# Patient Record
Sex: FEMALE | Race: WHITE | NOT HISPANIC OR LATINO | Employment: UNEMPLOYED | URBAN - METROPOLITAN AREA
[De-identification: names, ages, dates, MRNs, and addresses within clinical notes are randomized per-mention and may not be internally consistent; named-entity substitution may affect disease eponyms.]

---

## 2017-07-19 ENCOUNTER — ALLSCRIPTS OFFICE VISIT (OUTPATIENT)
Dept: OTHER | Facility: OTHER | Age: 57
End: 2017-07-19

## 2017-07-19 DIAGNOSIS — I10 ESSENTIAL (PRIMARY) HYPERTENSION: ICD-10-CM

## 2017-07-22 ENCOUNTER — TRANSCRIBE ORDERS (OUTPATIENT)
Dept: LAB | Facility: CLINIC | Age: 57
End: 2017-07-22

## 2017-07-22 ENCOUNTER — APPOINTMENT (OUTPATIENT)
Dept: LAB | Facility: CLINIC | Age: 57
End: 2017-07-22
Payer: COMMERCIAL

## 2017-07-22 DIAGNOSIS — I10 ESSENTIAL (PRIMARY) HYPERTENSION: ICD-10-CM

## 2017-07-22 LAB
ALBUMIN SERPL BCP-MCNC: 3.5 G/DL (ref 3.5–5)
ALP SERPL-CCNC: 108 U/L (ref 46–116)
ALT SERPL W P-5'-P-CCNC: 23 U/L (ref 12–78)
ANION GAP SERPL CALCULATED.3IONS-SCNC: 8 MMOL/L (ref 4–13)
AST SERPL W P-5'-P-CCNC: 28 U/L (ref 5–45)
BILIRUB SERPL-MCNC: 0.65 MG/DL (ref 0.2–1)
BUN SERPL-MCNC: 16 MG/DL (ref 5–25)
CALCIUM SERPL-MCNC: 8.9 MG/DL (ref 8.3–10.1)
CHLORIDE SERPL-SCNC: 103 MMOL/L (ref 100–108)
CHOLEST SERPL-MCNC: 166 MG/DL (ref 50–200)
CO2 SERPL-SCNC: 27 MMOL/L (ref 21–32)
CREAT SERPL-MCNC: 0.78 MG/DL (ref 0.6–1.3)
GFR SERPL CREATININE-BSD FRML MDRD: >60 ML/MIN/1.73SQ M
GLUCOSE P FAST SERPL-MCNC: 94 MG/DL (ref 65–99)
HDLC SERPL-MCNC: 55 MG/DL (ref 40–60)
LDLC SERPL CALC-MCNC: 73 MG/DL (ref 0–100)
POTASSIUM SERPL-SCNC: 4.5 MMOL/L (ref 3.5–5.3)
PROT SERPL-MCNC: 7.6 G/DL (ref 6.4–8.2)
SODIUM SERPL-SCNC: 138 MMOL/L (ref 136–145)
TRIGL SERPL-MCNC: 191 MG/DL
TSH SERPL DL<=0.05 MIU/L-ACNC: 2.89 UIU/ML (ref 0.36–3.74)

## 2017-07-22 PROCEDURE — 36415 COLL VENOUS BLD VENIPUNCTURE: CPT

## 2017-07-22 PROCEDURE — 84443 ASSAY THYROID STIM HORMONE: CPT

## 2017-07-22 PROCEDURE — 80053 COMPREHEN METABOLIC PANEL: CPT

## 2017-07-22 PROCEDURE — 80061 LIPID PANEL: CPT

## 2017-07-24 ENCOUNTER — GENERIC CONVERSION - ENCOUNTER (OUTPATIENT)
Dept: OTHER | Facility: OTHER | Age: 57
End: 2017-07-24

## 2017-11-16 ENCOUNTER — ALLSCRIPTS OFFICE VISIT (OUTPATIENT)
Dept: OTHER | Facility: OTHER | Age: 57
End: 2017-11-16

## 2017-11-16 ENCOUNTER — LAB REQUISITION (OUTPATIENT)
Dept: LAB | Facility: HOSPITAL | Age: 57
End: 2017-11-16
Payer: COMMERCIAL

## 2017-11-16 DIAGNOSIS — Z12.39 ENCOUNTER FOR OTHER SCREENING FOR MALIGNANT NEOPLASM OF BREAST: ICD-10-CM

## 2017-11-16 DIAGNOSIS — Z01.419 ENCOUNTER FOR GYNECOLOGICAL EXAMINATION WITHOUT ABNORMAL FINDING: ICD-10-CM

## 2017-11-16 PROCEDURE — G0145 SCR C/V CYTO,THINLAYER,RESCR: HCPCS | Performed by: OBSTETRICS & GYNECOLOGY

## 2017-11-16 PROCEDURE — 87624 HPV HI-RISK TYP POOLED RSLT: CPT | Performed by: OBSTETRICS & GYNECOLOGY

## 2017-11-22 LAB — HPV RRNA GENITAL QL NAA+PROBE: NORMAL

## 2017-11-28 ENCOUNTER — GENERIC CONVERSION - ENCOUNTER (OUTPATIENT)
Dept: OTHER | Facility: OTHER | Age: 57
End: 2017-11-28

## 2017-11-28 LAB
LAB AP GYN PRIMARY INTERPRETATION: NORMAL
Lab: NORMAL

## 2017-12-16 ENCOUNTER — HOSPITAL ENCOUNTER (OUTPATIENT)
Dept: MAMMOGRAPHY | Facility: CLINIC | Age: 57
Discharge: HOME | End: 2017-12-16
Payer: COMMERCIAL

## 2017-12-16 DIAGNOSIS — Z12.39 ENCOUNTER FOR OTHER SCREENING FOR MALIGNANT NEOPLASM OF BREAST: ICD-10-CM

## 2017-12-16 PROCEDURE — 77063 BREAST TOMOSYNTHESIS BI: CPT

## 2017-12-16 PROCEDURE — G0202 SCR MAMMO BI INCL CAD: HCPCS

## 2017-12-18 ENCOUNTER — GENERIC CONVERSION - ENCOUNTER (OUTPATIENT)
Dept: OTHER | Facility: OTHER | Age: 57
End: 2017-12-18

## 2018-01-09 NOTE — RESULT NOTES
Verified Results  (1) THIN PREP PAP WITH IMAGING 16VJS8886 02:29PM Ev Nuñez Order Number: [id number]     Test Name Result Flag Reference   LAB AP CASE REPORT (Report)     Gynecologic Cytology Report            Case: [id number]                  Authorizing Provider: Abran Sanchez MD     Collected:      11/16/2017 1429        First Screen:     ADILENE Coon   Received:      11/20/2017 1109        Rescreen:       ADILENE Jaime                              Specimen:  LIQUID-BASED PAP, SCREENING, Cervix   HPV HIGH RISK RESULT (Report)     HPV, High Risk: HPV NEG, HPV16 NEG, HPV18 NEG      Other High Risk HPV Negative, HPV 16 Negative, HPV 18 Negative  HPV types: 16,18,31,33,35,39,45,51,52,56,58,59,66 and 68 DNA are undetectable or below the pre-set threshold  [employer] FDA approved Shiva 4800 is utilized with strict adherence to the manufacturers instruction  manual to test for the presence of High-Risk HPV DNA, as well as HPV 16 and HPV 18  This instrument  has been validated by our laboratory and/or by the   A negative result does not preclude the presence of HPV infection because results depend on adequate  specimen collection, absence of inhibitors and sufficient DNA to be detected  Additionally, HPV negative  results are not intended to prevent women from proceeding to colposcopy if clinically warranted  Positive HPV test results indicate the presence of any one or more of the high risk types, but since patients  are often co-infected with low-risk types it does not rule out the presence of low-risk types in patients  with mixed infections  LAB AP GYN PRIMARY INTERPRETATION      Negative for intraepithelial lesion or malignancy  Electronically signed by ADILENE Jaime on 11/28/2017 at 2:18 PM   LAB AP GYN SPECIMEN ADEQUACY      Satisfactory for evaluation  Endocervical/transformation zone component present     LAB AP GYN ADDITIONAL INFORMATION (Report) [employer]'s FDA approved ,  and ThinPrep Imaging System are   utilized with strict adherence to the 's instruction manual to   prepare gynecologic and non-gynecologic cytology specimens for the   production of ThinPrep slides as well as for gynecologic ThinPrep imaging  These processes have been validated by our laboratory and/or by the     The Pap test is not a diagnostic procedure and should not be used as the   sole means to detect cervical cancer  It is only a screening procedure to   aid in the detection of cervical cancer and its precursors  Both   false-negative and false-positive results have been experienced  Your   patient's test result should be interpreted in this context together with   the history and clinical findings

## 2018-01-09 NOTE — PROGRESS NOTES
Assessment    1  Encounter for preventive health examination (V70 0) (Z00 00)   2  Arthralgia of left hip (889 45) (N68 885)    Discussion/Summary    Well adult  Patient appears to be in stable health  She was again encouraged to obtain colonoscopy for screening test  She will obtain blood work as ordered  Hip pain  Patient will obtain x-ray of left hip for further evaluation  If x-rays within normal limits she will consider starting physical therapy  Chief Complaint  Pt is here for a well exam  Pt offers no complaints  Pt would like BW ordered  All meds/allergies reviewed with pt  History of Present Illness  HPI: Patient does exercise 3 days a week with a dance/aerobics class  She denies any recent illness  Her gynecology exam is up to date  Her mammogram is up to date however patient has not obtain colonoscopy  Review of Systems    Constitutional: No fever, no chills, feels well, no tiredness, no recent weight gain or weight loss  Eyes: No complaints of eye pain, no red eyes, no eyesight problems, no discharge, no dry eyes, no itching of eyes  ENT: no complaints of earache, no loss of hearing, no nose bleeds, no nasal discharge, no sore throat, no hoarseness  Cardiovascular: No complaints of slow heart rate, no fast heart rate, no chest pain, no palpitations, no leg claudication, no lower extremity edema  Respiratory: No complaints of shortness of breath, no wheezing, no cough, no SOB on exertion, no orthopnea, no PND  Gastrointestinal: No complaints of abdominal pain, no constipation, no nausea or vomiting, no diarrhea, no bloody stools  Genitourinary: No complaints of dysuria, no incontinence, no pelvic pain, no dysmenorrhea, no vaginal discharge or bleeding  Musculoskeletal: Patient complains of intermittent left hip discomfort which can last from a few seconds to several hours  She denies any acute trauma   Patient describes days where she can exercise without any discomfort of hip    Integumentary: No complaints of skin rash or lesions, no itching, no skin wounds, no breast pain or lump  Active Problems     1  Encounter for cervical Pap smear with pelvic exam (V76 2,V72 31) (Z01 419)   2  Encounter for screening colonoscopy (V76 51) (Z12 11)   3  Insect bite (919 4,E906 4) (W57 XXXA)   4  Lyme disease (088 81) (A69 20)   5  Skin rash (782 1) (R21)    Hyperlipidemia (272 4) (E78 5)       Benign essential hypertension (401 1) (I10)       Vitamin D deficiency (268 9) (E55 9)          Past Medical History    · History of Encounter for screening mammogram for malignant neoplasm of breast  (V76 12) (Z12 31)   · History of rosacea (V13 3) (Z87 2)   · History of Reported Pap Smear   · History of Skin Cancer (V10 83)    Social History    · Never a smoker    Current Meds   1  Artificial Tears 0 4 % Ophthalmic Solution; Therapy: (Recorded:09Zgi7503) to Recorded   2  Atorvastatin Calcium 20 MG Oral Tablet; Take 1 tablet daily; Therapy: 03Bpc8861 to (Last Tenny Stage)  Requested for: 89Yen4868 Ordered   3  Bystolic 10 MG Oral Tablet; TAKE ONE (1) TABLET(S) DAILY; Therapy: 06IRR6159 to (0487 72 23 66)  Requested for: 60Kew1696; Last   Rx:91Wmx7984 Ordered   4  Restasis 0 05 % Ophthalmic Emulsion; Therapy: (Recorded:58Iyu3137) to Recorded   5  Vitamin D (Ergocalciferol) 49244 UNIT Oral Capsule; TAKE 1 CAPSULE WEEKLY; Therapy: 83QBX9394 to (Last KZ:46VLN7534)  Requested for: 55ZXN6163 Ordered    Allergies    1  No Known Drug Allergies    Vitals   Recorded: 22Rko8608 09:09AM   Temperature 97 5 F   Heart Rate 82   Systolic 823   Diastolic 80   Height 5 ft 7 in   Weight 227 lb 4 00 oz   BMI Calculated 35 59   BSA Calculated 2 13     Physical Exam    Constitutional   General appearance: No acute distress, well appearing and well nourished      Ears, Nose, Mouth, and Throat   External inspection of ears and nose: Normal     Otoscopic examination: Tympanic membranes translucent with normal light reflex  Canals patent without erythema  Oropharynx: Normal with no erythema, edema, exudate or lesions  Pulmonary   Respiratory effort: No increased work of breathing or signs of respiratory distress  Auscultation of lungs: Clear to auscultation  Cardiovascular   Auscultation of heart: Normal rate and rhythm, normal S1 and S2, no murmurs  Carotid pulses: 2+ bilaterally  Examination of extremities for edema and/or varicosities: Normal     Abdomen   Abdomen: Non-tender, no masses  Liver and spleen: No hepatomegaly or splenomegaly  Lymphatic   Palpation of lymph nodes in neck: No lymphadenopathy  Musculoskeletal   Gait and station: Normal     Joints, bones, and muscles: Normal     Range of motion: Abnormal   Patient has mild to moderate decreased range of motion with left hip in internal and external rotation  Negative tenderness to palpation of the greater trochanter  Stability: Normal     Muscle strength/tone: Normal        Health Management  Encounter for cervical Pap smear with pelvic exam   (every) THINPREP PAP; every 1 year; Next Due: 47NNR8297; Overdue  Encounter for screening colonoscopy   COLONOSCOPY; every 10 years; Next Due: 06NCS2956; Overdue  History of Encounter for screening mammogram for malignant neoplasm of breast   Digital Bilateral Screening Mammogram With CAD; every 1 year; Next Due: 00BXR3665;   Overdue    Signatures   Electronically signed by : JABARI Posada ; Feb 29 3135 11:03AM EST                       (Author)

## 2018-01-10 NOTE — RESULT NOTES
Verified Results  * XR HIP 2+ VIEW LEFT 67XWT1941 08:87UF Elsa Broderick     Test Name Result Flag Reference   * XR HIP/PELV 2-3 VWS LEFT (Report)     LEFT HIP     INDICATION: Left hip pain  COMPARISON: None     VIEWS: AP pelvis and 2 coned down views; 3 images     FINDINGS:     There is no fracture or dislocation  Moderate degenerative osteoarthritis of the hip joint with joint space narrowing and osteophyte formation of the femoral head and acetabulum  Degenerative disc disease within the lower lumbar spine  Mild degenerative osteoarthritis of the right hip  No lytic or blastic lesions are seen  Soft tissues are unremarkable  IMPRESSION:     Degenerative osteoarthritis  No acute osseous abnormality  Workstation performed: [id number]     Signed by:   Sabra Lee DO   3/22/16       Discussion/Summary   hip xray shows moderate arthritis   I would refer to [facility] for further eval and treat

## 2018-01-10 NOTE — RESULT NOTES
Verified Results  (1) COMPREHENSIVE METABOLIC PANEL 83QKO8308 95:13SU Dearl Pointer    Order Number: [id number]  TW Order Number: [id number] Order Number: [id number] Order Number: [id number]     Test Name Result Flag Reference   GLUCOSE,RANDM 91 mg/dL     If the patient is fasting, the ADA then defines impaired fasting glucose as > 100 mg/dL and diabetes as > or equal to 123 mg/dL  SODIUM 140 mmol/L  136-145   POTASSIUM 4 5 mmol/L  3 5-5 3   CHLORIDE 106 mmol/L  100-108   CARBON DIOXIDE 29 mmol/L  21-32   ANION GAP (CALC) 5 mmol/L  4-13   BLOOD UREA NITROGEN 16 mg/dL  5-25   CREATININE 0 72 mg/dL  0 60-1 30   Standardized to IDMS reference method   CALCIUM 8 7 mg/dL  8 3-10 1   BILI, TOTAL 0 67 mg/dL  0 20-1 00   ALK PHOSPHATAS 107 U/L     ALT (SGPT) 23 U/L  12-78   AST(SGOT) 22 U/L  5-45   ALBUMIN 3 6 g/dL  3 5-5 0   TOTAL PROTEIN 6 9 g/dL  6 4-8 2   eGFR Non-African American      >60 0 ml/min/1 73sq [facility] Disease Education Program recommendations are as follows:  GFR calculation is accurate only with a steady state creatinine  Chronic Kidney disease less than 60 ml/min/1 73 sq  meters  Kidney failure less than 15 ml/min/1 73 sq  meters  (1) LIPID PANEL, FASTING 83FMS1837 10:60SL Dearl Pointer    Order Number: [id number]  TW Order Number: [id number] Order Number: [id number] Order Number: [id number]     Test Name Result Flag Reference   CHOLESTEROL 153 mg/dL     HDL,DIRECT 53 mg/dL  40-60   Specimen collection should occur prior to Metamizole administration due to the potential for falsely depressed results     LDL CHOLESTEROL CALCULATED 66 mg/dL  0-100   Triglyceride:         Normal              <150 mg/dl       Borderline High    150-199 mg/dl       High               200-499 mg/dl       Very High          >499 mg/dl  Cholesterol:         Desirable        <200 mg/dl      Borderline High  200-239 mg/dl      High             >239 mg/dl  HDL Cholesterol: High    >59 mg/dL      Low     <41 mg/dL  LDL CALCULATED:    This screening LDL is a calculated result  It does not have the accuracy of the Direct Measured LDL in the monitoring of patients with hyperlipidemia and/or statin therapy  Direct Measure LDL (WTY972) must be ordered separately in these patients  TRIGLYCERIDES 171 mg/dL H <=150   Specimen collection should occur prior to N-Acetylcysteine or Metamizole administration due to the potential for falsely depressed results       (1) TSH 11JMF2667 89:16FB [facility] Order Number: [id number]   Order Number: [id number] Order Number: [id number] Order Number: [id number]     Test Name Result Flag Reference   TSH 2 680 uIU/mL  0 358-3 740   The recommended reference ranges for TSH during pregnancy are as follows:  First trimester 0 1 to 2 5 uIU/mL  Second trimester  0 2 to 3 0 uIU/mL  Third trimester 0 3 to 3 0 uIU/m     (1) VITAMIN D 25-HYDROXY 35NLE6756 21:21TP Soren Morgan Order Number: [id number]   Order Number: [id number]     Test Name Result Flag Reference   VIT D 25-HYDROX 33 5 ng/mL  30 0-100 0       Discussion/Summary   All recent blood test was within normal limits

## 2018-01-11 NOTE — RESULT NOTES
Discussion/Summary   all bw was normal     Verified Results  (1) COMPREHENSIVE METABOLIC PANEL 10OPS9810 46:69ZE Romina Wilsons Order Number: [id number]     Test Name Result Flag Reference   SODIUM 138 mmol/L  136-145   POTASSIUM 4 5 mmol/L  3 5-5 3   CHLORIDE 103 mmol/L  100-108   CARBON DIOXIDE 27 mmol/L  21-32   ANION GAP (CALC) 8 mmol/L  4-13   BLOOD UREA NITROGEN 16 mg/dL  5-25   CREATININE 0 78 mg/dL  0 60-1 30   Standardized to IDMS reference method   CALCIUM 8 9 mg/dL  8 3-10 1   BILI, TOTAL 0 65 mg/dL  0 20-1 00   ALK PHOSPHATAS 108 U/L     ALT (SGPT) 23 U/L  12-78   AST(SGOT) 28 U/L  5-45   ALBUMIN 3 5 g/dL  3 5-5 0   TOTAL PROTEIN 7 6 g/dL  6 4-8 2   eGFR Non-African American      >60 0 ml/min/1 73sq [facility] Disease Education Program recommendations are as follows:  GFR calculation is accurate only with a steady state creatinine  Chronic Kidney disease less than 60 ml/min/1 73 sq  meters  Kidney failure less than 15 ml/min/1 73 sq  meters  GLUCOSE FASTING 94 mg/dL  65-99     (1) LIPID PANEL, FASTING 57HVG0072 79:25UK Romina Miguel Order Number: [id number]     Test Name Result Flag Reference   CHOLESTEROL 166 mg/dL     HDL,DIRECT 55 mg/dL  40-60   Specimen collection should occur prior to Metamizole administration due to the potential for falsely depressed results  LDL CHOLESTEROL CALCULATED 73 mg/dL  0-100   Triglyceride:         Normal              <150 mg/dl       Borderline High    150-199 mg/dl       High               200-499 mg/dl       Very High          >499 mg/dl  Cholesterol:         Desirable        <200 mg/dl      Borderline High  200-239 mg/dl      High             >239 mg/dl  HDL Cholesterol:        High    >59 mg/dL      Low     <41 mg/dL  LDL CALCULATED:    This screening LDL is a calculated result  It does not have the accuracy of the Direct Measured LDL in the monitoring of patients with hyperlipidemia and/or statin therapy  Direct Measure LDL (FRI561) must be ordered separately in these patients  TRIGLYCERIDES 191 mg/dL H <=150   Specimen collection should occur prior to N-Acetylcysteine or Metamizole administration due to the potential for falsely depressed results  (1) TSH 82BOT3441 22:13YP Jumana Cardenas    Order Number: [id number]     Test Name Result Flag Reference   TSH 2 890 uIU/mL  0 358-3 740   Patients undergoing fluorescein dye angiography may retain small amounts of fluorescein in the body for 48-72 hours post procedure  Samples containing fluorescein can produce falsely depressed TSH values  If the patient had this procedure,a specimen should be resubmitted post fluorescein clearance            The recommended reference ranges for TSH during pregnancy are as follows:  First trimester 0 1 to 2 5 uIU/mL  Second trimester  0 2 to 3 0 uIU/mL  Third trimester 0 3 to 3 0 uIU/m

## 2018-01-12 NOTE — PROGRESS NOTES
Chief Complaint  pt here for bp check temp was 97 5 bp first time was 124/80,,, 15 min later bp was 120/80 , pt feels fine , pulse was 82 , resp was 14 told pt if u don't hear back from dr , no concern don't worry      Active Problems    1  Arthralgia of left hip (719 45) (M25 552)   2  Benign essential hypertension (401 1) (I10)   3  Contact dermatitis due to poison ivy (692 6) (L23 7)   4  Encounter for cervical Pap smear with pelvic exam (V76 2,V72 31) (Z01 419)   5  Encounter for screening colonoscopy (V76 51) (Z12 11)   6  Hyperlipidemia (272 4) (E78 5)   7  Insect bite (919 4,E906 4) (W57 XXXA)   8  Lyme disease (088 81) (A69 20)   9  Primary localized osteoarthritis of left hip (715 15) (M16 12)   10  Skin rash (782 1) (R21)   11  Vitamin D deficiency (268 9) (E55 9)    Current Meds   1  Artificial Tears 0 4 % Ophthalmic Solution; Therapy: (Recorded:96Wgb5511) to Recorded   2  Atorvastatin Calcium 20 MG Oral Tablet; TAKE ONE (1) TABLET(S) DAILY; Therapy: 45Aem8521 to (Evaluate:88Pig6504)  Requested for: 25Wrq5502; Last   Rx:72Hlk1085 Ordered   3  Bystolic 20 MG Oral Tablet; TAKE 1 TABLET DAILY; Therapy: 66HPF3521 to (Evaluate:52Mez3175)  Requested for: 82Nyo7182; Last   Rx:87Zjt8211 Ordered   4  Restasis 0 05 % Ophthalmic Emulsion; Therapy: (Recorded:99Iqd4411) to Recorded   5  Vitamin D (Ergocalciferol) 03423 UNIT Oral Capsule; TAKE 1 CAPSULE WEEKLY; Therapy: 02GAW8141 to (Last JM:17SKM2841)  Requested for: 56XKO8425 Ordered    Allergies    1  No Known Drug Allergies    Future Appointments    Date/Time Provider Specialty Site   11/62/4287 92:48 AM JABARI Robin   Family Medicine [address] [place] [address]     Signatures   Electronically signed by : Marthe Krabbe, M D ; Aug 19 7493 12:49PM EST                       (Author)

## 2018-01-14 VITALS
BODY MASS INDEX: 37.06 KG/M2 | WEIGHT: 236.13 LBS | HEIGHT: 67 IN | SYSTOLIC BLOOD PRESSURE: 130 MMHG | DIASTOLIC BLOOD PRESSURE: 86 MMHG | HEART RATE: 76 BPM | TEMPERATURE: 97.2 F

## 2018-01-15 NOTE — RESULT NOTES
Verified Results  MAMMO SCREENING BILATERAL W 3D & CAD 28IVW8262 03:49PM Joan Serrato Order Number: [id number]    - Patient Instructions: To schedule this appointment, please contact Central Scheduling at [phone]  Do not wear any perfume, powder, lotion or deodorant on breast or underarm area  Please bring your doctors order, referral (if needed) and insurance information with you on the day of the test  Failure to bring this information may result in this test being rescheduled  Arrive 15 minutes prior to your appointment time to register  On the day of your test, please bring any prior mammogram or breast studies with you that were not performed at a [facility]  Failure to bring prior exams may result in your test needing to be rescheduled   Order Number: [id number]    - Patient Instructions: To schedule this appointment, please contact Central Scheduling at [phone]  Do not wear any perfume, powder, lotion or deodorant on breast or underarm area  Please bring your doctors order, referral (if needed) and insurance information with you on the day of the test  Failure to bring this information may result in this test being rescheduled  Arrive 15 minutes prior to your appointment time to register  On the day of your test, please bring any prior mammogram or breast studies with you that were not performed at a [facility]  Failure to bring prior exams may result in your test needing to be rescheduled  Test Name Result Flag Reference   MAMMO SCREENING BILATERAL W 3D & CAD (Report)     Patient History:   Patient is postmenopausal    Family history of pancreatic cancer in maternal uncle at age 76  Benign stereotactic core biopsy of the right breast, August 5, 2005  Patient has never smoked  Patient's BMI is 33 9  Reason for exam: screening (asymptomatic)       Mammo Screening Bilateral W DBT and CAD: November 16, 2016 -    Check In #: [id number]   2D/3D Procedure   3D views: Bilateral MLO view(s) were taken  2D views: Bilateral MLO and CC view(s) were taken  Technologist: ARIEL Davis (ARIEL)(M)   Prior study comparison: October 20, 2015, left breast DIGITAL    UNILATERAL SCREENING MAMMOGRAM WITH CAD, performed at [address] [facility]  October 20, 2015, right breast unilateral    diagnostic mammogram, performed at [address] [place] [address]  April 17, 2015, right breast unilateral diagnostic    mammogram, performed at [address] [place] [address]  October 15, 2014, bilateral WB digitl bilat seth, performed at [address] [place] [address]  April 15, 2014, right breast    unilateral diagnostic mammogram, performed at [address] [place] [address]  October 14, 2013, right breast unilateral    diagnostic mammogram, performed at [address] [place] [address]  October 8, 2013, digital bilateral screening mammogram,    performed at [address] [facility]  October 2, 2012,    digital bilateral screening mammogram, performed at [address] [facility]  September 29, 2011, digital bilateral    screening mammogram, performed at [facility]  There are scattered fibroglandular densities  A combination of    mediolateral oblique 3-D tomographic slices as well as standard    two-dimensional orthogonal images were obtained  No dominant soft tissue mass, architectural distortion or    suspicious calcifications are noted  The skin and nipple    contours are within normal limits  No evidence of malignancy  No significant changes   when compared with prior studies  ASSESSMENT: BiRad:1 - Negative     Recommendation:   Routine screening mammogram of both breasts in 1 year  A    reminder letter will be sent  8-10% of cancers will be missed on mammography  Management of a    palpable abnormality must be based on clinical grounds   Patients    will be notified of their results via letter from our facility  Accredited by [employer] of Radiology and FDA  Transcription Location: ARIEL Lundy 98: [id number]     Risk Value(s):   Tyrer-Cuzick 10 Year: 8 672%, Tyrer-Cuzick Lifetime: 25 212%,    Myriad Table: 1 5%, COOKIE 5 Year: 1 8%, NCI Lifetime: 11 7%   Signed by:    Tika Crooks MD   11/17/16

## 2018-01-17 ENCOUNTER — ALLSCRIPTS OFFICE VISIT (OUTPATIENT)
Dept: OTHER | Facility: OTHER | Age: 58
End: 2018-01-17

## 2018-01-18 NOTE — PROGRESS NOTES
Assessment   1  Benign essential hypertension (401 1) (I10)    Plan   Health Maintenance    · COLONOSCOPY; Status:Active; Requested GLENDY:[id number]; Discussion/Summary      Hypertension  Patient's blood pressure is stable at this time and she will continue current regimen of medications  She he was given a prescription to obtain screening colonoscopy with Dr Susan Wesley  Patient's last blood test results were within normal limits  Chief Complaint   Pt is here for 6 month follow up  All meds/allergies reviewed and updated w/ pt  History of Present Illness   Patient denies any recent illness  She is up-to-date with seeing her gynecologist and mammogram  She has yet to have a screening colonoscopy  Review of Systems        Constitutional: No fever, no chills, feels well, no tiredness, no recent weight gain or weight loss  Eyes: No complaints of eye pain, no red eyes, no eyesight problems, no discharge, no dry eyes, no itching of eyes  ENT: no complaints of earache, no loss of hearing, no nose bleeds, no nasal discharge, no sore throat, no hoarseness  Cardiovascular: No complaints of slow heart rate, no fast heart rate, no chest pain, no palpitations, no leg claudication, no lower extremity edema  Respiratory: No complaints of shortness of breath, no wheezing, no cough, no SOB on exertion, no orthopnea, no PND  Gastrointestinal: No complaints of abdominal pain, no constipation, no nausea or vomiting, no diarrhea, no bloody stools  Genitourinary: No complaints of dysuria, no incontinence, no pelvic pain, no dysmenorrhea, no vaginal discharge or bleeding  Musculoskeletal: No complaints of arthralgias, no myalgias, no joint swelling or stiffness, no limb pain or swelling  Integumentary: No complaints of skin rash or lesions, no itching, no skin wounds, no breast pain or lump        Neurological: No complaints of headache, no confusion, no convulsions, no numbness, no dizziness or fainting, no tingling, no limb weakness, no difficulty walking  Active Problems   1  Arthralgia of left hip (719 45) (M25 552)   2  Benign essential hypertension (401 1) (I10)   3  Contact dermatitis due to poison ivy (692 6) (L23 7)   4  Encounter for cervical Pap smear with pelvic exam (V76 2,V72 31) (Z01 419)   5  Encounter for other screening for malignant neoplasm of breast (V76 19) (Z12 39)   6  Encounter for screening colonoscopy (V76 51) (Z12 11)   7  Hyperlipidemia (272 4) (E78 5)   8  Insect bite (919 4,E906 4) (W57 XXXA)   9  Lyme disease (088 81) (A69 20)   10  Primary localized osteoarthritis of left hip (715 15) (M16 12)   11  Skin rash (782 1) (R21)   12  Vitamin D deficiency (268 9) (E55 9)    Past Medical History   1  History of Encounter for screening mammogram for malignant neoplasm of breast     (V76 12) (Z12 31)   2  History of rosacea (V13 3) (Z87 2)   3  History of Reported Pap Smear   4  History of Skin Cancer (V10 83)    Surgical History   1  Denied: History Of Prior Surgery    Family History   Mother    1  Family history of Living and Healthy  Father    2  Family history of cardiac disorder (V17 49) (Z82 49)   3  Family history of dementia (V17 2) (Z81 8)   4  Family history of hypercholesterolemia (V18 19) (Z83 42)  Maternal Uncle    5  Family history of pancreatic cancer (V16 0) (Z80 0)    Social History    · Acute alcohol use (Z72 89)   · Denied: History of Drug use   · Exercises 5 to 6 times per week (V49 89) (Z78 9)   · Never a smoker   · Sexually active  The social history was reviewed and updated today  Current Meds    1  Artificial Tears 0 4 % SOLN;     Therapy: (Recorded:16Lzo7936) to Recorded   2  Atorvastatin Calcium 20 MG Oral Tablet; take one tablet by mouth every day; Therapy: 58Uib5700 to (Evaluate:24Mar2018)  Requested for: 78Bnn2615; Last     Rx:70Uhv6046 Ordered   3  Bystolic 20 MG Oral Tablet; take one tablet by mouth every day;      Therapy: 42IBF8478 to (Evaluate:24Mar2018)  Requested for: 99Mts4578; Last     Rx:22Qhz0491 Ordered   4  Calcium CAPS; Therapy: (Recorded:01Nov2016) to Recorded   5  Multivitamins TABS; Therapy: (Recorded:01Nov2016) to Recorded   6  Restasis 0 05 % Ophthalmic Emulsion; Therapy: (Recorded:84Pke5693) to Recorded     The medication list was reviewed and updated today  Allergies   1  No Known Drug Allergies    Vitals   Vital Signs    Recorded: 79QEE6496 10:47AM Recorded: 26EIJ6127 10:34AM   Temperature  96 F   Heart Rate  76   Respiration  16   Systolic 347 992   Diastolic 80 90   Height  5 ft 7 in   Weight  236 lb 2 oz   BMI Calculated  36 98   BSA Calculated  2 17     Physical Exam        Constitutional      General appearance: No acute distress, well appearing and well nourished  Ears, Nose, Mouth, and Throat      External inspection of ears and nose: Normal        Otoscopic examination: Tympanic membranes translucent with normal light reflex  Canals patent without erythema  Oropharynx: Normal with no erythema, edema, exudate or lesions  Pulmonary      Respiratory effort: No increased work of breathing or signs of respiratory distress  Auscultation of lungs: Clear to auscultation  Cardiovascular      Auscultation of heart: Normal rate and rhythm, normal S1 and S2, without murmurs  Examination of extremities for edema and/or varicosities: Normal        Lymphatic      Palpation of lymph nodes in neck: No lymphadenopathy  Health Management   Encounter for cervical Pap smear with pelvic exam   (every) THINPREP PAP; every 1 year; Next Due: 57NNL2613; Overdue  Encounter for screening colonoscopy   COLONOSCOPY; every 10 years; Next Due: 92KPJ0647; Overdue  History of Encounter for screening mammogram for malignant neoplasm of breast   Digital Bilateral Screening Mammogram With CAD; every 1 year; Next Due: 41DHA1054;  Overdue    Future Appointments      Date/Time Provider Specialty Site   11/19/2018 01:40 PM JABARI Cardona   Obstetrics/Gynecology [facility]  [facility]    Electronically signed by : JABARI Sanchez ; Jan 17 0660 10:54AM EST                       (Author)

## 2018-01-22 VITALS
SYSTOLIC BLOOD PRESSURE: 120 MMHG | DIASTOLIC BLOOD PRESSURE: 80 MMHG | RESPIRATION RATE: 16 BRPM | HEART RATE: 76 BPM | TEMPERATURE: 96 F | WEIGHT: 236.13 LBS | BODY MASS INDEX: 37.06 KG/M2 | HEIGHT: 67 IN

## 2018-01-22 VITALS
WEIGHT: 233 LBS | HEIGHT: 67 IN | BODY MASS INDEX: 36.57 KG/M2 | DIASTOLIC BLOOD PRESSURE: 82 MMHG | SYSTOLIC BLOOD PRESSURE: 128 MMHG

## 2018-01-23 NOTE — RESULT NOTES
Verified Results  MAMMO SCREENING BILATERAL W 3D & CAD 06Bww8486 10:45AM Gamal Hung Order Number: [id number]    - Patient Instructions: To schedule this appointment, please contact Central Scheduling at [phone]  Do not wear any perfume, powder, lotion or deodorant on breast or underarm area  Please bring your doctors order, referral (if needed) and insurance information with you on the day of the test  Failure to bring this information may result in this test being rescheduled  Arrive 15 minutes prior to your appointment time to register  On the day of your test, please bring any prior mammogram or breast studies with you that were not performed at a [facility]  Failure to bring prior exams may result in your test needing to be rescheduled  Test Name Result Flag Reference   MAMMO SCREENING BILATERAL W 3D & CAD (Report)     Patient History:   Patient is postmenopausal    Family history of pancreatic cancer at age 76 in maternal uncle  Benign stereotactic core biopsy of the right breast, August 5, 2005  No Hormone Replacement Therapy   Patient has never smoked  Patient's BMI is 33 9  Reason for exam: screening, asymptomatic  Mammo Screening Bilateral W DBT and CAD: December 16, 2017 -    Check In #: [de-identified]   2D/3D Procedure   3D views: Bilateral MLO view(s) were taken  2D views: Bilateral CC view(s) were taken  Technologist: ARIEL Cobb (R)(M)   Prior study comparison: November 16, 2016, mammo screening    bilateral W DBT and CAD performed at [facility]  October 20, 2015, left breast DIGITAL    UNILATERAL SCREENING MAMMOGRAM WITH CAD, performed at [facility]  October 20, 2015, right breast unilateral    diagnostic mammogram, performed at [address] [place] [address]  There are scattered fibroglandular densities   A combination of    mediolateral oblique 3D tomographic slices as well as standard    two-dimensional orthogonal images were obtained  No new dominant    soft tissue mass, architectural distortion or suspicious    calcifications are noted  The skin and nipple structures are    within normal limits  Benign appearing calcifications are noted  Prior biopsy clip noted  No mammographic evidence of malignancy  No    significant changes when compared with prior studies  ACR BI-RADSï¾® Assessments: BiRad:2 - Benign     Recommendation:   Routine screening mammogram of both breasts in 1 year  The patient is scheduled in a reminder system for screening    mammography  8-10% of cancers will be missed on mammography  Management of a    palpable abnormality must be based on clinical grounds  Patients    will be notified of their results via letter from our facility  Accredited by [employer] of [employer] and FDA       Transcription Location:  [address]: [id number]     Risk Value(s):   Tyrer-Cuzick 10 Year: 4 500%, Tyrer-Cuzick Lifetime: 12 900%,    Myriad Table: 1 5%, COOKIE 5 Year: 1 9%, NCI Lifetime: 11 5%   Signed by:   Nata Khan MD   12/18/17

## 2018-04-04 DIAGNOSIS — E78.5 HYPERLIPIDEMIA, UNSPECIFIED HYPERLIPIDEMIA TYPE: Primary | ICD-10-CM

## 2018-04-04 RX ORDER — ATORVASTATIN CALCIUM 20 MG/1
TABLET, FILM COATED ORAL
Qty: 30 TABLET | Refills: 5 | Status: SHIPPED | OUTPATIENT
Start: 2018-04-04 | End: 2018-10-06 | Stop reason: SDUPTHER

## 2018-04-12 DIAGNOSIS — I10 ESSENTIAL HYPERTENSION: Primary | ICD-10-CM

## 2018-04-12 RX ORDER — NEBIVOLOL HYDROCHLORIDE 20 MG/1
TABLET ORAL
Qty: 30 TABLET | Refills: 5 | Status: SHIPPED | OUTPATIENT
Start: 2018-04-12 | End: 2018-10-06 | Stop reason: SDUPTHER

## 2018-07-19 ENCOUNTER — OFFICE VISIT (OUTPATIENT)
Dept: FAMILY MEDICINE CLINIC | Facility: CLINIC | Age: 58
End: 2018-07-19
Payer: COMMERCIAL

## 2018-07-19 VITALS
WEIGHT: 238.8 LBS | DIASTOLIC BLOOD PRESSURE: 98 MMHG | TEMPERATURE: 98.4 F | SYSTOLIC BLOOD PRESSURE: 160 MMHG | HEIGHT: 68 IN | RESPIRATION RATE: 16 BRPM | BODY MASS INDEX: 36.19 KG/M2 | HEART RATE: 66 BPM

## 2018-07-19 DIAGNOSIS — I10 BENIGN ESSENTIAL HYPERTENSION: ICD-10-CM

## 2018-07-19 DIAGNOSIS — E78.5 HYPERLIPIDEMIA, UNSPECIFIED HYPERLIPIDEMIA TYPE: ICD-10-CM

## 2018-07-19 DIAGNOSIS — I10 ESSENTIAL HYPERTENSION: Primary | ICD-10-CM

## 2018-07-19 PROCEDURE — 99213 OFFICE O/P EST LOW 20 MIN: CPT | Performed by: FAMILY MEDICINE

## 2018-07-19 PROCEDURE — 3008F BODY MASS INDEX DOCD: CPT | Performed by: FAMILY MEDICINE

## 2018-07-19 RX ORDER — CYCLOSPORINE 0.5 MG/ML
EMULSION OPHTHALMIC
COMMUNITY
End: 2022-06-16

## 2018-07-19 RX ORDER — HYDROCHLOROTHIAZIDE 25 MG/1
25 TABLET ORAL DAILY
Qty: 30 TABLET | Refills: 5 | Status: SHIPPED | OUTPATIENT
Start: 2018-07-19 | End: 2019-01-02 | Stop reason: SDUPTHER

## 2018-07-19 NOTE — PROGRESS NOTES
FAMILY PRACTICE OFFICE VISIT       NAME: Gerry Hartmann  AGE: 62 y o  SEX: female       : 1960        MRN: [id number]    DATE: 2018  TIME: 12:44 PM    Assessment and Plan     Problem List Items Addressed This Visit     Benign essential hypertension     Hypertension  Blood pressures are above goal   She was given prescription to add hydrochlorothiazide 25 mg and continue with her current dose of of Bystolic  She will check blood pressures on her home device and call if they maintain above 140/90  She will obtain blood work as ordered         Relevant Medications    hydrochlorothiazide (HYDRODIURIL) 25 mg tablet    Hyperlipidemia     Hyperlipidemia  Patient will check lipid panel blood work and continue with current dose of statin therapy           Other Visit Diagnoses     Essential hypertension    -  Primary    Relevant Medications    hydrochlorothiazide (HYDRODIURIL) 25 mg tablet    Other Relevant Orders    CBC    Comprehensive metabolic panel    Lipid panel    TSH, 3rd generation            There are no Patient Instructions on file for this visit  Chief Complaint     Chief Complaint   Patient presents with    Follow-up     Patient is here for a follow up visit  History of Present Illness     Patient denies any symptoms of chest pain, shortness of breath, or headaches  She does walk 2 miles a day for exercise  She has not been checking home blood pressures on her device  She is a nonsmoker  Patient does see gynecologist for routine exams and mammograms  She has yet to obtain screening colonoscopy        Review of Systems   Review of Systems   Constitutional: Negative  HENT: Negative  Eyes: Negative  Respiratory: Negative  Cardiovascular: Negative  Gastrointestinal: Negative  Genitourinary: Negative  Musculoskeletal: Negative  Skin: Negative          Active Problem List     Patient Active Problem List   Diagnosis    Benign essential hypertension    Hyperlipidemia    Lyme disease       Past Medical History:  Past Medical History:   Diagnosis Date    Papanicolaou smear 12/01/2010    Pap and pelvic smears 12/2010, 10/2011, 10/2012    Rosacea     Skin cancer     H/O Basal Cell Cancer       Past Surgical History:  No past surgical history on file  Family History:  Family History   Problem Relation Age of Onset    No Known Problems Mother     Heart disease Father         Cardiac Disorder    Dementia Father     Hyperlipidemia Father     Pancreatic cancer Maternal Uncle        Social History:  Social History     Social History    Marital status: /Civil Union     Spouse name: N/A    Number of children: N/A    Years of education: N/A     Occupational History    Not on file  Social History Main Topics    Smoking status: Never Smoker    Smokeless tobacco: Never Used    Alcohol use Yes      Comment: Acute alcohol use    Drug use: No    Sexual activity: Yes     Other Topics Concern    Not on file     Social History Narrative    Exercises 5 50 6 times per week       Objective     Vitals:    07/19/18 1109   BP: 160/98   Pulse:    Resp:    Temp:      Wt Readings from Last 3 Encounters:   07/19/18 108 kg (238 lb 12 8 oz)   01/17/18 107 kg (236 lb 2 oz)   11/16/17 106 kg (233 lb)       Physical Exam   Constitutional: No distress  HENT:   Mouth/Throat: Oropharynx is clear and moist  No oropharyngeal exudate  Neck:   No carotid bruits   Cardiovascular:   Regular rate and rhythm with no murmurs   Pulmonary/Chest:   Lungs are clear to auscultation without wheezes,rales, or rhonchi   Musculoskeletal: She exhibits no edema         Pertinent Laboratory/Diagnostic Studies:  Lab Results   Component Value Date    GLUCOSE 91 06/08/2016    BUN 16 07/22/2017    CREATININE 0 78 07/22/2017    CALCIUM 8 9 07/22/2017     07/22/2017    K 4 5 07/22/2017    CO2 27 07/22/2017     07/22/2017     Lab Results   Component Value Date    ALT 23 07/22/2017 AST 28 07/22/2017    ALKPHOS 108 07/22/2017    BILITOT 0 65 07/22/2017       Lab Results   Component Value Date    WBC 5 89 02/12/2015    HGB 13 5 02/12/2015    HCT 41 1 02/12/2015    MCV 95 02/12/2015     02/12/2015       No results found for: TSH    Lab Results   Component Value Date    CHOL 166 07/22/2017     Lab Results   Component Value Date    TRIG 191 (H) 07/22/2017     Lab Results   Component Value Date    HDL 55 07/22/2017     Lab Results   Component Value Date    LDLCALC 73 07/22/2017     No results found for: HGBA1C    Results for orders placed or performed in visit on 11/16/17   HPV High Risk   Result Value Ref Range    HPV, High Risk HPV NEG, HPV16 NEG, HPV18 NEG HPV NEG, HPV16 NEG, HPV18 NEG   Liquid-based pap, screening   Result Value Ref Range    Case Report       Gynecologic Cytology Report                       Case: [id number]                                  Authorizing Provider:  Ashley Rowe MD          Collected:           11/16/2017 1429              First Screen:          ADILENE Rubio     Received:            11/20/2017 1109              Rescreen:              ADILENE Young                                                         Specimen:    LIQUID-BASED PAP, SCREENING, Cervix                                                        Primary Interpretation Negative for intraepithelial lesion or malignancy     Specimen Adequacy       Satisfactory for evaluation  Endocervical/transformation zone component present  High Risk HPV Result       HPV, High Risk: HPV NEG, HPV16 NEG, HPV18 NEG      Other High Risk HPV Negative, HPV 16 Negative, HPV 18 Negative  HPV types: 16,18,31,33,35,39,45,51,52,56,58,59,66 and 68 DNA are undetectable or below the pre-set threshold  Roches FDA approved Shiva 4800 is utilized with strict adherence to the s instruction  manual to test for the presence of High-Risk HPV DNA, as well as HPV 16 and HPV 18   This instrument  has been validated by our laboratory and/or by the   A negative result does not preclude the presence of HPV infection because results depend on adequate  specimen collection, absence of inhibitors and sufficient DNA to be detected  Additionally, HPV negative  results are not intended to prevent women from proceeding to colposcopy if clinically warranted  Positive HPV test results indicate the presence of any one or more of the high risk types, but since patients  are often co-infected with low-risk types it does not rule out the presence of low-risk  types in patients  with mixed infections  Additional Information       Guide's FDA approved ,  and ThinPrep Imaging System are utilized with strict adherence to the 's instruction manual to prepare gynecologic and non-gynecologic cytology specimens for the production of ThinPrep slides as well as for gynecologic ThinPrep imaging  These processes have been validated by our laboratory and/or by the   The Pap test is not a diagnostic procedure and should not be used as the sole means to detect cervical cancer  It is only a screening procedure to aid in the detection of cervical cancer and its precursors  Both false-negative and false-positive results have been experienced  Your patient's test result should be interpreted in this context together with the history and clinical findings           Orders Placed This Encounter   Procedures    CBC    Comprehensive metabolic panel    Lipid panel    TSH, 3rd generation       ALLERGIES:  No Known Allergies    Current Medications     Current Outpatient Prescriptions   Medication Sig Dispense Refill    atorvastatin (LIPITOR) 20 mg tablet TAKE ONE TABLET BY MOUTH EVERY DAY 30 tablet 5    BYSTOLIC 20 MG tablet TAKE ONE TABLET BY MOUTH EVERY DAY 30 tablet 5    cholecalciferol (VITAMIN D3) 1,000 units tablet Take 1 capsule by mouth once a week      cycloSPORINE (RESTASIS) 0 05 % ophthalmic emulsion Apply to eye      hypromellose (NATURES TEARS) 0 4 % SOLN Apply to eye      Multiple Vitamin (MULTIVITAMINS PO) Take by mouth      hydrochlorothiazide (HYDRODIURIL) 25 mg tablet Take 1 tablet (25 mg total) by mouth daily 30 tablet 5     No current facility-administered medications for this visit            Health Maintenance     Health Maintenance   Topic Date Due    HIV SCREENING  1960    Hepatitis C Screening  1960    Depression Screening PHQ-9  1960    CRC Screening: Colonoscopy  1960    PNEUMOCOCCAL POLYSACCHARIDE VACCINE X 2 AGE 11-64 YEARS IMMUNOCOMPROMISED (1) 01/12/1971    DTaP,Tdap,and Td Vaccines (1 - Tdap) 01/12/1981    INFLUENZA VACCINE  09/01/2018    PAP SMEAR  11/16/2018    MAMMOGRAM  12/16/2018     Immunization History   Administered Date(s) Administered    Influenza 07/29/2016    Influenza Quadrivalent Preservative Free 3 years and older IM 07/29/2016    Zoster 46/53/1881       Ariane Nuñez MD

## 2018-07-19 NOTE — ASSESSMENT & PLAN NOTE
Hyperlipidemia    Patient will check lipid panel blood work and continue with current dose of statin therapy

## 2018-07-19 NOTE — ASSESSMENT & PLAN NOTE
Hypertension  Blood pressures are above goal   She was given prescription to add hydrochlorothiazide 25 mg and continue with her current dose of of Bystolic  She will check blood pressures on her home device and call if they maintain above 140/90    She will obtain blood work as ordered

## 2018-07-24 ENCOUNTER — APPOINTMENT (OUTPATIENT)
Dept: LAB | Facility: CLINIC | Age: 58
End: 2018-07-24
Payer: COMMERCIAL

## 2018-07-24 DIAGNOSIS — I10 ESSENTIAL HYPERTENSION: ICD-10-CM

## 2018-07-24 LAB
ALBUMIN SERPL BCP-MCNC: 3.5 G/DL (ref 3.5–5)
ALP SERPL-CCNC: 105 U/L (ref 46–116)
ALT SERPL W P-5'-P-CCNC: 29 U/L (ref 12–78)
ANION GAP SERPL CALCULATED.3IONS-SCNC: 10 MMOL/L (ref 4–13)
AST SERPL W P-5'-P-CCNC: 39 U/L (ref 5–45)
BILIRUB SERPL-MCNC: 0.7 MG/DL (ref 0.2–1)
BUN SERPL-MCNC: 17 MG/DL (ref 5–25)
CALCIUM SERPL-MCNC: 9.3 MG/DL (ref 8.3–10.1)
CHLORIDE SERPL-SCNC: 100 MMOL/L (ref 100–108)
CHOLEST SERPL-MCNC: 167 MG/DL (ref 50–200)
CO2 SERPL-SCNC: 26 MMOL/L (ref 21–32)
CREAT SERPL-MCNC: 0.81 MG/DL (ref 0.6–1.3)
ERYTHROCYTE [DISTWIDTH] IN BLOOD BY AUTOMATED COUNT: 13.2 % (ref 11.6–15.1)
GFR SERPL CREATININE-BSD FRML MDRD: 80 ML/MIN/1.73SQ M
GLUCOSE P FAST SERPL-MCNC: 94 MG/DL (ref 65–99)
HCT VFR BLD AUTO: 43.3 % (ref 34.8–46.1)
HDLC SERPL-MCNC: 47 MG/DL (ref 40–60)
HGB BLD-MCNC: 13.9 G/DL (ref 11.5–15.4)
LDLC SERPL CALC-MCNC: 73 MG/DL (ref 0–100)
MCH RBC QN AUTO: 32.6 PG (ref 26.8–34.3)
MCHC RBC AUTO-ENTMCNC: 32.1 G/DL (ref 31.4–37.4)
MCV RBC AUTO: 102 FL (ref 82–98)
NONHDLC SERPL-MCNC: 120 MG/DL
PLATELET # BLD AUTO: 304 THOUSANDS/UL (ref 149–390)
PMV BLD AUTO: 10.1 FL (ref 8.9–12.7)
POTASSIUM SERPL-SCNC: 4.6 MMOL/L (ref 3.5–5.3)
PROT SERPL-MCNC: 8 G/DL (ref 6.4–8.2)
RBC # BLD AUTO: 4.26 MILLION/UL (ref 3.81–5.12)
SODIUM SERPL-SCNC: 136 MMOL/L (ref 136–145)
TRIGL SERPL-MCNC: 236 MG/DL
TSH SERPL DL<=0.05 MIU/L-ACNC: 4.14 UIU/ML (ref 0.36–3.74)
WBC # BLD AUTO: 8.65 THOUSAND/UL (ref 4.31–10.16)

## 2018-07-24 PROCEDURE — 80053 COMPREHEN METABOLIC PANEL: CPT

## 2018-07-24 PROCEDURE — 36415 COLL VENOUS BLD VENIPUNCTURE: CPT

## 2018-07-24 PROCEDURE — 84443 ASSAY THYROID STIM HORMONE: CPT

## 2018-07-24 PROCEDURE — 80061 LIPID PANEL: CPT

## 2018-07-24 PROCEDURE — 85027 COMPLETE CBC AUTOMATED: CPT

## 2018-07-25 ENCOUNTER — TELEPHONE (OUTPATIENT)
Dept: FAMILY MEDICINE CLINIC | Facility: CLINIC | Age: 58
End: 2018-07-25

## 2018-07-25 NOTE — TELEPHONE ENCOUNTER
----- Message from Thony Graham MD sent at 7/33/5227 12:26 PM EDT -----  Blood work shows borderline low thyroid but not enough to cause symptoms  I would recheck at next ov in 6 mos

## 2018-10-06 DIAGNOSIS — I10 ESSENTIAL HYPERTENSION: ICD-10-CM

## 2018-10-06 DIAGNOSIS — E78.5 HYPERLIPIDEMIA, UNSPECIFIED HYPERLIPIDEMIA TYPE: ICD-10-CM

## 2018-10-06 RX ORDER — NEBIVOLOL HYDROCHLORIDE 20 MG/1
TABLET ORAL
Qty: 30 TABLET | Refills: 5 | Status: SHIPPED | OUTPATIENT
Start: 2018-10-06 | End: 2019-03-31 | Stop reason: SDUPTHER

## 2018-10-06 RX ORDER — ATORVASTATIN CALCIUM 20 MG/1
TABLET, FILM COATED ORAL
Qty: 30 TABLET | Refills: 5 | Status: SHIPPED | OUTPATIENT
Start: 2018-10-06 | End: 2019-03-31 | Stop reason: SDUPTHER

## 2018-10-11 DIAGNOSIS — I10 ESSENTIAL HYPERTENSION: ICD-10-CM

## 2018-10-11 DIAGNOSIS — E78.5 HYPERLIPIDEMIA, UNSPECIFIED HYPERLIPIDEMIA TYPE: ICD-10-CM

## 2018-10-11 RX ORDER — NEBIVOLOL HYDROCHLORIDE 20 MG/1
TABLET ORAL
Qty: 30 TABLET | Refills: 5 | Status: SHIPPED | OUTPATIENT
Start: 2018-10-11 | End: 2018-10-23 | Stop reason: ALTCHOICE

## 2018-10-11 RX ORDER — ATORVASTATIN CALCIUM 20 MG/1
TABLET, FILM COATED ORAL
Qty: 30 TABLET | Refills: 5 | Status: SHIPPED | OUTPATIENT
Start: 2018-10-11 | End: 2018-10-23 | Stop reason: ALTCHOICE

## 2018-10-23 ENCOUNTER — OFFICE VISIT (OUTPATIENT)
Dept: FAMILY MEDICINE CLINIC | Facility: CLINIC | Age: 58
End: 2018-10-23
Payer: COMMERCIAL

## 2018-10-23 VITALS
SYSTOLIC BLOOD PRESSURE: 138 MMHG | HEIGHT: 68 IN | DIASTOLIC BLOOD PRESSURE: 88 MMHG | RESPIRATION RATE: 16 BRPM | BODY MASS INDEX: 35.98 KG/M2 | TEMPERATURE: 98.3 F | WEIGHT: 237.4 LBS | HEART RATE: 64 BPM

## 2018-10-23 DIAGNOSIS — E03.9 HYPOTHYROIDISM, UNSPECIFIED TYPE: Primary | ICD-10-CM

## 2018-10-23 DIAGNOSIS — I10 BENIGN ESSENTIAL HYPERTENSION: ICD-10-CM

## 2018-10-23 DIAGNOSIS — E03.8 OTHER SPECIFIED HYPOTHYROIDISM: ICD-10-CM

## 2018-10-23 PROCEDURE — 3008F BODY MASS INDEX DOCD: CPT | Performed by: FAMILY MEDICINE

## 2018-10-23 PROCEDURE — 99213 OFFICE O/P EST LOW 20 MIN: CPT | Performed by: FAMILY MEDICINE

## 2018-10-23 PROCEDURE — 3075F SYST BP GE 130 - 139MM HG: CPT | Performed by: FAMILY MEDICINE

## 2018-10-23 PROCEDURE — 3079F DIAST BP 80-89 MM HG: CPT | Performed by: FAMILY MEDICINE

## 2018-10-23 NOTE — ASSESSMENT & PLAN NOTE
Hypertension  Patient blood pressure is stable at this time    She will continue with current medications

## 2018-10-23 NOTE — PROGRESS NOTES
FAMILY PRACTICE OFFICE VISIT       NAME: Ceci Guerin  AGE: 62 y o  SEX: female       : 1960        MRN: [id number]    DATE: 10/23/2018  TIME: 11:36 AM    Assessment and Plan     Problem List Items Addressed This Visit     Benign essential hypertension     Hypertension  Patient blood pressure is stable at this time  She will continue with current medications         Other specified hypothyroidism     Hypothyroidism  Patient will check TSH level at this time  We will make further recommendations pending results of test           Other Visit Diagnoses     Hypothyroidism, unspecified type    -  Primary    Relevant Orders    TSH, 3rd generation            There are no Patient Instructions on file for this visit  Chief Complaint     Chief Complaint   Patient presents with    Follow-up     Patient is here for a follow up visit  History of Present Illness     Patient denies any recent illness  She has not been checking blood pressures at home on her home device  She denies any chest pain, shortness breath, or headaches  She did have a slightly abnormal TSH in blood work checked in 2018  She denies any changes in weight in the last year        Review of Systems   Review of Systems   Constitutional: Negative  HENT: Negative  Respiratory: Negative  Cardiovascular: Negative  Gastrointestinal: Negative  Active Problem List     Patient Active Problem List   Diagnosis    Benign essential hypertension    Hyperlipidemia    Lyme disease    Other specified hypothyroidism       Past Medical History:  Past Medical History:   Diagnosis Date    Papanicolaou smear 2010    Pap and pelvic smears 2010, 10/2011, 10/2012    Rosacea     Skin cancer     H/O Basal Cell Cancer       Past Surgical History:  No past surgical history on file      Family History:  Family History   Problem Relation Age of Onset    No Known Problems Mother     Heart disease Father Cardiac Disorder    Dementia Father     Hyperlipidemia Father     Pancreatic cancer Maternal Uncle        Social History:  Social History     Social History    Marital status: /Civil Union     Spouse name: N/A    Number of children: N/A    Years of education: N/A     Occupational History    Not on file  Social History Main Topics    Smoking status: Never Smoker    Smokeless tobacco: Never Used    Alcohol use Yes      Comment: Acute alcohol use    Drug use: No    Sexual activity: Yes     Other Topics Concern    Not on file     Social History Narrative    Exercises 5 50 6 times per week       Objective     Vitals:    10/23/18 1111   BP: 138/88   Pulse:    Resp:    Temp:      Wt Readings from Last 3 Encounters:   10/23/18 108 kg (237 lb 6 4 oz)   07/19/18 108 kg (238 lb 12 8 oz)   01/17/18 107 kg (236 lb 2 oz)       Physical Exam   Constitutional: No distress  Neck:   No carotid bruit   Cardiovascular:   Regular rate and rhythm with no murmurs   Pulmonary/Chest:   Lungs are clear to auscultation without wheezes,rales, or rhonchi   Musculoskeletal: She exhibits no edema  Lymphadenopathy:     She has no cervical adenopathy         Pertinent Laboratory/Diagnostic Studies:  Lab Results   Component Value Date    GLUCOSE 98 11/23/2015    BUN 17 07/24/2018    CREATININE 0 81 07/24/2018    CALCIUM 9 3 07/24/2018     07/24/2018    K 4 6 07/24/2018    CO2 26 07/24/2018     07/24/2018     Lab Results   Component Value Date    ALT 29 07/24/2018    AST 39 07/24/2018    ALKPHOS 105 07/24/2018    BILITOT 0 57 11/23/2015       Lab Results   Component Value Date    WBC 8 65 07/24/2018    HGB 13 9 07/24/2018    HCT 43 3 07/24/2018     (H) 07/24/2018     07/24/2018       No results found for: TSH    Lab Results   Component Value Date    CHOL 158 11/23/2015     Lab Results   Component Value Date    TRIG 236 (H) 07/24/2018     Lab Results   Component Value Date    HDL 47 07/24/2018 Lab Results   Component Value Date    LDLCALC 73 07/24/2018     No results found for: HGBA1C    Results for orders placed or performed in visit on 07/24/18   CBC   Result Value Ref Range    WBC 8 65 4 31 - 10 16 Thousand/uL    RBC 4 26 3 81 - 5 12 Million/uL    Hemoglobin 13 9 11 5 - 15 4 g/dL    Hematocrit 43 3 34 8 - 46 1 %     (H) 82 - 98 fL    MCH 32 6 26 8 - 34 3 pg    MCHC 32 1 31 4 - 37 4 g/dL    RDW 13 2 11 6 - 15 1 %    Platelets 406 518 - 165 Thousands/uL    MPV 10 1 8 9 - 12 7 fL   Comprehensive metabolic panel   Result Value Ref Range    Sodium 136 136 - 145 mmol/L    Potassium 4 6 3 5 - 5 3 mmol/L    Chloride 100 100 - 108 mmol/L    CO2 26 21 - 32 mmol/L    ANION GAP 10 4 - 13 mmol/L    BUN 17 5 - 25 mg/dL    Creatinine 0 81 0 60 - 1 30 mg/dL    Glucose, Fasting 94 65 - 99 mg/dL    Calcium 9 3 8 3 - 10 1 mg/dL    AST 39 5 - 45 U/L    ALT 29 12 - 78 U/L    Alkaline Phosphatase 105 46 - 116 U/L    Total Protein 8 0 6 4 - 8 2 g/dL    Albumin 3 5 3 5 - 5 0 g/dL    Total Bilirubin 0 70 0 20 - 1 00 mg/dL    eGFR 80 ml/min/1 73sq m   Lipid panel   Result Value Ref Range    Cholesterol 167 50 - 200 mg/dL    Triglycerides 236 (H) <=150 mg/dL    HDL, Direct 47 40 - 60 mg/dL    LDL Calculated 73 0 - 100 mg/dL    Non-HDL-Chol (CHOL-HDL) 120 mg/dl   TSH, 3rd generation   Result Value Ref Range    TSH 3RD GENERATON 4 140 (H) 0 358 - 3 740 uIU/mL       Orders Placed This Encounter   Procedures    TSH, 3rd generation       ALLERGIES:  No Known Allergies    Current Medications     Current Outpatient Prescriptions   Medication Sig Dispense Refill    atorvastatin (LIPITOR) 20 mg tablet TAKE ONE TABLET BY MOUTH EVERY DAY 30 tablet 5    BYSTOLIC 20 MG tablet TAKE ONE TABLET BY MOUTH EVERY DAY 30 tablet 5    cholecalciferol (VITAMIN D3) 1,000 units tablet Take 1 capsule by mouth once a week      cycloSPORINE (RESTASIS) 0 05 % ophthalmic emulsion Apply to eye      hydrochlorothiazide (HYDRODIURIL) 25 mg tablet Take 1 tablet (25 mg total) by mouth daily 30 tablet 5    hypromellose (NATURES TEARS) 0 4 % SOLN Apply to eye      Multiple Vitamin (MULTIVITAMINS PO) Take by mouth       No current facility-administered medications for this visit            Health Maintenance     Health Maintenance   Topic Date Due    CRC Screening: Colonoscopy  1960    Pneumococcal PPSV23 Highest Risk Adult (1 of 3 - PCV13) 01/12/1979    DTaP,Tdap,and Td Vaccines (1 - Tdap) 01/12/1981    INFLUENZA VACCINE  07/01/2018    PAP SMEAR  11/16/2018    MAMMOGRAM  12/16/2018    Depression Screening PHQ  10/23/2019     Immunization History   Administered Date(s) Administered    Influenza 07/29/2016    Influenza Quadrivalent Preservative Free 3 years and older IM 07/29/2016    Zoster 02/11/0993       Scott Barnett MD

## 2018-10-23 NOTE — ASSESSMENT & PLAN NOTE
Hypothyroidism  Patient will check TSH level at this time    We will make further recommendations pending results of test

## 2018-10-24 ENCOUNTER — APPOINTMENT (OUTPATIENT)
Dept: LAB | Facility: CLINIC | Age: 58
End: 2018-10-24
Payer: COMMERCIAL

## 2018-10-24 ENCOUNTER — TRANSCRIBE ORDERS (OUTPATIENT)
Dept: LAB | Facility: CLINIC | Age: 58
End: 2018-10-24

## 2018-10-24 DIAGNOSIS — E03.9 HYPOTHYROIDISM, UNSPECIFIED TYPE: ICD-10-CM

## 2018-10-24 LAB — TSH SERPL DL<=0.05 MIU/L-ACNC: 2.27 UIU/ML (ref 0.36–3.74)

## 2018-10-24 PROCEDURE — 36415 COLL VENOUS BLD VENIPUNCTURE: CPT

## 2018-10-24 PROCEDURE — 84443 ASSAY THYROID STIM HORMONE: CPT

## 2018-10-25 ENCOUNTER — TELEPHONE (OUTPATIENT)
Dept: FAMILY MEDICINE CLINIC | Facility: CLINIC | Age: 58
End: 2018-10-25

## 2018-10-25 NOTE — TELEPHONE ENCOUNTER
----- Message from Crys Hamilton MD sent at [id number]/[id number]  7:14 AM EDT -----  Blood work shows thyroid function back to normal

## 2018-11-25 NOTE — PROGRESS NOTES
Assessment/Plan:  Normal GYN exam  CoTesting '22  RTO 1 yr  Obesity- demonstrated weight loss in the past, gained 8 '17, 3 '18  SBE  3 D Mammo  Exrecise 3/wk- does 6 /wk, 3 with a   Ca 1,000 mg/d -does         Diagnoses and all orders for this visit:    Encounter for annual routine gynecological examination    Encounter for screening mammogram for breast cancer  -     Mammo screening bilateral w 3d & cad; Future    Other orders  -     calcium-vitamin D 250-100 MG-UNIT per tablet; Take 1 tablet by mouth 2 (two) times a day              Subjective:        Patient ID: Vasile Oquendo is a 62 y o  female  Jessie King is here for her annual visit  She is without complaints  She gained another 3 lb but is active going to the gym 3 times a week with a   The following portions of the patient's history were reviewed and updated as appropriate: She  has a past medical history of High blood pressure; Papanicolaou smear (12/01/2010); Rosacea; and Skin cancer  Patient Active Problem List    Diagnosis Date Noted    Encounter for annual routine gynecological examination 11/26/2018    Encounter for screening mammogram for breast cancer 11/26/2018    Other specified hypothyroidism 10/23/2018    Lyme disease 07/21/2015    Benign essential hypertension 04/16/2013    Hyperlipidemia 09/18/2012   PMH:  G2, P2; SAVD x 2, '90, '93  Right stereotactic biopsy '05  Basal cell cancer- R Eye, Chest 5/10  Panic attacks  HTN  Obesity- BMI 40 '11, lost 40 pounds '15  Lyme Disease 7/15  Hypercholesterolemia 9/15  Menopause 54 '14     She  has a past surgical history that includes [place] tooth extraction  Her family history includes Alcohol abuse in her maternal grandfather; Dementia in her father; Heart disease in her father; Hyperlipidemia in her father; No Known Problems in her brother, daughter, daughter, mother, and paternal grandmother;  Other in her paternal grandfather; Pancreatic cancer in her maternal uncle; Stroke in her maternal grandmother  FH:   MGM- CVA  F- Parkinson's disease 80     She  reports that she has never smoked  She has never used smokeless tobacco  She reports that she drinks alcohol  She reports that she does not use drugs  SH:   Just moved back to area '17   Jhony Perry still at [facility]  Jobie Friend 25 in [address] and dogs   28  in [place] and back her watching [employer] 2 dogs  Current Outpatient Prescriptions   Medication Sig Dispense Refill    atorvastatin (LIPITOR) 20 mg tablet TAKE ONE TABLET BY MOUTH EVERY DAY 30 tablet 5    BYSTOLIC 20 MG tablet TAKE ONE TABLET BY MOUTH EVERY DAY 30 tablet 5    calcium-vitamin D 250-100 MG-UNIT per tablet Take 1 tablet by mouth 2 (two) times a day      cycloSPORINE (RESTASIS) 0 05 % ophthalmic emulsion Apply to eye      hydrochlorothiazide (HYDRODIURIL) 25 mg tablet Take 1 tablet (25 mg total) by mouth daily 30 tablet 5    hypromellose (NATURES TEARS) 0 4 % SOLN Apply to eye      Multiple Vitamin (MULTIVITAMINS PO) Take by mouth       No current facility-administered medications for this visit  Current Outpatient Prescriptions on File Prior to Visit   Medication Sig    atorvastatin (LIPITOR) 20 mg tablet TAKE ONE TABLET BY MOUTH EVERY DAY    BYSTOLIC 20 MG tablet TAKE ONE TABLET BY MOUTH EVERY DAY    cycloSPORINE (RESTASIS) 0 05 % ophthalmic emulsion Apply to eye    hydrochlorothiazide (HYDRODIURIL) 25 mg tablet Take 1 tablet (25 mg total) by mouth daily    hypromellose (NATURES TEARS) 0 4 % SOLN Apply to eye    Multiple Vitamin (MULTIVITAMINS PO) Take by mouth    [DISCONTINUED] cholecalciferol (VITAMIN D3) 1,000 units tablet Take 1 capsule by mouth once a week     No current facility-administered medications on file prior to visit  She has No Known Allergies       Review of Systems   Constitutional: Negative for activity change, appetite change, fatigue and unexpected weight change  Eyes: Negative for visual disturbance  Respiratory: Negative for cough, chest tightness, shortness of breath and wheezing  Cardiovascular: Negative for chest pain, palpitations and leg swelling  Breast: Patient denies tenderness, nipple discharge, masses, or erythema  Gastrointestinal: Negative for abdominal distention, abdominal pain, blood in stool, constipation, diarrhea, nausea and vomiting  Endocrine: Negative for cold intolerance and heat intolerance  Genitourinary: Negative for decreased urine volume, difficulty urinating, dyspareunia, dysuria, frequency, hematuria, menstrual problem, pelvic pain, urgency, vaginal bleeding, vaginal discharge and vaginal pain  Rare stress incontinence  Moore without problems  Musculoskeletal: Positive for arthralgias (Left hip- chronic)  Skin: Negative for rash  Neurological: Negative for weakness, light-headedness, numbness and headaches  Hematological: Does not bruise/bleed easily  Psychiatric/Behavioral: Negative for agitation, behavioral problems, self-injury and sleep disturbance  The patient is not nervous/anxious  Objective:    Vitals:    11/26/18 1139   BP: 142/82   BP Location: Right arm   Patient Position: Sitting   Pulse: 80   Weight: 107 kg (236 lb)   Height: 5' 8" (1 727 m)            Physical Exam   Constitutional: She is oriented to person, place, and time  She appears well-developed and well-nourished  HENT:   Head: Normocephalic and atraumatic  Eyes: Pupils are equal, round, and reactive to light  Conjunctivae and EOM are normal    Neck: Normal range of motion  Neck supple  No tracheal deviation present  No thyromegaly present  Cardiovascular: Normal rate, regular rhythm and normal heart sounds  No murmur heard  Pulmonary/Chest: Effort normal and breath sounds normal  No respiratory distress  She has no wheezes   Right breast exhibits no inverted nipple, no mass, no nipple discharge, no skin change and no tenderness  Left breast exhibits no inverted nipple, no mass, no nipple discharge, no skin change and no tenderness  Breasts are symmetrical    Abdominal: Soft  Bowel sounds are normal  She exhibits no distension and no mass  There is no tenderness  Genitourinary: Vagina normal and uterus normal  Rectal exam shows no external hemorrhoid  No breast swelling, tenderness, discharge or bleeding  There is no rash, tenderness or lesion on the right labia  There is no rash, tenderness or lesion on the left labia  Uterus is not deviated, not enlarged and not tender  Cervix exhibits no motion tenderness and no discharge  Right adnexum displays no mass, no tenderness and no fullness  Left adnexum displays no mass, no tenderness and no fullness  Genitourinary Comments: Mild vaginal relaxation  Musculoskeletal: Normal range of motion  Neurological: She is alert and oriented to person, place, and time  Skin: Skin is warm and dry  Psychiatric: She has a normal mood and affect  Her behavior is normal  Judgment and thought content normal    Nursing note and vitals reviewed

## 2018-11-26 ENCOUNTER — ANNUAL EXAM (OUTPATIENT)
Dept: GYNECOLOGY | Facility: CLINIC | Age: 58
End: 2018-11-26
Payer: COMMERCIAL

## 2018-11-26 VITALS
BODY MASS INDEX: 35.77 KG/M2 | WEIGHT: 236 LBS | HEART RATE: 80 BPM | SYSTOLIC BLOOD PRESSURE: 142 MMHG | HEIGHT: 68 IN | DIASTOLIC BLOOD PRESSURE: 82 MMHG

## 2018-11-26 DIAGNOSIS — Z12.31 ENCOUNTER FOR SCREENING MAMMOGRAM FOR BREAST CANCER: ICD-10-CM

## 2018-11-26 DIAGNOSIS — Z01.419 ENCOUNTER FOR ANNUAL ROUTINE GYNECOLOGICAL EXAMINATION: Primary | ICD-10-CM

## 2018-11-26 PROCEDURE — S0612 ANNUAL GYNECOLOGICAL EXAMINA: HCPCS | Performed by: OBSTETRICS & GYNECOLOGY

## 2018-12-17 ENCOUNTER — HOSPITAL ENCOUNTER (OUTPATIENT)
Dept: MAMMOGRAPHY | Facility: CLINIC | Age: 58
Discharge: HOME | End: 2018-12-17
Payer: COMMERCIAL

## 2018-12-17 VITALS — BODY MASS INDEX: 35.77 KG/M2 | WEIGHT: 236 LBS | HEIGHT: 68 IN

## 2018-12-17 DIAGNOSIS — Z12.31 ENCOUNTER FOR SCREENING MAMMOGRAM FOR BREAST CANCER: ICD-10-CM

## 2018-12-17 PROCEDURE — 77063 BREAST TOMOSYNTHESIS BI: CPT

## 2018-12-17 PROCEDURE — 77067 SCR MAMMO BI INCL CAD: CPT

## 2019-01-02 DIAGNOSIS — I10 ESSENTIAL HYPERTENSION: ICD-10-CM

## 2019-01-02 RX ORDER — HYDROCHLOROTHIAZIDE 25 MG/1
TABLET ORAL
Qty: 30 TABLET | Refills: 5 | Status: SHIPPED | OUTPATIENT
Start: 2019-01-02 | End: 2019-07-27 | Stop reason: SDUPTHER

## 2019-02-14 ENCOUNTER — OFFICE VISIT (OUTPATIENT)
Dept: FAMILY MEDICINE CLINIC | Facility: CLINIC | Age: 59
End: 2019-02-14
Payer: COMMERCIAL

## 2019-02-14 VITALS
BODY MASS INDEX: 35.89 KG/M2 | HEART RATE: 68 BPM | DIASTOLIC BLOOD PRESSURE: 70 MMHG | TEMPERATURE: 98.9 F | WEIGHT: 236.8 LBS | HEIGHT: 68 IN | RESPIRATION RATE: 16 BRPM | SYSTOLIC BLOOD PRESSURE: 118 MMHG

## 2019-02-14 DIAGNOSIS — I10 BENIGN ESSENTIAL HYPERTENSION: ICD-10-CM

## 2019-02-14 DIAGNOSIS — E78.5 HYPERLIPIDEMIA, UNSPECIFIED HYPERLIPIDEMIA TYPE: ICD-10-CM

## 2019-02-14 DIAGNOSIS — Z28.21 VACCINATION REFUSED BY PATIENT: Primary | ICD-10-CM

## 2019-02-14 PROCEDURE — 1036F TOBACCO NON-USER: CPT | Performed by: FAMILY MEDICINE

## 2019-02-14 PROCEDURE — 99213 OFFICE O/P EST LOW 20 MIN: CPT | Performed by: FAMILY MEDICINE

## 2019-02-14 PROCEDURE — 3074F SYST BP LT 130 MM HG: CPT | Performed by: FAMILY MEDICINE

## 2019-02-14 PROCEDURE — 3078F DIAST BP <80 MM HG: CPT | Performed by: FAMILY MEDICINE

## 2019-02-14 PROCEDURE — 3008F BODY MASS INDEX DOCD: CPT | Performed by: FAMILY MEDICINE

## 2019-02-14 RX ORDER — FLUOROURACIL 50 MG/G
CREAM TOPICAL
COMMUNITY
Start: 2019-01-22 | End: 2019-12-03

## 2019-02-14 RX ORDER — DOXYCYCLINE HYCLATE 100 MG/1
CAPSULE ORAL AS NEEDED
COMMUNITY
Start: 2019-01-22 | End: 2021-03-02

## 2019-02-14 NOTE — ASSESSMENT & PLAN NOTE
Hypertension    Patient blood pressure is stable at this time and she will continue with current regimen of medications

## 2019-02-14 NOTE — PROGRESS NOTES
FAMILY PRACTICE OFFICE VISIT       NAME: Lynn Samayoa  AGE: 61 y o  SEX: female       : 1960        MRN: [id number]    DATE: 2019  TIME: 10:38 AM    Assessment and Plan     Problem List Items Addressed This Visit        Cardiovascular and Mediastinum    Benign essential hypertension     Hypertension  Patient blood pressure is stable at this time and she will continue with current regimen of medications            Other    Hyperlipidemia     Hyperlipidemia  Lipid panel stable on current dose of statin therapy           Other Visit Diagnoses     Vaccination refused by patient    -  Primary        Patient was given referral to have screening colonoscopy performed this year    There are no Patient Instructions on file for this visit  Chief Complaint     Chief Complaint   Patient presents with    Follow-up     Pt is here for 4 month follow up        History of Present Illness     Patient denies any recent illness  She does see her gynecologist on a regular basis for Pap smears and mammograms  She has yet to have a colonoscopy but is willing to have 1 this year  Review of Systems   Review of Systems   Constitutional: Negative  HENT: Negative  Eyes: Negative  Respiratory: Negative  Cardiovascular: Negative  Gastrointestinal: Negative  Genitourinary: Negative  Musculoskeletal: Negative  Skin: Negative  Neurological: Negative  Psychiatric/Behavioral: Negative          Active Problem List     Patient Active Problem List   Diagnosis    Benign essential hypertension    Hyperlipidemia    Lyme disease    Other specified hypothyroidism    Encounter for annual routine gynecological examination    Encounter for screening mammogram for breast cancer       Past Medical History:  Past Medical History:   Diagnosis Date    High blood pressure     Papanicolaou smear 2010    Pap and pelvic smears 2010, 10/2011, 10/2012    Rosacea     Skin cancer     H/O Basal Cell Cancer       Past Surgical History:  Past Surgical History:   Procedure Laterality Date    BREAST BIOPSY Right 08/05/2005    benign aprocrine metaplasia    WISDOM TOOTH EXTRACTION         Family History:  Family History   Problem Relation Age of Onset    No Known Problems Mother     Heart disease Father         Cardiac Disorder    Dementia Father     Hyperlipidemia Father     Pancreatic cancer Maternal Uncle     No Known Problems Brother     No Known Problems Daughter     Stroke Maternal Grandmother     Alcohol abuse Maternal Grandfather     No Known Problems Paternal Grandmother     Other Paternal Grandfather         heart problems    No Known Problems Daughter        Social History:  Social History     Socioeconomic History    Marital status: /Civil Union     Spouse name: Not on file    Number of children: Not on file    Years of education: Not on file    Highest education level: Not on file   Occupational History    Not on file   Social Needs    Financial resource strain: Not on file    Food insecurity:     Worry: Not on file     Inability: Not on file    Transportation needs:     Medical: Not on file     Non-medical: Not on file   Tobacco Use    Smoking status: Never Smoker    Smokeless tobacco: Never Used   Substance and Sexual Activity    Alcohol use: Yes     Comment: Acute alcohol use    Drug use: No    Sexual activity: Yes     Partners: Male     Birth control/protection: None   Lifestyle    Physical activity:     Days per week: Not on file     Minutes per session: Not on file    Stress: Not on file   Relationships    Social connections:     Talks on phone: Not on file     Gets together: Not on file     Attends Zoroastrianism service: Not on file     Active member of club or organization: Not on file     Attends meetings of clubs or organizations: Not on file     Relationship status: Not on file    Intimate partner violence:     Fear of current or ex partner: Not on file     Emotionally abused: Not on file     Physically abused: Not on file     Forced sexual activity: Not on file   Other Topics Concern    Not on file   Social History Narrative    Exercises 5 50 6 times per week       Objective     Vitals:    02/14/19 1007   BP: 118/70   Pulse: 68   Resp: 16   Temp: 98 9 °F (37 2 °C)     Wt Readings from Last 3 Encounters:   02/14/19 107 kg (236 lb 12 8 oz)   12/17/18 107 kg (236 lb)   11/26/18 107 kg (236 lb)       Physical Exam   Constitutional: No distress  HENT:   Mouth/Throat: No oropharyngeal exudate  Tympanic membranes within normal limits bilaterally   Neck:   No carotid bruit   Cardiovascular:   Regular rate and rhythm with no murmurs   Pulmonary/Chest:   Lungs are clear to auscultation without wheezes,rales, or rhonchi   Musculoskeletal: She exhibits no edema  Pertinent Laboratory/Diagnostic Studies:  Lab Results   Component Value Date    GLUCOSE 98 11/23/2015    BUN 17 07/24/2018    CREATININE 0 81 07/24/2018    CALCIUM 9 3 07/24/2018     11/23/2015    K 4 6 07/24/2018    CO2 26 07/24/2018     07/24/2018     Lab Results   Component Value Date    ALT 29 07/24/2018    AST 39 07/24/2018    ALKPHOS 105 07/24/2018    BILITOT 0 57 11/23/2015       Lab Results   Component Value Date    WBC 8 65 07/24/2018    HGB 13 9 07/24/2018    HCT 43 3 07/24/2018     (H) 07/24/2018     07/24/2018       No results found for: TSH    Lab Results   Component Value Date    CHOL 158 11/23/2015     Lab Results   Component Value Date    TRIG 236 (H) 07/24/2018     Lab Results   Component Value Date    HDL 47 07/24/2018     Lab Results   Component Value Date    LDLCALC 73 07/24/2018     No results found for: HGBA1C    Results for orders placed or performed in visit on 10/24/18   TSH, 3rd generation   Result Value Ref Range    TSH 3RD GENERATON 2 270 0 358 - 3 740 uIU/mL       No orders of the defined types were placed in this encounter        ALLERGIES:  No Known Allergies    Current Medications     Current Outpatient Medications   Medication Sig Dispense Refill    atorvastatin (LIPITOR) 20 mg tablet TAKE ONE TABLET BY MOUTH EVERY DAY 30 tablet 5    BYSTOLIC 20 MG tablet TAKE ONE TABLET BY MOUTH EVERY DAY 30 tablet 5    calcium-vitamin D 250-100 MG-UNIT per tablet Take 1 tablet by mouth 2 (two) times a day      cycloSPORINE (RESTASIS) 0 05 % ophthalmic emulsion Apply to eye      doxycycline hyclate (VIBRAMYCIN) 100 mg capsule as needed      fluorouracil (EFUDEX) 5 % cream       hydrochlorothiazide (HYDRODIURIL) 25 mg tablet TAKE ONE TABLET BY MOUTH EVERY DAY 30 tablet 5    hypromellose (NATURES TEARS) 0 4 % SOLN Apply to eye      Multiple Vitamin (MULTIVITAMINS PO) Take by mouth       No current facility-administered medications for this visit            Health Maintenance     Health Maintenance   Topic Date Due    Hepatitis C Screening  1960    CRC Screening: Colonoscopy  1960    BMI: Followup Plan  01/12/1978    Pneumococcal PPSV23 Highest Risk Adult (1 of 3 - PCV13) 01/12/1979    DTaP,Tdap,and Td Vaccines (1 - Tdap) 01/12/1981    PAP SMEAR  11/16/2018    INFLUENZA VACCINE  03/14/2019 (Originally 7/1/2018)    Depression Screening PHQ  10/23/2019    BMI: Adult  12/17/2019    MAMMOGRAM  12/17/2019    HEPATITIS B VACCINES  Aged Out     Immunization History   Administered Date(s) Administered    INFLUENZA 07/29/2016    Influenza Quadrivalent Preservative Free 3 years and older IM 07/29/2016    Zoster 56/41/3334       Denise Lopez MD

## 2019-03-31 DIAGNOSIS — E78.5 HYPERLIPIDEMIA, UNSPECIFIED HYPERLIPIDEMIA TYPE: ICD-10-CM

## 2019-03-31 DIAGNOSIS — I10 ESSENTIAL HYPERTENSION: ICD-10-CM

## 2019-04-01 RX ORDER — NEBIVOLOL HYDROCHLORIDE 20 MG/1
TABLET ORAL
Qty: 30 TABLET | Refills: 5 | Status: SHIPPED | OUTPATIENT
Start: 2019-04-01 | End: 2020-04-22

## 2019-04-01 RX ORDER — ATORVASTATIN CALCIUM 20 MG/1
TABLET, FILM COATED ORAL
Qty: 30 TABLET | Refills: 5 | Status: SHIPPED | OUTPATIENT
Start: 2019-04-01 | End: 2020-04-22

## 2019-05-01 ENCOUNTER — ANESTHESIA (OUTPATIENT)
Dept: PERIOP | Facility: HOSPITAL | Age: 59
End: 2019-05-01
Payer: COMMERCIAL

## 2019-05-01 ENCOUNTER — APPOINTMENT (EMERGENCY)
Dept: RADIOLOGY | Facility: HOSPITAL | Age: 59
End: 2019-05-01
Payer: COMMERCIAL

## 2019-05-01 ENCOUNTER — ANESTHESIA EVENT (OUTPATIENT)
Dept: PERIOP | Facility: HOSPITAL | Age: 59
End: 2019-05-01
Payer: COMMERCIAL

## 2019-05-01 ENCOUNTER — HOSPITAL ENCOUNTER (OUTPATIENT)
Facility: HOSPITAL | Age: 59
Setting detail: OBSERVATION
Discharge: HOME | End: 2019-05-02
Attending: EMERGENCY MEDICINE | Admitting: SURGERY
Payer: COMMERCIAL

## 2019-05-01 DIAGNOSIS — S09.90XA INJURY OF HEAD, INITIAL ENCOUNTER: ICD-10-CM

## 2019-05-01 DIAGNOSIS — S01.81XA FACIAL LACERATION, INITIAL ENCOUNTER: ICD-10-CM

## 2019-05-01 DIAGNOSIS — S02.85XA CLOSED FRACTURE OF ORBIT, INITIAL ENCOUNTER (HCC): ICD-10-CM
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
## 2019-05-01 DIAGNOSIS — S02.32XA CLOSED FRACTURE OF LEFT ORBITAL FLOOR, INITIAL ENCOUNTER (HCC): ICD-10-CM

## 2019-05-01 LAB
ANION GAP SERPL CALCULATED.3IONS-SCNC: 8 MMOL/L (ref 4–13)
BUN SERPL-MCNC: 17 MG/DL (ref 5–25)
CALCIUM SERPL-MCNC: 8.9 MG/DL (ref 8.3–10.1)
CHLORIDE SERPL-SCNC: 105 MMOL/L (ref 100–108)
CO2 SERPL-SCNC: 27 MMOL/L (ref 21–32)
CREAT SERPL-MCNC: 0.79 MG/DL (ref 0.6–1.3)
ERYTHROCYTE [DISTWIDTH] IN BLOOD BY AUTOMATED COUNT: 13.4 % (ref 11.6–15.1)
GFR SERPL CREATININE-BSD FRML MDRD: 82 ML/MIN/1.73SQ M
GLUCOSE P FAST SERPL-MCNC: 137 MG/DL (ref 65–99)
GLUCOSE SERPL-MCNC: 137 MG/DL (ref 65–140)
HCT VFR BLD AUTO: 42.9 % (ref 34.8–46.1)
HGB BLD-MCNC: 14.3 G/DL (ref 11.5–15.4)
MCH RBC QN AUTO: 33.3 PG (ref 26.8–34.3)
MCHC RBC AUTO-ENTMCNC: 33.3 G/DL (ref 31.4–37.4)
MCV RBC AUTO: 100 FL (ref 82–98)
PLATELET # BLD AUTO: 327 THOUSANDS/UL (ref 149–390)
PMV BLD AUTO: 9.3 FL (ref 8.9–12.7)
POTASSIUM SERPL-SCNC: 3.4 MMOL/L (ref 3.5–5.3)
RBC # BLD AUTO: 4.29 MILLION/UL (ref 3.81–5.12)
SODIUM SERPL-SCNC: 140 MMOL/L (ref 136–145)
WBC # BLD AUTO: 10.9 THOUSAND/UL (ref 4.31–10.16)

## 2019-05-01 PROCEDURE — C1713 ANCHOR/SCREW BN/BN,TIS/BN: HCPCS | Performed by: PLASTIC SURGERY

## 2019-05-01 PROCEDURE — 72125 CT NECK SPINE W/O DYE: CPT

## 2019-05-01 PROCEDURE — 99285 EMERGENCY DEPT VISIT HI MDM: CPT

## 2019-05-01 PROCEDURE — 80048 BASIC METABOLIC PNL TOTAL CA: CPT | Performed by: SURGERY

## 2019-05-01 PROCEDURE — 70450 CT HEAD/BRAIN W/O DYE: CPT

## 2019-05-01 PROCEDURE — 70486 CT MAXILLOFACIAL W/O DYE: CPT

## 2019-05-01 PROCEDURE — 90471 IMMUNIZATION ADMIN: CPT

## 2019-05-01 PROCEDURE — 36415 COLL VENOUS BLD VENIPUNCTURE: CPT | Performed by: SURGERY

## 2019-05-01 PROCEDURE — NC001 PR NO CHARGE: Performed by: SURGERY

## 2019-05-01 PROCEDURE — 90715 TDAP VACCINE 7 YRS/> IM: CPT | Performed by: EMERGENCY MEDICINE

## 2019-05-01 PROCEDURE — 85027 COMPLETE CBC AUTOMATED: CPT | Performed by: SURGERY

## 2019-05-01 PROCEDURE — 99285 EMERGENCY DEPT VISIT HI MDM: CPT | Performed by: EMERGENCY MEDICINE

## 2019-05-01 PROCEDURE — 99219 PR INITIAL OBSERVATION CARE/DAY 50 MINUTES: CPT | Performed by: SURGERY

## 2019-05-01 DEVICE — TI MATRIXMIDFACE SCREW SELF-DRILLING 5MM
Type: IMPLANTABLE DEVICE | Site: ORBIT | Status: FUNCTIONAL
Brand: MATRIXMIDFACE

## 2019-05-01 DEVICE — TI MATRIXMIDFACE ORBITAL FLOOR PLATE-ANATOMIC MED/0.3MM THIC
Type: IMPLANTABLE DEVICE | Site: ORBIT | Status: FUNCTIONAL
Brand: MATRIXMIDFACE

## 2019-05-01 RX ORDER — HYDROMORPHONE HCL/PF 1 MG/ML
0.5 SYRINGE (ML) INJECTION
Status: DISCONTINUED | OUTPATIENT
Start: 2019-05-01 | End: 2019-05-01 | Stop reason: HOSPADM

## 2019-05-01 RX ORDER — ROCURONIUM BROMIDE 10 MG/ML
INJECTION, SOLUTION INTRAVENOUS AS NEEDED
Status: DISCONTINUED | OUTPATIENT
Start: 2019-05-01 | End: 2019-05-01 | Stop reason: SURG

## 2019-05-01 RX ORDER — LIDOCAINE HYDROCHLORIDE 10 MG/ML
INJECTION, SOLUTION INFILTRATION; PERINEURAL AS NEEDED
Status: DISCONTINUED | OUTPATIENT
Start: 2019-05-01 | End: 2019-05-01 | Stop reason: SURG

## 2019-05-01 RX ORDER — SUCCINYLCHOLINE/SOD CL,ISO/PF 100 MG/5ML
SYRINGE (ML) INTRAVENOUS AS NEEDED
Status: DISCONTINUED | OUTPATIENT
Start: 2019-05-01 | End: 2019-05-01 | Stop reason: SURG

## 2019-05-01 RX ORDER — FENTANYL CITRATE/PF 50 MCG/ML
25 SYRINGE (ML) INJECTION
Status: DISCONTINUED | OUTPATIENT
Start: 2019-05-01 | End: 2019-05-01 | Stop reason: HOSPADM

## 2019-05-01 RX ORDER — ALBUTEROL SULFATE 2.5 MG/3ML
2.5 SOLUTION RESPIRATORY (INHALATION) EVERY 4 HOURS PRN
Status: DISCONTINUED | OUTPATIENT
Start: 2019-05-01 | End: 2019-05-01 | Stop reason: HOSPADM

## 2019-05-01 RX ORDER — SODIUM CHLORIDE, SODIUM LACTATE, POTASSIUM CHLORIDE, CALCIUM CHLORIDE 600; 310; 30; 20 MG/100ML; MG/100ML; MG/100ML; MG/100ML
100 INJECTION, SOLUTION INTRAVENOUS CONTINUOUS
Status: DISCONTINUED | OUTPATIENT
Start: 2019-05-01 | End: 2019-05-02 | Stop reason: HOSPADM

## 2019-05-01 RX ORDER — LABETALOL 20 MG/4 ML (5 MG/ML) INTRAVENOUS SYRINGE
AS NEEDED
Status: DISCONTINUED | OUTPATIENT
Start: 2019-05-01 | End: 2019-05-01 | Stop reason: SURG

## 2019-05-01 RX ORDER — MIDAZOLAM HYDROCHLORIDE 1 MG/ML
INJECTION INTRAMUSCULAR; INTRAVENOUS AS NEEDED
Status: DISCONTINUED | OUTPATIENT
Start: 2019-05-01 | End: 2019-05-01 | Stop reason: SURG

## 2019-05-01 RX ORDER — GLYCOPYRROLATE 0.2 MG/ML
INJECTION INTRAMUSCULAR; INTRAVENOUS AS NEEDED
Status: DISCONTINUED | OUTPATIENT
Start: 2019-05-01 | End: 2019-05-01 | Stop reason: SURG

## 2019-05-01 RX ORDER — LIDOCAINE HYDROCHLORIDE AND EPINEPHRINE 10; 10 MG/ML; UG/ML
INJECTION, SOLUTION INFILTRATION; PERINEURAL AS NEEDED
Status: DISCONTINUED | OUTPATIENT
Start: 2019-05-01 | End: 2019-05-01 | Stop reason: HOSPADM

## 2019-05-01 RX ORDER — HYDROCHLOROTHIAZIDE 25 MG/1
25 TABLET ORAL DAILY
Status: DISCONTINUED | OUTPATIENT
Start: 2019-05-01 | End: 2019-05-02 | Stop reason: HOSPADM

## 2019-05-01 RX ORDER — NEBIVOLOL 10 MG/1
20 TABLET ORAL DAILY
Status: DISCONTINUED | OUTPATIENT
Start: 2019-05-01 | End: 2019-05-02 | Stop reason: HOSPADM

## 2019-05-01 RX ORDER — PROPOFOL 10 MG/ML
INJECTION, EMULSION INTRAVENOUS AS NEEDED
Status: DISCONTINUED | OUTPATIENT
Start: 2019-05-01 | End: 2019-05-01 | Stop reason: SURG

## 2019-05-01 RX ORDER — SODIUM CHLORIDE, SODIUM GLUCONATE, SODIUM ACETATE, POTASSIUM CHLORIDE, MAGNESIUM CHLORIDE, SODIUM PHOSPHATE, DIBASIC, AND POTASSIUM PHOSPHATE .53; .5; .37; .037; .03; .012; .00082 G/100ML; G/100ML; G/100ML; G/100ML; G/100ML; G/100ML; G/100ML
125 INJECTION, SOLUTION INTRAVENOUS CONTINUOUS
Status: DISCONTINUED | OUTPATIENT
Start: 2019-05-01 | End: 2019-05-02 | Stop reason: HOSPADM

## 2019-05-01 RX ORDER — SODIUM CHLORIDE, SODIUM LACTATE, POTASSIUM CHLORIDE, CALCIUM CHLORIDE 600; 310; 30; 20 MG/100ML; MG/100ML; MG/100ML; MG/100ML
INJECTION, SOLUTION INTRAVENOUS CONTINUOUS PRN
Status: DISCONTINUED | OUTPATIENT
Start: 2019-05-01 | End: 2019-05-01

## 2019-05-01 RX ORDER — ONDANSETRON 2 MG/ML
INJECTION INTRAMUSCULAR; INTRAVENOUS AS NEEDED
Status: DISCONTINUED | OUTPATIENT
Start: 2019-05-01 | End: 2019-05-01 | Stop reason: SURG

## 2019-05-01 RX ORDER — MAGNESIUM HYDROXIDE 1200 MG/15ML
LIQUID ORAL AS NEEDED
Status: DISCONTINUED | OUTPATIENT
Start: 2019-05-01 | End: 2019-05-01 | Stop reason: HOSPADM

## 2019-05-01 RX ORDER — CEFAZOLIN SODIUM 2 G/50ML
SOLUTION INTRAVENOUS AS NEEDED
Status: DISCONTINUED | OUTPATIENT
Start: 2019-05-01 | End: 2019-05-01 | Stop reason: SURG

## 2019-05-01 RX ORDER — FENTANYL CITRATE 50 UG/ML
INJECTION, SOLUTION INTRAMUSCULAR; INTRAVENOUS AS NEEDED
Status: DISCONTINUED | OUTPATIENT
Start: 2019-05-01 | End: 2019-05-01 | Stop reason: SURG

## 2019-05-01 RX ORDER — HYDROMORPHONE HCL/PF 1 MG/ML
0.5 SYRINGE (ML) INJECTION
Status: DISCONTINUED | OUTPATIENT
Start: 2019-05-01 | End: 2019-05-02 | Stop reason: HOSPADM

## 2019-05-01 RX ORDER — EPHEDRINE SULFATE 50 MG/ML
INJECTION INTRAVENOUS AS NEEDED
Status: DISCONTINUED | OUTPATIENT
Start: 2019-05-01 | End: 2019-05-01 | Stop reason: SURG

## 2019-05-01 RX ORDER — OXYCODONE HYDROCHLORIDE 5 MG/1
5 TABLET ORAL EVERY 4 HOURS PRN
Status: DISCONTINUED | OUTPATIENT
Start: 2019-05-01 | End: 2019-05-02 | Stop reason: HOSPADM

## 2019-05-01 RX ORDER — LIDOCAINE HYDROCHLORIDE AND EPINEPHRINE 10; 10 MG/ML; UG/ML
1 INJECTION, SOLUTION INFILTRATION; PERINEURAL ONCE
Status: COMPLETED | OUTPATIENT
Start: 2019-05-01 | End: 2019-05-01

## 2019-05-01 RX ORDER — ATORVASTATIN CALCIUM 20 MG/1
20 TABLET, FILM COATED ORAL
Status: DISCONTINUED | OUTPATIENT
Start: 2019-05-01 | End: 2019-05-02 | Stop reason: HOSPADM

## 2019-05-01 RX ORDER — NEOSTIGMINE METHYLSULFATE 1 MG/ML
INJECTION INTRAVENOUS AS NEEDED
Status: DISCONTINUED | OUTPATIENT
Start: 2019-05-01 | End: 2019-05-01 | Stop reason: SURG

## 2019-05-01 RX ORDER — OXYCODONE HYDROCHLORIDE 10 MG/1
10 TABLET ORAL EVERY 4 HOURS PRN
Status: DISCONTINUED | OUTPATIENT
Start: 2019-05-01 | End: 2019-05-02 | Stop reason: HOSPADM

## 2019-05-01 RX ORDER — ONDANSETRON 2 MG/ML
4 INJECTION INTRAMUSCULAR; INTRAVENOUS ONCE AS NEEDED
Status: DISCONTINUED | OUTPATIENT
Start: 2019-05-01 | End: 2019-05-01 | Stop reason: HOSPADM

## 2019-05-01 RX ORDER — DEXAMETHASONE SODIUM PHOSPHATE 10 MG/ML
INJECTION, SOLUTION INTRAMUSCULAR; INTRAVENOUS AS NEEDED
Status: DISCONTINUED | OUTPATIENT
Start: 2019-05-01 | End: 2019-05-01 | Stop reason: SURG

## 2019-05-01 RX ORDER — ACETAMINOPHEN 325 MG/1
650 TABLET ORAL EVERY 6 HOURS PRN
Status: DISCONTINUED | OUTPATIENT
Start: 2019-05-01 | End: 2019-05-02 | Stop reason: HOSPADM

## 2019-05-01 RX ADMIN — TETANUS TOXOID, REDUCED DIPHTHERIA TOXOID AND ACELLULAR PERTUSSIS VACCINE, ADSORBED 0.5 ML: 5; 2.5; 8; 8; 2.5 SUSPENSION INTRAMUSCULAR at 01:37

## 2019-05-01 RX ADMIN — GLYCOPYRROLATE 0.2 MG: 0.2 INJECTION, SOLUTION INTRAMUSCULAR; INTRAVENOUS at 19:46

## 2019-05-01 RX ADMIN — ONDANSETRON 4 MG: 2 INJECTION INTRAMUSCULAR; INTRAVENOUS at 19:05

## 2019-05-01 RX ADMIN — ROCURONIUM BROMIDE 2.5 MG: 10 INJECTION, SOLUTION INTRAVENOUS at 18:56

## 2019-05-01 RX ADMIN — SODIUM CHLORIDE, SODIUM GLUCONATE, SODIUM ACETATE, POTASSIUM CHLORIDE, MAGNESIUM CHLORIDE, SODIUM PHOSPHATE, DIBASIC, AND POTASSIUM PHOSPHATE 125 ML/HR: .53; .5; .37; .037; .03; .012; .00082 INJECTION, SOLUTION INTRAVENOUS at 04:22

## 2019-05-01 RX ADMIN — HYDROCHLOROTHIAZIDE 25 MG: 25 TABLET ORAL at 10:43

## 2019-05-01 RX ADMIN — SODIUM CHLORIDE, SODIUM LACTATE, POTASSIUM CHLORIDE, AND CALCIUM CHLORIDE: .6; .31; .03; .02 INJECTION, SOLUTION INTRAVENOUS at 18:16

## 2019-05-01 RX ADMIN — LIDOCAINE HYDROCHLORIDE,EPINEPHRINE BITARTRATE 1 ML: 10; .01 INJECTION, SOLUTION INFILTRATION; PERINEURAL at 00:44

## 2019-05-01 RX ADMIN — NEBIVOLOL HYDROCHLORIDE 20 MG: 10 TABLET ORAL at 10:45

## 2019-05-01 RX ADMIN — SODIUM CHLORIDE, SODIUM GLUCONATE, SODIUM ACETATE, POTASSIUM CHLORIDE, MAGNESIUM CHLORIDE, SODIUM PHOSPHATE, DIBASIC, AND POTASSIUM PHOSPHATE 125 ML/HR: .53; .5; .37; .037; .03; .012; .00082 INJECTION, SOLUTION INTRAVENOUS at 17:29

## 2019-05-01 RX ADMIN — LABETALOL HYDROCHLORIDE 2.5 MG: 5 INJECTION, SOLUTION INTRAVENOUS at 19:38

## 2019-05-01 RX ADMIN — SODIUM CHLORIDE 3 G: 9 INJECTION, SOLUTION INTRAVENOUS at 07:10

## 2019-05-01 RX ADMIN — EPHEDRINE SULFATE 10 MG: 50 INJECTION, SOLUTION INTRAVENOUS at 19:14

## 2019-05-01 RX ADMIN — NEOSTIGMINE METHYLSULFATE 2.5 MG: 1 INJECTION, SOLUTION INTRAVENOUS at 19:46

## 2019-05-01 RX ADMIN — PROPOFOL 200 MG: 10 INJECTION, EMULSION INTRAVENOUS at 18:56

## 2019-05-01 RX ADMIN — GLYCOPYRROLATE 0.4 MG: 0.2 INJECTION, SOLUTION INTRAMUSCULAR; INTRAVENOUS at 19:21

## 2019-05-01 RX ADMIN — CEFAZOLIN SODIUM 2000 MG: 2 SOLUTION INTRAVENOUS at 19:04

## 2019-05-01 RX ADMIN — LIDOCAINE HYDROCHLORIDE 50 MG: 10 INJECTION, SOLUTION INFILTRATION; PERINEURAL at 19:28

## 2019-05-01 RX ADMIN — Medication 100 MG: at 18:56

## 2019-05-01 RX ADMIN — ROCURONIUM BROMIDE 10 MG: 10 INJECTION, SOLUTION INTRAVENOUS at 19:02

## 2019-05-01 RX ADMIN — SODIUM CHLORIDE, SODIUM GLUCONATE, SODIUM ACETATE, POTASSIUM CHLORIDE, MAGNESIUM CHLORIDE, SODIUM PHOSPHATE, DIBASIC, AND POTASSIUM PHOSPHATE 125 ML/HR: .53; .5; .37; .037; .03; .012; .00082 INJECTION, SOLUTION INTRAVENOUS at 08:08

## 2019-05-01 RX ADMIN — FENTANYL CITRATE 100 MCG: 50 INJECTION, SOLUTION INTRAMUSCULAR; INTRAVENOUS at 18:53

## 2019-05-01 RX ADMIN — SODIUM CHLORIDE 3 G: 9 INJECTION, SOLUTION INTRAVENOUS at 13:12

## 2019-05-01 RX ADMIN — MIDAZOLAM 2 MG: 1 INJECTION INTRAMUSCULAR; INTRAVENOUS at 18:50

## 2019-05-01 RX ADMIN — SODIUM CHLORIDE, SODIUM GLUCONATE, SODIUM ACETATE, POTASSIUM CHLORIDE, MAGNESIUM CHLORIDE, SODIUM PHOSPHATE, DIBASIC, AND POTASSIUM PHOSPHATE 125 ML/HR: .53; .5; .37; .037; .03; .012; .00082 INJECTION, SOLUTION INTRAVENOUS at 20:33

## 2019-05-01 RX ADMIN — DEXAMETHASONE SODIUM PHOSPHATE 10 MG: 10 INJECTION, SOLUTION INTRAMUSCULAR; INTRAVENOUS at 19:05

## 2019-05-02 VITALS
TEMPERATURE: 98.1 F | BODY MASS INDEX: 34.86 KG/M2 | OXYGEN SATURATION: 94 % | DIASTOLIC BLOOD PRESSURE: 74 MMHG | SYSTOLIC BLOOD PRESSURE: 133 MMHG | RESPIRATION RATE: 18 BRPM | HEART RATE: 89 BPM | HEIGHT: 68 IN | WEIGHT: 230 LBS

## 2019-05-02 PROCEDURE — 97166 OT EVAL MOD COMPLEX 45 MIN: CPT

## 2019-05-02 PROCEDURE — G8988 SELF CARE GOAL STATUS: HCPCS

## 2019-05-02 PROCEDURE — G8989 SELF CARE D/C STATUS: HCPCS

## 2019-05-02 PROCEDURE — NC001 PR NO CHARGE: Performed by: SURGERY

## 2019-05-02 PROCEDURE — G8987 SELF CARE CURRENT STATUS: HCPCS

## 2019-05-02 PROCEDURE — 99217 PR OBSERVATION CARE DISCHARGE MANAGEMENT: CPT | Performed by: SURGERY

## 2019-05-02 RX ADMIN — SODIUM CHLORIDE, SODIUM GLUCONATE, SODIUM ACETATE, POTASSIUM CHLORIDE, MAGNESIUM CHLORIDE, SODIUM PHOSPHATE, DIBASIC, AND POTASSIUM PHOSPHATE 125 ML/HR: .53; .5; .37; .037; .03; .012; .00082 INJECTION, SOLUTION INTRAVENOUS at 05:32

## 2019-05-02 RX ADMIN — HYDROCHLOROTHIAZIDE 25 MG: 25 TABLET ORAL at 08:28

## 2019-05-02 RX ADMIN — NEBIVOLOL HYDROCHLORIDE 20 MG: 10 TABLET ORAL at 08:28

## 2019-05-03 ENCOUNTER — TRANSITIONAL CARE MANAGEMENT (OUTPATIENT)
Dept: FAMILY MEDICINE CLINIC | Facility: CLINIC | Age: 59
End: 2019-05-03

## 2019-07-27 DIAGNOSIS — I10 ESSENTIAL HYPERTENSION: ICD-10-CM

## 2019-07-27 RX ORDER — HYDROCHLOROTHIAZIDE 25 MG/1
TABLET ORAL
Qty: 30 TABLET | Refills: 5 | Status: SHIPPED | OUTPATIENT
Start: 2019-07-27 | End: 2020-01-22

## 2019-07-29 DIAGNOSIS — I10 ESSENTIAL HYPERTENSION: ICD-10-CM

## 2019-07-29 RX ORDER — HYDROCHLOROTHIAZIDE 25 MG/1
TABLET ORAL
Qty: 30 TABLET | Refills: 5 | OUTPATIENT
Start: 2019-07-29

## 2019-08-14 ENCOUNTER — OFFICE VISIT (OUTPATIENT)
Dept: FAMILY MEDICINE CLINIC | Facility: CLINIC | Age: 59
End: 2019-08-14
Payer: COMMERCIAL

## 2019-08-14 VITALS
WEIGHT: 233.6 LBS | BODY MASS INDEX: 35.4 KG/M2 | HEART RATE: 64 BPM | OXYGEN SATURATION: 95 % | DIASTOLIC BLOOD PRESSURE: 70 MMHG | HEIGHT: 68 IN | SYSTOLIC BLOOD PRESSURE: 110 MMHG | RESPIRATION RATE: 18 BRPM | TEMPERATURE: 98.6 F

## 2019-08-14 DIAGNOSIS — I10 BENIGN ESSENTIAL HYPERTENSION: Primary | ICD-10-CM
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
## 2019-08-14 DIAGNOSIS — E78.5 HYPERLIPIDEMIA, UNSPECIFIED HYPERLIPIDEMIA TYPE: ICD-10-CM

## 2019-08-14 PROCEDURE — 3074F SYST BP LT 130 MM HG: CPT | Performed by: FAMILY MEDICINE

## 2019-08-14 PROCEDURE — 1036F TOBACCO NON-USER: CPT | Performed by: FAMILY MEDICINE

## 2019-08-14 PROCEDURE — 3008F BODY MASS INDEX DOCD: CPT | Performed by: FAMILY MEDICINE

## 2019-08-14 PROCEDURE — 99213 OFFICE O/P EST LOW 20 MIN: CPT | Performed by: FAMILY MEDICINE

## 2019-08-14 NOTE — ASSESSMENT & PLAN NOTE
Hypertension  Patient blood pressure is stable at this time she will continue with current regimen of medications    She will obtain blood work as ordered for further evaluation

## 2019-08-14 NOTE — PROGRESS NOTES
FAMILY PRACTICE OFFICE VISIT       NAME: Marcie Guevara  AGE: 61 y o  SEX: female       : 1960        MRN: [id number]    DATE: 2019  TIME: 11:00 AM    Assessment and Plan     Problem List Items Addressed This Visit        Cardiovascular and Mediastinum    Benign essential hypertension - Primary     Hypertension  Patient blood pressure is stable at this time she will continue with current regimen of medications  She will obtain blood work as ordered for further evaluation         Relevant Orders    CBC    Comprehensive metabolic panel    Lipid panel    TSH, 3rd generation       Musculoskeletal and Integument    Tick bite     Tick bite  Patient will observe for any unusual symptoms or rashes over the next 3 weeks  She will call and we will recommend further evaluation as needed            Other    Hyperlipidemia    Relevant Orders    CBC    Comprehensive metabolic panel    Lipid panel    TSH, 3rd generation              Chief Complaint     Chief Complaint   Patient presents with    Follow-up     6 months    Tick Removal     Behind right ear about 1 week ago       History of Present Illness     Patient denies any recent illness  She has been stain active with exercise and walking on a daily basis  She has yet to go for her initial screening colonoscopy  Patient also reports a removal of a take behind her right ear 1 week ago  She denies any symptoms at this time      Review of Systems   Review of Systems   Constitutional: Negative  HENT: Negative  Eyes: Negative  Respiratory: Negative  Cardiovascular: Negative  Gastrointestinal: Negative  Genitourinary: Negative  Musculoskeletal: Negative  Skin:        Patient does complain of intermittent pruritic skin but denies any a obvious dryness on her skin  It is variable where itching occurs but could be anywhere on her body which lasted few minutes after scratching  She denies any known allergens at this time  Neurological: Negative  Hematological: Negative  Psychiatric/Behavioral: Negative          Active Problem List     Patient Active Problem List   Diagnosis    Benign essential hypertension    Hyperlipidemia    Lyme disease    Other specified hypothyroidism    Encounter for annual routine gynecological examination    Encounter for screening mammogram for breast cancer    Closed fracture of left orbital floor ([address] )    Fall    Tick bite       Past Medical History:  Past Medical History:   Diagnosis Date    High blood pressure     Papanicolaou smear 12/01/2010    Pap and pelvic smears 12/2010, 10/2011, 10/2012    Rosacea     Skin cancer     H/O Basal Cell Cancer       Past Surgical History:  Past Surgical History:   Procedure Laterality Date    BREAST BIOPSY Right 08/05/2005    benign aprocrine metaplasia    ORBITAL FRACTURE SURGERY Left 5/1/2019    Procedure: OPEN REDUCTION W/ INTERNAL FIXATION (ORIF) ORBITAL;  Surgeon: Devon Flores MD;  Location: [facility];  Service: Plastics    WISDOM TOOTH EXTRACTION         Family History:  Family History   Problem Relation Age of Onset    No Known Problems Mother     Heart disease Father         Cardiac Disorder    Dementia Father     Hyperlipidemia Father     Pancreatic cancer Maternal Uncle     No Known Problems Brother     No Known Problems Daughter     Stroke Maternal Grandmother     Alcohol abuse Maternal Grandfather     No Known Problems Paternal Grandmother     Other Paternal Grandfather         heart problems    No Known Problems Daughter        Social History:  Social History     Socioeconomic History    Marital status: /Civil Union     Spouse name: Not on file    Number of children: Not on file    Years of education: Not on file    Highest education level: Not on file   Occupational History    Not on file   Social Needs    Financial resource strain: Not on file    Food insecurity:     Worry: Not on file     Inability: Not on file    Transportation needs:     Medical: Not on file     Non-medical: Not on file   Tobacco Use    Smoking status: Never Smoker    Smokeless tobacco: Never Used   Substance and Sexual Activity    Alcohol use: Yes     Comment: Acute alcohol use    Drug use: No    Sexual activity: Yes     Partners: Male     Birth control/protection: None   Lifestyle    Physical activity:     Days per week: Not on file     Minutes per session: Not on file    Stress: Not on file   Relationships    Social connections:     Talks on phone: Not on file     Gets together: Not on file     Attends [employer] service: Not on file     Active member of club or organization: Not on file     Attends meetings of clubs or organizations: Not on file     Relationship status: Not on file    Intimate partner violence:     Fear of current or ex partner: Not on file     Emotionally abused: Not on file     Physically abused: Not on file     Forced sexual activity: Not on file   Other Topics Concern    Not on file   Social History Narrative    Exercises 5 50 6 times per week       Objective     Vitals:    08/14/19 1029   BP: 110/70   Pulse:    Resp:    Temp:    SpO2:      Wt Readings from Last 3 Encounters:   08/14/19 106 kg (233 lb 9 6 oz)   05/01/19 104 kg (230 lb)   02/14/19 107 kg (236 lb 12 8 oz)       Physical Exam   Constitutional: She is oriented to person, place, and time  No distress  Cardiovascular: Normal heart sounds  Regular rate and rhythm with no murmurs   Pulmonary/Chest: Breath sounds normal    Lungs are clear to auscultation without wheezes,rales, or rhonchi   Abdominal:   Abdomen is soft, nontender with positive bowel sounds  There is no rebound or guarding  No masses palpated   Neurological: She is alert and oriented to person, place, and time  No cranial nerve deficit  Skin: No rash noted  Area were tick bite was attached appears to be minimally pink with no discharge or signs of infection    Negative ECM rash Psychiatric: She has a normal mood and affect   Her behavior is normal  Judgment and thought content normal        Pertinent Laboratory/Diagnostic Studies:  Lab Results   Component Value Date    GLUCOSE 98 11/23/2015    BUN 17 05/01/2019    CREATININE 0 79 05/01/2019    CALCIUM 8 9 05/01/2019     11/23/2015    K 3 4 (L) 05/01/2019    CO2 27 05/01/2019     05/01/2019     Lab Results   Component Value Date    ALT 29 07/24/2018    AST 39 07/24/2018    ALKPHOS 105 07/24/2018    BILITOT 0 57 11/23/2015       Lab Results   Component Value Date    WBC 10 90 (H) 05/01/2019    HGB 14 3 05/01/2019    HCT 42 9 05/01/2019     (H) 05/01/2019     05/01/2019       No results found for: TSH    Lab Results   Component Value Date    CHOL 158 11/23/2015     Lab Results   Component Value Date    TRIG 236 (H) 07/24/2018     Lab Results   Component Value Date    HDL 47 07/24/2018     Lab Results   Component Value Date    LDLCALC 73 07/24/2018     No results found for: HGBA1C    Results for orders placed or performed during the hospital encounter of 05/01/19   CBC and Platelet   Result Value Ref Range    WBC 10 90 (H) 4 31 - 10 16 Thousand/uL    RBC 4 29 3 81 - 5 12 Million/uL    Hemoglobin 14 3 11 5 - 15 4 g/dL    Hematocrit 42 9 34 8 - 46 1 %     (H) 82 - 98 fL    MCH 33 3 26 8 - 34 3 pg    MCHC 33 3 31 4 - 37 4 g/dL    RDW 13 4 11 6 - 15 1 %    Platelets 090 054 - 619 Thousands/uL    MPV 9 3 8 9 - 12 7 fL   Basic metabolic panel   Result Value Ref Range    Sodium 140 136 - 145 mmol/L    Potassium 3 4 (L) 3 5 - 5 3 mmol/L    Chloride 105 100 - 108 mmol/L    CO2 27 21 - 32 mmol/L    ANION GAP 8 4 - 13 mmol/L    BUN 17 5 - 25 mg/dL    Creatinine 0 79 0 60 - 1 30 mg/dL    Glucose 137 65 - 140 mg/dL    Glucose, Fasting 137 (H) 65 - 99 mg/dL    Calcium 8 9 8 3 - 10 1 mg/dL    eGFR 82 ml/min/1 73sq m       Orders Placed This Encounter   Procedures    CBC    Comprehensive metabolic panel    Lipid panel    TSH, 3rd generation       ALLERGIES:  No Known Allergies    Current Medications     Current Outpatient Medications   Medication Sig Dispense Refill    atorvastatin (LIPITOR) 20 mg tablet TAKE ONE TABLET BY MOUTH EVERY DAY 30 tablet 5    BYSTOLIC 20 MG tablet TAKE ONE TABLET BY MOUTH EVERY DAY 30 tablet 5    calcium-vitamin D 250-100 MG-UNIT per tablet Take 1 tablet by mouth daily       cycloSPORINE (RESTASIS) 0 05 % ophthalmic emulsion Apply to eye      doxycycline hyclate (VIBRAMYCIN) 100 mg capsule as needed      fluorouracil (EFUDEX) 5 % cream       hydrochlorothiazide (HYDRODIURIL) 25 mg tablet TAKE ONE TABLET BY MOUTH EVERY DAY 30 tablet 5    hypromellose (NATURES TEARS) 0 4 % SOLN Apply to eye      Multiple Vitamin (MULTIVITAMINS PO) Take by mouth       No current facility-administered medications for this visit            Health Maintenance     Health Maintenance   Topic Date Due    Hepatitis C Screening  1960    CRC Screening: Colonoscopy  1960    BMI: Followup Plan  01/12/1978    INFLUENZA VACCINE  07/01/2019    Pneumococcal Vaccine: Pediatrics (0 to 5 Years) and At-Risk Patients (6 to 59 Years) (1 of 3 - PCV13) 05/07/2020 (Originally 1/12/1966)    Depression Screening PHQ  10/23/2019    MAMMOGRAM  12/17/2019    BMI: Adult  08/14/2020    PAP SMEAR  11/16/2020    Pneumococcal Vaccine: 65+ Years (1 of 2 - PCV13) 01/12/2025    DTaP,Tdap,and Td Vaccines (2 - Td) 05/01/2029    HEPATITIS B VACCINES  Aged Out     Immunization History   Administered Date(s) Administered    INFLUENZA 07/29/2016    Influenza Quadrivalent Preservative Free 3 years and older IM 07/29/2016    Tdap 05/01/2019    Zoster 77/96/1799       Thony Graham MD

## 2019-08-14 NOTE — ASSESSMENT & PLAN NOTE
Tick bite  Patient will observe for any unusual symptoms or rashes over the next 3 weeks    She will call and we will recommend further evaluation as needed

## 2019-08-15 ENCOUNTER — APPOINTMENT (OUTPATIENT)
Dept: LAB | Facility: CLINIC | Age: 59
End: 2019-08-15
Payer: COMMERCIAL

## 2019-08-15 ENCOUNTER — TELEPHONE (OUTPATIENT)
Dept: FAMILY MEDICINE CLINIC | Facility: CLINIC | Age: 59
End: 2019-08-15

## 2019-08-15 DIAGNOSIS — E78.5 HYPERLIPIDEMIA, UNSPECIFIED HYPERLIPIDEMIA TYPE: ICD-10-CM

## 2019-08-15 DIAGNOSIS — I10 BENIGN ESSENTIAL HYPERTENSION: ICD-10-CM

## 2019-08-15 LAB
ALBUMIN SERPL BCP-MCNC: 3.4 G/DL (ref 3.5–5)
ALP SERPL-CCNC: 114 U/L (ref 46–116)
ALT SERPL W P-5'-P-CCNC: 26 U/L (ref 12–78)
ANION GAP SERPL CALCULATED.3IONS-SCNC: 6 MMOL/L (ref 4–13)
AST SERPL W P-5'-P-CCNC: 23 U/L (ref 5–45)
BILIRUB SERPL-MCNC: 0.27 MG/DL (ref 0.2–1)
BUN SERPL-MCNC: 15 MG/DL (ref 5–25)
CALCIUM SERPL-MCNC: 9 MG/DL (ref 8.3–10.1)
CHLORIDE SERPL-SCNC: 105 MMOL/L (ref 100–108)
CHOLEST SERPL-MCNC: 164 MG/DL (ref 50–200)
CO2 SERPL-SCNC: 29 MMOL/L (ref 21–32)
CREAT SERPL-MCNC: 0.72 MG/DL (ref 0.6–1.3)
ERYTHROCYTE [DISTWIDTH] IN BLOOD BY AUTOMATED COUNT: 13.1 % (ref 11.6–15.1)
GFR SERPL CREATININE-BSD FRML MDRD: 92 ML/MIN/1.73SQ M
GLUCOSE P FAST SERPL-MCNC: 98 MG/DL (ref 65–99)
HCT VFR BLD AUTO: 40.9 % (ref 34.8–46.1)
HDLC SERPL-MCNC: 47 MG/DL (ref 40–60)
HGB BLD-MCNC: 12.9 G/DL (ref 11.5–15.4)
LDLC SERPL CALC-MCNC: 65 MG/DL (ref 0–100)
MCH RBC QN AUTO: 32.1 PG (ref 26.8–34.3)
MCHC RBC AUTO-ENTMCNC: 31.5 G/DL (ref 31.4–37.4)
MCV RBC AUTO: 102 FL (ref 82–98)
NONHDLC SERPL-MCNC: 117 MG/DL
PLATELET # BLD AUTO: 319 THOUSANDS/UL (ref 149–390)
PMV BLD AUTO: 10.1 FL (ref 8.9–12.7)
POTASSIUM SERPL-SCNC: 4 MMOL/L (ref 3.5–5.3)
PROT SERPL-MCNC: 7.6 G/DL (ref 6.4–8.2)
RBC # BLD AUTO: 4.02 MILLION/UL (ref 3.81–5.12)
SODIUM SERPL-SCNC: 140 MMOL/L (ref 136–145)
TRIGL SERPL-MCNC: 262 MG/DL
TSH SERPL DL<=0.05 MIU/L-ACNC: 1.43 UIU/ML (ref 0.36–3.74)
WBC # BLD AUTO: 6.82 THOUSAND/UL (ref 4.31–10.16)

## 2019-08-15 PROCEDURE — 36415 COLL VENOUS BLD VENIPUNCTURE: CPT

## 2019-08-15 PROCEDURE — 84443 ASSAY THYROID STIM HORMONE: CPT

## 2019-08-15 PROCEDURE — 85027 COMPLETE CBC AUTOMATED: CPT

## 2019-08-15 PROCEDURE — 80053 COMPREHEN METABOLIC PANEL: CPT

## 2019-08-15 PROCEDURE — 80061 LIPID PANEL: CPT

## 2019-08-15 NOTE — TELEPHONE ENCOUNTER
----- Message from Abiodun Tadeo MD sent at 1/74/0390  5:12 PM EDT -----  All recent blood work stable    No significant change from previous blood test

## 2019-10-26 DIAGNOSIS — E78.5 HYPERLIPIDEMIA, UNSPECIFIED HYPERLIPIDEMIA TYPE: ICD-10-CM

## 2019-10-26 DIAGNOSIS — I10 ESSENTIAL HYPERTENSION: ICD-10-CM

## 2019-10-28 RX ORDER — ATORVASTATIN CALCIUM 20 MG/1
TABLET, FILM COATED ORAL
Qty: 30 TABLET | Refills: 5 | Status: SHIPPED | OUTPATIENT
Start: 2019-10-28 | End: 2019-12-03

## 2019-10-28 RX ORDER — NEBIVOLOL HYDROCHLORIDE 20 MG/1
TABLET ORAL
Qty: 30 TABLET | Refills: 5 | Status: SHIPPED | OUTPATIENT
Start: 2019-10-28 | End: 2019-12-03

## 2019-12-03 ENCOUNTER — ANNUAL EXAM (OUTPATIENT)
Dept: GYNECOLOGY | Facility: CLINIC | Age: 59
End: 2019-12-03
Payer: COMMERCIAL

## 2019-12-03 VITALS
DIASTOLIC BLOOD PRESSURE: 82 MMHG | HEIGHT: 67 IN | WEIGHT: 229.4 LBS | BODY MASS INDEX: 36 KG/M2 | SYSTOLIC BLOOD PRESSURE: 120 MMHG

## 2019-12-03 DIAGNOSIS — Z12.31 ENCOUNTER FOR SCREENING MAMMOGRAM FOR BREAST CANCER: ICD-10-CM

## 2019-12-03 DIAGNOSIS — Z01.419 ENCOUNTER FOR ANNUAL ROUTINE GYNECOLOGICAL EXAMINATION: Primary | ICD-10-CM

## 2019-12-03 PROCEDURE — S0612 ANNUAL GYNECOLOGICAL EXAMINA: HCPCS | Performed by: OBSTETRICS & GYNECOLOGY

## 2019-12-03 NOTE — PROGRESS NOTES
Assessment/Plan:  Normal GYN exam  CoTesting '22  RTO 1 yr  Obesity- demonstrated weight loss in the past, gained 6 '17, 3 '18- lost 8 '19  SBE  3 D Mammo  Exrecise 3/wk- does 5 /wk  Ca 1,000 mg/d -does       Diagnoses and all orders for this visit:    Encounter for annual routine gynecological examination    Encounter for screening mammogram for breast cancer  -     Mammo screening bilateral w 3d & cad; Future              Subjective:        Patient ID: Missy Roca is a 61 y o  female  [facility] is here for her annual visit  She is without any gynecologic complaints  She fell and in May and fractured her left orbit requiring surgery  The following portions of the patient's history were reviewed and updated as appropriate: She  has a past medical history of High blood pressure, Papanicolaou smear (12/01/2010), Rosacea, and Skin cancer  Patient Active Problem List    Diagnosis Date Noted    Tick bite 08/14/2019    Closed fracture of left orbital floor ([facility] [address] ) 05/01/2019    Fall 05/01/2019    Encounter for annual routine gynecological examination 11/26/2018    Encounter for screening mammogram for breast cancer 11/26/2018    Other specified hypothyroidism 10/23/2018    Lyme disease 07/21/2015    Benign essential hypertension 04/16/2013    Hyperlipidemia 09/18/2012   PMH:  G2, P2; SAVD x 2, '90, '93  Right stereotactic biopsy '05  Basal cell cancer- R Eye, Chest 5/10  Panic attacks  HTN  Obesity- BMI 40 '11, lost 40 pounds '15  Lyme Disease 7/15  Hypercholesterolemia 9/15      Menopause 47 '14   She  has a past surgical history that includes [place] tooth extraction; Breast biopsy (Right, 08/05/2005); and Orbital fracture repair (Left, 5/1/2019)  Her family history includes Alcohol abuse in her maternal grandfather; Dementia in her father; Heart disease in her father; Hyperlipidemia in her father; No Known Problems in her brother, daughter, daughter, mother, and paternal grandmother;  Other in her paternal grandfather; Pancreatic cancer in her maternal uncle; Stroke in her maternal grandmother  FH:   M- 80 R Breast Ca- lumpectomy without adjuvant therapy  MGM- CVA  F- Parkinson's disease 80      She  reports that she has never smoked  She has never used smokeless tobacco  She reports that she drinks alcohol  She reports that she does not use drugs  SH:   Just moved back to [address] still at [place]  Zulman [address] 26 in [address] and dogs   29 back here watching [employer] dogs and others  Current Outpatient Medications   Medication Sig Dispense Refill    atorvastatin (LIPITOR) 20 mg tablet TAKE ONE TABLET BY MOUTH EVERY DAY 30 tablet 5    atorvastatin (LIPITOR) 20 mg tablet TAKE ONE TABLET BY MOUTH EVERY DAY 30 tablet 5    BYSTOLIC 20 MG tablet TAKE ONE TABLET BY MOUTH EVERY DAY 30 tablet 5    BYSTOLIC 20 MG tablet TAKE ONE TABLET BY MOUTH EVERY DAY 30 tablet 5    calcium-vitamin D 250-100 MG-UNIT per tablet Take 1 tablet by mouth daily       cycloSPORINE (RESTASIS) 0 05 % ophthalmic emulsion Apply to eye      doxycycline hyclate (VIBRAMYCIN) 100 mg capsule as needed      fluorouracil (EFUDEX) 5 % cream       hydrochlorothiazide (HYDRODIURIL) 25 mg tablet TAKE ONE TABLET BY MOUTH EVERY DAY 30 tablet 5    hypromellose (NATURES TEARS) 0 4 % SOLN Apply to eye      Multiple Vitamin (MULTIVITAMINS PO) Take by mouth       No current facility-administered medications for this visit        Current Outpatient Medications on File Prior to Visit   Medication Sig    atorvastatin (LIPITOR) 20 mg tablet TAKE ONE TABLET BY MOUTH EVERY DAY    atorvastatin (LIPITOR) 20 mg tablet TAKE ONE TABLET BY MOUTH EVERY DAY    BYSTOLIC 20 MG tablet TAKE ONE TABLET BY MOUTH EVERY DAY    BYSTOLIC 20 MG tablet TAKE ONE TABLET BY MOUTH EVERY DAY    calcium-vitamin D 250-100 MG-UNIT per tablet Take 1 tablet by mouth daily     cycloSPORINE (RESTASIS) 0 05 % ophthalmic emulsion Apply to eye    doxycycline hyclate (VIBRAMYCIN) 100 mg capsule as needed    fluorouracil (EFUDEX) 5 % cream     hydrochlorothiazide (HYDRODIURIL) 25 mg tablet TAKE ONE TABLET BY MOUTH EVERY DAY    hypromellose (NATURES TEARS) 0 4 % SOLN Apply to eye    Multiple Vitamin (MULTIVITAMINS PO) Take by mouth     No current facility-administered medications on file prior to visit  She has No Known Allergies       Review of Systems   Constitutional: Negative for activity change, appetite change, fatigue and unexpected weight change  Eyes: Negative for visual disturbance  Respiratory: Negative for cough, chest tightness, shortness of breath and wheezing  Cardiovascular: Negative for chest pain, palpitations and leg swelling  Breast: Patient denies tenderness, nipple discharge, masses, or erythema  Gastrointestinal: Negative for abdominal distention, abdominal pain, blood in stool, constipation, diarrhea, nausea and vomiting  Endocrine: Negative for cold intolerance and heat intolerance  Genitourinary: Negative for decreased urine volume, difficulty urinating, dyspareunia, dysuria, frequency, hematuria, menstrual problem, pelvic pain, urgency, vaginal bleeding, vaginal discharge and vaginal pain  Rare stress incontinence  [place] once a week using a lubricant  Musculoskeletal: Positive for arthralgias (Left hip)  Skin: Negative for rash  Neurological: Negative for weakness, light-headedness, numbness and headaches  Hematological: Does not bruise/bleed easily  Psychiatric/Behavioral: Negative for agitation, behavioral problems and sleep disturbance  The patient is not nervous/anxious  Objective: There were no vitals filed for this visit  Physical Exam   Constitutional: She is oriented to person, place, and time  She appears well-developed and well-nourished  HENT:   Head: Normocephalic and atraumatic     Eyes: Pupils are equal, round, and reactive to light  Conjunctivae and EOM are normal    Neck: Normal range of motion  Neck supple  No tracheal deviation present  No thyromegaly present  Cardiovascular: Normal rate, regular rhythm and normal heart sounds  No murmur heard  Pulmonary/Chest: Effort normal and breath sounds normal  No respiratory distress  She has no wheezes  Right breast exhibits no inverted nipple, no mass, no nipple discharge, no skin change and no tenderness  Left breast exhibits no inverted nipple, no mass, no nipple discharge, no skin change and no tenderness  No breast tenderness, discharge or bleeding  Breasts are symmetrical    Abdominal: Soft  Bowel sounds are normal  She exhibits no distension and no mass  There is no tenderness  Genitourinary: Vagina normal and uterus normal  Rectal exam shows no external hemorrhoid  No breast tenderness, discharge or bleeding  There is no rash, tenderness or lesion on the right labia  There is no rash, tenderness or lesion on the left labia  Uterus is not deviated, not enlarged and not tender  Cervix exhibits no motion tenderness and no discharge  Right adnexum displays no mass, no tenderness and no fullness  Left adnexum displays no mass, no tenderness and no fullness  Genitourinary Comments: Urethral meatus within normal limits  Perineum within normal limits  Bladder well supported  Musculoskeletal: Normal range of motion  Neurological: She is alert and oriented to person, place, and time  Skin: Skin is warm and dry  Psychiatric: She has a normal mood and affect  Her behavior is normal  Judgment and thought content normal    Nursing note and vitals reviewed

## 2019-12-31 ENCOUNTER — HOSPITAL ENCOUNTER (OUTPATIENT)
Dept: BONE DENSITY | Facility: CLINIC | Age: 59
Discharge: HOME | End: 2019-12-31
Payer: COMMERCIAL

## 2019-12-31 VITALS — WEIGHT: 229 LBS | HEIGHT: 67 IN | BODY MASS INDEX: 35.94 KG/M2

## 2019-12-31 DIAGNOSIS — Z12.31 ENCOUNTER FOR SCREENING MAMMOGRAM FOR BREAST CANCER: ICD-10-CM

## 2019-12-31 PROCEDURE — 77063 BREAST TOMOSYNTHESIS BI: CPT

## 2019-12-31 PROCEDURE — 77067 SCR MAMMO BI INCL CAD: CPT

## 2020-01-22 DIAGNOSIS — I10 ESSENTIAL HYPERTENSION: ICD-10-CM

## 2020-01-22 RX ORDER — HYDROCHLOROTHIAZIDE 25 MG/1
TABLET ORAL
Qty: 30 TABLET | Refills: 5 | Status: SHIPPED | OUTPATIENT
Start: 2020-01-22 | End: 2020-08-19

## 2020-01-25 DIAGNOSIS — I10 ESSENTIAL HYPERTENSION: ICD-10-CM

## 2020-01-25 RX ORDER — HYDROCHLOROTHIAZIDE 25 MG/1
TABLET ORAL
Qty: 30 TABLET | Refills: 0 | OUTPATIENT
Start: 2020-01-25

## 2020-02-24 DIAGNOSIS — I10 ESSENTIAL HYPERTENSION: ICD-10-CM

## 2020-02-24 RX ORDER — HYDROCHLOROTHIAZIDE 25 MG/1
TABLET ORAL
Qty: 30 TABLET | Refills: 5 | OUTPATIENT
Start: 2020-02-24

## 2020-04-22 DIAGNOSIS — E78.5 HYPERLIPIDEMIA, UNSPECIFIED HYPERLIPIDEMIA TYPE: ICD-10-CM

## 2020-04-22 DIAGNOSIS — I10 ESSENTIAL HYPERTENSION: ICD-10-CM

## 2020-04-22 RX ORDER — ATORVASTATIN CALCIUM 20 MG/1
TABLET, FILM COATED ORAL
Qty: 30 TABLET | Refills: 5 | Status: SHIPPED | OUTPATIENT
Start: 2020-04-22 | End: 2020-04-24

## 2020-04-22 RX ORDER — NEBIVOLOL HYDROCHLORIDE 20 MG/1
TABLET ORAL
Qty: 30 TABLET | Refills: 5 | Status: SHIPPED | OUTPATIENT
Start: 2020-04-22 | End: 2020-04-24

## 2020-04-24 DIAGNOSIS — E78.5 HYPERLIPIDEMIA, UNSPECIFIED HYPERLIPIDEMIA TYPE: ICD-10-CM

## 2020-04-24 DIAGNOSIS — I10 ESSENTIAL HYPERTENSION: ICD-10-CM

## 2020-04-24 RX ORDER — NEBIVOLOL HYDROCHLORIDE 20 MG/1
TABLET ORAL
Qty: 30 TABLET | Refills: 5 | Status: SHIPPED | OUTPATIENT
Start: 2020-04-24 | End: 2020-10-21

## 2020-04-24 RX ORDER — ATORVASTATIN CALCIUM 20 MG/1
TABLET, FILM COATED ORAL
Qty: 30 TABLET | Refills: 5 | Status: SHIPPED | OUTPATIENT
Start: 2020-04-24 | End: 2020-10-21

## 2020-05-13 ENCOUNTER — OFFICE VISIT (OUTPATIENT)
Dept: FAMILY MEDICINE CLINIC | Facility: CLINIC | Age: 60
End: 2020-05-13
Payer: COMMERCIAL

## 2020-05-13 VITALS
BODY MASS INDEX: 36.98 KG/M2 | DIASTOLIC BLOOD PRESSURE: 84 MMHG | HEIGHT: 67 IN | WEIGHT: 235.6 LBS | SYSTOLIC BLOOD PRESSURE: 122 MMHG | HEART RATE: 57 BPM | TEMPERATURE: 97.9 F

## 2020-05-13 DIAGNOSIS — E78.5 HYPERLIPIDEMIA, UNSPECIFIED HYPERLIPIDEMIA TYPE: ICD-10-CM

## 2020-05-13 DIAGNOSIS — Z00.00 WELL ADULT EXAM: ICD-10-CM

## 2020-05-13 DIAGNOSIS — Z12.11 COLON CANCER SCREENING: ICD-10-CM

## 2020-05-13 DIAGNOSIS — I10 BENIGN ESSENTIAL HYPERTENSION: Primary | ICD-10-CM

## 2020-05-13 PROCEDURE — 99396 PREV VISIT EST AGE 40-64: CPT | Performed by: FAMILY MEDICINE

## 2020-06-25 ENCOUNTER — OFFICE VISIT (OUTPATIENT)
Dept: FAMILY MEDICINE CLINIC | Facility: CLINIC | Age: 60
End: 2020-06-25
Payer: COMMERCIAL

## 2020-06-25 VITALS
TEMPERATURE: 99.3 F | WEIGHT: 238 LBS | RESPIRATION RATE: 15 BRPM | HEIGHT: 67 IN | SYSTOLIC BLOOD PRESSURE: 118 MMHG | HEART RATE: 75 BPM | BODY MASS INDEX: 37.35 KG/M2 | DIASTOLIC BLOOD PRESSURE: 68 MMHG | OXYGEN SATURATION: 94 %

## 2020-06-25 DIAGNOSIS — H61.23 BILATERAL IMPACTED CERUMEN: Primary | ICD-10-CM

## 2020-06-25 PROCEDURE — 1036F TOBACCO NON-USER: CPT | Performed by: FAMILY MEDICINE

## 2020-06-25 PROCEDURE — 3074F SYST BP LT 130 MM HG: CPT | Performed by: FAMILY MEDICINE

## 2020-06-25 PROCEDURE — 3078F DIAST BP <80 MM HG: CPT | Performed by: FAMILY MEDICINE

## 2020-06-25 PROCEDURE — 69210 REMOVE IMPACTED EAR WAX UNI: CPT | Performed by: FAMILY MEDICINE

## 2020-06-25 PROCEDURE — 3008F BODY MASS INDEX DOCD: CPT | Performed by: FAMILY MEDICINE

## 2020-06-25 PROCEDURE — 99212 OFFICE O/P EST SF 10 MIN: CPT | Performed by: FAMILY MEDICINE

## 2020-08-19 DIAGNOSIS — I10 ESSENTIAL HYPERTENSION: ICD-10-CM

## 2020-08-19 RX ORDER — HYDROCHLOROTHIAZIDE 25 MG/1
TABLET ORAL
Qty: 30 TABLET | Refills: 5 | Status: SHIPPED | OUTPATIENT
Start: 2020-08-19 | End: 2021-02-15

## 2020-09-01 ENCOUNTER — APPOINTMENT (OUTPATIENT)
Dept: LAB | Facility: CLINIC | Age: 60
End: 2020-09-01
Payer: COMMERCIAL

## 2020-09-01 DIAGNOSIS — E03.9 HYPOTHYROIDISM, UNSPECIFIED TYPE: Primary | ICD-10-CM

## 2020-09-01 DIAGNOSIS — E78.5 HYPERLIPIDEMIA, UNSPECIFIED HYPERLIPIDEMIA TYPE: ICD-10-CM

## 2020-09-01 DIAGNOSIS — I10 BENIGN ESSENTIAL HYPERTENSION: ICD-10-CM

## 2020-09-01 LAB
ALBUMIN SERPL BCP-MCNC: 3.4 G/DL (ref 3.5–5)
ALP SERPL-CCNC: 112 U/L (ref 46–116)
ALT SERPL W P-5'-P-CCNC: 24 U/L (ref 12–78)
ANION GAP SERPL CALCULATED.3IONS-SCNC: 6 MMOL/L (ref 4–13)
AST SERPL W P-5'-P-CCNC: 21 U/L (ref 5–45)
BILIRUB SERPL-MCNC: 0.66 MG/DL (ref 0.2–1)
BUN SERPL-MCNC: 16 MG/DL (ref 5–25)
CALCIUM SERPL-MCNC: 9.4 MG/DL (ref 8.3–10.1)
CHLORIDE SERPL-SCNC: 105 MMOL/L (ref 100–108)
CHOLEST SERPL-MCNC: 173 MG/DL (ref 50–200)
CO2 SERPL-SCNC: 29 MMOL/L (ref 21–32)
CREAT SERPL-MCNC: 0.8 MG/DL (ref 0.6–1.3)
ERYTHROCYTE [DISTWIDTH] IN BLOOD BY AUTOMATED COUNT: 12.5 % (ref 11.6–15.1)
GFR SERPL CREATININE-BSD FRML MDRD: 80 ML/MIN/1.73SQ M
GLUCOSE P FAST SERPL-MCNC: 95 MG/DL (ref 65–99)
HCT VFR BLD AUTO: 42 % (ref 34.8–46.1)
HDLC SERPL-MCNC: 53 MG/DL
HGB BLD-MCNC: 13.4 G/DL (ref 11.5–15.4)
LDLC SERPL CALC-MCNC: 66 MG/DL (ref 0–100)
MCH RBC QN AUTO: 32.5 PG (ref 26.8–34.3)
MCHC RBC AUTO-ENTMCNC: 31.9 G/DL (ref 31.4–37.4)
MCV RBC AUTO: 102 FL (ref 82–98)
NONHDLC SERPL-MCNC: 120 MG/DL
PLATELET # BLD AUTO: 298 THOUSANDS/UL (ref 149–390)
PMV BLD AUTO: 9.8 FL (ref 8.9–12.7)
POTASSIUM SERPL-SCNC: 3.8 MMOL/L (ref 3.5–5.3)
PROT SERPL-MCNC: 7.6 G/DL (ref 6.4–8.2)
RBC # BLD AUTO: 4.12 MILLION/UL (ref 3.81–5.12)
SODIUM SERPL-SCNC: 140 MMOL/L (ref 136–145)
TRIGL SERPL-MCNC: 272 MG/DL
TSH SERPL DL<=0.05 MIU/L-ACNC: 4.23 UIU/ML (ref 0.36–3.74)
WBC # BLD AUTO: 9.03 THOUSAND/UL (ref 4.31–10.16)

## 2020-09-01 PROCEDURE — 80061 LIPID PANEL: CPT

## 2020-09-01 PROCEDURE — 84443 ASSAY THYROID STIM HORMONE: CPT

## 2020-09-01 PROCEDURE — 80053 COMPREHEN METABOLIC PANEL: CPT

## 2020-09-01 PROCEDURE — 85027 COMPLETE CBC AUTOMATED: CPT

## 2020-09-01 PROCEDURE — 36415 COLL VENOUS BLD VENIPUNCTURE: CPT

## 2020-10-21 DIAGNOSIS — E78.5 HYPERLIPIDEMIA, UNSPECIFIED HYPERLIPIDEMIA TYPE: ICD-10-CM

## 2020-10-21 DIAGNOSIS — I10 ESSENTIAL HYPERTENSION: ICD-10-CM

## 2020-10-21 RX ORDER — NEBIVOLOL HYDROCHLORIDE 20 MG/1
TABLET ORAL
Qty: 30 TABLET | Refills: 5 | Status: SHIPPED | OUTPATIENT
Start: 2020-10-21 | End: 2021-05-13

## 2020-10-21 RX ORDER — ATORVASTATIN CALCIUM 20 MG/1
TABLET, FILM COATED ORAL
Qty: 30 TABLET | Refills: 5 | Status: SHIPPED | OUTPATIENT
Start: 2020-10-21 | End: 2021-05-13

## 2020-10-27 ENCOUNTER — PREP FOR PROCEDURE (OUTPATIENT)
Dept: GASTROENTEROLOGY | Facility: MEDICAL CENTER | Age: 60
End: 2020-10-27

## 2020-10-27 DIAGNOSIS — Z12.11 COLON CANCER SCREENING: Primary | ICD-10-CM

## 2020-12-22 ENCOUNTER — TRANSCRIBE ORDERS (OUTPATIENT)
Dept: ADMINISTRATIVE | Facility: HOSPITAL | Age: 60
End: 2020-12-22

## 2020-12-22 DIAGNOSIS — Z12.31 ENCOUNTER FOR SCREENING MAMMOGRAM FOR MALIGNANT NEOPLASM OF BREAST: Primary | ICD-10-CM

## 2021-01-05 NOTE — PROGRESS NOTES
Assessment/Plan:  Normal GYN exam  CoTesting '22  RTO 1 yr  Obesity- weight loss in the past, gained 9 '15, 3 '18- lost 8 '19, gained 10 '20  SBE  3 D Mammo  Exrecise 3/wk- does 5 /wk  Ca 1,000 mg/d -does       Diagnoses and all orders for this visit:    Encounter for annual routine gynecological examination    BMI 37 0-37 9, adult    Encounter for screening mammogram for breast cancer    - has active order for 3 D Mammogram    Depression screen negative  Subjective:        Patient ID: Ruel Dunn is a 61 y o  female  [place] is here for a yearly evaluation  She is without complaints  She denies any vaginal bleeding  She is sexually active without any problems  Her ability to exercise has been affected by the pandemic  The following portions of the patient's history were reviewed and updated as appropriate: She  has a past medical history of High blood pressure, Papanicolaou smear (12/01/2010), Rosacea, and Skin cancer  Patient Active Problem List    Diagnosis Date Noted    Bilateral impacted cerumen 06/25/2020    Tick bite 08/14/2019    Closed fracture of left orbital floor ([facility] [address] ) 05/01/2019    Fall 05/01/2019    Encounter for annual routine gynecological examination 11/26/2018    Well adult exam 11/26/2018    Other specified hypothyroidism 10/23/2018    Lyme disease 07/21/2015    Benign essential hypertension 04/16/2013    Hyperlipidemia 09/18/2012   PMH:  G2, P2; SAVD x 2, '90, '93  Right stereotactic biopsy '05  Basal cell cancer- R Eye, Chest 5/10  Panic attacks  HTN  Obesity- BMI 40 '11, lost 40 pounds '15  Lyme Disease 7/15  Hypercholesterolemia 9/15      Menopause 47 '14   She  has a past surgical history that includes [place] tooth extraction; Breast biopsy (Right, 08/05/2005); and Orbital fracture repair (Left, 5/1/2019)    Her family history includes Alcohol abuse in her maternal grandfather; Breast cancer (age of onset: 80) in her mother; Dementia in her father; Heart disease in her father; Hyperlipidemia in her father; No Known Problems in her brother, daughter, daughter, maternal aunt, paternal aunt, and paternal grandmother; Other in her paternal grandfather; Pancreatic cancer in her maternal uncle; Stroke in her maternal grandmother  FH:   M- 80 R Breast Ca- lumpectomy without adjuvant therapy  MGM- CVA  F- Parkinson's disease 80, Valvular heart surgery   She  reports that she has never smoked  She has never used smokeless tobacco  She reports current alcohol use  She reports that she does not use drugs  SH:   Just moved back to area    [facility] in [address] and dogs    31 back here watching [employer] dogs and others    Oli's father , had been bed ridden for 4 years  Current Outpatient Medications   Medication Sig Dispense Refill    atorvastatin (LIPITOR) 20 mg tablet TAKE ONE TABLET BY MOUTH EVERY DAY 30 tablet 5    Bystolic 20 MG tablet TAKE ONE TABLET BY MOUTH EVERY DAY 30 tablet 5    cycloSPORINE (RESTASIS) 0 05 % ophthalmic emulsion Apply to eye      doxycycline hyclate (VIBRAMYCIN) 100 mg capsule as needed      hydrochlorothiazide (HYDRODIURIL) 25 mg tablet TAKE ONE TABLET BY MOUTH EVERY DAY 30 tablet 5    Multiple Vitamin (MULTIVITAMINS PO) Take by mouth       No current facility-administered medications for this visit  Current Outpatient Medications on File Prior to Visit   Medication Sig    atorvastatin (LIPITOR) 20 mg tablet TAKE ONE TABLET BY MOUTH EVERY DAY    Bystolic 20 MG tablet TAKE ONE TABLET BY MOUTH EVERY DAY    cycloSPORINE (RESTASIS) 0 05 % ophthalmic emulsion Apply to eye    doxycycline hyclate (VIBRAMYCIN) 100 mg capsule as needed    hydrochlorothiazide (HYDRODIURIL) 25 mg tablet TAKE ONE TABLET BY MOUTH EVERY DAY    Multiple Vitamin (MULTIVITAMINS PO) Take by mouth     No current facility-administered medications on file prior to visit        She has No Known Allergies       Review of Systems   Constitutional: Negative for activity change, appetite change, chills, fatigue, fever and unexpected weight change  HENT: Positive for sneezing  Negative for congestion, rhinorrhea, sinus pressure, sore throat and trouble swallowing  Eyes: Negative for discharge, redness, itching and visual disturbance  Respiratory: Negative for cough, chest tightness, shortness of breath and wheezing  Cardiovascular: Negative for chest pain, palpitations and leg swelling  Gastrointestinal: Negative for abdominal distention, abdominal pain, blood in stool, constipation, diarrhea, nausea and vomiting  Genitourinary: Negative for decreased urine volume, difficulty urinating, dyspareunia, dysuria, frequency, hematuria, menstrual problem, pelvic pain, urgency, vaginal bleeding, vaginal discharge and vaginal pain  Sexually active   Musculoskeletal: Positive for arthralgias (Left hip and right knee)  Skin: Negative for rash  Neurological: Negative for weakness, light-headedness, numbness and headaches  Hematological: Does not bruise/bleed easily  Psychiatric/Behavioral: Negative for agitation, behavioral problems and sleep disturbance  The patient is not nervous/anxious  Objective: There were no vitals filed for this visit  Physical Exam  Vitals signs and nursing note reviewed  Constitutional:       General: She is not in acute distress  Appearance: She is well-developed  HENT:      Head: Normocephalic and atraumatic  Eyes:      General: No scleral icterus  Right eye: No discharge  Left eye: No discharge  Extraocular Movements: Extraocular movements intact  Conjunctiva/sclera: Conjunctivae normal    Neck:      Musculoskeletal: Normal range of motion and neck supple  Thyroid: No thyromegaly  Trachea: No tracheal deviation  Cardiovascular:      Rate and Rhythm: Normal rate and regular rhythm  Heart sounds: Normal heart sounds  No murmur  Pulmonary:      Effort: Pulmonary effort is normal  No respiratory distress  Breath sounds: Normal breath sounds  No wheezing  Chest:      Breasts: Breasts are symmetrical          Right: No inverted nipple, mass, nipple discharge, skin change or tenderness  Left: No inverted nipple, mass, nipple discharge, skin change or tenderness  Abdominal:      General: Bowel sounds are normal  There is no distension  Palpations: Abdomen is soft  There is no mass  Tenderness: There is no abdominal tenderness  Genitourinary:     General: Normal vulva  Labia:         Right: No rash, tenderness or lesion  Left: No rash, tenderness or lesion  Vagina: Normal       Cervix: No cervical motion tenderness or discharge  Uterus: Not deviated, not enlarged and not tender  Adnexa:         Right: No mass, tenderness or fullness  Left: No mass, tenderness or fullness  Rectum: No external hemorrhoid  Comments: Urethral meatus within normal limits  Perineum within normal limits  Bladder well supported  Physiologic vaginal atrophy  Musculoskeletal: Normal range of motion  Lymphadenopathy:      Cervical: No cervical adenopathy  Skin:     General: Skin is warm and dry  Neurological:      Mental Status: She is alert and oriented to person, place, and time  Psychiatric:         Behavior: Behavior normal          Thought Content:  Thought content normal          Judgment: Judgment normal

## 2021-01-06 ENCOUNTER — ANNUAL EXAM (OUTPATIENT)
Dept: GYNECOLOGY | Facility: CLINIC | Age: 61
End: 2021-01-06
Payer: COMMERCIAL

## 2021-01-06 VITALS
BODY MASS INDEX: 37.57 KG/M2 | SYSTOLIC BLOOD PRESSURE: 126 MMHG | HEIGHT: 67 IN | DIASTOLIC BLOOD PRESSURE: 78 MMHG | WEIGHT: 239.4 LBS

## 2021-01-06 DIAGNOSIS — Z12.31 ENCOUNTER FOR SCREENING MAMMOGRAM FOR BREAST CANCER: ICD-10-CM

## 2021-01-06 DIAGNOSIS — Z01.419 ENCOUNTER FOR ANNUAL ROUTINE GYNECOLOGICAL EXAMINATION: Primary | ICD-10-CM

## 2021-01-06 PROCEDURE — S0612 ANNUAL GYNECOLOGICAL EXAMINA: HCPCS | Performed by: OBSTETRICS & GYNECOLOGY

## 2021-01-13 ENCOUNTER — HOSPITAL ENCOUNTER (OUTPATIENT)
Dept: MAMMOGRAPHY | Facility: CLINIC | Age: 61
Discharge: HOME | End: 2021-01-13
Payer: COMMERCIAL

## 2021-01-13 VITALS — WEIGHT: 235 LBS | BODY MASS INDEX: 36.88 KG/M2 | HEIGHT: 67 IN

## 2021-01-13 DIAGNOSIS — Z12.31 ENCOUNTER FOR SCREENING MAMMOGRAM FOR MALIGNANT NEOPLASM OF BREAST: ICD-10-CM

## 2021-01-13 PROCEDURE — 77067 SCR MAMMO BI INCL CAD: CPT

## 2021-01-13 PROCEDURE — 77063 BREAST TOMOSYNTHESIS BI: CPT

## 2021-01-28 ENCOUNTER — LAB (OUTPATIENT)
Dept: LAB | Facility: CLINIC | Age: 61
End: 2021-01-28
Payer: COMMERCIAL

## 2021-01-28 DIAGNOSIS — E03.9 HYPOTHYROIDISM, UNSPECIFIED TYPE: ICD-10-CM

## 2021-01-28 LAB — TSH SERPL DL<=0.05 MIU/L-ACNC: 5.03 UIU/ML (ref 0.36–3.74)

## 2021-01-28 PROCEDURE — 36415 COLL VENOUS BLD VENIPUNCTURE: CPT

## 2021-01-28 PROCEDURE — 84443 ASSAY THYROID STIM HORMONE: CPT

## 2021-02-15 DIAGNOSIS — I10 ESSENTIAL HYPERTENSION: ICD-10-CM

## 2021-02-15 RX ORDER — HYDROCHLOROTHIAZIDE 25 MG/1
TABLET ORAL
Qty: 30 TABLET | Refills: 5 | Status: SHIPPED | OUTPATIENT
Start: 2021-02-15 | End: 2021-05-21 | Stop reason: SDUPTHER

## 2021-03-01 ENCOUNTER — ANESTHESIA EVENT (OUTPATIENT)
Dept: GASTROENTEROLOGY | Facility: AMBULARY SURGERY CENTER | Age: 61
End: 2021-03-01

## 2021-03-02 ENCOUNTER — HOSPITAL ENCOUNTER (OUTPATIENT)
Dept: GASTROENTEROLOGY | Facility: AMBULARY SURGERY CENTER | Age: 61
Setting detail: OUTPATIENT SURGERY
Discharge: HOME | End: 2021-03-02
Attending: INTERNAL MEDICINE | Admitting: INTERNAL MEDICINE
Payer: COMMERCIAL

## 2021-03-02 ENCOUNTER — ANESTHESIA (OUTPATIENT)
Dept: GASTROENTEROLOGY | Facility: AMBULARY SURGERY CENTER | Age: 61
End: 2021-03-02

## 2021-03-02 VITALS
WEIGHT: 233 LBS | DIASTOLIC BLOOD PRESSURE: 57 MMHG | HEART RATE: 65 BPM | SYSTOLIC BLOOD PRESSURE: 98 MMHG | OXYGEN SATURATION: 95 % | BODY MASS INDEX: 36.57 KG/M2 | RESPIRATION RATE: 18 BRPM | HEIGHT: 67 IN | TEMPERATURE: 97 F

## 2021-03-02 DIAGNOSIS — Z12.11 COLON CANCER SCREENING: ICD-10-CM

## 2021-03-02 PROCEDURE — G0121 COLON CA SCRN NOT HI RSK IND: HCPCS | Performed by: INTERNAL MEDICINE

## 2021-03-02 RX ORDER — LIDOCAINE HYDROCHLORIDE 10 MG/ML
INJECTION, SOLUTION EPIDURAL; INFILTRATION; INTRACAUDAL; PERINEURAL AS NEEDED
Status: DISCONTINUED | OUTPATIENT
Start: 2021-03-02 | End: 2021-03-02

## 2021-03-02 RX ORDER — SODIUM CHLORIDE, SODIUM LACTATE, POTASSIUM CHLORIDE, CALCIUM CHLORIDE 600; 310; 30; 20 MG/100ML; MG/100ML; MG/100ML; MG/100ML
100 INJECTION, SOLUTION INTRAVENOUS CONTINUOUS
Status: DISCONTINUED | OUTPATIENT
Start: 2021-03-02 | End: 2021-03-06 | Stop reason: HOSPADM

## 2021-03-02 RX ORDER — PROPOFOL 10 MG/ML
INJECTION, EMULSION INTRAVENOUS AS NEEDED
Status: DISCONTINUED | OUTPATIENT
Start: 2021-03-02 | End: 2021-03-02

## 2021-03-02 RX ADMIN — PROPOFOL 50 MG: 10 INJECTION, EMULSION INTRAVENOUS at 09:45

## 2021-03-02 RX ADMIN — PROPOFOL 100 MG: 10 INJECTION, EMULSION INTRAVENOUS at 09:41

## 2021-03-02 RX ADMIN — LIDOCAINE HYDROCHLORIDE 50 MG: 10 INJECTION, SOLUTION EPIDURAL; INFILTRATION; INTRACAUDAL at 09:41

## 2021-03-02 RX ADMIN — PROPOFOL 50 MG: 10 INJECTION, EMULSION INTRAVENOUS at 09:48

## 2021-03-02 RX ADMIN — PROPOFOL 50 MG: 10 INJECTION, EMULSION INTRAVENOUS at 09:53

## 2021-03-02 RX ADMIN — SODIUM CHLORIDE, SODIUM LACTATE, POTASSIUM CHLORIDE, AND CALCIUM CHLORIDE 100 ML/HR: .6; .31; .03; .02 INJECTION, SOLUTION INTRAVENOUS at 09:10

## 2021-03-02 RX ADMIN — PROPOFOL 50 MG: 10 INJECTION, EMULSION INTRAVENOUS at 09:55

## 2021-03-02 RX ADMIN — PROPOFOL 50 MG: 10 INJECTION, EMULSION INTRAVENOUS at 09:50

## 2021-03-02 RX ADMIN — PROPOFOL 50 MG: 10 INJECTION, EMULSION INTRAVENOUS at 09:42

## 2021-03-02 NOTE — DISCHARGE INSTRUCTIONS
Diverticulosis   WHAT YOU NEED TO KNOW:   Diverticulosis is a condition that causes small pockets called diverticula to form in your intestine  These pockets make it difficult for bowel movements to pass through your digestive system  DISCHARGE INSTRUCTIONS:   Seek care immediately if:   · You have severe pain on the left side of your lower abdomen  · Your bowel movements are bright or dark red  Contact your healthcare provider if:   · You have a fever and chills  · You feel dizzy or lightheaded  · You have nausea, or you are vomiting  · You have a change in your bowel movements  · You have questions or concerns about your condition or care  Medicines:   · Medicines  to soften your bowel movements may be given  You may also need medicines to treat symptoms such as bloating and pain  · Take your medicine as directed  Contact your healthcare provider if you think your medicine is not helping or if you have side effects  Tell him or her if you are allergic to any medicine  Keep a list of the medicines, vitamins, and herbs you take  Include the amounts, and when and why you take them  Bring the list or the pill bottles to follow-up visits  Carry your medicine list with you in case of an emergency  Self-care: The goal of treatment is to manage any symptoms you have and prevent other problems such as diverticulitis  Diverticulitis is swelling or infection of the diverticula  Your healthcare provider may recommend any of the following:  · Eat a variety of high-fiber foods  High-fiber foods help you have regular bowel movements  High-fiber foods include cooked beans, fruits, vegetables, and some cereals  Most adults need 25 to 35 grams of fiber each day  Your healthcare provider may recommend that you have more  Ask your healthcare provider how much fiber you need  Increase fiber slowly  You may have abdominal discomfort, bloating, and gas if you add fiber to your diet too quickly  You may need to take a fiber supplement if you are not getting enough fiber from food  · Drink liquids as directed  You may need to drink 2 to 3 liters (8 to 12 cups) of liquids every day  Ask your healthcare provider how much liquid to drink each day and which liquids are best for you  · Apply heat  on your abdomen for 20 to 30 minutes every 2 hours for as many days as directed  Heat helps decrease pain and muscle spasms  Help prevent diverticulitis or other symptoms: The following may help decrease your risk for diverticulitis or symptoms, such as bleeding  Talk to your provider about these or other things you can do to prevent problems that may occur with diverticulosis  · Exercise regularly  Ask your healthcare provider about the best exercise plan for you  Exercise can help you have regular bowel movements  Get 30 minutes of exercise on most days of the week  · Maintain a healthy weight  Ask your healthcare provider how much you should weigh  Ask him or her to help you create a weight loss plan if you are overweight  · Do not smoke  Nicotine and other chemicals in cigarettes increase your risk for diverticulitis  Ask your healthcare provider for information if you currently smoke and need help to quit  E-cigarettes or smokeless tobacco still contain nicotine  Talk to your healthcare provider before you use these products  · Ask your healthcare provider if it is safe to take NSAIDs  NSAIDs may increase your risk of diverticulitis  Follow up with your healthcare provider as directed:  Write down your questions so you remember to ask them during your visits  © Copyright [address] Information is for End User's use only and may not be sold, redistributed or otherwise used for commercial purposes  All illustrations and images included in CareNotes® are the copyrighted property of [employer]  or [facility] Veronica Donaldson   The above information is an  only   It is not intended as medical advice for individual conditions or treatments  Talk to your doctor, nurse or pharmacist before following any medical regimen to see if it is safe and effective for you  Diverticulitis Diet   WHAT YOU NEED TO KNOW:   A diverticulitis diet includes foods that allow your intestines to rest while you have diverticulitis  Diverticulitis is a condition that causes small pockets along your intestine called diverticula to become inflamed or infected  This is caused by hard bowel movement, food, or bacteria that get stuck in the pockets  DISCHARGE INSTRUCTIONS:   Foods that may be recommended while you have diverticulitis:   · A clear liquid diet may be recommended for 2 to 3 days  A clear liquid diet includes clear liquids, and foods that are liquid at room temperature  Examples include the following:     ? Water and clear juices (such as apple, cranberry, or grape), strained citrus juices or fruit punch    ? Coffee or tea (without cream or milk)    ? Clear sports drinks or soft drinks, such as ginger ale, lemon-lime soda, or club soda (no cola or root beer)    ? Clear broth, bouillon, or consommé    ? Plain popsicles (no popsicles with pureed fruit or fiber)    ? Flavored gelatin without fruit    · Low-fiber foods may be recommended until your symptoms improve  Examples include the following:     ? Cream of wheat and finely ground grits    ? White bread, white pasta, and white rice    ? Canned and well-cooked fruit without skins or seeds, and juice without pulp    ? Canned and well-cooked vegetables without skins or seeds, and vegetable juice    ? Cow's milk, lactose-free milk, soy milk, and rice milk    ? Yogurt, cottage cheese, and sherbet    ? Eggs, poultry (such as chicken and turkey), fish, and tender, ground, well-cooked beef     ? Tofu and smooth nut butters, such as peanut butter    ?  Broth and strained soups made of low-fiber foods    High-fiber foods  can help prevent diverticulosis and diverticulitis  Your healthcare provider will tell you when you can add high-fiber foods back into your diet  Examples include the following:  · Whole grains and breads, and cereals made with whole grains    · Dried fruit, fresh fruit with skin, and fruit pulp    · Raw vegetables    · Cooked greens, such as spinach    · Tough meat and meat with gristle    · Legumes, such as arciniega beans and lentils    Contact your healthcare provider if:   · Your symptoms do not get better, or they get worse  · You have questions about the foods you should eat  · You have questions or concerns about your condition or care  © Copyright [address] Information is for End User's use only and may not be sold, redistributed or otherwise used for commercial purposes  All illustrations and images included in CareNotes® are the copyrighted property of [employer]  or [employer]  The above information is an  only  It is not intended as medical advice for individual conditions or treatments  Talk to your doctor, nurse or pharmacist before following any medical regimen to see if it is safe and effective for you

## 2021-03-02 NOTE — H&P
History and Physical - [facility]  Moncho Pires 64 y o  female MRN: [id number]    HPI: Moncho Pires is a 64y o  year old female who presents for screening colonoscopy  She never had colonoscopy before        Review of Systems    Historical Information   Past Medical History:   Diagnosis Date    Cancer ([place])     High blood pressure     Hypertension     Papanicolaou smear 12/01/2010    Pap and pelvic smears 12/2010, 10/2011, 10/2012    Rosacea     Skin cancer     H/O Basal Cell Cancer     Past Surgical History:   Procedure Laterality Date    BREAST BIOPSY Right 08/05/2005    benign aprocrine metaplasia    ORBITAL FRACTURE SURGERY Left 5/1/2019    Procedure: OPEN REDUCTION W/ INTERNAL FIXATION (ORIF) ORBITAL;  Surgeon: Dallin Urena MD;  Location: [facility];  Service: Plastics    WISDOM TOOTH EXTRACTION       Social History   Social History     Substance and Sexual Activity   Alcohol Use Yes    Frequency: 4 or more times a week    Drinks per session: 1 or 2    Binge frequency: Never     Social History     Substance and Sexual Activity   Drug Use No     Social History     Tobacco Use   Smoking Status Never Smoker   Smokeless Tobacco Never Used     Family History   Problem Relation Age of Onset    Breast cancer Mother 80    Heart disease Father         Cardiac Disorder    Dementia Father     Hyperlipidemia Father     Pancreatic cancer Maternal Uncle     No Known Problems Brother     No Known Problems Daughter     Stroke Maternal Grandmother     Alcohol abuse Maternal Grandfather     No Known Problems Paternal Grandmother     Other Paternal Grandfather         heart problems    No Known Problems Daughter     No Known Problems Maternal Aunt     No Known Problems Paternal Aunt        Meds/Allergies     (Not in a hospital admission)      No Known Allergies    Objective     /85   Pulse 75   Temp (!) 96 9 °F (36 1 °C) (Temporal)   Resp 18   Ht 5' 7" (1 702 m) Wt 106 kg (233 lb)   LMP  (LMP Unknown)   SpO2 97%   BMI 36 49 kg/m²       PHYSICAL EXAM    Gen: NAD  CV: RRR  CHEST: Clear  ABD: soft, NT/ND  EXT: no edema  Neuro: AAO      ASSESSMENT/PLAN:  This is a 64y o  year old female here for screening colonoscopy      PLAN:   Procedure:  Colonoscopy with biopsy and possible polypectomy

## 2021-03-02 NOTE — ANESTHESIA POSTPROCEDURE EVALUATION
Post-Op Assessment Note    CV Status:  Stable    Pain management: adequate     Mental Status:  Alert and awake   Hydration Status:  Euvolemic   PONV Controlled:  Controlled   Airway Patency:  Patent      Post Op Vitals Reviewed: Yes      Staff: CRNA         No complications documented      /66 (03/02/21 0957)    Temp (!) 97 °F (36 1 °C) (03/02/21 0957)    Pulse 79 (03/02/21 0957)   Resp 20 (03/02/21 0957)    SpO2 96 % (03/02/21 0957)

## 2021-03-02 NOTE — ANESTHESIA PREPROCEDURE EVALUATION
Procedure:  COLONOSCOPY    Relevant Problems   ANESTHESIA (within normal limits)      CARDIO   (+) Benign essential hypertension   (+) Hyperlipidemia      ENDO   (+) Other specified hypothyroidism (no meds currently)      PULMONARY (within normal limits)   (-) Sleep apnea   (-) Smoking   (-) URI (upper respiratory infection)        Physical Exam    Airway    Mallampati score: III  TM Distance: <3 FB  Neck ROM: full     Dental   No notable dental hx     Cardiovascular      Pulmonary      Other Findings        Anesthesia Plan  ASA Score- 2     Anesthesia Type- IV sedation with anesthesia with ASA Monitors  Additional Monitors:   Airway Plan:           Plan Factors-Exercise tolerance (METS): >4 METS  Chart reviewed  Existing labs reviewed  Patient summary reviewed  Patient is not a current smoker  Induction- intravenous  Postoperative Plan-     Informed Consent- Anesthetic plan and risks discussed with patient  I personally reviewed this patient with the CRNA  Discussed and agreed on the Anesthesia Plan with the CRNA  Gonzales Kohli

## 2021-03-10 ENCOUNTER — VBI (OUTPATIENT)
Dept: ADMINISTRATIVE | Facility: OTHER | Age: 61
End: 2021-03-10

## 2021-04-06 DIAGNOSIS — Z23 ENCOUNTER FOR IMMUNIZATION: ICD-10-CM

## 2021-04-12 ENCOUNTER — IMMUNIZATIONS (OUTPATIENT)
Dept: FAMILY MEDICINE CLINIC | Facility: HOSPITAL | Age: 61
End: 2021-04-12

## 2021-04-12 DIAGNOSIS — Z23 ENCOUNTER FOR IMMUNIZATION: Primary | ICD-10-CM

## 2021-04-12 PROCEDURE — 91300 SARS-COV-2 / COVID-19 MRNA VACCINE (PFIZER-BIONTECH) 30 MCG: CPT

## 2021-04-12 PROCEDURE — 0001A SARS-COV-2 / COVID-19 MRNA VACCINE (PFIZER-BIONTECH) 30 MCG: CPT

## 2021-05-03 ENCOUNTER — IMMUNIZATIONS (OUTPATIENT)
Dept: FAMILY MEDICINE CLINIC | Facility: HOSPITAL | Age: 61
End: 2021-05-03

## 2021-05-03 DIAGNOSIS — Z23 ENCOUNTER FOR IMMUNIZATION: Primary | ICD-10-CM

## 2021-05-03 PROCEDURE — 91300 SARS-COV-2 / COVID-19 MRNA VACCINE (PFIZER-BIONTECH) 30 MCG: CPT

## 2021-05-03 PROCEDURE — 0002A SARS-COV-2 / COVID-19 MRNA VACCINE (PFIZER-BIONTECH) 30 MCG: CPT

## 2021-05-13 DIAGNOSIS — I10 ESSENTIAL HYPERTENSION: ICD-10-CM

## 2021-05-13 DIAGNOSIS — E78.5 HYPERLIPIDEMIA, UNSPECIFIED HYPERLIPIDEMIA TYPE: ICD-10-CM

## 2021-05-13 RX ORDER — ATORVASTATIN CALCIUM 20 MG/1
TABLET, FILM COATED ORAL
Qty: 30 TABLET | Refills: 1 | Status: SHIPPED | OUTPATIENT
Start: 2021-05-13 | End: 2021-05-16

## 2021-05-13 RX ORDER — NEBIVOLOL HYDROCHLORIDE 20 MG/1
TABLET ORAL
Qty: 30 TABLET | Refills: 1 | Status: SHIPPED | OUTPATIENT
Start: 2021-05-13 | End: 2021-05-16

## 2021-05-13 NOTE — TELEPHONE ENCOUNTER
Please let patient know prescriptions for Bystolic and atorvastatin were renewed  She is due for a checkup in the office with Dr No Lin  Her last checkup was May 13, 2020  Please ask her to schedule

## 2021-05-16 DIAGNOSIS — E78.5 HYPERLIPIDEMIA, UNSPECIFIED HYPERLIPIDEMIA TYPE: ICD-10-CM

## 2021-05-16 DIAGNOSIS — I10 ESSENTIAL HYPERTENSION: ICD-10-CM

## 2021-05-16 RX ORDER — NEBIVOLOL HYDROCHLORIDE 20 MG/1
TABLET ORAL
Qty: 30 TABLET | Refills: 5 | Status: SHIPPED | OUTPATIENT
Start: 2021-05-16 | End: 2021-05-21 | Stop reason: SDUPTHER

## 2021-05-16 RX ORDER — ATORVASTATIN CALCIUM 20 MG/1
TABLET, FILM COATED ORAL
Qty: 30 TABLET | Refills: 5 | Status: SHIPPED | OUTPATIENT
Start: 2021-05-16 | End: 2021-11-12

## 2021-05-17 ENCOUNTER — RA CDI HCC (OUTPATIENT)
Dept: OTHER | Facility: HOSPITAL | Age: 61
End: 2021-05-17

## 2021-05-17 NOTE — PROGRESS NOTES
[facility] [[email or web address]  coding opportunities             Chart reviewed, (number of) suggestions sent to provider: 1           Patients insurance company: [employer] (Medicare Advantage and Commercial)     Visit status: Patient arrived for their scheduled appointment     Provider never responded to [place] [id number]  coding request     [place] [address]  coding opportunities             Chart reviewed, (number of) suggestions sent to provider: 1           Patients insurance company: [employer] ([employer] and [employer])           Found on active problem list - please assess using MEAT for 2021  S02  32XA Closed fracture of left orbital floor ([facility] [[email or web address] 75 )

## 2021-05-21 ENCOUNTER — OFFICE VISIT (OUTPATIENT)
Dept: FAMILY MEDICINE CLINIC | Facility: CLINIC | Age: 61
End: 2021-05-21
Payer: COMMERCIAL

## 2021-05-21 VITALS
OXYGEN SATURATION: 98 % | BODY MASS INDEX: 37.2 KG/M2 | HEIGHT: 67 IN | SYSTOLIC BLOOD PRESSURE: 110 MMHG | RESPIRATION RATE: 18 BRPM | DIASTOLIC BLOOD PRESSURE: 60 MMHG | WEIGHT: 237 LBS | HEART RATE: 67 BPM | TEMPERATURE: 97.8 F

## 2021-05-21 DIAGNOSIS — E03.9 HYPOTHYROIDISM, UNSPECIFIED TYPE: ICD-10-CM

## 2021-05-21 DIAGNOSIS — Z00.00 WELL ADULT EXAM: ICD-10-CM

## 2021-05-21 DIAGNOSIS — I10 ESSENTIAL HYPERTENSION: Primary | ICD-10-CM

## 2021-05-21 DIAGNOSIS — E78.5 HYPERLIPIDEMIA, UNSPECIFIED HYPERLIPIDEMIA TYPE: ICD-10-CM

## 2021-05-21 PROCEDURE — 99396 PREV VISIT EST AGE 40-64: CPT | Performed by: FAMILY MEDICINE

## 2021-05-21 PROCEDURE — 3725F SCREEN DEPRESSION PERFORMED: CPT | Performed by: FAMILY MEDICINE

## 2021-05-21 RX ORDER — HYDROCHLOROTHIAZIDE 25 MG/1
25 TABLET ORAL DAILY
Qty: 30 TABLET | Refills: 5 | Status: SHIPPED | OUTPATIENT
Start: 2021-05-21 | End: 2021-07-02

## 2021-05-21 RX ORDER — NEBIVOLOL 20 MG/1
20 TABLET ORAL DAILY
Qty: 30 TABLET | Refills: 5 | Status: SHIPPED | OUTPATIENT
Start: 2021-05-21 | End: 2021-11-12

## 2021-05-21 NOTE — ASSESSMENT & PLAN NOTE
Hypothyroidism  Patient will check TSH blood work    We will make further recommendations pending results of test

## 2021-05-21 NOTE — ASSESSMENT & PLAN NOTE
Well adult  Overall the patient appears to be in stable health  Her colonoscopy and mammogram are up-to-date  She is up-to-date with COVID vaccination  She will obtain blood work as ordered for further evaluation    We will make further recommendations pending results of test

## 2021-05-21 NOTE — PROGRESS NOTES
FAMILY PRACTICE OFFICE VISIT       NAME: Gretchen Lechuga  AGE: 64 y o  SEX: female       : 1960        MRN: [id number]    DATE: 2021  TIME: 10:42 AM    Assessment and Plan     Problem List Items Addressed This Visit        Endocrine    Hypothyroidism      Hypothyroidism  Patient will check TSH blood work  We will make further recommendations pending results of test          Relevant Medications    nebivolol (Bystolic) 20 MG tablet    Other Relevant Orders    CBC    Comprehensive metabolic panel    Lipid panel    TSH, 3rd generation       Cardiovascular and Mediastinum    Essential hypertension - Primary       Hypertension  Patient blood pressure is stable at this time she will continue with current regimen of medications         Relevant Medications    nebivolol (Bystolic) 20 MG tablet    hydrochlorothiazide (HYDRODIURIL) 25 mg tablet    Other Relevant Orders    CBC    Comprehensive metabolic panel    Lipid panel    TSH, 3rd generation       Other    Hyperlipidemia      Hyperlipidemia  Patient will check lipid panel blood work and continue with current dose of statin therapy         Well adult exam       Well adult  Overall the patient appears to be in stable health  Her colonoscopy and mammogram are up-to-date  She is up-to-date with COVID vaccination  She will obtain blood work as ordered for further evaluation  We will make further recommendations pending results of test              Depression Screening Follow-up Plan: Patient's depression screening was positive with a PHQ-2 score of 0  Their PHQ-9 score was   Clinically patient does not have depression  No treatment is required  BMI Counseling: Body mass index is 37 12 kg/m²  The BMI is above normal  Nutrition recommendations include decreasing portion sizes and moderation in carbohydrate intake  Exercise recommendations include exercising 3-5 times per week               Chief Complaint     Chief Complaint   Patient presents with   Miesha العراقي Physical Exam       History of Present Illness      Patient the office for annual wellness exam   She denies any recent illness  She states she has gained a few lb since last year due to [employer] inactivity  She is trying to play some tennis at this time as well as going for walks with her dog for 30 minutes on a daily basis  She does see her gynecologist on a yearly basis for routine exams and mammograms  Her colonoscopy was performed in March 2021 and was cleared for 10 years  She is up-to-date with COVID vaccination  Review of Systems   Review of Systems   Constitutional: Negative  HENT: Negative  Eyes: Negative  Respiratory: Negative  Cardiovascular: Negative  Gastrointestinal: Negative  Genitourinary: Negative  Musculoskeletal: Negative  Skin: Negative  Neurological: Negative  Psychiatric/Behavioral: Negative          Active Problem List     Patient Active Problem List   Diagnosis    Essential hypertension    Hyperlipidemia    Lyme disease    Hypothyroidism    Encounter for annual routine gynecological examination    Well adult exam    Closed fracture of left orbital floor ([address] )    Fall    Tick bite    Bilateral impacted cerumen       Past Medical History:  Past Medical History:   Diagnosis Date    [facility] [address]     High blood pressure     Hypertension     Papanicolaou smear 12/01/2010    Pap and pelvic smears 12/2010, 10/2011, 10/2012    Rosacea     Skin cancer     H/O Basal Cell Cancer       Past Surgical History:  Past Surgical History:   Procedure Laterality Date    BREAST BIOPSY Right 08/05/2005    benign aprocrine metaplasia    ORBITAL FRACTURE SURGERY Left 5/1/2019    Procedure: OPEN REDUCTION W/ INTERNAL FIXATION (ORIF) ORBITAL;  Surgeon: Iliana Hollins MD;  [facility];  Service: Plastics    WISDOM TOOTH EXTRACTION         Family History:  Family History   Problem Relation Age of Onset    Breast cancer Mother 80    Heart disease Father Cardiac Disorder    Dementia Father     Hyperlipidemia Father     Pancreatic cancer Maternal Uncle     No Known Problems Brother     No Known Problems Daughter     Stroke Maternal Grandmother     Alcohol abuse Maternal Grandfather     No Known Problems Paternal Grandmother     Other Paternal Grandfather         heart problems    No Known Problems Daughter     No Known Problems Maternal Aunt     No Known Problems Paternal Aunt        Social History:  Social History     Socioeconomic History    Marital status: /Civil Union     Spouse name: Not on file    Number of children: Not on file    Years of education: Not on file    Highest education level: Not on file   Occupational History    Not on file   Social Needs    Financial resource strain: Not on file    Food insecurity     Worry: Not on file     Inability: Not on file    Transportation needs     Medical: Not on file     Non-medical: Not on file   Tobacco Use    Smoking status: Never Smoker    Smokeless tobacco: Never Used   Substance and Sexual Activity    Alcohol use: Yes     Frequency: 4 or more times a week     Drinks per session: 1 or 2     Binge frequency: Never    Drug use: No    Sexual activity: Yes     Partners: Male     Birth control/protection: Post-menopausal   Lifestyle    Physical activity     Days per week: Not on file     Minutes per session: Not on file    Stress: Not on file   Relationships    Social connections     Talks on phone: Not on file     Gets together: Not on file     Attends Latter-day service: Not on file     Active member of club or organization: Not on file     Attends meetings of clubs or organizations: Not on file     Relationship status: Not on file    Intimate partner violence     Fear of current or ex partner: Not on file     Emotionally abused: Not on file     Physically abused: Not on file     Forced sexual activity: Not on file   Other Topics Concern    Not on file   Social History Narrative    Exercises 5 50 6 times per week       Objective     Vitals:    05/21/21 1001   BP: 110/60   Pulse: 67   Resp: 18   Temp: 97 8 °F (36 6 °C)   SpO2: 98%     Wt Readings from Last 3 Encounters:   05/21/21 108 kg (237 lb)   03/02/21 106 kg (233 lb)   01/13/21 107 kg (235 lb)       Physical Exam  Vitals signs and nursing note reviewed  Constitutional:       General: She is not in acute distress  Appearance: Normal appearance  She is well-developed  She is not ill-appearing  HENT:      Head: Normocephalic and atraumatic  Right Ear: External ear normal       Left Ear: External ear normal    Eyes:      General:         Right eye: No discharge  Left eye: No discharge  Extraocular Movements: Extraocular movements intact  Conjunctiva/sclera: Conjunctivae normal       Pupils: Pupils are equal, round, and reactive to light  Neck:      Musculoskeletal: Normal range of motion and neck supple  Thyroid: No thyromegaly  Vascular: No carotid bruit  Cardiovascular:      Rate and Rhythm: Normal rate and regular rhythm  Heart sounds: Normal heart sounds  No murmur  Pulmonary:      Effort: Pulmonary effort is normal       Breath sounds: Normal breath sounds  No wheezing, rhonchi or rales  Abdominal:      General: Abdomen is flat  Bowel sounds are normal  There is no distension  Palpations: Abdomen is soft  Tenderness: There is no abdominal tenderness  There is no guarding or rebound  Comments: NO hepatospenomegaly   Musculoskeletal: Normal range of motion  Right lower leg: No edema  Left lower leg: No edema  Lymphadenopathy:      Cervical: No cervical adenopathy  Skin:     Findings: No rash  Comments: NO RASHES   Neurological:      General: No focal deficit present  Mental Status: She is alert and oriented to person, place, and time  Cranial Nerves: No cranial nerve deficit     Psychiatric:         Mood and Affect: Mood normal  Behavior: Behavior normal          Thought Content: Thought content normal          Judgment: Judgment normal          Pertinent Laboratory/Diagnostic Studies:  Lab Results   Component Value Date    GLUCOSE 98 11/23/2015    BUN 16 09/01/2020    CREATININE 0 80 09/01/2020    CALCIUM 9 4 09/01/2020     11/23/2015    K 3 8 09/01/2020    CO2 29 09/01/2020     09/01/2020     Lab Results   Component Value Date    ALT 24 09/01/2020    AST 21 09/01/2020    ALKPHOS 112 09/01/2020    BILITOT 0 57 11/23/2015       Lab Results   Component Value Date    WBC 9 03 09/01/2020    HGB 13 4 09/01/2020    HCT 42 0 09/01/2020     (H) 09/01/2020     09/01/2020       No results found for: TSH    Lab Results   Component Value Date    CHOL 158 11/23/2015     Lab Results   Component Value Date    TRIG 272 (H) 09/01/2020     Lab Results   Component Value Date    HDL 53 09/01/2020     Lab Results   Component Value Date    LDLCALC 66 09/01/2020     No results found for: HGBA1C    Results for orders placed or performed in visit on 01/28/21   TSH, 3rd generation   Result Value Ref Range    TSH 3RD GENERATON 5 030 (H) 0 358 - 3 740 uIU/mL       Orders Placed This Encounter   Procedures    CBC    Comprehensive metabolic panel    Lipid panel    TSH, 3rd generation       ALLERGIES:  No Known Allergies    Current Medications     Current Outpatient Medications   Medication Sig Dispense Refill    atorvastatin (LIPITOR) 20 mg tablet TAKE ONE TABLET BY MOUTH EVERY DAY 30 tablet 5    cycloSPORINE (RESTASIS) 0 05 % ophthalmic emulsion Apply to eye      hydrochlorothiazide (HYDRODIURIL) 25 mg tablet Take 1 tablet (25 mg total) by mouth daily 30 tablet 5    Multiple Vitamin (MULTIVITAMINS PO) Take by mouth      nebivolol (Bystolic) 20 MG tablet Take 1 tablet (20 mg total) by mouth daily 30 tablet 5     No current facility-administered medications for this visit            Health Maintenance     Health Maintenance   Topic Date Due    Hepatitis C Screening  Never done    HIV Screening  Never done    BMI: Followup Plan  Never done    Annual Physical  05/13/2021    Depression Screening PHQ  05/21/2022    BMI: Adult  05/21/2022    MAMMOGRAM  01/13/2023    DTaP,Tdap,and Td Vaccines (2 - Td) 05/01/2029    Colorectal Cancer Screening  03/02/2031    Influenza Vaccine  Completed    COVID-19 Vaccine  Completed    Pneumococcal Vaccine: Pediatrics (0 to 5 Years) and At-Risk Patients (6 to 59 Years)  Aged Out    HIB Vaccine  Aged Out    Hepatitis B Vaccine  Aged Out    IPV Vaccine  Aged Out    Hepatitis A Vaccine  Aged Out    Meningococcal ACWY Vaccine  Aged Out    HPV Vaccine  Aged [employer] History   Administered Date(s) Administered    INFLUENZA 07/29/2016, 11/23/2020    Influenza Injectable, MDCK, Preservative Free, Quadrivalent, 0 5 mL 11/23/2020    Influenza Quadrivalent Preservative Free 3 years and older IM 07/29/2016    SARS-CoV-2 / COVID-19 mRNA IM ([employer]) 04/12/2021, 05/03/2021    Tdap 05/01/2019    Zoster 86/44/1117       Mitch Drake MD

## 2021-05-24 ENCOUNTER — OFFICE VISIT (OUTPATIENT)
Dept: FAMILY MEDICINE CLINIC | Facility: CLINIC | Age: 61
End: 2021-05-24
Payer: COMMERCIAL

## 2021-05-24 VITALS
DIASTOLIC BLOOD PRESSURE: 68 MMHG | SYSTOLIC BLOOD PRESSURE: 118 MMHG | BODY MASS INDEX: 37.42 KG/M2 | WEIGHT: 238.4 LBS | HEIGHT: 67 IN | OXYGEN SATURATION: 97 % | HEART RATE: 68 BPM | RESPIRATION RATE: 16 BRPM | TEMPERATURE: 97.6 F

## 2021-05-24 DIAGNOSIS — H10.9 CONJUNCTIVITIS OF LEFT EYE, UNSPECIFIED CONJUNCTIVITIS TYPE: Primary | ICD-10-CM

## 2021-05-24 DIAGNOSIS — H00.015 HORDEOLUM EXTERNUM OF LEFT LOWER EYELID: ICD-10-CM

## 2021-05-24 PROCEDURE — 3008F BODY MASS INDEX DOCD: CPT | Performed by: FAMILY MEDICINE

## 2021-05-24 PROCEDURE — 1036F TOBACCO NON-USER: CPT | Performed by: FAMILY MEDICINE

## 2021-05-24 PROCEDURE — 99212 OFFICE O/P EST SF 10 MIN: CPT | Performed by: FAMILY MEDICINE

## 2021-05-24 NOTE — ASSESSMENT & PLAN NOTE
Hordeolum  Patient given prescription for Garamycin abdominal ointment to apply 4 times daily to the affected area  She will continue with warm compresses 4 times daily for 5 minutes  She will also start some of her doxycycline 100 mg b i d  that she normally takes for rosacea    Patient will call if there is no changes in condition over the next 2-3 days

## 2021-05-24 NOTE — PROGRESS NOTES
FAMILY PRACTICE OFFICE VISIT       NAME: Artemio Espinosa  AGE: 64 y o  SEX: female       : 1960        MRN: [id number]    DATE: 2021  TIME: 10:49 AM    Assessment and Plan     Problem List Items Addressed This Visit        Other    Hordeolum externum of left lower eyelid     Hordeolum  Patient given prescription for Garamycin abdominal ointment to apply 4 times daily to the affected area  She will continue with warm compresses 4 times daily for 5 minutes  She will also start some of her doxycycline 100 mg b i d  that she normally takes for rosacea  Patient will call if there is no changes in condition over the next 2-3 days           Other Visit Diagnoses     Conjunctivitis of left eye, unspecified conjunctivitis type    -  Primary    Relevant Medications    gentamicin (GENTAK) 0 3 % ophthalmic ointment              Chief Complaint     Chief Complaint   Patient presents with   Elizabeth Franco     left eye        History of Present Illness       Patient in the office with 2 day history of nodule on left lower eyelid that has become red and uncomfortable with blinking her eyes  She has no changes in visual acuity and can move her eyes in all directions without discomfort  She denies any other recent illness  She does not wear contact lenses      Review of Systems   Review of Systems   Constitutional: Negative  Eyes: Positive for discharge and redness  Negative for visual disturbance         Active Problem List     Patient Active Problem List   Diagnosis    Essential hypertension    Hyperlipidemia    Lyme disease    Hypothyroidism    Encounter for annual routine gynecological examination    Well adult exam    Closed fracture of left orbital floor ([address] )    Fall    Tick bite    Bilateral impacted cerumen    Hordeolum externum of left lower eyelid       Past Medical History:  Past Medical History:   Diagnosis Date    Cancer ([address]     High blood pressure     Hypertension     Papanicolaou smear 12/01/2010    Pap and pelvic smears 12/2010, 10/2011, 10/2012    Rosacea     Skin cancer     H/O Basal Cell Cancer       Past Surgical History:  Past Surgical History:   Procedure Laterality Date    BREAST BIOPSY Right 08/05/2005    benign aprocrine metaplasia    ORBITAL FRACTURE SURGERY Left 5/1/2019    Procedure: OPEN REDUCTION W/ INTERNAL FIXATION (ORIF) ORBITAL;  Surgeon: Shanae Roca MD;  [facility]: [facility];  Service: Plastics    WISDOM TOOTH EXTRACTION         Family History:  Family History   Problem Relation Age of Onset    Breast cancer Mother 80    Heart disease Father         Cardiac Disorder    Dementia Father     Hyperlipidemia Father     Pancreatic cancer Maternal Uncle     No Known Problems Brother     No Known Problems Daughter     Stroke Maternal Grandmother     Alcohol abuse Maternal Grandfather     No Known Problems Paternal Grandmother     Other Paternal Grandfather         heart problems    No Known Problems Daughter     No Known Problems Maternal Aunt     No Known Problems Paternal Aunt        Social History:  Social History     Socioeconomic History    Marital status: /Civil Union     Spouse name: Not on file    Number of children: Not on file    Years of education: Not on file    Highest education level: Not on file   Occupational History    Not on file   Social Needs    Financial resource strain: Not on file    Food insecurity     Worry: Not on file     Inability: Not on file    Transportation needs     Medical: Not on file     Non-medical: Not on file   Tobacco Use    Smoking status: Never Smoker    Smokeless tobacco: Never Used   Substance and Sexual Activity    Alcohol use: Yes     Frequency: 4 or more times a week     Drinks per session: 1 or 2     Binge frequency: Never    Drug use: No    Sexual activity: Yes     Partners: Male     Birth control/protection: Post-menopausal   Lifestyle    Physical activity     Days per week: Not on file Minutes per session: Not on file    Stress: Not on file   Relationships    Social connections     Talks on phone: Not on file     Gets together: Not on file     Attends Jain service: Not on file     Active member of club or organization: Not on file     Attends meetings of clubs or organizations: Not on file     Relationship status: Not on file    Intimate partner violence     Fear of current or ex partner: Not on file     Emotionally abused: Not on file     Physically abused: Not on file     Forced sexual activity: Not on file   Other Topics Concern    Not on file   Social History Narrative    Exercises 5 50 6 times per week       Objective     Vitals:    05/24/21 1027   BP: 118/68   Pulse: 68   Resp: 16   Temp: 97 6 °F (36 4 °C)   SpO2: 97%     Wt Readings from Last 3 Encounters:   05/24/21 108 kg (238 lb 6 4 oz)   05/21/21 108 kg (237 lb)   03/02/21 106 kg (233 lb)       Physical Exam  Constitutional:       General: She is not in acute distress  Appearance: Normal appearance  She is not ill-appearing  Eyes:      Extraocular Movements: Extraocular movements intact  Pupils: Pupils are equal, round, and reactive to light  Comments: Patient very slight injection of  Conjunctiva of left eye  There is a large 2 mm nodule on left lower eyelid  There is some mild redness and swelling of skin  Below left eyelid  Neurological:      Mental Status: She is alert           Pertinent Laboratory/Diagnostic Studies:  Lab Results   Component Value Date    GLUCOSE 98 11/23/2015    BUN 16 09/01/2020    CREATININE 0 80 09/01/2020    CALCIUM 9 4 09/01/2020     11/23/2015    K 3 8 09/01/2020    CO2 29 09/01/2020     09/01/2020     Lab Results   Component Value Date    ALT 24 09/01/2020    AST 21 09/01/2020    ALKPHOS 112 09/01/2020    BILITOT 0 57 11/23/2015       Lab Results   Component Value Date    WBC 9 03 09/01/2020    HGB 13 4 09/01/2020    HCT 42 0 09/01/2020     (H) 09/01/2020  09/01/2020       No results found for: TSH    Lab Results   Component Value Date    CHOL 158 11/23/2015     Lab Results   Component Value Date    TRIG 272 (H) 09/01/2020     Lab Results   Component Value Date    HDL 53 09/01/2020     Lab Results   Component Value Date    LDLCALC 66 09/01/2020     No results found for: HGBA1C    Results for orders placed or performed in visit on 01/28/21   TSH, 3rd generation   Result Value Ref Range    TSH 3RD GENERATON 5 030 (H) 0 358 - 3 740 uIU/mL       No orders of the defined types were placed in this encounter  ALLERGIES:  No Known Allergies    Current Medications     Current Outpatient Medications   Medication Sig Dispense Refill    atorvastatin (LIPITOR) 20 mg tablet TAKE ONE TABLET BY MOUTH EVERY DAY 30 tablet 5    hydrochlorothiazide (HYDRODIURIL) 25 mg tablet Take 1 tablet (25 mg total) by mouth daily 30 tablet 5    Multiple Vitamin (MULTIVITAMINS PO) Take by mouth      nebivolol (Bystolic) 20 MG tablet Take 1 tablet (20 mg total) by mouth daily 30 tablet 5    cycloSPORINE (RESTASIS) 0 05 % ophthalmic emulsion Apply to eye      gentamicin (GENTAK) 0 3 % ophthalmic ointment Administer 0 5 inches into the left eye 4 (four) times a day 3 5 g 1     No current facility-administered medications for this visit            Health Maintenance     Health Maintenance   Topic Date Due    Hepatitis C Screening  Never done    HIV Screening  Never done    Depression Screening PHQ  05/21/2022    BMI: Followup Plan  05/21/2022    BMI: Adult  05/21/2022    Annual Physical  05/21/2022    MAMMOGRAM  01/13/2023    DTaP,Tdap,and Td Vaccines (2 - Td) 05/01/2029    Colorectal Cancer Screening  03/02/2031    Influenza Vaccine  Completed    COVID-19 Vaccine  Completed    Pneumococcal Vaccine: Pediatrics (0 to 5 Years) and At-Risk Patients (6 to 59 Years)  Aged Out    HIB Vaccine  Aged Out    Hepatitis B Vaccine  Aged Out    IPV Vaccine  Aged Out    Hepatitis A Vaccine  Aged Out    Meningococcal ACWY Vaccine  Aged Out    HPV Vaccine  Aged Out     Immunization History   Administered Date(s) Administered    INFLUENZA 07/29/2016, 11/23/2020    Influenza Injectable, MDCK, Preservative Free, Quadrivalent, 0 5 mL 11/23/2020    Influenza Quadrivalent Preservative Free 3 years and older IM 07/29/2016    SARS-CoV-2 / COVID-19 mRNA IM ([employer]) 04/12/2021, 05/03/2021    Tdap 05/01/2019    Zoster 39/23/1997       Marco A Virk MD

## 2021-07-01 ENCOUNTER — APPOINTMENT (OUTPATIENT)
Dept: LAB | Facility: CLINIC | Age: 61
End: 2021-07-01
Payer: COMMERCIAL

## 2021-07-01 DIAGNOSIS — E03.9 HYPOTHYROIDISM, UNSPECIFIED TYPE: ICD-10-CM

## 2021-07-01 DIAGNOSIS — I10 ESSENTIAL HYPERTENSION: ICD-10-CM

## 2021-07-01 LAB
ALBUMIN SERPL BCP-MCNC: 3.5 G/DL (ref 3.5–5)
ALP SERPL-CCNC: 101 U/L (ref 46–116)
ALT SERPL W P-5'-P-CCNC: 20 U/L (ref 12–78)
ANION GAP SERPL CALCULATED.3IONS-SCNC: 8 MMOL/L (ref 4–13)
AST SERPL W P-5'-P-CCNC: 16 U/L (ref 5–45)
BILIRUB SERPL-MCNC: 0.74 MG/DL (ref 0.2–1)
BUN SERPL-MCNC: 17 MG/DL (ref 5–25)
CALCIUM SERPL-MCNC: 9.4 MG/DL (ref 8.3–10.1)
CHLORIDE SERPL-SCNC: 105 MMOL/L (ref 100–108)
CHOLEST SERPL-MCNC: 161 MG/DL (ref 50–200)
CO2 SERPL-SCNC: 26 MMOL/L (ref 21–32)
CREAT SERPL-MCNC: 0.71 MG/DL (ref 0.6–1.3)
ERYTHROCYTE [DISTWIDTH] IN BLOOD BY AUTOMATED COUNT: 12.7 % (ref 11.6–15.1)
GFR SERPL CREATININE-BSD FRML MDRD: 92 ML/MIN/1.73SQ M
GLUCOSE P FAST SERPL-MCNC: 111 MG/DL (ref 65–99)
HCT VFR BLD AUTO: 40.6 % (ref 34.8–46.1)
HDLC SERPL-MCNC: 42 MG/DL
HGB BLD-MCNC: 13.3 G/DL (ref 11.5–15.4)
LDLC SERPL CALC-MCNC: 74 MG/DL (ref 0–100)
MCH RBC QN AUTO: 32.7 PG (ref 26.8–34.3)
MCHC RBC AUTO-ENTMCNC: 32.8 G/DL (ref 31.4–37.4)
MCV RBC AUTO: 100 FL (ref 82–98)
NONHDLC SERPL-MCNC: 119 MG/DL
PLATELET # BLD AUTO: 319 THOUSANDS/UL (ref 149–390)
PMV BLD AUTO: 10.7 FL (ref 8.9–12.7)
POTASSIUM SERPL-SCNC: 3.1 MMOL/L (ref 3.5–5.3)
PROT SERPL-MCNC: 7.4 G/DL (ref 6.4–8.2)
RBC # BLD AUTO: 4.07 MILLION/UL (ref 3.81–5.12)
SODIUM SERPL-SCNC: 139 MMOL/L (ref 136–145)
TRIGL SERPL-MCNC: 224 MG/DL
TSH SERPL DL<=0.05 MIU/L-ACNC: 2.38 UIU/ML (ref 0.36–3.74)
WBC # BLD AUTO: 8.83 THOUSAND/UL (ref 4.31–10.16)

## 2021-07-01 PROCEDURE — 85027 COMPLETE CBC AUTOMATED: CPT

## 2021-07-01 PROCEDURE — 80061 LIPID PANEL: CPT

## 2021-07-01 PROCEDURE — 80053 COMPREHEN METABOLIC PANEL: CPT

## 2021-07-01 PROCEDURE — 36415 COLL VENOUS BLD VENIPUNCTURE: CPT

## 2021-07-01 PROCEDURE — 84443 ASSAY THYROID STIM HORMONE: CPT

## 2021-07-06 ENCOUNTER — TELEPHONE (OUTPATIENT)
Dept: FAMILY MEDICINE CLINIC | Facility: CLINIC | Age: 61
End: 2021-07-06

## 2021-07-06 NOTE — TELEPHONE ENCOUNTER
----- Message from Rebecca Sandoval MD sent at 7/9/4680  6:38 AM EDT -----  Recent blood work was stable with the exception of low potassium at 3 1  This is probably from her  hydrochlorothiazide diuretic  I would Like to decrease this medication to 12 5 mg daily  I will also send in for a few days of potassium supplement    Recheck blood work in 4 weeks

## 2021-08-11 RX ORDER — HYDROCHLOROTHIAZIDE 12.5 MG/1
12.5 TABLET ORAL DAILY
Qty: 30 TABLET | Refills: 0 | OUTPATIENT
Start: 2021-08-11

## 2021-08-14 DIAGNOSIS — I10 ESSENTIAL HYPERTENSION: Primary | ICD-10-CM

## 2021-08-16 RX ORDER — HYDROCHLOROTHIAZIDE 25 MG/1
TABLET ORAL
Qty: 30 TABLET | Refills: 5 | Status: SHIPPED | OUTPATIENT
Start: 2021-08-16 | End: 2022-02-10

## 2021-09-01 ENCOUNTER — TELEPHONE (OUTPATIENT)
Dept: FAMILY MEDICINE CLINIC | Facility: CLINIC | Age: 61
End: 2021-09-01

## 2021-09-01 ENCOUNTER — APPOINTMENT (OUTPATIENT)
Dept: LAB | Facility: CLINIC | Age: 61
End: 2021-09-01
Payer: COMMERCIAL

## 2021-09-01 DIAGNOSIS — I10 ESSENTIAL HYPERTENSION: ICD-10-CM

## 2021-09-01 LAB
ANION GAP SERPL CALCULATED.3IONS-SCNC: 4 MMOL/L (ref 4–13)
BUN SERPL-MCNC: 13 MG/DL (ref 5–25)
CALCIUM SERPL-MCNC: 9 MG/DL (ref 8.3–10.1)
CHLORIDE SERPL-SCNC: 105 MMOL/L (ref 100–108)
CO2 SERPL-SCNC: 28 MMOL/L (ref 21–32)
CREAT SERPL-MCNC: 0.89 MG/DL (ref 0.6–1.3)
GFR SERPL CREATININE-BSD FRML MDRD: 70 ML/MIN/1.73SQ M
GLUCOSE SERPL-MCNC: 120 MG/DL (ref 65–140)
POTASSIUM SERPL-SCNC: 4 MMOL/L (ref 3.5–5.3)
SODIUM SERPL-SCNC: 137 MMOL/L (ref 136–145)

## 2021-09-01 PROCEDURE — 80048 BASIC METABOLIC PNL TOTAL CA: CPT

## 2021-09-01 PROCEDURE — 36415 COLL VENOUS BLD VENIPUNCTURE: CPT

## 2021-09-01 NOTE — TELEPHONE ENCOUNTER
----- Message from Sola Youssef MD sent at 5/0/6383 11:57 AM EDT -----   Recent blood work stable with potassium level coming back to normal at 4 0

## 2021-11-03 ENCOUNTER — VBI (OUTPATIENT)
Dept: ADMINISTRATIVE | Facility: OTHER | Age: 61
End: 2021-11-03

## 2021-11-12 DIAGNOSIS — E78.5 HYPERLIPIDEMIA, UNSPECIFIED HYPERLIPIDEMIA TYPE: ICD-10-CM

## 2021-11-12 DIAGNOSIS — I10 ESSENTIAL HYPERTENSION: ICD-10-CM

## 2021-11-12 RX ORDER — NEBIVOLOL 20 MG/1
TABLET ORAL
Qty: 30 TABLET | Refills: 5 | Status: SHIPPED | OUTPATIENT
Start: 2021-11-12 | End: 2022-06-13 | Stop reason: SDUPTHER

## 2021-11-12 RX ORDER — ATORVASTATIN CALCIUM 20 MG/1
TABLET, FILM COATED ORAL
Qty: 30 TABLET | Refills: 5 | Status: SHIPPED | OUTPATIENT
Start: 2021-11-12 | End: 2022-06-30

## 2021-12-18 ENCOUNTER — IMMUNIZATIONS (OUTPATIENT)
Dept: FAMILY MEDICINE CLINIC | Facility: HOSPITAL | Age: 61
End: 2021-12-18

## 2021-12-18 DIAGNOSIS — Z23 ENCOUNTER FOR IMMUNIZATION: Primary | ICD-10-CM

## 2021-12-18 PROCEDURE — 0001A COVID-19 PFIZER VACC 0.3 ML: CPT

## 2021-12-18 PROCEDURE — 91300 COVID-19 PFIZER VACC 0.3 ML: CPT

## 2022-01-05 ENCOUNTER — TELEPHONE (OUTPATIENT)
Dept: OBGYN CLINIC | Facility: CLINIC | Age: 62
End: 2022-01-05

## 2022-01-05 NOTE — TELEPHONE ENCOUNTER
Pt has her yearly set up for 1/12 and would like to make her appt for her mammo  Script needed    thank you

## 2022-01-11 PROBLEM — Z12.31 ENCOUNTER FOR SCREENING MAMMOGRAM FOR BREAST CANCER: Status: ACTIVE | Noted: 2022-01-11

## 2022-01-11 PROBLEM — Z12.4 SCREENING FOR CERVICAL CANCER: Status: ACTIVE | Noted: 2022-01-11

## 2022-01-11 NOTE — PROGRESS NOTES
Assessment/Plan:  Normal GYN exam  CoTesting '22  RTO 1 yr  Obesity- a struggle with vacillating weight; lost 5 '21  SBE  3 D Mammo  - active, due now  Colonoscopy 3/21-  nl  Exrecise 3/wk- does 5 /wk  Ca 1,000 mg/d -does      Depression Screen:  Neg    Diagnoses and all orders for this visit:    Encounter for annual routine gynecological examination  -     Liquid-based pap, screening    Screening for cervical cancer  -     Liquid-based pap, screening    Encounter for screening mammogram for breast cancer    BMI 37 0-37 9, adult              Subjective:        Patient ID: Vasile Oquendo is a 58 y o  female  Jessie King presents today for a yearly evaluation  She is without any complaints  She denies any vaginal bleeding  She was able to lose 5 lb last year  She walks her dog twice daily and consumes an adequate amount of calcium  Her mammogram is scheduled for the end of the month  She had a normal colonoscopy this past March  The following portions of the patient's history were reviewed and updated as appropriate: She  has a past medical history of Cancer ([facility] [place] [address] ), High blood pressure, Hypertension, Papanicolaou smear (12/01/2010), Rosacea, and Skin cancer    Patient Active Problem List    Diagnosis Date Noted    Encounter for screening mammogram for breast cancer 01/11/2022    Screening for cervical cancer 01/11/2022    Hordeolum externum of left lower eyelid 05/24/2021    Bilateral impacted cerumen 06/25/2020    Tick bite 08/14/2019    Closed fracture of left orbital floor ([facility] [place] [address] ) 05/01/2019    Fall 05/01/2019    Encounter for annual routine gynecological examination 11/26/2018    Well adult exam 11/26/2018    Hypothyroidism 10/23/2018    Lyme disease 07/21/2015    Essential hypertension 04/16/2013    Hyperlipidemia 09/18/2012   PMH:  G2, P2; SAVD x 2, '90, '93  Right stereotactic biopsy '05  Basal cell cancer- R Eye, Chest 5/10  Panic attacks  HTN  Obesity- BMI 40 '11, lost 40 pounds '15  Lyme Disease 7/15  Hypercholesterolemia 9/15      Menopause 47 '14   She  has a past surgical history that includes [place] tooth extraction; Breast biopsy (Right, 2005); and Orbital fracture repair (Left, 2019)  Her family history includes Alcohol abuse in her maternal grandfather; Breast cancer (age of onset: 80) in her mother; Dementia in her father; Heart disease in her father; Hyperlipidemia in her father; No Known Problems in her brother, daughter, daughter, maternal aunt, paternal aunt, and paternal grandmother; Other in her paternal grandfather; Pancreatic cancer in her maternal uncle; Stroke in her maternal grandmother  FH:   M- 80 R Breast Ca- lumpectomy without adjuvant therapy  MGM- CVA  F- Parkinson's disease 80, Valvular heart surgery   She  reports that she has never smoked  She has never used smokeless tobacco  She reports current alcohol use  She reports that she does not use drugs  SH:     Just moved back to area    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  [address] [facility] and dogs    31 back here watching Lucinda Roy 2 dogs and others    Oli's father , had been bed ridden for 4 years    Current Outpatient Medications   Medication Sig Dispense Refill    atorvastatin (LIPITOR) 20 mg tablet TAKE ONE TABLET BY MOUTH EVERY DAY 30 tablet 5    cycloSPORINE (RESTASIS) 0 05 % ophthalmic emulsion Apply to eye      gentamicin (GENTAK) 0 3 % ophthalmic ointment Administer 0 5 inches into the left eye 4 (four) times a day 3 5 g 1    hydrochlorothiazide (HYDRODIURIL) 12 5 mg tablet Take 1 tablet (12 5 mg total) by mouth daily 90 tablet 1    hydrochlorothiazide (HYDRODIURIL) 25 mg tablet TAKE ONE TABLET BY MOUTH EVERY DAY 30 tablet 5    Multiple Vitamin (MULTIVITAMINS PO) Take by mouth      nebivolol (BYSTOLIC) 20 MG tablet TAKE ONE TABLET BY MOUTH EVERY DAY 30 tablet 5    potassium chloride (K-DUR,KLOR-CON) 10 mEq tablet Take 1 tablet (10 mEq total) by mouth 2 (two) times a day 10 tablet 0     No current facility-administered medications for this visit  Current Outpatient Medications on File Prior to Visit   Medication Sig    atorvastatin (LIPITOR) 20 mg tablet TAKE ONE TABLET BY MOUTH EVERY DAY    cycloSPORINE (RESTASIS) 0 05 % ophthalmic emulsion Apply to eye    gentamicin (GENTAK) 0 3 % ophthalmic ointment Administer 0 5 inches into the left eye 4 (four) times a day    hydrochlorothiazide (HYDRODIURIL) 12 5 mg tablet Take 1 tablet (12 5 mg total) by mouth daily    hydrochlorothiazide (HYDRODIURIL) 25 mg tablet TAKE ONE TABLET BY MOUTH EVERY DAY    Multiple Vitamin (MULTIVITAMINS PO) Take by mouth    nebivolol (BYSTOLIC) 20 MG tablet TAKE ONE TABLET BY MOUTH EVERY DAY    potassium chloride (K-DUR,KLOR-CON) 10 mEq tablet Take 1 tablet (10 mEq total) by mouth 2 (two) times a day     No current facility-administered medications on file prior to visit  She has No Known Allergies       Review of Systems   Constitutional: Negative for activity change, appetite change, fatigue and unexpected weight change  HENT: Positive for sneezing (Chronic during this time of year which she believes is from forced air from the heating system)  Eyes: Negative for visual disturbance  Respiratory: Negative for cough, chest tightness, shortness of breath and wheezing  Cardiovascular: Negative for chest pain, palpitations and leg swelling  Breast: Patient denies tenderness, nipple discharge, masses, or erythema  Gastrointestinal: Negative for abdominal distention, abdominal pain, blood in stool, constipation, diarrhea, nausea and vomiting  Endocrine: Negative for cold intolerance and heat intolerance  Genitourinary: Negative for decreased urine volume, difficulty urinating, dyspareunia, dysuria, frequency, hematuria, menstrual problem, pelvic pain, urgency, vaginal bleeding, vaginal discharge and vaginal pain  Sexually active  Rare stress incontinence which is stable  Musculoskeletal: Positive for arthralgias (Intermittently involving the left hip)  Skin: Negative for rash  Neurological: Negative for weakness, light-headedness, numbness and headaches  Hematological: Does not bruise/bleed easily  Psychiatric/Behavioral: Negative for agitation, behavioral problems and sleep disturbance  The patient is not nervous/anxious  Objective:    Vitals:    01/12/22 1101   BP: 110/60   Weight: 106 kg (234 lb 3 2 oz)   Height: 5' 7" (1 702 m)            Physical Exam  Vitals and nursing note reviewed  Constitutional:       General: She is not in acute distress  Appearance: Normal appearance  She is well-developed  She is obese  HENT:      Head: Normocephalic and atraumatic  Eyes:      General: No scleral icterus  Right eye: No discharge  Left eye: No discharge  Extraocular Movements: Extraocular movements intact  Conjunctiva/sclera: Conjunctivae normal    Neck:      Thyroid: No thyromegaly  Trachea: No tracheal deviation  Cardiovascular:      Rate and Rhythm: Normal rate and regular rhythm  Heart sounds: Normal heart sounds  No murmur heard  Pulmonary:      Effort: Pulmonary effort is normal  No respiratory distress  Breath sounds: Normal breath sounds  No wheezing  Chest:   Breasts: Breasts are symmetrical       Right: No inverted nipple, mass, nipple discharge, skin change or tenderness  Left: No inverted nipple, mass, nipple discharge, skin change or tenderness  Abdominal:      General: Abdomen is flat  Bowel sounds are normal  There is no distension  Palpations: Abdomen is soft  There is no mass  Tenderness: There is no abdominal tenderness  There is no right CVA tenderness, left CVA tenderness or guarding  Genitourinary:     General: Normal vulva  Labia:         Right: No rash, tenderness or lesion           Left: No rash, tenderness or lesion  Vagina: Normal       Cervix: No cervical motion tenderness or discharge  Uterus: Not deviated, not enlarged and not tender  Adnexa:         Right: No mass, tenderness or fullness  Left: No mass, tenderness or fullness  Rectum: No external hemorrhoid  Comments: Urethral meatus within normal limits  Perineum within normal limits  Bladder well supported  Physiologic vaginal atrophy  Musculoskeletal:         General: Normal range of motion  Cervical back: Normal range of motion and neck supple  Lymphadenopathy:      Cervical: No cervical adenopathy  Skin:     General: Skin is warm and dry  Neurological:      Mental Status: She is alert and oriented to person, place, and time  Psychiatric:         Mood and Affect: Mood normal          Behavior: Behavior normal          Thought Content:  Thought content normal          Judgment: Judgment normal

## 2022-01-12 ENCOUNTER — ANNUAL EXAM (OUTPATIENT)
Dept: OBGYN CLINIC | Facility: CLINIC | Age: 62
End: 2022-01-12
Payer: COMMERCIAL

## 2022-01-12 VITALS
WEIGHT: 234.2 LBS | DIASTOLIC BLOOD PRESSURE: 60 MMHG | BODY MASS INDEX: 36.76 KG/M2 | HEIGHT: 67 IN | SYSTOLIC BLOOD PRESSURE: 110 MMHG

## 2022-01-12 DIAGNOSIS — Z12.4 SCREENING FOR CERVICAL CANCER: ICD-10-CM

## 2022-01-12 DIAGNOSIS — Z01.419 ENCOUNTER FOR ANNUAL ROUTINE GYNECOLOGICAL EXAMINATION: Primary | ICD-10-CM

## 2022-01-12 DIAGNOSIS — Z12.31 ENCOUNTER FOR SCREENING MAMMOGRAM FOR BREAST CANCER: ICD-10-CM

## 2022-01-12 PROCEDURE — S0612 ANNUAL GYNECOLOGICAL EXAMINA: HCPCS | Performed by: OBSTETRICS & GYNECOLOGY

## 2022-01-12 PROCEDURE — G0476 HPV COMBO ASSAY CA SCREEN: HCPCS | Performed by: OBSTETRICS & GYNECOLOGY

## 2022-01-12 PROCEDURE — G0145 SCR C/V CYTO,THINLAYER,RESCR: HCPCS | Performed by: OBSTETRICS & GYNECOLOGY

## 2022-01-16 LAB
HPV HR 12 DNA CVX QL NAA+PROBE: NEGATIVE
HPV16 DNA CVX QL NAA+PROBE: NEGATIVE
HPV18 DNA CVX QL NAA+PROBE: NEGATIVE

## 2022-01-19 LAB
LAB AP GYN PRIMARY INTERPRETATION: NORMAL
Lab: NORMAL

## 2022-01-24 ENCOUNTER — HOSPITAL ENCOUNTER (OUTPATIENT)
Dept: MAMMOGRAPHY | Facility: CLINIC | Age: 62
Discharge: HOME | End: 2022-01-24
Payer: COMMERCIAL

## 2022-01-24 VITALS — HEIGHT: 67 IN | BODY MASS INDEX: 36.73 KG/M2 | WEIGHT: 234 LBS

## 2022-01-24 DIAGNOSIS — Z12.31 ENCOUNTER FOR SCREENING MAMMOGRAM FOR MALIGNANT NEOPLASM OF BREAST: ICD-10-CM

## 2022-01-24 PROCEDURE — 77067 SCR MAMMO BI INCL CAD: CPT

## 2022-01-24 PROCEDURE — 77063 BREAST TOMOSYNTHESIS BI: CPT

## 2022-02-10 DIAGNOSIS — I10 ESSENTIAL HYPERTENSION: ICD-10-CM

## 2022-02-10 RX ORDER — HYDROCHLOROTHIAZIDE 25 MG/1
TABLET ORAL
Qty: 30 TABLET | Refills: 5 | Status: SHIPPED | OUTPATIENT
Start: 2022-02-10 | End: 2022-06-16

## 2022-06-13 DIAGNOSIS — I10 ESSENTIAL HYPERTENSION: ICD-10-CM

## 2022-06-13 RX ORDER — DOXYCYCLINE HYCLATE 100 MG/1
100 CAPSULE ORAL DAILY PRN
COMMUNITY
Start: 2022-04-13

## 2022-06-13 RX ORDER — NEBIVOLOL 20 MG/1
20 TABLET ORAL DAILY
Qty: 30 TABLET | Refills: 5 | Status: SHIPPED | OUTPATIENT
Start: 2022-06-13

## 2022-06-16 ENCOUNTER — OFFICE VISIT (OUTPATIENT)
Dept: FAMILY MEDICINE CLINIC | Facility: CLINIC | Age: 62
End: 2022-06-16
Payer: COMMERCIAL

## 2022-06-16 ENCOUNTER — APPOINTMENT (OUTPATIENT)
Dept: LAB | Facility: CLINIC | Age: 62
End: 2022-06-16
Payer: COMMERCIAL

## 2022-06-16 VITALS
TEMPERATURE: 97.5 F | SYSTOLIC BLOOD PRESSURE: 122 MMHG | RESPIRATION RATE: 18 BRPM | BODY MASS INDEX: 36.61 KG/M2 | HEART RATE: 75 BPM | DIASTOLIC BLOOD PRESSURE: 80 MMHG | HEIGHT: 67 IN | WEIGHT: 233.25 LBS | OXYGEN SATURATION: 96 %

## 2022-06-16 DIAGNOSIS — E03.9 HYPOTHYROIDISM, UNSPECIFIED TYPE: ICD-10-CM

## 2022-06-16 DIAGNOSIS — Z00.00 WELL ADULT EXAM: ICD-10-CM

## 2022-06-16 DIAGNOSIS — I10 ESSENTIAL HYPERTENSION: Primary | ICD-10-CM

## 2022-06-16 DIAGNOSIS — I10 ESSENTIAL HYPERTENSION: ICD-10-CM

## 2022-06-16 DIAGNOSIS — E78.5 HYPERLIPIDEMIA, UNSPECIFIED HYPERLIPIDEMIA TYPE: ICD-10-CM

## 2022-06-16 LAB
ALBUMIN SERPL BCP-MCNC: 3.3 G/DL (ref 3.5–5)
ALP SERPL-CCNC: 113 U/L (ref 46–116)
ALT SERPL W P-5'-P-CCNC: 20 U/L (ref 12–78)
ANION GAP SERPL CALCULATED.3IONS-SCNC: 7 MMOL/L (ref 4–13)
AST SERPL W P-5'-P-CCNC: 16 U/L (ref 5–45)
BILIRUB SERPL-MCNC: 0.58 MG/DL (ref 0.2–1)
BUN SERPL-MCNC: 16 MG/DL (ref 5–25)
CALCIUM ALBUM COR SERPL-MCNC: 9.9 MG/DL (ref 8.3–10.1)
CALCIUM SERPL-MCNC: 9.3 MG/DL (ref 8.3–10.1)
CHLORIDE SERPL-SCNC: 104 MMOL/L (ref 100–108)
CHOLEST SERPL-MCNC: 161 MG/DL
CO2 SERPL-SCNC: 26 MMOL/L (ref 21–32)
CREAT SERPL-MCNC: 0.82 MG/DL (ref 0.6–1.3)
ERYTHROCYTE [DISTWIDTH] IN BLOOD BY AUTOMATED COUNT: 13 % (ref 11.6–15.1)
GFR SERPL CREATININE-BSD FRML MDRD: 76 ML/MIN/1.73SQ M
GLUCOSE P FAST SERPL-MCNC: 123 MG/DL (ref 65–99)
HCT VFR BLD AUTO: 41.7 % (ref 34.8–46.1)
HDLC SERPL-MCNC: 46 MG/DL
HGB BLD-MCNC: 13.6 G/DL (ref 11.5–15.4)
LDLC SERPL CALC-MCNC: 66 MG/DL (ref 0–100)
MCH RBC QN AUTO: 32.1 PG (ref 26.8–34.3)
MCHC RBC AUTO-ENTMCNC: 32.6 G/DL (ref 31.4–37.4)
MCV RBC AUTO: 98 FL (ref 82–98)
NONHDLC SERPL-MCNC: 115 MG/DL
PLATELET # BLD AUTO: 337 THOUSANDS/UL (ref 149–390)
PMV BLD AUTO: 9.9 FL (ref 8.9–12.7)
POTASSIUM SERPL-SCNC: 3.5 MMOL/L (ref 3.5–5.3)
PROT SERPL-MCNC: 7.6 G/DL (ref 6.4–8.2)
RBC # BLD AUTO: 4.24 MILLION/UL (ref 3.81–5.12)
SODIUM SERPL-SCNC: 137 MMOL/L (ref 136–145)
TRIGL SERPL-MCNC: 246 MG/DL
TSH SERPL DL<=0.05 MIU/L-ACNC: 5.75 UIU/ML (ref 0.45–4.5)
WBC # BLD AUTO: 10.82 THOUSAND/UL (ref 4.31–10.16)

## 2022-06-16 PROCEDURE — 85027 COMPLETE CBC AUTOMATED: CPT

## 2022-06-16 PROCEDURE — 80053 COMPREHEN METABOLIC PANEL: CPT

## 2022-06-16 PROCEDURE — 3008F BODY MASS INDEX DOCD: CPT | Performed by: FAMILY MEDICINE

## 2022-06-16 PROCEDURE — 84443 ASSAY THYROID STIM HORMONE: CPT

## 2022-06-16 PROCEDURE — 99396 PREV VISIT EST AGE 40-64: CPT | Performed by: FAMILY MEDICINE

## 2022-06-16 PROCEDURE — 1036F TOBACCO NON-USER: CPT | Performed by: FAMILY MEDICINE

## 2022-06-16 PROCEDURE — 36415 COLL VENOUS BLD VENIPUNCTURE: CPT

## 2022-06-16 PROCEDURE — 80061 LIPID PANEL: CPT

## 2022-06-16 PROCEDURE — 3725F SCREEN DEPRESSION PERFORMED: CPT | Performed by: FAMILY MEDICINE

## 2022-06-16 NOTE — ASSESSMENT & PLAN NOTE
Hyperlipidemia    Patient will take lipid panel blood work and continue with current dose of statin therapy

## 2022-06-16 NOTE — ASSESSMENT & PLAN NOTE
Well adult  Overall the patient appears to be stable at this time  She will obtain blood work as ordered for further evaluation  Her colonoscopy and mammogram are up-to-date

## 2022-06-16 NOTE — PROGRESS NOTES
FAMILY PRACTICE OFFICE VISIT       NAME: Con Horner  AGE: 58 y o  SEX: female       : 1960        MRN: [id number]    DATE: 2022  TIME: 8:57 AM    Assessment and Plan     Problem List Items Addressed This Visit        Endocrine    Hypothyroidism     Hypothyroidism  Patient will check TSH blood work  We will make further recommendations pending results of test            Relevant Orders    CBC    Comprehensive metabolic panel    Lipid panel    TSH, 3rd generation       Cardiovascular and Mediastinum    Essential hypertension - Primary     Hypertension  Patient blood pressure is stable at this time she will continue current regimen of medications           Relevant Orders    CBC    Comprehensive metabolic panel    Lipid panel    TSH, 3rd generation       Other    Hyperlipidemia     Hyperlipidemia  Patient will take lipid panel blood work and continue with current dose of statin therapy           Relevant Orders    CBC    Comprehensive metabolic panel    Lipid panel    TSH, 3rd generation    Well adult exam     Well adult  Overall the patient appears to be stable at this time  She will obtain blood work as ordered for further evaluation  Her colonoscopy and mammogram are up-to-date  Chief Complaint     Chief Complaint   Patient presents with    Well Check       History of Present Illness     Patient in the office for annual wellness exam   She denies any recent illness  She is up-to-date with mammograms and colonoscopy  She does walk on a regular basis almost daily for exercise  She denies any significant changes in weight  Review of Systems   Review of Systems   Constitutional: Negative  HENT: Negative  Eyes: Negative  Respiratory: Negative  Cardiovascular: Negative  Gastrointestinal: Negative  Genitourinary: Negative  Musculoskeletal: Negative  Skin: Negative  Neurological: Negative  Psychiatric/Behavioral: Negative          Active Problem List     Patient Active Problem List   Diagnosis    Essential hypertension    Hyperlipidemia    Lyme disease    Hypothyroidism    Encounter for annual routine gynecological examination    Well adult exam    Closed fracture of left orbital floor ([facility] [address] )    Fall    Tick bite    Bilateral impacted cerumen    Hordeolum externum of left lower eyelid    Encounter for screening mammogram for breast cancer    Screening for cervical cancer       Past Medical History:  Past Medical History:   Diagnosis Date    Cancer ([facility] [address] )     High blood pressure     Hypertension     Papanicolaou smear 12/01/2010    Pap and pelvic smears 12/2010, 10/2011, 10/2012    Rosacea     Skin cancer     H/O Basal Cell Cancer       Past Surgical History:  Past Surgical History:   Procedure Laterality Date    BREAST BIOPSY Right 08/05/2005    benign aprocrine metaplasia    ORBITAL FRACTURE SURGERY Left 5/1/2019    Procedure: OPEN REDUCTION W/ INTERNAL FIXATION (ORIF) ORBITAL;  Surgeon: Enrique Petty MD;  [facility];  Service: Plastics    WISDOM TOOTH EXTRACTION         Family History:  Family History   Problem Relation Age of Onset    Breast cancer Mother 80    Heart disease Father         Cardiac Disorder    Dementia Father     Hyperlipidemia Father     Pancreatic cancer Maternal Uncle     No Known Problems Brother     No Known Problems Daughter     Stroke Maternal Grandmother     Alcohol abuse Maternal Grandfather     No Known Problems Paternal Grandmother     Other Paternal Grandfather         heart problems    No Known Problems Daughter     No Known Problems Maternal Aunt     No Known Problems Paternal Aunt        Social History:  Social History     Socioeconomic History    Marital status: /Civil Union     Spouse name: Not on file    Number of children: Not on file    Years of education: Not on file    Highest education level: Not on file   Occupational History    Not on file   Tobacco Use  Smoking status: Never Smoker    Smokeless tobacco: Never Used   Vaping Use    Vaping Use: Never used   Substance and Sexual Activity    Alcohol use: Yes    Drug use: No    Sexual activity: Yes     Partners: Male     Birth control/protection: Post-menopausal   Other Topics Concern    Not on file   Social History Narrative    Exercises 5 50 6 times per week     Social Determinants of Health     Financial Resource Strain: Not on file   Food Insecurity: Not on file   Transportation Needs: Not on file   Physical Activity: Not on file   Stress: Not on file   Social Connections: Not on file   Intimate Partner Violence: Not on file   Housing Stability: Not on file       Objective     Vitals:    06/16/22 0835   BP: 122/80   Pulse: 75   Resp: 18   Temp: 97 5 °F (36 4 °C)   SpO2: 96%     Wt Readings from Last 3 Encounters:   06/16/22 106 kg (233 lb 4 oz)   01/24/22 106 kg (234 lb)   01/12/22 106 kg (234 lb 3 2 oz)       Physical Exam  Constitutional:       General: She is not in acute distress  Appearance: Normal appearance  She is not ill-appearing  HENT:      Head: Normocephalic and atraumatic  Eyes:      General:         Right eye: No discharge  Left eye: No discharge  Extraocular Movements: Extraocular movements intact  Conjunctiva/sclera: Conjunctivae normal       Pupils: Pupils are equal, round, and reactive to light  Neck:      Vascular: No carotid bruit  Cardiovascular:      Rate and Rhythm: Normal rate and regular rhythm  Heart sounds: Normal heart sounds  No murmur heard  Pulmonary:      Effort: Pulmonary effort is normal       Breath sounds: Normal breath sounds  No wheezing, rhonchi or rales  Abdominal:      General: Abdomen is flat  Bowel sounds are normal  There is no distension  Palpations: Abdomen is soft  Tenderness: There is no abdominal tenderness  There is no guarding or rebound  Musculoskeletal:      Right lower leg: No edema        Left lower leg: No edema  Lymphadenopathy:      Cervical: No cervical adenopathy  Skin:     Findings: No rash  Neurological:      General: No focal deficit present  Mental Status: She is alert and oriented to person, place, and time  Cranial Nerves: No cranial nerve deficit  Psychiatric:         Mood and Affect: Mood normal          Behavior: Behavior normal          Thought Content: Thought content normal          Judgment: Judgment normal          Pertinent Laboratory/Diagnostic Studies:  Lab Results   Component Value Date    GLUCOSE 98 11/23/2015    BUN 13 09/01/2021    CREATININE 0 89 09/01/2021    CALCIUM 9 0 09/01/2021     11/23/2015    K 4 0 09/01/2021    CO2 28 09/01/2021     09/01/2021     Lab Results   Component Value Date    ALT 20 07/01/2021    AST 16 07/01/2021    ALKPHOS 101 07/01/2021    BILITOT 0 57 11/23/2015       Lab Results   Component Value Date    WBC 8 83 07/01/2021    HGB 13 3 07/01/2021    HCT 40 6 07/01/2021     (H) 07/01/2021     07/01/2021       No results found for: TSH    Lab Results   Component Value Date    CHOL 158 11/23/2015     Lab Results   Component Value Date    TRIG 224 (H) 07/01/2021     Lab Results   Component Value Date    HDL 42 07/01/2021     Lab Results   Component Value Date    LDLCALC 74 07/01/2021     No results found for: HGBA1C    Results for orders placed or performed in visit on 01/12/22   HPV High Risk    Specimen: Cervix;  Genital   Result Value Ref Range    HPV Other HR Negative Negative    HPV16 Negative Negative    HPV18 Negative Negative   Liquid-based pap, screening   Result Value Ref Range    Case Report       Gynecologic Cytology Report                       Case: [id number]                                  Authorizing Provider:  Robbi Saint, MD          Collected:           01/12/2022 1134              Ordering Location:     [facility]      Received:            01/12/2022 [facility]                                                                               [facility] [place]                                                                    First Screen:          Blaise Chand                                                               Specimen:    LIQUID-BASED PAP, SCREENING, Cervix                                                        Primary Interpretation Negative for intraepithelial lesion or malignancy     Specimen Adequacy       Satisfactory for evaluation  Endocervical/transformation zone component present  Additional Information       [employer]'s FDA approved ,  and ThinPrep Imaging Duo System are utilized with strict adherence to the 's instruction manual to prepare gynecologic and non-gynecologic cytology specimens for the production of ThinPrep slides as well as for gynecologic ThinPrep imaging  These processes have been validated by our laboratory and/or by the   The Pap test is not a diagnostic procedure and should not be used as the sole means to detect cervical cancer  It is only a screening procedure to aid in the detection of cervical cancer and its precursors  Both false-negative and false-positive results have been experienced  Your patient's test result should be interpreted in this context together with the history and clinical findings           Orders Placed This Encounter   Procedures    CBC    Comprehensive metabolic panel    Lipid panel    TSH, 3rd generation       ALLERGIES:  No Known Allergies    Current Medications     Current Outpatient Medications   Medication Sig Dispense Refill    atorvastatin (LIPITOR) 20 mg tablet TAKE ONE TABLET BY MOUTH EVERY DAY 30 tablet 5    doxycycline hyclate (VIBRAMYCIN) 100 mg capsule Take 100 mg by mouth daily as needed      hydrochlorothiazide (HYDRODIURIL) 12 5 mg tablet Take 1 tablet (12 5 mg total) by mouth daily 90 tablet 1    Multiple Vitamin (MULTIVITAMINS PO) Take by mouth      nebivolol (BYSTOLIC) 20 MG tablet Take 1 tablet (20 mg total) by mouth daily 30 tablet 5     No current facility-administered medications for this visit           Health Maintenance     Health Maintenance   Topic Date Due    Hepatitis C Screening  Never done    HIV Screening  Never done    COVID-19 Vaccine (4 - Booster for Pfizer series) 04/18/2022    BMI: Followup Plan  05/21/2022    Annual Physical  05/21/2022    Influenza Vaccine (Season Ended) 09/01/2022    Depression Screening  06/16/2023    BMI: Adult  06/16/2023    Breast Cancer Screening: Mammogram  01/24/2024    DTaP,Tdap,and Td Vaccines (2 - Td or Tdap) 05/01/2029    Colorectal Cancer Screening  03/02/2031    Pneumococcal Vaccine: Pediatrics (0 to 5 Years) and At-Risk Patients (6 to 59 Years)  Aged Out    HIB Vaccine  Aged Out    Hepatitis B Vaccine  Aged Out    IPV Vaccine  Aged Out    Hepatitis A Vaccine  Aged Out    Meningococcal ACWY Vaccine  Aged Out    HPV Vaccine  Aged [employer] History   Administered Date(s) Administered    COVID-19 PFIZER VACCINE 0 3 ML IM 04/12/2021, 05/03/2021, 12/18/2021    INFLUENZA 07/29/2016, 11/23/2020    Influenza Injectable, MDCK, Preservative Free, Quadrivalent, 0 5 mL 11/23/2020    Influenza Quadrivalent Preservative Free 3 years and older IM 07/29/2016    Tdap 05/01/2019    Zoster 40/29/3825       Lauren Andrews MD

## 2022-06-17 ENCOUNTER — TELEPHONE (OUTPATIENT)
Dept: FAMILY MEDICINE CLINIC | Facility: CLINIC | Age: 62
End: 2022-06-17

## 2022-06-17 DIAGNOSIS — E03.9 HYPOTHYROIDISM, UNSPECIFIED TYPE: Primary | ICD-10-CM

## 2022-06-17 RX ORDER — LEVOTHYROXINE SODIUM 0.03 MG/1
25 TABLET ORAL DAILY
Qty: 90 TABLET | Refills: 1 | Status: SHIPPED | OUTPATIENT
Start: 2022-06-17 | End: 2022-12-09

## 2022-06-17 NOTE — TELEPHONE ENCOUNTER
----- Message from Mary Lemos MD sent at 1/64/3494  6:16 AM EDT -----  Recent blood work stable for patient with the exception of low thyroid function    If agreeable patient should consider levothyroxine 25 mcg tablet once daily and repeat blood work in 3 months

## 2022-06-17 NOTE — TELEPHONE ENCOUNTER
Spoke with patient  Made aware of results and provider's instructions  Patient verbalized understanding and was agreeable w/ plan  Please send RX to [facility] in [place]

## 2022-06-30 DIAGNOSIS — E78.5 HYPERLIPIDEMIA, UNSPECIFIED HYPERLIPIDEMIA TYPE: ICD-10-CM

## 2022-06-30 RX ORDER — ATORVASTATIN CALCIUM 20 MG/1
TABLET, FILM COATED ORAL
Qty: 30 TABLET | Refills: 1 | Status: SHIPPED | OUTPATIENT
Start: 2022-06-30

## 2022-07-18 DIAGNOSIS — I10 ESSENTIAL HYPERTENSION: ICD-10-CM

## 2022-07-18 RX ORDER — HYDROCHLOROTHIAZIDE 12.5 MG/1
12.5 TABLET ORAL DAILY
Qty: 90 TABLET | Refills: 1 | Status: SHIPPED | OUTPATIENT
Start: 2022-07-18

## 2022-08-10 ENCOUNTER — OFFICE VISIT (OUTPATIENT)
Dept: FAMILY MEDICINE CLINIC | Facility: CLINIC | Age: 62
End: 2022-08-10
Payer: COMMERCIAL

## 2022-08-10 VITALS
HEART RATE: 69 BPM | HEIGHT: 67 IN | DIASTOLIC BLOOD PRESSURE: 80 MMHG | SYSTOLIC BLOOD PRESSURE: 128 MMHG | BODY MASS INDEX: 37.23 KG/M2 | WEIGHT: 237.2 LBS | OXYGEN SATURATION: 97 % | TEMPERATURE: 97.8 F | RESPIRATION RATE: 20 BRPM

## 2022-08-10 DIAGNOSIS — H61.23 BILATERAL IMPACTED CERUMEN: Primary | ICD-10-CM

## 2022-08-10 PROBLEM — S02.32XA CLOSED FRACTURE OF LEFT ORBITAL FLOOR (HCC): Status: RESOLVED | Noted: 2019-05-01 | Resolved: 2022-08-10

## 2022-08-10 PROBLEM — Z12.31 ENCOUNTER FOR SCREENING MAMMOGRAM FOR BREAST CANCER: Status: RESOLVED | Noted: 2022-01-11 | Resolved: 2022-08-10

## 2022-08-10 PROBLEM — Z01.419 ENCOUNTER FOR ANNUAL ROUTINE GYNECOLOGICAL EXAMINATION: Status: RESOLVED | Noted: 2018-11-26 | Resolved: 2022-08-10

## 2022-08-10 PROBLEM — Z12.4 SCREENING FOR CERVICAL CANCER: Status: RESOLVED | Noted: 2022-01-11 | Resolved: 2022-08-10

## 2022-08-10 PROBLEM — H00.015 HORDEOLUM EXTERNUM OF LEFT LOWER EYELID: Status: RESOLVED | Noted: 2021-05-24 | Resolved: 2022-08-10

## 2022-08-10 PROBLEM — L71.9 ROSACEA: Status: ACTIVE | Noted: 2022-08-10
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
## 2022-08-10 PROBLEM — Z00.00 WELL ADULT EXAM: Status: RESOLVED | Noted: 2018-11-26 | Resolved: 2022-08-10
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
## 2022-08-10 PROCEDURE — 3725F SCREEN DEPRESSION PERFORMED: CPT | Performed by: FAMILY MEDICINE

## 2022-08-10 PROCEDURE — 99213 OFFICE O/P EST LOW 20 MIN: CPT | Performed by: FAMILY MEDICINE

## 2022-08-10 PROCEDURE — 69210 REMOVE IMPACTED EAR WAX UNI: CPT | Performed by: FAMILY MEDICINE

## 2022-08-10 NOTE — PROGRESS NOTES
Chief Complaint   Patient presents with    Left ear ache     Xs' 3 days  HPI   Here because she had discomfort in her left ear yesterday  Along with a gummy feeling  Like sticky  Today she is feeling better  Past Medical History:   Diagnosis Date    Hyperlipidemia 9/18/2012    Hypertension     Hypothyroidism 10/23/2018    Lyme disease 07/21/2015    Rosacea     Skin cancer     H/O Basal Cell Cancer        Past Surgical History:   Procedure Laterality Date    BREAST BIOPSY Right 08/05/2005    benign aprocrine metaplasia    ORBITAL FRACTURE SURGERY Left 5/1/2019    Procedure: OPEN REDUCTION W/ INTERNAL FIXATION (ORIF) ORBITAL;  Surgeon: Jose D Robbins MD;  [facility];  Service: Plastics    WISDOM TOOTH EXTRACTION         Social History     Tobacco Use    Smoking status: Never Smoker    Smokeless tobacco: Never Used   Substance Use Topics    Alcohol use: Yes       Social History     Social History Narrative     since 1986  Two children  No grandchildren  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  Enjoys reading, golfing, tennis  The following portions of the patient's history were reviewed and updated as appropriate: allergies, current medications, past family history, past medical history, past social history, past surgical history and problem list       Review of Systems       /80 (BP Location: Left arm, Patient Position: Sitting, Cuff Size: Large)   Pulse 69   Temp 97 8 °F (36 6 °C) (Temporal)   Resp 20   Ht 5' 7" (1 702 m)   Wt 108 kg (237 lb 3 2 oz)   LMP  (LMP Unknown)   SpO2 97%   BMI 37 15 kg/m²      Physical Exam   Wax present in both ear canals  Left ear canal totally blocked  Ear cerumen removal    Date/Time: 8/10/2022 11:50 AM  Performed by: Chandni Garza MD  Authorized by: Chandni Garza MD   Universal Protocol:  Consent: Verbal consent obtained    Patient understanding: patient states understanding of the procedure being performed      Patient location:  Clinic  Procedure details:     Location:  L ear and R ear    Procedure type: irrigation with instrumentation    Post-procedure details:     Complication:  None    Hearing quality:  Improved    Patient tolerance of procedure: Tolerated well, no immediate complications            Current Outpatient Medications:     atorvastatin (LIPITOR) 20 mg tablet, TAKE ONE TABLET BY MOUTH EVERY DAY, Disp: 30 tablet, Rfl: 1    doxycycline hyclate (VIBRAMYCIN) 100 mg capsule, Take 100 mg by mouth daily as needed, Disp: , Rfl:     hydrochlorothiazide (HYDRODIURIL) 12 5 mg tablet, Take 1 tablet (12 5 mg total) by mouth daily, Disp: 90 tablet, Rfl: 1    levothyroxine (Synthroid) 25 mcg tablet, Take 1 tablet (25 mcg total) by mouth daily, Disp: 90 tablet, Rfl: 1    Multiple Vitamin (MULTIVITAMINS PO), Take by mouth, Disp: , Rfl:     nebivolol (BYSTOLIC) 20 MG tablet, Take 1 tablet (20 mg total) by mouth daily, Disp: 30 tablet, Rfl: 5     No problem-specific Assessment & Plan notes found for this encounter  Diagnoses and all orders for this visit:    Bilateral impacted cerumen    Other orders  -     Ear cerumen removal        Patient Instructions   Wax removed from ear canals  Return as scheduled

## 2022-09-07 DIAGNOSIS — E78.5 HYPERLIPIDEMIA, UNSPECIFIED HYPERLIPIDEMIA TYPE: ICD-10-CM

## 2022-09-07 RX ORDER — ATORVASTATIN CALCIUM 20 MG/1
TABLET, FILM COATED ORAL
Qty: 30 TABLET | Refills: 1 | Status: SHIPPED | OUTPATIENT
Start: 2022-09-07 | End: 2022-10-10

## 2022-10-06 ENCOUNTER — VBI (OUTPATIENT)
Dept: ADMINISTRATIVE | Facility: OTHER | Age: 62
End: 2022-10-06

## 2022-10-10 DIAGNOSIS — E78.5 HYPERLIPIDEMIA, UNSPECIFIED HYPERLIPIDEMIA TYPE: ICD-10-CM

## 2022-10-10 RX ORDER — ATORVASTATIN CALCIUM 20 MG/1
TABLET, FILM COATED ORAL
Qty: 30 TABLET | Refills: 1 | Status: SHIPPED | OUTPATIENT
Start: 2022-10-10

## 2022-12-09 DIAGNOSIS — E03.9 HYPOTHYROIDISM, UNSPECIFIED TYPE: ICD-10-CM

## 2022-12-09 DIAGNOSIS — E78.5 HYPERLIPIDEMIA, UNSPECIFIED HYPERLIPIDEMIA TYPE: ICD-10-CM

## 2022-12-09 DIAGNOSIS — I10 ESSENTIAL HYPERTENSION: ICD-10-CM

## 2022-12-09 RX ORDER — NEBIVOLOL 20 MG/1
TABLET ORAL
Qty: 30 TABLET | Refills: 5 | Status: SHIPPED | OUTPATIENT
Start: 2022-12-09

## 2022-12-09 RX ORDER — ATORVASTATIN CALCIUM 20 MG/1
TABLET, FILM COATED ORAL
Qty: 30 TABLET | Refills: 1 | Status: SHIPPED | OUTPATIENT
Start: 2022-12-09

## 2022-12-09 RX ORDER — LEVOTHYROXINE SODIUM 0.03 MG/1
TABLET ORAL
Qty: 90 TABLET | Refills: 1 | Status: SHIPPED | OUTPATIENT
Start: 2022-12-09

## 2023-01-23 DIAGNOSIS — I10 ESSENTIAL HYPERTENSION: ICD-10-CM

## 2023-01-23 RX ORDER — HYDROCHLOROTHIAZIDE 12.5 MG/1
12.5 TABLET ORAL DAILY
Qty: 90 TABLET | Refills: 1 | Status: SHIPPED | OUTPATIENT
Start: 2023-01-23

## 2023-01-24 PROBLEM — Z01.419 ENCOUNTER FOR ANNUAL ROUTINE GYNECOLOGICAL EXAMINATION: Status: ACTIVE | Noted: 2023-01-24

## 2023-01-24 PROBLEM — Z12.31 ENCOUNTER FOR SCREENING MAMMOGRAM FOR BREAST CANCER: Status: ACTIVE | Noted: 2023-01-24

## 2023-01-25 ENCOUNTER — ANNUAL EXAM (OUTPATIENT)
Dept: OBGYN CLINIC | Facility: CLINIC | Age: 63
End: 2023-01-25

## 2023-01-25 VITALS
WEIGHT: 238 LBS | BODY MASS INDEX: 38.25 KG/M2 | SYSTOLIC BLOOD PRESSURE: 136 MMHG | HEIGHT: 66 IN | DIASTOLIC BLOOD PRESSURE: 80 MMHG

## 2023-01-25 DIAGNOSIS — Z01.419 ENCOUNTER FOR ANNUAL ROUTINE GYNECOLOGICAL EXAMINATION: Primary | ICD-10-CM

## 2023-01-25 DIAGNOSIS — Z12.31 ENCOUNTER FOR SCREENING MAMMOGRAM FOR BREAST CANCER: ICD-10-CM

## 2023-01-25 NOTE — PROGRESS NOTES
Assessment/Plan:  Normal GYN exam  CoTesting '27  RTO 1 yr  Obesity- a struggle with vacillating weight; lost 5 '21  SBE  3 D Mammo  - active, due now  Colonoscopy 3/21-  nl  Exrecise 3/wk- does 5 /wk  Ca 1,000 mg/d -does     Depression Screen:  Neg       Diagnoses and all orders for this visit:    Encounter for annual routine gynecological examination    Encounter for screening mammogram for breast cancer  -     Mammo screening bilateral w 3d & cad; Future    BMI 38 0-38 9,adult              Subjective:        Patient ID: Shiva Gaitan is a 61 y o  female  Laurian Litter returns for a yearly evaluation  She is without any gynecologic complaints  She denies any vaginal bleeding  She remains sexually active and uses Martinez glide as a lubricant  Her health is not changed over the past year  The following portions of the patient's history were reviewed and updated as appropriate: She  has a past medical history of Hyperlipidemia (9/18/2012), Hypertension, Hypothyroidism (10/23/2018), Lyme disease (07/21/2015), Rosacea, and Skin cancer  Patient Active Problem List    Diagnosis Date Noted   • Encounter for annual routine gynecological examination 01/24/2023   • Encounter for screening mammogram for breast cancer 01/24/2023   • Rosacea 08/10/2022   • Hypothyroidism 10/23/2018   • Essential hypertension 04/16/2013   • Hyperlipidemia 09/18/2012   PMH:  G2, P2; SAVD x 2, '90, '93  Right stereotactic biopsy '05  Basal cell cancer- R Eye, Chest 5/10  Panic attacks  HTN  Obesity- BMI 40 '11, lost 40 pounds '15  Lyme Disease 7/15  Hypercholesterolemia 9/15      Menopause 47 '14   She  has a past surgical history that includes [place] tooth extraction; Breast biopsy (Right, 08/05/2005); and Orbital fracture repair (Left, 5/1/2019)    Her family history includes Alcohol abuse in her maternal grandfather; Breast cancer (age of onset: 80) in her mother; Dementia in her father; Heart disease in her father; Hyperlipidemia in her father; No Known Problems in her brother, daughter, daughter, maternal aunt, paternal aunt, and paternal grandmother; Other in her paternal grandfather; Pancreatic cancer in her maternal uncle; Stroke in her maternal grandmother  FH:   M- 80 R Breast Ca- lumpectomy without adjuvant therapy  MGM- CVA  F- Parkinson's disease 80, Valvular heart surgery   She  reports that she has never smoked  She has never used smokeless tobacco  She reports current alcohol use  She reports that she does not use drugs  SH:     Moved back to area    Palma Kussmaul at [employer]  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  [employer]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,Horses and dogs    32 back here watching [place] [employer] Malaika's 2 dogs and others    Oli's father , had been bed ridden for 4 years  Current Outpatient Medications   Medication Sig Dispense Refill   • atorvastatin (LIPITOR) 20 mg tablet TAKE ONE TABLET BY MOUTH EVERY DAY 30 tablet 1   • doxycycline hyclate (VIBRAMYCIN) 100 mg capsule Take 100 mg by mouth daily as needed     • hydrochlorothiazide (HYDRODIURIL) 12 5 mg tablet Take 1 tablet (12 5 mg total) by mouth daily 90 tablet 1   • levothyroxine 25 mcg tablet TAKE ONE TABLET BY MOUTH EVERY DAY 90 tablet 1   • Multiple Vitamin (MULTIVITAMINS PO) Take by mouth     • nebivolol (BYSTOLIC) 20 MG tablet TAKE ONE TABLET BY MOUTH EVERY DAY 30 tablet 5     No current facility-administered medications for this visit       Current Outpatient Medications on File Prior to Visit   Medication Sig   • atorvastatin (LIPITOR) 20 mg tablet TAKE ONE TABLET BY MOUTH EVERY DAY   • doxycycline hyclate (VIBRAMYCIN) 100 mg capsule Take 100 mg by mouth daily as needed   • hydrochlorothiazide (HYDRODIURIL) 12 5 mg tablet Take 1 tablet (12 5 mg total) by mouth daily   • levothyroxine 25 mcg tablet TAKE ONE TABLET BY MOUTH EVERY DAY   • Multiple Vitamin (MULTIVITAMINS PO) Take by mouth   • nebivolol (BYSTOLIC) 20 MG tablet TAKE ONE TABLET BY MOUTH EVERY DAY     No current facility-administered medications on file prior to visit  She has No Known Allergies       Review of Systems   Constitutional: Negative for activity change, appetite change, fatigue and unexpected weight change  Eyes: Negative for visual disturbance  Respiratory: Negative for cough, chest tightness, shortness of breath and wheezing  Cardiovascular: Negative for chest pain, palpitations and leg swelling  Breast: Patient denies tenderness, nipple discharge, masses, or erythema  Gastrointestinal: Negative for abdominal distention, abdominal pain, blood in stool, constipation, diarrhea, nausea and vomiting  Endocrine: Negative for cold intolerance and heat intolerance  Genitourinary: Negative for decreased urine volume, difficulty urinating, dyspareunia, dysuria, frequency, hematuria, menstrual problem, pelvic pain, urgency, vaginal bleeding, vaginal discharge and vaginal pain  Sexually active  Stable chronic mild stress incontinence  Musculoskeletal: Negative for arthralgias  Skin: Negative for rash  Neurological: Negative for weakness, light-headedness, numbness and headaches  Hematological: Does not bruise/bleed easily  Psychiatric/Behavioral: Negative for agitation, behavioral problems and sleep disturbance  The patient is not nervous/anxious  Objective:    Vitals:    01/25/23 1052   BP: 136/80   BP Location: Right arm   Patient Position: Sitting   Cuff Size: Standard   Weight: 108 kg (238 lb)   Height: 5' 6" (1 676 m)            Physical Exam  Vitals and nursing note reviewed  Constitutional:       General: She is not in acute distress  Appearance: Normal appearance  She is well-developed  HENT:      Head: Normocephalic and atraumatic  Eyes:      General: No scleral icterus  Right eye: No discharge  Left eye: No discharge  Extraocular Movements: Extraocular movements intact        Conjunctiva/sclera: Conjunctivae normal    Neck:      Thyroid: No thyromegaly  Trachea: No tracheal deviation  Cardiovascular:      Rate and Rhythm: Normal rate and regular rhythm  Heart sounds: Normal heart sounds  No murmur heard  Pulmonary:      Effort: Pulmonary effort is normal  No respiratory distress  Breath sounds: Normal breath sounds  No wheezing  Chest:   Breasts:     Breasts are symmetrical       Right: No inverted nipple, mass, nipple discharge, skin change or tenderness  Left: No inverted nipple, mass, nipple discharge, skin change or tenderness  Abdominal:      General: Bowel sounds are normal  There is no distension  Palpations: Abdomen is soft  There is no mass  Tenderness: There is no abdominal tenderness  There is no guarding or rebound  Genitourinary:     General: Normal vulva  Labia:         Right: No rash, tenderness or lesion  Left: No rash, tenderness or lesion  Vagina: Normal       Cervix: No cervical motion tenderness or discharge  Uterus: Not deviated, not enlarged and not tender  Adnexa:         Right: No mass, tenderness or fullness  Left: No mass, tenderness or fullness  Rectum: No external hemorrhoid  Comments: Urethral meatus within normal limits  Perineum within normal limits  Bladder well supported  The pelvis is deep  The exam is limited due to the patient's habitus  Musculoskeletal:         General: No tenderness  Normal range of motion  Cervical back: Normal range of motion and neck supple  Lymphadenopathy:      Cervical: No cervical adenopathy  Skin:     General: Skin is warm and dry  Neurological:      Mental Status: She is alert and oriented to person, place, and time  Psychiatric:         Mood and Affect: Mood normal          Behavior: Behavior normal          Thought Content:  Thought content normal          Judgment: Judgment normal

## 2023-02-17 ENCOUNTER — HOSPITAL ENCOUNTER (OUTPATIENT)
Dept: MAMMOGRAPHY | Facility: CLINIC | Age: 63
Discharge: HOME | End: 2023-02-17

## 2023-02-17 VITALS — WEIGHT: 238.1 LBS | HEIGHT: 66 IN | BODY MASS INDEX: 38.27 KG/M2

## 2023-02-17 DIAGNOSIS — Z12.31 ENCOUNTER FOR SCREENING MAMMOGRAM FOR MALIGNANT NEOPLASM OF BREAST: ICD-10-CM

## 2023-05-05 DIAGNOSIS — E78.5 HYPERLIPIDEMIA, UNSPECIFIED HYPERLIPIDEMIA TYPE: ICD-10-CM

## 2023-05-05 RX ORDER — ATORVASTATIN CALCIUM 20 MG/1
TABLET, FILM COATED ORAL
Qty: 30 TABLET | Refills: 1 | Status: SHIPPED | OUTPATIENT
Start: 2023-05-05

## 2023-05-28 DIAGNOSIS — E03.9 HYPOTHYROIDISM, UNSPECIFIED TYPE: ICD-10-CM

## 2023-05-28 RX ORDER — LEVOTHYROXINE SODIUM 0.03 MG/1
TABLET ORAL
Qty: 90 TABLET | Refills: 1 | Status: SHIPPED | OUTPATIENT
Start: 2023-05-28

## 2023-06-23 ENCOUNTER — RA CDI HCC (OUTPATIENT)
Dept: OTHER | Facility: HOSPITAL | Age: 63
End: 2023-06-23

## 2023-06-23 NOTE — PROGRESS NOTES
[facility]  coding opportunities       Chart reviewed, no opportunity found: CHART REVIEWED, NO OPPORTUNITY FOUND        Patients Insurance        Commercial Insurance: Lj Gimenez

## 2023-06-30 ENCOUNTER — OFFICE VISIT (OUTPATIENT)
Dept: FAMILY MEDICINE CLINIC | Facility: CLINIC | Age: 63
End: 2023-06-30
Payer: COMMERCIAL

## 2023-06-30 ENCOUNTER — APPOINTMENT (OUTPATIENT)
Dept: LAB | Facility: CLINIC | Age: 63
End: 2023-06-30
Payer: COMMERCIAL

## 2023-06-30 VITALS
DIASTOLIC BLOOD PRESSURE: 80 MMHG | RESPIRATION RATE: 18 BRPM | SYSTOLIC BLOOD PRESSURE: 130 MMHG | WEIGHT: 240.6 LBS | TEMPERATURE: 97.3 F | BODY MASS INDEX: 38.83 KG/M2

## 2023-06-30 DIAGNOSIS — E78.5 HYPERLIPIDEMIA, UNSPECIFIED HYPERLIPIDEMIA TYPE: ICD-10-CM

## 2023-06-30 DIAGNOSIS — E03.9 HYPOTHYROIDISM, UNSPECIFIED TYPE: ICD-10-CM

## 2023-06-30 DIAGNOSIS — I10 ESSENTIAL HYPERTENSION: ICD-10-CM

## 2023-06-30 DIAGNOSIS — E03.9 HYPOTHYROIDISM, UNSPECIFIED TYPE: Primary | ICD-10-CM

## 2023-06-30 DIAGNOSIS — Z00.00 WELL ADULT EXAM: ICD-10-CM

## 2023-06-30 LAB
ALBUMIN SERPL BCP-MCNC: 3.5 G/DL (ref 3.5–5)
ALP SERPL-CCNC: 104 U/L (ref 46–116)
ALT SERPL W P-5'-P-CCNC: 19 U/L (ref 12–78)
ANION GAP SERPL CALCULATED.3IONS-SCNC: 7 MMOL/L
AST SERPL W P-5'-P-CCNC: 14 U/L (ref 5–45)
BILIRUB SERPL-MCNC: 0.52 MG/DL (ref 0.2–1)
BUN SERPL-MCNC: 14 MG/DL (ref 5–25)
CALCIUM SERPL-MCNC: 9.1 MG/DL (ref 8.3–10.1)
CHLORIDE SERPL-SCNC: 108 MMOL/L (ref 96–108)
CHOLEST SERPL-MCNC: 174 MG/DL
CO2 SERPL-SCNC: 25 MMOL/L (ref 21–32)
CREAT SERPL-MCNC: 0.76 MG/DL (ref 0.6–1.3)
ERYTHROCYTE [DISTWIDTH] IN BLOOD BY AUTOMATED COUNT: 12.7 % (ref 11.6–15.1)
GFR SERPL CREATININE-BSD FRML MDRD: 83 ML/MIN/1.73SQ M
GLUCOSE P FAST SERPL-MCNC: 108 MG/DL (ref 65–99)
HCT VFR BLD AUTO: 39.8 % (ref 34.8–46.1)
HDLC SERPL-MCNC: 38 MG/DL
HGB BLD-MCNC: 12.8 G/DL (ref 11.5–15.4)
LDLC SERPL CALC-MCNC: 83 MG/DL (ref 0–100)
MCH RBC QN AUTO: 32 PG (ref 26.8–34.3)
MCHC RBC AUTO-ENTMCNC: 32.2 G/DL (ref 31.4–37.4)
MCV RBC AUTO: 100 FL (ref 82–98)
NONHDLC SERPL-MCNC: 136 MG/DL
PLATELET # BLD AUTO: 352 THOUSANDS/UL (ref 149–390)
PMV BLD AUTO: 9.7 FL (ref 8.9–12.7)
POTASSIUM SERPL-SCNC: 4.1 MMOL/L (ref 3.5–5.3)
PROT SERPL-MCNC: 7.3 G/DL (ref 6.4–8.4)
RBC # BLD AUTO: 4 MILLION/UL (ref 3.81–5.12)
SODIUM SERPL-SCNC: 140 MMOL/L (ref 135–147)
TRIGL SERPL-MCNC: 265 MG/DL
TSH SERPL DL<=0.05 MIU/L-ACNC: 3.88 UIU/ML (ref 0.45–4.5)
WBC # BLD AUTO: 7.88 THOUSAND/UL (ref 4.31–10.16)

## 2023-06-30 PROCEDURE — 85027 COMPLETE CBC AUTOMATED: CPT

## 2023-06-30 PROCEDURE — 36415 COLL VENOUS BLD VENIPUNCTURE: CPT

## 2023-06-30 PROCEDURE — 99396 PREV VISIT EST AGE 40-64: CPT | Performed by: FAMILY MEDICINE

## 2023-06-30 PROCEDURE — 80061 LIPID PANEL: CPT

## 2023-06-30 PROCEDURE — 84443 ASSAY THYROID STIM HORMONE: CPT

## 2023-06-30 PROCEDURE — 80053 COMPREHEN METABOLIC PANEL: CPT

## 2023-06-30 RX ORDER — ATORVASTATIN CALCIUM 20 MG/1
20 TABLET, FILM COATED ORAL DAILY
Qty: 90 TABLET | Refills: 3 | Status: SHIPPED | OUTPATIENT
Start: 2023-06-30

## 2023-06-30 RX ORDER — LEVOTHYROXINE SODIUM 0.03 MG/1
25 TABLET ORAL DAILY
Qty: 90 TABLET | Refills: 3 | Status: SHIPPED | OUTPATIENT
Start: 2023-06-30

## 2023-06-30 RX ORDER — NEBIVOLOL 20 MG/1
20 TABLET ORAL DAILY
Qty: 90 TABLET | Refills: 3 | Status: SHIPPED | OUTPATIENT
Start: 2023-06-30

## 2023-06-30 RX ORDER — HYDROCHLOROTHIAZIDE 12.5 MG/1
12.5 TABLET ORAL DAILY
Qty: 90 TABLET | Refills: 3 | Status: SHIPPED | OUTPATIENT
Start: 2023-06-30

## 2023-06-30 NOTE — ASSESSMENT & PLAN NOTE
Hyperlipidemia    Patient will check lipid panel blood work and continue her current dose of statin therapy

## 2023-06-30 NOTE — PROGRESS NOTES
FAMILY PRACTICE OFFICE VISIT       NAME: Juan Wynne  AGE: 61 y o  SEX: female       : 1960        MRN: [id number]    DATE: 2023  TIME: 8:48 AM    Assessment and Plan     Problem List Items Addressed This Visit        Endocrine    Hypothyroidism - Primary     Hypothyroidism  TSH is stable on current dose of levothyroxine         Relevant Medications    nebivolol (BYSTOLIC) 20 MG tablet    levothyroxine 25 mcg tablet    Other Relevant Orders    CBC    Comprehensive metabolic panel    Lipid panel    TSH, 3rd generation       Cardiovascular and Mediastinum    Essential hypertension     Hypertension  The patient's blood pressure is stable at this time and he will continue current regimen of medications         Relevant Medications    nebivolol (BYSTOLIC) 20 MG tablet    hydrochlorothiazide (HYDRODIURIL) 12 5 mg tablet    Other Relevant Orders    CBC    Comprehensive metabolic panel    Lipid panel    TSH, 3rd generation       Other    Hyperlipidemia     Hyperlipidemia  Patient will check lipid panel blood work and continue her current dose of statin therapy         Relevant Medications    atorvastatin (LIPITOR) 20 mg tablet    Other Relevant Orders    CBC    Comprehensive metabolic panel    Lipid panel    TSH, 3rd generation    Well adult exam     Well adult  Overall the patient appears to be in stable health  Her colonoscopy and mammograms are up-to-date  She was recommended to try and get a more regular form of walking program of 30 minutes 3 to 4 days a week  Chief Complaint     Chief Complaint   Patient presents with   • Physical Exam       History of Present Illness     Patient in the office for annual wellness exam   She denies any recent illness  She has gained a little weight due to not walking as often with her elderly dog  She does see her gynecologist for routine exams  Her last colonoscopy is up-to-date from         Review of Systems   Review of Systems Constitutional: Negative  HENT: Negative  Eyes: Negative  Respiratory: Negative  Cardiovascular: Negative  Gastrointestinal: Negative  Genitourinary: Negative  Musculoskeletal: Negative  Skin: Negative  Neurological: Negative  Psychiatric/Behavioral: Negative          Active Problem List     Patient Active Problem List   Diagnosis   • Essential hypertension   • Hyperlipidemia   • Hypothyroidism   • Rosacea   • Encounter for annual routine gynecological examination   • Encounter for screening mammogram for breast cancer   • Well adult exam       Past Medical History:  Past Medical History:   Diagnosis Date   • Hyperlipidemia 9/18/2012   • Hypertension    • Hypothyroidism 10/23/2018   • Lyme disease 07/21/2015   • Rosacea    • Skin cancer     H/O Basal Cell Cancer       Past Surgical History:  Past Surgical History:   Procedure Laterality Date   • BREAST BIOPSY Right 08/05/2005    benign aprocrine metaplasia   • ORBITAL FRACTURE SURGERY Left 5/1/2019    Procedure: OPEN REDUCTION W/ INTERNAL FIXATION (ORIF) ORBITAL;  Surgeon: Fermin Milan MD;  Location: [facility];  Service: Plastics   • WISDOM TOOTH EXTRACTION         Family History:  Family History   Problem Relation Age of Onset   • Breast cancer Mother 80   • Heart disease Father         Cardiac Disorder   • Dementia Father    • Hyperlipidemia Father    • Pancreatic cancer Maternal Uncle    • No Known Problems Brother    • No Known Problems Daughter    • Stroke Maternal Grandmother    • Alcohol abuse Maternal Grandfather    • No Known Problems Paternal Grandmother    • Other Paternal Grandfather         heart problems   • No Known Problems Daughter    • No Known Problems Maternal Aunt    • No Known Problems Paternal Aunt        Social History:  Social History     Socioeconomic History   • Marital status: /Civil Union     Spouse name: Not on file   • Number of children: Not on file   • Years of education: Not on file   • Highest education level: Not on file   Occupational History   • Not on file   Tobacco Use   • Smoking status: Never   • Smokeless tobacco: Never   Vaping Use   • Vaping Use: Never used   Substance and Sexual Activity   • Alcohol use: Yes   • Drug use: No   • Sexual activity: Yes     Partners: Male     Birth control/protection: Post-menopausal   Other Topics Concern   • Not on file   Social History Narrative     since 1986  Two children  No grandchildren  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  Enjoys reading, golfing, tennis  Social Determinants of Health     Financial Resource Strain: Not on file   Food Insecurity: Not on file   Transportation Needs: Not on file   Physical Activity: Not on file   Stress: Not on file   Social Connections: Not on file   Intimate Partner Violence: Not on file   Housing Stability: Not on file       Objective     Vitals:    06/30/23 0818   BP: 130/80   Resp: 18   Temp: (!) 97 3 °F (36 3 °C)     Wt Readings from Last 3 Encounters:   06/30/23 109 kg (240 lb 9 6 oz)   02/17/23 108 kg (238 lb 1 6 oz)   01/25/23 108 kg (238 lb)       Physical Exam  Constitutional:       General: She is not in acute distress  Appearance: Normal appearance  She is not ill-appearing  HENT:      Head: Normocephalic and atraumatic  Right Ear: Tympanic membrane, ear canal and external ear normal  There is no impacted cerumen  Left Ear: Tympanic membrane, ear canal and external ear normal  There is no impacted cerumen  Eyes:      General:         Right eye: No discharge  Left eye: No discharge  Extraocular Movements: Extraocular movements intact  Conjunctiva/sclera: Conjunctivae normal       Pupils: Pupils are equal, round, and reactive to light  Neck:      Vascular: No carotid bruit  Cardiovascular:      Rate and Rhythm: Normal rate and regular rhythm  Heart sounds: Normal heart sounds  No murmur heard    Pulmonary: "Effort: Pulmonary effort is normal       Breath sounds: Normal breath sounds  No wheezing, rhonchi or rales  Abdominal:      General: Abdomen is flat  Bowel sounds are normal  There is no distension  Palpations: Abdomen is soft  Tenderness: There is no abdominal tenderness  There is no guarding or rebound  Musculoskeletal:      Right lower leg: No edema  Left lower leg: No edema  Lymphadenopathy:      Cervical: No cervical adenopathy  Skin:     Findings: No rash  Neurological:      General: No focal deficit present  Mental Status: She is alert and oriented to person, place, and time  Cranial Nerves: No cranial nerve deficit  Psychiatric:         Mood and Affect: Mood normal          Behavior: Behavior normal          Thought Content:  Thought content normal          Judgment: Judgment normal          Pertinent Laboratory/Diagnostic Studies:  Lab Results   Component Value Date    GLUCOSE 98 11/23/2015    BUN 16 06/16/2022    CREATININE 0 82 06/16/2022    CALCIUM 9 3 06/16/2022     11/23/2015    K 3 5 06/16/2022    CO2 26 06/16/2022     06/16/2022     Lab Results   Component Value Date    ALT 20 06/16/2022    AST 16 06/16/2022    ALKPHOS 113 06/16/2022    BILITOT 0 57 11/23/2015       Lab Results   Component Value Date    WBC 10 82 (H) 06/16/2022    HGB 13 6 06/16/2022    HCT 41 7 06/16/2022    MCV 98 06/16/2022     06/16/2022       No results found for: \"TSH\"    Lab Results   Component Value Date    CHOL 158 11/23/2015     Lab Results   Component Value Date    TRIG 246 (H) 06/16/2022     Lab Results   Component Value Date    HDL 46 (L) 06/16/2022     Lab Results   Component Value Date    LDLCALC 66 06/16/2022     No results found for: \"HGBA1C\"    Results for orders placed or performed in visit on 06/16/22   CBC   Result Value Ref Range    WBC 10 82 (H) 4 31 - 10 16 Thousand/uL    RBC 4 24 3 81 - 5 12 Million/uL    Hemoglobin 13 6 11 5 - 15 4 g/dL    Hematocrit " 41 7 34 8 - 46 1 %    MCV 98 82 - 98 fL    MCH 32 1 26 8 - 34 3 pg    MCHC 32 6 31 4 - 37 4 g/dL    RDW 13 0 11 6 - 15 1 %    Platelets 918 927 - 749 Thousands/uL    MPV 9 9 8 9 - 12 7 fL   Comprehensive metabolic panel   Result Value Ref Range    Sodium 137 136 - 145 mmol/L    Potassium 3 5 3 5 - 5 3 mmol/L    Chloride 104 100 - 108 mmol/L    CO2 26 21 - 32 mmol/L    ANION GAP 7 4 - 13 mmol/L    BUN 16 5 - 25 mg/dL    Creatinine 0 82 0 60 - 1 30 mg/dL    Glucose, Fasting 123 (H) 65 - 99 mg/dL    Calcium 9 3 8 3 - 10 1 mg/dL    Corrected Calcium 9 9 8 3 - 10 1 mg/dL    AST 16 5 - 45 U/L    ALT 20 12 - 78 U/L    Alkaline Phosphatase 113 46 - 116 U/L    Total Protein 7 6 6 4 - 8 2 g/dL    Albumin 3 3 (L) 3 5 - 5 0 g/dL    Total Bilirubin 0 58 0 20 - 1 00 mg/dL    eGFR 76 ml/min/1 73sq m   Lipid panel   Result Value Ref Range    Cholesterol 161 See Comment mg/dL    Triglycerides 246 (H) See Comment mg/dL    HDL, Direct 46 (L) >=50 mg/dL    LDL Calculated 66 0 - 100 mg/dL    Non-HDL-Chol (CHOL-HDL) 115 mg/dl   TSH, 3rd generation   Result Value Ref Range    TSH 3RD GENERATON 5 750 (H) 0 450 - 4 500 uIU/mL       Orders Placed This Encounter   Procedures   • CBC   • Comprehensive metabolic panel   • Lipid panel   • TSH, 3rd generation       ALLERGIES:  No Known Allergies    Current Medications     Current Outpatient Medications   Medication Sig Dispense Refill   • atorvastatin (LIPITOR) 20 mg tablet Take 1 tablet (20 mg total) by mouth daily 90 tablet 3   • doxycycline hyclate (VIBRAMYCIN) 100 mg capsule Take 100 mg by mouth daily as needed     • hydrochlorothiazide (HYDRODIURIL) 12 5 mg tablet Take 1 tablet (12 5 mg total) by mouth daily 90 tablet 3   • levothyroxine 25 mcg tablet Take 1 tablet (25 mcg total) by mouth daily 90 tablet 3   • Multiple Vitamin (MULTIVITAMINS PO) Take by mouth     • nebivolol (BYSTOLIC) 20 MG tablet Take 1 tablet (20 mg total) by mouth daily 90 tablet 3     No current facility-administered medications for this visit           Health Maintenance     Health Maintenance   Topic Date Due   • Hepatitis C Screening  Never done   • HIV Screening  Never done   • BMI: Followup Plan  05/21/2022   • Annual Physical  06/16/2023   • Depression Screening  08/10/2023   • BMI: Adult  06/30/2024   • Breast Cancer Screening: Mammogram  02/17/2025   • DTaP,Tdap,and Td Vaccines (2 - Td or Tdap) 05/01/2029   • Colorectal Cancer Screening  03/02/2031   • Influenza Vaccine  Completed   • COVID-19 Vaccine  Completed   • Pneumococcal Vaccine: Pediatrics (0 to 5 Years) and At-Risk Patients (6 to 59 Years)  Aged Out   • HIB Vaccine  Aged Out   • IPV Vaccine  Aged Out   • Hepatitis A Vaccine  Aged Out   • Meningococcal ACWY Vaccine  Aged Out   • HPV Vaccine  Aged [employer] History   Administered Date(s) Administered   • COVID-19 PFIZER VACCINE 0 3 ML IM 04/12/2021, 05/03/2021, 12/18/2021   • COVID-19 Pfizer Vac BIVALENT Gary-sucrose 12 Yr+ IM (BOOSTER ONLY) 12/12/2022   • INFLUENZA 07/29/2016, 11/23/2020, 12/12/2022   • Influenza Injectable, MDCK, Preservative Free, Quadrivalent, 0 5 mL 11/23/2020   • Influenza Quadrivalent Preservative Free 3 years and older IM 07/29/2016   • Tdap 05/01/2019   • Zoster 81/65/3053       Edith Cadet MD

## 2023-06-30 NOTE — ASSESSMENT & PLAN NOTE
Well adult  Overall the patient appears to be in stable health  Her colonoscopy and mammograms are up-to-date  She was recommended to try and get a more regular form of walking program of 30 minutes 3 to 4 days a week

## 2023-08-29 PROBLEM — Z00.00 WELL ADULT EXAM: Status: RESOLVED | Noted: 2023-06-30 | Resolved: 2023-08-29

## 2023-11-27 ENCOUNTER — OFFICE VISIT (OUTPATIENT)
Dept: FAMILY MEDICINE CLINIC | Facility: CLINIC | Age: 63
End: 2023-11-27
Payer: COMMERCIAL

## 2023-11-27 VITALS
OXYGEN SATURATION: 94 % | DIASTOLIC BLOOD PRESSURE: 82 MMHG | RESPIRATION RATE: 18 BRPM | WEIGHT: 233.2 LBS | SYSTOLIC BLOOD PRESSURE: 130 MMHG | HEART RATE: 71 BPM | BODY MASS INDEX: 37.64 KG/M2

## 2023-11-27 DIAGNOSIS — H61.21 IMPACTED CERUMEN OF RIGHT EAR: Primary | ICD-10-CM

## 2023-11-27 PROCEDURE — 69210 REMOVE IMPACTED EAR WAX UNI: CPT | Performed by: FAMILY MEDICINE

## 2023-11-27 PROCEDURE — 99213 OFFICE O/P EST LOW 20 MIN: CPT | Performed by: FAMILY MEDICINE

## 2023-11-27 NOTE — ASSESSMENT & PLAN NOTE
Tolerated lavage well today. TM normal appearing. Recommend Debrox at home as needed. Follow up if any new concerns or recurrence.

## 2023-11-27 NOTE — PROGRESS NOTES
Name: Aron Saenz      : 1960      MRN: [id number]  Encounter Provider: Marilee Cervantes DO  Encounter Date: 2023   Encounter department: [address] [place] [address]. Impacted cerumen of right ear  Assessment & Plan: Tolerated lavage well today. TM normal appearing. Recommend Debrox at home as needed. Follow up if any new concerns or recurrence. Orders:  -     Ear cerumen removal        Depression Screening and Follow-up Plan: Patient was screened for depression during today's encounter. They screened negative with a PHQ-2 score of 0.         Subjective      HPI  Has has trouble with impacted cerumen in the past. Feels like right ear is clogged today, would like ear wax removal.    Review of Systems    Current Outpatient Medications on File Prior to Visit   Medication Sig   • atorvastatin (LIPITOR) 20 mg tablet Take 1 tablet (20 mg total) by mouth daily   • doxycycline hyclate (VIBRAMYCIN) 100 mg capsule Take 100 mg by mouth daily as needed   • hydrochlorothiazide (HYDRODIURIL) 12.5 mg tablet Take 1 tablet (12.5 mg total) by mouth daily   • levothyroxine 25 mcg tablet Take 1 tablet (25 mcg total) by mouth daily   • Multiple Vitamin (MULTIVITAMINS PO) Take by mouth   • nebivolol (BYSTOLIC) 20 MG tablet Take 1 tablet (20 mg total) by mouth daily       Objective     /82   Pulse 71   Resp 18   Wt 106 kg (233 lb 3.2 oz)   LMP  (LMP Unknown)   SpO2 94%   BMI 37.64 kg/m²     Physical Exam  Ear cerumen removal    Date/Time: 2023 9:10 AM    Performed by: Marilee Cervantes DO  Authorized by: Marilee Cervantes DO  [place] Protocol:  Consent given by: patient  Patient understanding: patient states understanding of the procedure being performed    Patient location:  Clinic  Procedure details:     Local anesthetic:  None    Location:  R ear    Procedure type: irrigation with instrumentation      Instrumentation: curette    Post-procedure details:     Complication:  None Hearing quality:  Improved    Patient tolerance of procedure:   Tolerated well, no immediate complications      Shanti Sultana, DO

## 2024-01-26 PROBLEM — H61.21 IMPACTED CERUMEN OF RIGHT EAR: Status: RESOLVED | Noted: 2020-06-25 | Resolved: 2024-01-26

## 2024-01-28 NOTE — PROGRESS NOTES
Assessment/Plan:  Normal GYN exam  CoTesting '27  RTO 1 yr  Obesity- a struggle with vacillating weight; lost 5 '21 and '23  SBE  3 D Mammo  - active, scheduled for next month  Colonoscopy 3/21-  nl  Exrecise 3/wk- does 5 /wk  Ca 1,000 mg/d -does      Depression Screen:  Neg       Diagnoses and all orders for this visit:    Encounter for annual routine gynecological examination    Encounter for screening mammogram for breast cancer              Subjective:        Patient ID: Fátima Reyes is a 64 y.o. female.    Fátima returns for an annual visit.  She has no concerns.  She denies any vaginal bleeding.  She remains sexually active.  Her health has not changed at all in the past year.  Oli was doing well.        The following portions of the patient's history were reviewed and updated as appropriate: She  has a past medical history of Hyperlipidemia (9/18/2012), Hypertension, Hypothyroidism (10/23/2018), Lyme disease (07/21/2015), Rosacea, and Skin cancer.  Patient Active Problem List    Diagnosis Date Noted    Encounter for annual routine gynecological examination 01/24/2023    Encounter for screening mammogram for breast cancer 01/24/2023    Rosacea 08/10/2022    Hypothyroidism 10/23/2018    Essential hypertension 04/16/2013    Hyperlipidemia 09/18/2012   PMH:  G2, P2; SAVD x 2, '90, '93  Right stereotactic biopsy '05  Basal cell cancer- R Eye, Chest 5/10  Panic attacks  HTN  Obesity- BMI 40 '11, lost 40 pounds '15  Lyme Disease 7/15  Hypercholesterolemia 9/15      Menopause 54 '14   She  has a past surgical history that includes [place] tooth extraction; Breast biopsy (Right, 08/05/2005); and Orbital fracture repair (Left, 5/1/2019).  Her family history includes Alcohol abuse in her maternal grandfather; Breast cancer (age of onset: 85) in her mother; Dementia in her father; Heart disease in her father; Hyperlipidemia in her father; No Known Problems in her brother, daughter, daughter, maternal aunt, paternal  aunt, and paternal grandmother; Other in her paternal grandfather; Pancreatic cancer in her maternal uncle; Stroke in her maternal grandmother.  FH:   M- 85 R Breast Ca- lumpectomy without adjuvant therapy  MGM- CVA  F- Parkinson's disease 81, Valvular heart surgery '20 86  She  reports that she has never smoked. She has never used smokeless tobacco. She reports current alcohol use. She reports that she does not use drugs.  SH:  . Moved back to area '17 . Oli still at [place].  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.  Mercedes 30 in [place]- ,Horses and dogs.   33 back here, was watching [employer] 2 dogs and others.  Currently watching homes and dogs.  Current Outpatient Medications   Medication Sig Dispense Refill    atorvastatin (LIPITOR) 20 mg tablet Take 1 tablet (20 mg total) by mouth daily 90 tablet 3    doxycycline hyclate (VIBRAMYCIN) 100 mg capsule Take 100 mg by mouth daily as needed      hydrochlorothiazide (HYDRODIURIL) 12.5 mg tablet Take 1 tablet (12.5 mg total) by mouth daily 90 tablet 3    Multiple Vitamin (MULTIVITAMINS PO) Take by mouth      nebivolol (BYSTOLIC) 20 MG tablet Take 1 tablet (20 mg total) by mouth daily 90 tablet 3     No current facility-administered medications for this visit.     Current Outpatient Medications on File Prior to Visit   Medication Sig    atorvastatin (LIPITOR) 20 mg tablet Take 1 tablet (20 mg total) by mouth daily    doxycycline hyclate (VIBRAMYCIN) 100 mg capsule Take 100 mg by mouth daily as needed    hydrochlorothiazide (HYDRODIURIL) 12.5 mg tablet Take 1 tablet (12.5 mg total) by mouth daily    Multiple Vitamin (MULTIVITAMINS PO) Take by mouth    nebivolol (BYSTOLIC) 20 MG tablet Take 1 tablet (20 mg total) by mouth daily     No current facility-administered medications on file prior to visit.     She has No Known Allergies..    Review of Systems   Constitutional:  Negative for activity change, appetite change, fatigue and unexpected weight  "change.   Eyes:  Negative for visual disturbance.   Respiratory:  Negative for cough, chest tightness, shortness of breath and wheezing.    Cardiovascular:  Negative for chest pain, palpitations and leg swelling.        Breast: Patient denies tenderness, nipple discharge, masses, or erythema.   Gastrointestinal:  Negative for abdominal distention, abdominal pain, blood in stool, constipation, diarrhea, nausea and vomiting.   Endocrine: Negative for cold intolerance and heat intolerance.   Genitourinary:  Negative for decreased urine volume, difficulty urinating, dyspareunia, dysuria, frequency, hematuria, menstrual problem, pelvic pain, urgency, vaginal bleeding, vaginal discharge and vaginal pain.        Sexually active.  Stable chronic mild stress incontinence.   Musculoskeletal:  Negative for arthralgias.   Skin:  Negative for rash.   Neurological:  Negative for weakness, light-headedness, numbness and headaches.   Hematological:  Does not bruise/bleed easily.   Psychiatric/Behavioral:  Negative for agitation, behavioral problems and sleep disturbance. The patient is not nervous/anxious.          Objective:    Vitals:    01/29/24 1009   BP: 138/80   BP Location: Right arm   Patient Position: Sitting   Cuff Size: Large   Weight: 106 kg (233 lb 9.6 oz)   Height: 5' 6\" (1.676 m)            Physical Exam  Vitals and nursing note reviewed. Exam conducted with a chaperone present.   Constitutional:       General: She is not in acute distress.     Appearance: Normal appearance. She is well-developed.   HENT:      Head: Normocephalic and atraumatic.   Eyes:      General: No scleral icterus.        Right eye: No discharge.         Left eye: No discharge.      Extraocular Movements: Extraocular movements intact.      Conjunctiva/sclera: Conjunctivae normal.   Neck:      Thyroid: No thyromegaly.      Trachea: No tracheal deviation.   Cardiovascular:      Rate and Rhythm: Normal rate and regular rhythm.      Heart sounds: " Normal heart sounds. No murmur heard.  Pulmonary:      Effort: Pulmonary effort is normal. No respiratory distress.      Breath sounds: Normal breath sounds. No wheezing.   Chest:   Breasts:     Breasts are symmetrical.      Right: No inverted nipple, mass, nipple discharge, skin change or tenderness.      Left: No inverted nipple, mass, nipple discharge, skin change or tenderness.   Abdominal:      General: Bowel sounds are normal. There is no distension.      Palpations: Abdomen is soft. There is no mass.      Tenderness: There is no abdominal tenderness. There is no guarding or rebound.   Genitourinary:     General: Normal vulva.      Labia:         Right: No rash, tenderness or lesion.         Left: No rash, tenderness or lesion.       Vagina: Normal.      Cervix: No cervical motion tenderness or discharge.      Uterus: Not deviated, not enlarged and not tender.       Adnexa:         Right: No mass, tenderness or fullness.          Left: No mass, tenderness or fullness.        Rectum: No external hemorrhoid.      Comments: Urethral meatus within normal limits.  Perineum within normal limits.  Bladder well supported.  The pelvis is deep.  There is physiologic vaginal atrophy.  Musculoskeletal:         General: No tenderness. Normal range of motion.      Cervical back: Normal range of motion and neck supple.   Lymphadenopathy:      Cervical: No cervical adenopathy.   Skin:     General: Skin is warm and dry.   Neurological:      Mental Status: She is alert and oriented to person, place, and time.   Psychiatric:         Mood and Affect: Mood normal.         Behavior: Behavior normal.         Thought Content: Thought content normal.         Judgment: Judgment normal.

## 2024-01-29 ENCOUNTER — ANNUAL EXAM (OUTPATIENT)
Dept: OBGYN CLINIC | Facility: CLINIC | Age: 64
End: 2024-01-29
Payer: COMMERCIAL

## 2024-01-29 VITALS
BODY MASS INDEX: 37.54 KG/M2 | WEIGHT: 233.6 LBS | HEIGHT: 66 IN | DIASTOLIC BLOOD PRESSURE: 80 MMHG | SYSTOLIC BLOOD PRESSURE: 138 MMHG

## 2024-01-29 DIAGNOSIS — Z12.31 ENCOUNTER FOR SCREENING MAMMOGRAM FOR BREAST CANCER: ICD-10-CM

## 2024-01-29 DIAGNOSIS — Z01.419 ENCOUNTER FOR ANNUAL ROUTINE GYNECOLOGICAL EXAMINATION: Primary | ICD-10-CM

## 2024-01-29 PROCEDURE — S0612 ANNUAL GYNECOLOGICAL EXAMINA: HCPCS | Performed by: OBSTETRICS & GYNECOLOGY

## 2024-02-19 ENCOUNTER — HOSPITAL ENCOUNTER (OUTPATIENT)
Dept: MAMMOGRAPHY | Facility: CLINIC | Age: 64
Discharge: HOME | End: 2024-02-19
Payer: COMMERCIAL

## 2024-02-19 VITALS — BODY MASS INDEX: 37.28 KG/M2 | HEIGHT: 66 IN | WEIGHT: 232 LBS

## 2024-02-19 DIAGNOSIS — Z12.31 ENCOUNTER FOR SCREENING MAMMOGRAM FOR BREAST CANCER: ICD-10-CM

## 2024-02-19 PROCEDURE — 77063 BREAST TOMOSYNTHESIS BI: CPT

## 2024-02-19 PROCEDURE — 77067 SCR MAMMO BI INCL CAD: CPT

## 2024-02-21 PROBLEM — Z01.419 ENCOUNTER FOR ANNUAL ROUTINE GYNECOLOGICAL EXAMINATION: Status: RESOLVED | Noted: 2023-01-24 | Resolved: 2024-02-21

## 2024-04-05 ENCOUNTER — OFFICE VISIT (OUTPATIENT)
Dept: OBGYN CLINIC | Facility: CLINIC | Age: 64
End: 2024-04-05
Payer: COMMERCIAL

## 2024-04-05 ENCOUNTER — APPOINTMENT (OUTPATIENT)
Dept: RADIOLOGY | Facility: AMBULARY SURGERY CENTER | Age: 64
End: 2024-04-05
Attending: ORTHOPAEDIC SURGERY
Payer: COMMERCIAL

## 2024-04-05 VITALS
HEIGHT: 66 IN | BODY MASS INDEX: 37.28 KG/M2 | SYSTOLIC BLOOD PRESSURE: 142 MMHG | WEIGHT: 232 LBS | DIASTOLIC BLOOD PRESSURE: 87 MMHG | HEART RATE: 61 BPM

## 2024-04-05 DIAGNOSIS — M17.11 PRIMARY OSTEOARTHRITIS OF RIGHT KNEE: ICD-10-CM

## 2024-04-05 DIAGNOSIS — M25.561 RIGHT KNEE PAIN, UNSPECIFIED CHRONICITY: Primary | ICD-10-CM

## 2024-04-05 DIAGNOSIS — M23.203 DEGENERATIVE TEAR OF MEDIAL MENISCUS OF RIGHT KNEE: ICD-10-CM

## 2024-04-05 DIAGNOSIS — M25.561 RIGHT KNEE PAIN, UNSPECIFIED CHRONICITY: ICD-10-CM

## 2024-04-05 PROCEDURE — 73562 X-RAY EXAM OF KNEE 3: CPT

## 2024-04-05 PROCEDURE — 99204 OFFICE O/P NEW MOD 45 MIN: CPT | Performed by: ORTHOPAEDIC SURGERY
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
## 2024-04-15 ENCOUNTER — OFFICE VISIT (OUTPATIENT)
Dept: OBGYN CLINIC | Facility: CLINIC | Age: 64
End: 2024-04-15
Payer: COMMERCIAL

## 2024-04-15 VITALS
HEART RATE: 67 BPM | DIASTOLIC BLOOD PRESSURE: 81 MMHG | WEIGHT: 232 LBS | HEIGHT: 66 IN | BODY MASS INDEX: 37.28 KG/M2 | SYSTOLIC BLOOD PRESSURE: 147 MMHG

## 2024-04-15 DIAGNOSIS — M17.11 PRIMARY OSTEOARTHRITIS OF RIGHT KNEE: Primary | ICD-10-CM

## 2024-04-15 PROCEDURE — 99213 OFFICE O/P EST LOW 20 MIN: CPT | Performed by: ORTHOPAEDIC SURGERY

## 2024-04-15 PROCEDURE — 20610 DRAIN/INJ JOINT/BURSA W/O US: CPT

## 2024-04-15 RX ORDER — BETAMETHASONE SODIUM PHOSPHATE AND BETAMETHASONE ACETATE 3; 3 MG/ML; MG/ML
6 INJECTION, SUSPENSION INTRA-ARTICULAR; INTRALESIONAL; INTRAMUSCULAR; SOFT TISSUE
Status: COMPLETED | OUTPATIENT
Start: 2024-04-15 | End: 2024-04-15

## 2024-04-15 RX ORDER — BUPIVACAINE HYDROCHLORIDE 2.5 MG/ML
2 INJECTION, SOLUTION INFILTRATION; PERINEURAL
Status: COMPLETED | OUTPATIENT
Start: 2024-04-15 | End: 2024-04-15

## 2024-04-15 RX ADMIN — BUPIVACAINE HYDROCHLORIDE 2 ML: 2.5 INJECTION, SOLUTION INFILTRATION; PERINEURAL at 13:00

## 2024-04-15 RX ADMIN — BETAMETHASONE SODIUM PHOSPHATE AND BETAMETHASONE ACETATE 6 MG: 3; 3 INJECTION, SUSPENSION INTRA-ARTICULAR; INTRALESIONAL; INTRAMUSCULAR; SOFT TISSUE at 13:00

## 2024-05-26 DIAGNOSIS — E78.5 HYPERLIPIDEMIA, UNSPECIFIED HYPERLIPIDEMIA TYPE: ICD-10-CM

## 2024-05-26 RX ORDER — ATORVASTATIN CALCIUM 20 MG/1
20 TABLET, FILM COATED ORAL DAILY
Qty: 90 TABLET | Refills: 1 | Status: SHIPPED | OUTPATIENT
Start: 2024-05-26

## 2024-06-07 ENCOUNTER — OFFICE VISIT (OUTPATIENT)
Dept: OBGYN CLINIC | Facility: CLINIC | Age: 64
End: 2024-06-07
Payer: COMMERCIAL

## 2024-06-07 VITALS — WEIGHT: 232 LBS | HEIGHT: 66 IN | BODY MASS INDEX: 37.28 KG/M2

## 2024-06-07 DIAGNOSIS — M17.11 PRIMARY OSTEOARTHRITIS OF RIGHT KNEE: Primary | ICD-10-CM

## 2024-06-07 PROCEDURE — 99213 OFFICE O/P EST LOW 20 MIN: CPT | Performed by: ORTHOPAEDIC SURGERY

## 2024-06-07 RX ORDER — CELECOXIB 100 MG/1
100 CAPSULE ORAL 2 TIMES DAILY
Qty: 30 CAPSULE | Refills: 1 | Status: SHIPPED | OUTPATIENT
Start: 2024-06-07

## 2024-06-07 NOTE — PROGRESS NOTES
Assessment:  1. Primary osteoarthritis of right knee  Medial  Knee Brace    Injection procedure prior authorization    celecoxib (CeleBREX) 100 mg capsule        Patient Active Problem List   Diagnosis    Essential hypertension    Hyperlipidemia    Hypothyroidism    Rosacea    Encounter for screening mammogram for breast cancer    Primary osteoarthritis of right knee           Plan      Viscosupplements have been ordered medial  brace has been ordered.  Celebrex has also been ordered patient has tried Aleve.            Subjective:     Patient ID:    Chief Complaint:Fátima Reyes 64 y.o. female      HPI    Patient comes in today with regards to her right knee.  She did have a really good relief for the first couple weeks but it started to wear off.  She has been walking a lot and took a trip to [facility] where the cortisone seem to have worn off.  She would like to know what her next option can be.  Pain is on the medial side of the knee joint      The following portions of the patient's history were reviewed and updated as appropriate: allergies, current medications, past family history, past social history, past surgical history and problem list.    All organ systems normal    Social History     Socioeconomic History    Marital status: /Civil Union     Spouse name: Not on file    Number of children: Not on file    Years of education: Not on file    Highest education level: Not on file   Occupational History    Not on file   Tobacco Use    Smoking status: Never    Smokeless tobacco: Never   Vaping Use    Vaping status: Never Used   Substance and Sexual Activity    Alcohol use: Yes    Drug use: No    Sexual activity: Yes     Partners: Male     Birth control/protection: Post-menopausal   Other Topics Concern    Not on file   Social History Narrative     since 1986.    Two children.  No grandchildren.    Housewife.   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     "Enjoys reading, golfing, tennis.     Social Determinants of Health     Financial Resource Strain: Not on file   Food Insecurity: Not on file   Transportation Needs: Not on file   Physical Activity: Not on file   Stress: Not on file   Social Connections: Not on file   Intimate Partner Violence: Not on file   Housing Stability: Not on file     Past Medical History:   Diagnosis Date    Hyperlipidemia 9/18/2012    Hypertension     Hypothyroidism 10/23/2018    Lyme disease 07/21/2015    Rosacea     Skin cancer     H/O Basal Cell Cancer     Past Surgical History:   Procedure Laterality Date    BREAST BIOPSY Right 08/05/2005    benign aprocrine metaplasia    ORBITAL FRACTURE SURGERY Left 5/1/2019    Procedure: OPEN REDUCTION W/ INTERNAL FIXATION (ORIF) ORBITAL;  Surgeon: Quirino Silverio MD;  Location: [facility];  Service: Plastics    WISDOM TOOTH EXTRACTION       No Known Allergies  Current Outpatient Medications on File Prior to Visit   Medication Sig Dispense Refill    atorvastatin (LIPITOR) 20 mg tablet TAKE ONE TABLET BY MOUTH EVERY DAY 90 tablet 1    doxycycline hyclate (VIBRAMYCIN) 100 mg capsule Take 100 mg by mouth daily as needed      hydrochlorothiazide (HYDRODIURIL) 12.5 mg tablet Take 1 tablet (12.5 mg total) by mouth daily 90 tablet 3    Multiple Vitamin (MULTIVITAMINS PO) Take by mouth      nebivolol (BYSTOLIC) 20 MG tablet Take 1 tablet (20 mg total) by mouth daily 90 tablet 3     No current facility-administered medications on file prior to visit.              Objective:        Ortho Exam  No effusion no ecchymosis no erythema pinpoint tenderness anterior medial joint line valgus stress negative Gilberto's test negative bounce test negative.  Valgus laxity      I have personally reviewed pertinent films in PACS.    Portions of the record may have been created with voice recognition software.  Occasional wrong word or \"sound a like\" substitutions may have occurred due to the inherent limitations of voice " recognition software.  Read the chart carefully and recognize, using context, where substitutions have occurred.

## 2024-08-07 ENCOUNTER — PROCEDURE VISIT (OUTPATIENT)
Dept: OBGYN CLINIC | Facility: CLINIC | Age: 64
End: 2024-08-07
Payer: COMMERCIAL

## 2024-08-07 VITALS
WEIGHT: 232 LBS | BODY MASS INDEX: 37.28 KG/M2 | HEIGHT: 66 IN | HEART RATE: 69 BPM | SYSTOLIC BLOOD PRESSURE: 118 MMHG | DIASTOLIC BLOOD PRESSURE: 77 MMHG

## 2024-08-07 DIAGNOSIS — M17.11 PRIMARY OSTEOARTHRITIS OF RIGHT KNEE: Primary | ICD-10-CM

## 2024-08-07 PROCEDURE — 20610 DRAIN/INJ JOINT/BURSA W/O US: CPT

## 2024-08-07 RX ORDER — HYALURONATE SODIUM 10 MG/ML
20 SYRINGE (ML) INTRAARTICULAR
Status: COMPLETED | OUTPATIENT
Start: 2024-08-07 | End: 2024-08-07

## 2024-08-07 RX ADMIN — Medication 20 MG: at 10:15

## 2024-08-07 NOTE — PROGRESS NOTES
1. Primary osteoarthritis of right knee          Patient is here for her 1st injection of Euflexxa into the right knee.     Patient rates their pain as 3/10 today    Physical exam of the knee shows no effusion no ecchymosis.      Large joint arthrocentesis: R knee  Universal Protocol:  Consent: Verbal consent obtained.  Risks and benefits: risks, benefits and alternatives were discussed  Consent given by: patient  Patient understanding: patient states understanding of the procedure being performed  Patient identity confirmed: verbally with patient  Supporting Documentation  Indications: pain   Procedure Details  Location: knee - R knee  Needle size: 22 G  Approach: anterolateral  Medications administered: 20 mg Sodium Hyaluronate (Viscosup) 20 MG/2ML    Patient tolerance: patient tolerated the procedure well with no immediate complications  Dressing:  Sterile dressing applied            Patient tolerated procedure follow up 1 week for 2nd injection of Euflexxa into the right knee

## 2024-08-14 ENCOUNTER — PROCEDURE VISIT (OUTPATIENT)
Dept: OBGYN CLINIC | Facility: CLINIC | Age: 64
End: 2024-08-14
Payer: COMMERCIAL

## 2024-08-14 VITALS
HEART RATE: 65 BPM | WEIGHT: 232 LBS | SYSTOLIC BLOOD PRESSURE: 122 MMHG | BODY MASS INDEX: 37.28 KG/M2 | DIASTOLIC BLOOD PRESSURE: 75 MMHG | HEIGHT: 66 IN

## 2024-08-14 DIAGNOSIS — M17.11 PRIMARY OSTEOARTHRITIS OF RIGHT KNEE: Primary | ICD-10-CM

## 2024-08-14 PROCEDURE — 20610 DRAIN/INJ JOINT/BURSA W/O US: CPT | Performed by: ORTHOPAEDIC SURGERY

## 2024-08-14 RX ORDER — HYALURONATE SODIUM 10 MG/ML
20 SYRINGE (ML) INTRAARTICULAR
Status: COMPLETED | OUTPATIENT
Start: 2024-08-14 | End: 2024-08-14

## 2024-08-14 RX ADMIN — Medication 20 MG: at 09:45

## 2024-08-14 NOTE — PROGRESS NOTES
"No diagnosis found.  Patient is here for her 2nd  injection of Euflexxa  into the right knee. Patient reportsimprovements.      All organ systems normal  Physical exam of the knee shows no effusion no ecchymosis.      Large joint arthrocentesis: R knee  Onset Protocol:  procedure performed by consultantConsent: Verbal consent obtained.  Risks and benefits: risks, benefits and alternatives were discussed  Consent given by: patient  Time out: Immediately prior to procedure a \"time out\" was called to verify the correct patient, procedure, equipment, support staff and site/side marked as required.  Timeout called at: 8/14/2024 9:54 AM.  Patient understanding: patient states understanding of the procedure being performed  Site marked: the operative site was marked  Supporting Documentation  Indications: pain   Procedure Details  Location: knee - R knee  Needle size: 22 G  Approach: lateral  Medications administered: 20 mg Sodium Hyaluronate (Viscosup) 20 MG/2ML    Patient tolerance: patient tolerated the procedure well with no immediate complications  Dressing:  Sterile dressing applied          Patient tolerated procedure follow up 1 week for 3rd Euflexxa   "

## 2024-08-21 ENCOUNTER — PROCEDURE VISIT (OUTPATIENT)
Dept: OBGYN CLINIC | Facility: CLINIC | Age: 64
End: 2024-08-21
Payer: COMMERCIAL

## 2024-08-21 VITALS
DIASTOLIC BLOOD PRESSURE: 82 MMHG | HEIGHT: 66 IN | SYSTOLIC BLOOD PRESSURE: 131 MMHG | HEART RATE: 66 BPM | WEIGHT: 232 LBS | RESPIRATION RATE: 16 BRPM | BODY MASS INDEX: 37.28 KG/M2

## 2024-08-21 DIAGNOSIS — M17.11 PRIMARY OSTEOARTHRITIS OF RIGHT KNEE: Primary | ICD-10-CM

## 2024-08-21 DIAGNOSIS — I10 ESSENTIAL HYPERTENSION: ICD-10-CM

## 2024-08-21 PROCEDURE — 20610 DRAIN/INJ JOINT/BURSA W/O US: CPT

## 2024-08-21 RX ORDER — NEBIVOLOL 20 MG/1
20 TABLET ORAL DAILY
Qty: 90 TABLET | Refills: 1 | Status: SHIPPED | OUTPATIENT
Start: 2024-08-21

## 2024-08-21 RX ORDER — HYALURONATE SODIUM 10 MG/ML
20 SYRINGE (ML) INTRAARTICULAR
Status: COMPLETED | OUTPATIENT
Start: 2024-08-21 | End: 2024-08-21

## 2024-08-21 RX ORDER — HYDROCHLOROTHIAZIDE 12.5 MG/1
12.5 TABLET ORAL DAILY
Qty: 30 TABLET | Refills: 0 | Status: SHIPPED | OUTPATIENT
Start: 2024-08-21

## 2024-08-21 RX ADMIN — Medication 20 MG: at 09:15

## 2024-08-21 NOTE — PROGRESS NOTES
1. Primary osteoarthritis of right knee  Ambulatory referral to Physical Therapy        Patient is here for her 3rd injection of Euflexxa into the right knee.     Patient rates their pain as 0/10 today    Physical exam of the knee shows no effusion no ecchymosis.      Large joint arthrocentesis: R knee  Universal Protocol:  Consent: Verbal consent obtained.  Risks and benefits: risks, benefits and alternatives were discussed  Consent given by: patient  Patient understanding: patient states understanding of the procedure being performed  Patient identity confirmed: verbally with patient  Supporting Documentation  Indications: pain   Procedure Details  Location: knee - R knee  Needle size: 22 G  Approach: anterolateral  Medications administered: 20 mg Sodium Hyaluronate (Viscosup) 20 MG/2ML    Patient tolerance: patient tolerated the procedure well with no immediate complications  Dressing:  Sterile dressing applied            Patient tolerated procedure follow up as needed for persistent pain and inflammation of the right knee    states" I am having a warm feeling in my both feet and in my face since few days " on and off . denies any pain.

## 2024-08-29 ENCOUNTER — EVALUATION (OUTPATIENT)
Dept: PHYSICAL THERAPY | Facility: REHABILITATION | Age: 64
End: 2024-08-29
Payer: COMMERCIAL

## 2024-08-29 DIAGNOSIS — M17.11 PRIMARY OSTEOARTHRITIS OF RIGHT KNEE: Primary | ICD-10-CM

## 2024-08-29 PROCEDURE — 97161 PT EVAL LOW COMPLEX 20 MIN: CPT | Performed by: PHYSICAL THERAPIST

## 2024-08-29 PROCEDURE — 97530 THERAPEUTIC ACTIVITIES: CPT | Performed by: PHYSICAL THERAPIST

## 2024-08-29 PROCEDURE — 97110 THERAPEUTIC EXERCISES: CPT | Performed by: PHYSICAL THERAPIST

## 2024-08-29 NOTE — PROGRESS NOTES
PT Evaluation     Today's date: 2024  Patient name: Fátima Reyes  : 1960  MRN: [id number]  Referring provider: Maria De Jesus Palomares PA-C  Dx:   Encounter Diagnosis     ICD-10-CM    1. Primary osteoarthritis of right knee  M17.11 Ambulatory referral to Physical Therapy                     Assessment  Impairments: activity intolerance, impaired balance, impaired physical strength and lacks appropriate home exercise program  Functional limitations: Difficulty navigating stairs, lifting/carrying/pushing/pulling objects    Assessment details: Fátima is a 64 y.o. male presenting to PT w/ history of R knee pain. Pt would benefit from skilled PT services to address the below listed impairments and functional limitations to encourage return to PLOF.       Understanding of Dx/Px/POC: good     Prognosis: good    Goals  STG: R knee MMT improved by at least 25% in all deficient planes in 4 weeks.   STG: Subjectively reports pain at worst decreased by 2 points in 4 weeks.   STG: I w/ HEPs 1 week after prescription.   LTG: Walks 2 miles w/o R knee pain by d/c.   LTG: FOTO >= to goal by d/c.   LTG: I w/ comprehensive HEP by d/c.         Plan    Planned therapy interventions: strengthening, stretching, therapeutic activities, therapeutic exercise, therapeutic training, transfer training, flexibility, functional ROM exercises, graded activity, graded exercise, home exercise program, IADL retraining and graded motor    Frequency: 1-2x month  Duration in weeks: 8  Plan of Care beginning date: 2024  Plan of Care expiration date: 10/24/2024  Treatment plan discussed with: patient        Subjective Evaluation    History of Present Illness  Mechanism of injury: Reports in 2024 walking down hill and slipped. Started seeing ortho, got first cortisone shot end of 2024 which allowed her to function. It was good for a while. Went back to ortho and started gel shots in August. She states since getting injections  her knee has been feeling good. Has been doing her usual walking as well as playing golf. The only thing she notices currently is stiffness going down stairs.  She is fine sitting. No numbness or tingling. Her knee does not buckle, click, catch, or lock.   Patient Goals  Patient goals for therapy: decreased pain, improved balance, increased motion, increased strength, independence with ADLs/IADLs and return to sport/leisure activities    Pain  Current pain ratin  At best pain ratin  At worst pain ratin  Quality: tight, dull ache and discomfort          Objective     Active Range of Motion   Left Knee   Normal active range of motion    Right Knee   Normal active range of motion    Strength/Myotome Testing     Left Knee   Flexion: 5  Extension: 5    Right Knee   Flexion: 5  Extension: 4             Precautions: N/A      Manuals                                                                 Neuro Re-Ed             SLS HEP                                                                                          Ther Ex             Knee ext TB HEP            STS HEP            SLR  HEP                                                                             Ther Activity             Education Regarding HEP, load management 10'                         Gait Training                                       Modalities

## 2024-09-06 ENCOUNTER — RA CDI HCC (OUTPATIENT)
Dept: OTHER | Facility: HOSPITAL | Age: 64
End: 2024-09-06

## 2024-09-13 ENCOUNTER — OFFICE VISIT (OUTPATIENT)
Dept: FAMILY MEDICINE CLINIC | Facility: CLINIC | Age: 64
End: 2024-09-13
Payer: COMMERCIAL

## 2024-09-13 ENCOUNTER — APPOINTMENT (OUTPATIENT)
Dept: LAB | Facility: CLINIC | Age: 64
End: 2024-09-13
Payer: COMMERCIAL

## 2024-09-13 VITALS
BODY MASS INDEX: 38.15 KG/M2 | HEART RATE: 86 BPM | WEIGHT: 237.38 LBS | RESPIRATION RATE: 18 BRPM | OXYGEN SATURATION: 96 % | SYSTOLIC BLOOD PRESSURE: 116 MMHG | TEMPERATURE: 97.1 F | DIASTOLIC BLOOD PRESSURE: 74 MMHG | HEIGHT: 66 IN

## 2024-09-13 DIAGNOSIS — E78.5 HYPERLIPIDEMIA, UNSPECIFIED HYPERLIPIDEMIA TYPE: ICD-10-CM

## 2024-09-13 DIAGNOSIS — Z00.00 WELL ADULT EXAM: ICD-10-CM

## 2024-09-13 DIAGNOSIS — G47.09 OTHER INSOMNIA: ICD-10-CM

## 2024-09-13 DIAGNOSIS — E03.9 HYPOTHYROIDISM, UNSPECIFIED TYPE: ICD-10-CM

## 2024-09-13 DIAGNOSIS — G47.00 INSOMNIA, UNSPECIFIED TYPE: Primary | ICD-10-CM

## 2024-09-13 DIAGNOSIS — I10 ESSENTIAL HYPERTENSION: ICD-10-CM

## 2024-09-13 DIAGNOSIS — I10 ESSENTIAL HYPERTENSION: Primary | ICD-10-CM

## 2024-09-13 LAB
ALBUMIN SERPL BCG-MCNC: 4.3 G/DL (ref 3.5–5)
ALP SERPL-CCNC: 107 U/L (ref 34–104)
ALT SERPL W P-5'-P-CCNC: 13 U/L (ref 7–52)
ANION GAP SERPL CALCULATED.3IONS-SCNC: 11 MMOL/L (ref 4–13)
AST SERPL W P-5'-P-CCNC: 16 U/L (ref 5–45)
BILIRUB SERPL-MCNC: 0.73 MG/DL (ref 0.2–1)
BUN SERPL-MCNC: 17 MG/DL (ref 5–25)
CALCIUM SERPL-MCNC: 9 MG/DL (ref 8.4–10.2)
CHLORIDE SERPL-SCNC: 102 MMOL/L (ref 96–108)
CHOLEST SERPL-MCNC: 168 MG/DL
CO2 SERPL-SCNC: 26 MMOL/L (ref 21–32)
CREAT SERPL-MCNC: 0.76 MG/DL (ref 0.6–1.3)
ERYTHROCYTE [DISTWIDTH] IN BLOOD BY AUTOMATED COUNT: 12.4 % (ref 11.6–15.1)
GLUCOSE P FAST SERPL-MCNC: 115 MG/DL (ref 65–99)
HCT VFR BLD AUTO: 42.8 % (ref 36.5–46.1)
HDLC SERPL-MCNC: 40 MG/DL
HGB BLD-MCNC: 13.8 G/DL (ref 12–15.4)
LDLC SERPL CALC-MCNC: 77 MG/DL (ref 0–100)
MCH RBC QN AUTO: 31.7 PG (ref 26.8–34.3)
MCHC RBC AUTO-ENTMCNC: 32.2 G/DL (ref 31.4–37.4)
MCV RBC AUTO: 98 FL (ref 82–98)
NONHDLC SERPL-MCNC: 128 MG/DL
PLATELET # BLD AUTO: 181 THOUSANDS/UL (ref 149–390)
PMV BLD AUTO: 11.7 FL (ref 8.9–12.7)
POTASSIUM SERPL-SCNC: 4.1 MMOL/L (ref 3.5–5.3)
PROT SERPL-MCNC: 7.2 G/DL (ref 6.4–8.4)
RBC # BLD AUTO: 4.36 MILLION/UL (ref 3.88–5.12)
SODIUM SERPL-SCNC: 139 MMOL/L (ref 135–147)
TRIGL SERPL-MCNC: 255 MG/DL
TSH SERPL DL<=0.05 MIU/L-ACNC: 3.21 UIU/ML (ref 0.45–4.5)
WBC # BLD AUTO: 8.89 THOUSAND/UL (ref 4.31–10.16)

## 2024-09-13 PROCEDURE — 84443 ASSAY THYROID STIM HORMONE: CPT

## 2024-09-13 PROCEDURE — 80061 LIPID PANEL: CPT

## 2024-09-13 PROCEDURE — 85027 COMPLETE CBC AUTOMATED: CPT

## 2024-09-13 PROCEDURE — 99213 OFFICE O/P EST LOW 20 MIN: CPT | Performed by: FAMILY MEDICINE

## 2024-09-13 PROCEDURE — 99396 PREV VISIT EST AGE 40-64: CPT | Performed by: FAMILY MEDICINE

## 2024-09-13 PROCEDURE — 36415 COLL VENOUS BLD VENIPUNCTURE: CPT

## 2024-09-13 PROCEDURE — 80053 COMPREHEN METABOLIC PANEL: CPT

## 2024-09-13 RX ORDER — ZOLPIDEM TARTRATE 10 MG/1
10 TABLET ORAL
Qty: 10 TABLET | Refills: 0 | Status: SHIPPED | OUTPATIENT
Start: 2024-09-13

## 2024-09-13 NOTE — PROGRESS NOTES
FAMILY PRACTICE OFFICE VISIT       NAME: Fátima Reyes  AGE: 64 y.o. SEX: adult       : 1960        MRN: [id number]    DATE: 2024  TIME: 12:33 PM    Assessment and Plan     Problem List Items Addressed This Visit       Essential hypertension - Primary     Hypertension.  The patient's blood pressure is stable at this time and he will continue current regimen of medications         Relevant Orders    CBC    Comprehensive metabolic panel    Lipid panel    TSH, 3rd generation    Hyperlipidemia     Hyperlipidemia.  Patient will check lipid panel blood work and continue with current dose of statin therapy         Relevant Orders    CBC    Comprehensive metabolic panel    Lipid panel    TSH, 3rd generation    Hypothyroidism     Hypothyroidism.  Patient has been off levothyroxine for approximately 1 year.  She will check TSH level.  We will make further recommendations pending results of test.         Relevant Orders    CBC    Comprehensive metabolic panel    Lipid panel    TSH, 3rd generation    Well adult exam     Well adult.  Overall the patient appears to be in stable health.  She will obtain blood work as ordered for further evaluation.  Patient was counseled to obtain COVID-19 vaccine as well as influenza prior to her October trip to [facility].  Upon her return she will consider having Shingrix vaccination.         Relevant Orders    CBC    Comprehensive metabolic panel    Lipid panel    TSH, 3rd generation    Other insomnia     Insomnia.  Patient given prescription for zolpidem 10 mg to use at bedtime as needed to adjust to time zone changes on her upcoming vacation trip to [facility]                Chief Complaint     Chief Complaint   Patient presents with    Well Check     Physical        History of Present Illness     Patient in the office for annual wellness exam.  She denies any recent illness.  She has not been able to exercise regularly due to right knee pain for which she received therapeutic gel  injections recently.  She feels discomfort has improved in her right knee.  Her mammograms, Pap smears, and colonoscopies are up-to-date.  Patient does see gynecologist on a routine basis.    Patient requested prescription for zolpidem to help with her sleep as she and her  are planning a trip to [place] next month.        Review of Systems   Review of Systems   Constitutional: Negative.    HENT: Negative.     Eyes: Negative.    Respiratory: Negative.     Cardiovascular: Negative.    Gastrointestinal: Negative.    Genitourinary: Negative.    Musculoskeletal:  Positive for arthralgias.   Skin: Negative.    Neurological: Negative.    Psychiatric/Behavioral: Negative.         Active Problem List     Patient Active Problem List   Diagnosis    Essential hypertension    Hyperlipidemia    Hypothyroidism    Rosacea    Encounter for screening mammogram for breast cancer    Primary osteoarthritis of right knee    Well adult exam    Other insomnia       Past Medical History:  Past Medical History:   Diagnosis Date    Hyperlipidemia 9/18/2012    Hypertension     Hypothyroidism 10/23/2018    Lyme disease 07/21/2015    Rosacea     Skin cancer     H/O Basal Cell Cancer       Past Surgical History:  Past Surgical History:   Procedure Laterality Date    BREAST BIOPSY Right 08/05/2005    benign aprocrine metaplasia    ORBITAL FRACTURE SURGERY Left 5/1/2019    Procedure: OPEN REDUCTION W/ INTERNAL FIXATION (ORIF) ORBITAL;  Surgeon: Quirino Silverio MD;  Location: [facility];  Service: Plastics    WISDOM TOOTH EXTRACTION         Family History:  Family History   Problem Relation Age of Onset    Breast cancer Mother 85    Heart disease Father         Cardiac Disorder    Dementia Father     Hyperlipidemia Father     Pancreatic cancer Maternal Uncle     No Known Problems Brother     No Known Problems Daughter     Stroke Maternal Grandmother     Alcohol abuse Maternal Grandfather     No Known Problems Paternal Grandmother     Other  Paternal Grandfather         heart problems    No Known Problems Daughter     No Known Problems Maternal Aunt     No Known Problems Paternal Aunt        Social History:  Social History     Socioeconomic History    Marital status: /Civil Union     Spouse name: Not on file    Number of children: Not on file    Years of education: Not on file    Highest education level: Not on file   Occupational History    Not on file   Tobacco Use    Smoking status: Never    Smokeless tobacco: Never   Vaping Use    Vaping status: Never Used   Substance and Sexual Activity    Alcohol use: Yes    Drug use: No    Sexual activity: Yes     Partners: Male     Birth control/protection: Post-menopausal   Other Topics Concern    Not on file   Social History Narrative     since 1986.    Two children.  No grandchildren.    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    Enjoys reading, golfing, tennis.     Social Determinants of Health     Financial Resource Strain: Not on file   Food Insecurity: Not on file   Transportation Needs: Not on file   Physical Activity: Not on file   Stress: Not on file   Social Connections: Not on file   Intimate Partner Violence: Not on file   Housing Stability: Not on file       Objective     Vitals:    09/13/24 1002   BP: 116/74   Pulse: 86   Resp: 18   Temp: (!) 97.1 °F (36.2 °C)   SpO2: 96%     Wt Readings from Last 3 Encounters:   09/13/24 108 kg (237 lb 6 oz)   08/21/24 105 kg (232 lb)   08/14/24 105 kg (232 lb)       Physical Exam  Constitutional:       General: She is not in acute distress.     Appearance: Normal appearance. She is not ill-appearing.   HENT:      Head: Normocephalic and atraumatic.   Eyes:      General:         Right eye: No discharge.         Left eye: No discharge.      Extraocular Movements: Extraocular movements intact.      Conjunctiva/sclera: Conjunctivae normal.      Pupils: Pupils are equal, round, and reactive to light.   Neck:       "Vascular: No carotid bruit.   Cardiovascular:      Rate and Rhythm: Normal rate and regular rhythm.      Heart sounds: Normal heart sounds. No murmur heard.  Pulmonary:      Effort: Pulmonary effort is normal.      Breath sounds: Normal breath sounds. No wheezing, rhonchi or rales.   Abdominal:      General: Abdomen is flat. Bowel sounds are normal. There is no distension.      Palpations: Abdomen is soft.      Tenderness: There is no abdominal tenderness. There is no guarding or rebound.   Musculoskeletal:      Right lower leg: No edema.      Left lower leg: No edema.   Lymphadenopathy:      Cervical: No cervical adenopathy.   Skin:     Findings: No rash.   Neurological:      General: No focal deficit present.      Mental Status: She is alert and oriented to person, place, and time.      Cranial Nerves: No cranial nerve deficit.   Psychiatric:         Mood and Affect: Mood normal.         Behavior: Behavior normal.         Thought Content: Thought content normal.         Judgment: Judgment normal.         Pertinent Laboratory/Diagnostic Studies:  Lab Results   Component Value Date    GLUCOSE 98 11/23/2015    BUN 14 06/30/2023    CREATININE 0.76 06/30/2023    CALCIUM 9.1 06/30/2023     11/23/2015    K 4.1 06/30/2023    CO2 25 06/30/2023     06/30/2023     Lab Results   Component Value Date    ALT 19 06/30/2023    AST 14 06/30/2023    ALKPHOS 104 06/30/2023    BILITOT 0.57 11/23/2015       Lab Results   Component Value Date    WBC 7.88 06/30/2023    HGB 12.8 06/30/2023    HCT 39.8 06/30/2023     (H) 06/30/2023     06/30/2023       No results found for: \"TSH\"    Lab Results   Component Value Date    CHOL 158 11/23/2015     Lab Results   Component Value Date    TRIG 265 (H) 06/30/2023     Lab Results   Component Value Date    HDL 38 (L) 06/30/2023     Lab Results   Component Value Date    LDLCALC 83 06/30/2023     No results found for: \"HGBA1C\"    Results for orders placed or performed in " visit on 06/30/23   CBC   Result Value Ref Range    WBC 7.88 4.31 - 10.16 Thousand/uL    RBC 4.00 3.81 - 5.12 Million/uL    Hemoglobin 12.8 11.5 - 15.4 g/dL    Hematocrit 39.8 34.8 - 46.1 %     (H) 82 - 98 fL    MCH 32.0 26.8 - 34.3 pg    MCHC 32.2 31.4 - 37.4 g/dL    RDW 12.7 11.6 - 15.1 %    Platelets 352 149 - 390 Thousands/uL    MPV 9.7 8.9 - 12.7 fL   Comprehensive metabolic panel   Result Value Ref Range    Sodium 140 135 - 147 mmol/L    Potassium 4.1 3.5 - 5.3 mmol/L    Chloride 108 96 - 108 mmol/L    CO2 25 21 - 32 mmol/L    ANION GAP 7 mmol/L    BUN 14 5 - 25 mg/dL    Creatinine 0.76 0.60 - 1.30 mg/dL    Glucose, Fasting 108 (H) 65 - 99 mg/dL    Calcium 9.1 8.3 - 10.1 mg/dL    AST 14 5 - 45 U/L    ALT 19 12 - 78 U/L    Alkaline Phosphatase 104 46 - 116 U/L    Total Protein 7.3 6.4 - 8.4 g/dL    Albumin 3.5 3.5 - 5.0 g/dL    Total Bilirubin 0.52 0.20 - 1.00 mg/dL    eGFR 83 ml/min/1.73sq m   Lipid panel   Result Value Ref Range    Cholesterol 174 See Comment mg/dL    Triglycerides 265 (H) See Comment mg/dL    HDL, Direct 38 (L) >=50 mg/dL    LDL Calculated 83 0 - 100 mg/dL    Non-HDL-Chol (CHOL-HDL) 136 mg/dl   TSH, 3rd generation   Result Value Ref Range    TSH 3RD GENERATON 3.883 0.450 - 4.500 uIU/mL       Orders Placed This Encounter   Procedures    CBC    Comprehensive metabolic panel    Lipid panel    TSH, 3rd generation       ALLERGIES:  No Known Allergies    Current Medications     Current Outpatient Medications   Medication Sig Dispense Refill    atorvastatin (LIPITOR) 20 mg tablet TAKE ONE TABLET BY MOUTH EVERY DAY 90 tablet 1    celecoxib (CeleBREX) 100 mg capsule Take 1 capsule (100 mg total) by mouth 2 (two) times a day 30 capsule 1    doxycycline hyclate (VIBRAMYCIN) 100 mg capsule Take 100 mg by mouth daily as needed      Multiple Vitamin (MULTIVITAMINS PO) Take by mouth      nebivolol (BYSTOLIC) 20 MG tablet TAKE ONE TABLET BY MOUTH EVERY DAY 90 tablet 1    hydroCHLOROthiazide 12.5 mg  tablet TAKE ONE TABLET BY MOUTH EVERY DAY (Patient not taking: Reported on 9/13/2024) 30 tablet 0    zolpidem (AMBIEN) 10 mg tablet Take 1 tablet (10 mg total) by mouth daily at bedtime as needed for sleep 10 tablet 0     No current facility-administered medications for this visit.         Health Maintenance     Health Maintenance   Topic Date Due    Hepatitis C Screening  Never done    HIV Screening  Never done    Hepatitis A Vaccine (1 of 2 - Risk 2-dose series) Never done    Zoster Vaccine (2 of 3) 04/04/2015    RSV Vaccine Age 60+ Years (1 - 1-dose 60+ series) Never done    Hepatitis B Vaccine (1 of 3 - Risk 3-dose series) Never done    Annual Physical  06/30/2024    COVID-19 Vaccine (5 - 2023-24 season) 09/01/2024    Influenza Vaccine (1) 09/01/2024    Depression Screening  09/13/2025    Breast Cancer Screening: Mammogram  02/19/2026    Cervical Cancer Screening  01/12/2027    DTaP,Tdap,and Td Vaccines (2 - Td or Tdap) 05/01/2029    Colorectal Cancer Screening  03/02/2031    RSV Vaccine age 0-20 Months  Aged Out    Pneumococcal Vaccine: Pediatrics (0 to 5 Years) and At-Risk Patients (6 to 64 Years)  Aged Out    HIB Vaccine  Aged Out    IPV Vaccine  Aged Out    Meningococcal ACWY Vaccine  Aged Out    HPV Vaccine  Aged Out     Immunization History   Administered Date(s) Administered    COVID-19 PFIZER VACCINE 0.3 ML IM 04/12/2021, 05/03/2021, 12/18/2021    COVID-19 Pfizer Vac BIVALENT Gary-sucrose 12 Yr+ IM 12/12/2022    INFLUENZA 07/29/2016, 11/23/2020, 12/12/2022    Influenza Injectable, MDCK, Preservative Free, Quadrivalent, 0.5 mL 11/23/2020    Influenza Quadrivalent Preservative Free 3 years and older IM 07/29/2016    Tdap 05/01/2019    Zoster 02/07/2015       Wagner Gillis MD

## 2024-09-13 NOTE — ASSESSMENT & PLAN NOTE
Well adult.  Overall the patient appears to be in stable health.  She will obtain blood work as ordered for further evaluation.  Patient was counseled to obtain COVID-19 vaccine as well as influenza prior to her October trip to [facility].  Upon her return she will consider having Shingrix vaccination.

## 2024-09-13 NOTE — PROGRESS NOTES
FAMILY PRACTICE OFFICE VISIT       NAME: Fátima Reyes  AGE: 64 y.o. SEX: adult       : 1960        MRN: [id number]    DATE: 2024  TIME: 12:30 PM    Assessment and Plan     Problem List Items Addressed This Visit    None  Visit Diagnoses       Insomnia, unspecified type    -  Primary    Relevant Medications    zolpidem (AMBIEN) 10 mg tablet                Chief Complaint   No chief complaint on file.      History of Present Illness     HPI    Review of Systems   Review of Systems    Active Problem List     Patient Active Problem List   Diagnosis    Essential hypertension    Hyperlipidemia    Hypothyroidism    Rosacea    Encounter for screening mammogram for breast cancer    Primary osteoarthritis of right knee    Well adult exam       Past Medical History:  Past Medical History:   Diagnosis Date    Hyperlipidemia 2012    Hypertension     Hypothyroidism 10/23/2018    Lyme disease 2015    Rosacea     Skin cancer     H/O Basal Cell Cancer       Past Surgical History:  Past Surgical History:   Procedure Laterality Date    BREAST BIOPSY Right 2005    benign aprocrine metaplasia    ORBITAL FRACTURE SURGERY Left 2019    Procedure: OPEN REDUCTION W/ INTERNAL FIXATION (ORIF) ORBITAL;  Surgeon: Quirino Silverio MD;  Location: [facility];  Service: Plastics    WISDOM TOOTH EXTRACTION         Family History:  Family History   Problem Relation Age of Onset    Breast cancer Mother 85    Heart disease Father         Cardiac Disorder    Dementia Father     Hyperlipidemia Father     Pancreatic cancer Maternal Uncle     No Known Problems Brother     No Known Problems Daughter     Stroke Maternal Grandmother     Alcohol abuse Maternal Grandfather     No Known Problems Paternal Grandmother     Other Paternal Grandfather         heart problems    No Known Problems Daughter     No Known Problems Maternal Aunt     No Known Problems Paternal Aunt        Social History:  Social History     Socioeconomic  "History    Marital status: /Civil Union     Spouse name: Not on file    Number of children: Not on file    Years of education: Not on file    Highest education level: Not on file   Occupational History    Not on file   Tobacco Use    Smoking status: Never    Smokeless tobacco: Never   Vaping Use    Vaping status: Never Used   Substance and Sexual Activity    Alcohol use: Yes    Drug use: No    Sexual activity: Yes     Partners: Male     Birth control/protection: Post-menopausal   Other Topics Concern    Not on file   Social History Narrative     since 1986.    Two children.  No grandchildren.    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    Enjoys reading, golfing, tennis.     Social Determinants of Health     Financial Resource Strain: Not on file   Food Insecurity: Not on file   Transportation Needs: Not on file   Physical Activity: Not on file   Stress: Not on file   Social Connections: Not on file   Intimate Partner Violence: Not on file   Housing Stability: Not on file       Objective   There were no vitals filed for this visit.  Wt Readings from Last 3 Encounters:   09/13/24 108 kg (237 lb 6 oz)   08/21/24 105 kg (232 lb)   08/14/24 105 kg (232 lb)       Physical Exam    Pertinent Laboratory/Diagnostic Studies:  Lab Results   Component Value Date    GLUCOSE 98 11/23/2015    BUN 14 06/30/2023    CREATININE 0.76 06/30/2023    CALCIUM 9.1 06/30/2023     11/23/2015    K 4.1 06/30/2023    CO2 25 06/30/2023     06/30/2023     Lab Results   Component Value Date    ALT 19 06/30/2023    AST 14 06/30/2023    ALKPHOS 104 06/30/2023    BILITOT 0.57 11/23/2015       Lab Results   Component Value Date    WBC 7.88 06/30/2023    HGB 12.8 06/30/2023    HCT 39.8 06/30/2023     (H) 06/30/2023     06/30/2023       No results found for: \"TSH\"    Lab Results   Component Value Date    CHOL 158 11/23/2015     Lab Results   Component Value Date    TRIG 265 (H) " "06/30/2023     Lab Results   Component Value Date    HDL 38 (L) 06/30/2023     Lab Results   Component Value Date    LDLCALC 83 06/30/2023     No results found for: \"HGBA1C\"    Results for orders placed or performed in visit on 06/30/23   CBC   Result Value Ref Range    WBC 7.88 4.31 - 10.16 Thousand/uL    RBC 4.00 3.81 - 5.12 Million/uL    Hemoglobin 12.8 11.5 - 15.4 g/dL    Hematocrit 39.8 34.8 - 46.1 %     (H) 82 - 98 fL    MCH 32.0 26.8 - 34.3 pg    MCHC 32.2 31.4 - 37.4 g/dL    RDW 12.7 11.6 - 15.1 %    Platelets 352 149 - 390 Thousands/uL    MPV 9.7 8.9 - 12.7 fL   Comprehensive metabolic panel   Result Value Ref Range    Sodium 140 135 - 147 mmol/L    Potassium 4.1 3.5 - 5.3 mmol/L    Chloride 108 96 - 108 mmol/L    CO2 25 21 - 32 mmol/L    ANION GAP 7 mmol/L    BUN 14 5 - 25 mg/dL    Creatinine 0.76 0.60 - 1.30 mg/dL    Glucose, Fasting 108 (H) 65 - 99 mg/dL    Calcium 9.1 8.3 - 10.1 mg/dL    AST 14 5 - 45 U/L    ALT 19 12 - 78 U/L    Alkaline Phosphatase 104 46 - 116 U/L    Total Protein 7.3 6.4 - 8.4 g/dL    Albumin 3.5 3.5 - 5.0 g/dL    Total Bilirubin 0.52 0.20 - 1.00 mg/dL    eGFR 83 ml/min/1.73sq m   Lipid panel   Result Value Ref Range    Cholesterol 174 See Comment mg/dL    Triglycerides 265 (H) See Comment mg/dL    HDL, Direct 38 (L) >=50 mg/dL    LDL Calculated 83 0 - 100 mg/dL    Non-HDL-Chol (CHOL-HDL) 136 mg/dl   TSH, 3rd generation   Result Value Ref Range    TSH 3RD GENERATON 3.883 0.450 - 4.500 uIU/mL       No orders of the defined types were placed in this encounter.      ALLERGIES:  No Known Allergies    Current Medications     Current Outpatient Medications   Medication Sig Dispense Refill    zolpidem (AMBIEN) 10 mg tablet Take 1 tablet (10 mg total) by mouth daily at bedtime as needed for sleep 10 tablet 0    atorvastatin (LIPITOR) 20 mg tablet TAKE ONE TABLET BY MOUTH EVERY DAY 90 tablet 1    celecoxib (CeleBREX) 100 mg capsule Take 1 capsule (100 mg total) by mouth 2 (two) times " a day 30 capsule 1    doxycycline hyclate (VIBRAMYCIN) 100 mg capsule Take 100 mg by mouth daily as needed      hydroCHLOROthiazide 12.5 mg tablet TAKE ONE TABLET BY MOUTH EVERY DAY (Patient not taking: Reported on 9/13/2024) 30 tablet 0    Multiple Vitamin (MULTIVITAMINS PO) Take by mouth      nebivolol (BYSTOLIC) 20 MG tablet TAKE ONE TABLET BY MOUTH EVERY DAY 90 tablet 1     No current facility-administered medications for this visit.         Health Maintenance     Health Maintenance   Topic Date Due    Hepatitis C Screening  Never done    HIV Screening  Never done    Hepatitis A Vaccine (1 of 2 - Risk 2-dose series) Never done    Zoster Vaccine (2 of 3) 04/04/2015    RSV Vaccine Age 60+ Years (1 - 1-dose 60+ series) Never done    Hepatitis B Vaccine (1 of 3 - Risk 3-dose series) Never done    Annual Physical  06/30/2024    COVID-19 Vaccine (5 - 2023-24 season) 09/01/2024    Influenza Vaccine (1) 09/01/2024    Depression Screening  09/13/2025    Breast Cancer Screening: Mammogram  02/19/2026    Cervical Cancer Screening  01/12/2027    DTaP,Tdap,and Td Vaccines (2 - Td or Tdap) 05/01/2029    Colorectal Cancer Screening  03/02/2031    RSV Vaccine age 0-20 Months  Aged Out    Pneumococcal Vaccine: Pediatrics (0 to 5 Years) and At-Risk Patients (6 to 64 Years)  Aged Out    HIB Vaccine  Aged Out    IPV Vaccine  Aged Out    Meningococcal ACWY Vaccine  Aged Out    HPV Vaccine  Aged Out     Immunization History   Administered Date(s) Administered    COVID-19 PFIZER VACCINE 0.3 ML IM 04/12/2021, 05/03/2021, 12/18/2021    COVID-19 Pfizer Vac BIVALENT Gary-sucrose 12 Yr+ IM 12/12/2022    INFLUENZA 07/29/2016, 11/23/2020, 12/12/2022    Influenza Injectable, MDCK, Preservative Free, Quadrivalent, 0.5 mL 11/23/2020    Influenza Quadrivalent Preservative Free 3 years and older IM 07/29/2016    Tdap 05/01/2019    Zoster 02/07/2015       Wagner Gillis MD

## 2024-09-13 NOTE — ASSESSMENT & PLAN NOTE
Insomnia.  Patient given prescription for zolpidem 10 mg to use at bedtime as needed to adjust to time zone changes on her upcoming vacation trip to [facility]

## 2024-09-13 NOTE — ASSESSMENT & PLAN NOTE
Hypothyroidism.  Patient has been off levothyroxine for approximately 1 year.  She will check TSH level.  We will make further recommendations pending results of test.

## 2024-09-16 ENCOUNTER — TELEPHONE (OUTPATIENT)
Dept: FAMILY MEDICINE CLINIC | Facility: CLINIC | Age: 64
End: 2024-09-16

## 2024-09-16 NOTE — TELEPHONE ENCOUNTER
----- Message from Wagner Gillis MD sent at 9/16/2024  6:03 AM EDT -----  All recent blood tests stable for pt.

## 2024-10-13 PROBLEM — Z00.00 WELL ADULT EXAM: Status: RESOLVED | Noted: 2024-09-13 | Resolved: 2024-10-13

## 2024-11-14 ENCOUNTER — NURSE TRIAGE (OUTPATIENT)
Age: 64
End: 2024-11-14

## 2024-11-14 NOTE — TELEPHONE ENCOUNTER
"Patient reporting noticing a right breast lump today. Nickel sized, and painful/sore. Located between armpit and breast. Denies redness, warmth, swelling, fevers, discoloration, or abnormal nipple drainage.   Scheduled eval for Monday. Discussed with patient tylenol/ibuprofen for pain control and warm compresses.     Reason for Disposition   Breast lump    Answer Assessment - Initial Assessment Questions  1. SYMPTOM: \"What's the main symptom you're concerned about?\"  (e.g., lump, nipple discharge, pain, rash )      Nickel sized lump  2. LOCATION: \"Where is the lump located?\"      Right breast, between armpit and nipple   3. ONSET: \"When did lump  start?\"      Noticed today   4. PRIOR HISTORY: \"Do you have any history of prior problems with your breasts?\" (e.g., breast cancer, breast implant, fibrocystic breast disease)      Denies   5. CAUSE: \"What do you think is causing this symptom?\"      Unsure   6. OTHER SYMPTOMS: \"Do you have any other symptoms?\" (e.g., fever, breast pain, nipple discharge, redness or rash)      Denies  7. PREGNANCY-BREASTFEEDING: \"Is there any chance you are pregnant?\" \"When was your last menstrual period?\" \"Are you breastfeeding?\"      Post menopausal    Protocols used: Breast Symptoms-Adult-[place]    "

## 2024-11-15 DIAGNOSIS — I10 ESSENTIAL HYPERTENSION: ICD-10-CM

## 2024-11-15 RX ORDER — HYDROCHLOROTHIAZIDE 12.5 MG/1
12.5 TABLET ORAL DAILY
Qty: 30 TABLET | Refills: 0 | Status: SHIPPED | OUTPATIENT
Start: 2024-11-15

## 2024-11-15 NOTE — TELEPHONE ENCOUNTER
Patient needs updated blood work. Please place orders. A courtesy refill was provided.   eGFR    eGFR was not calculated on last lab

## 2024-11-18 ENCOUNTER — TELEPHONE (OUTPATIENT)
Age: 64
End: 2024-11-18

## 2024-11-18 ENCOUNTER — OFFICE VISIT (OUTPATIENT)
Dept: OBGYN CLINIC | Facility: CLINIC | Age: 64
End: 2024-11-18
Payer: COMMERCIAL

## 2024-11-18 VITALS — BODY MASS INDEX: 38.38 KG/M2 | WEIGHT: 237.8 LBS | SYSTOLIC BLOOD PRESSURE: 138 MMHG | DIASTOLIC BLOOD PRESSURE: 74 MMHG

## 2024-11-18 DIAGNOSIS — N63.10 MASS OF RIGHT BREAST, UNSPECIFIED QUADRANT: Primary | ICD-10-CM

## 2024-11-18 PROCEDURE — 99213 OFFICE O/P EST LOW 20 MIN: CPT | Performed by: OBSTETRICS & GYNECOLOGY

## 2024-11-18 NOTE — PROGRESS NOTES
Name: Fátima Reyes      : 1960      MRN: [id number]  Encounter Provider: Leah Teresa DO  Encounter Date: 2024   Encounter department: [facility] [place]  :  Assessment & Plan  Mass of right breast, unspecified quadrant  Small approx 3 cm area palpable in right breast approx 8-9 o'clock position  Check diag mammo and diag u/s  Will f/u results    Orders:    US breast right limited (diagnostic); Future    Mammo diagnostic right w 3d and cad; Future        History of Present Illness     HPI  Fátima Reyes is a 64 y.o.   who presents today for right breast lump.    Noticed on Thursday- felt tenderness with her bra rubbing on the right outer side.   Felt the area and noticed a fullness/mass that was palpable in that area  She has never experienced anything like this before  It is no longer tender to the touch but she can still palpate    Denies any skin changes/dimpling, rashes or nipple discharge    Last mammogram 2024- BIR[facility] 1  Denies hx of abnormal mammogram    History obtained from: patient    Review of Systems  Negative unless otherwise noted in HPI    Past Medical History   Past Medical History:   Diagnosis Date    Hyperlipidemia 2012    Hypertension     Hypothyroidism 10/23/2018    Lyme disease 2015    Rosacea     Skin cancer     H/O Basal Cell Cancer     Past Surgical History:   Procedure Laterality Date    BREAST BIOPSY Right 2005    benign aprocrine metaplasia    ORBITAL FRACTURE SURGERY Left 2019    Procedure: OPEN REDUCTION W/ INTERNAL FIXATION (ORIF) ORBITAL;  Surgeon: Quirino Silverio MD;  Location: [facility];  Service: Plastics    WISDOM TOOTH EXTRACTION       Family History   Problem Relation Age of Onset    Breast cancer Mother 85    Heart disease Father         Cardiac Disorder    Dementia Father     Hyperlipidemia Father     Pancreatic cancer Maternal Uncle     No Known Problems Brother     No Known Problems  Daughter     Stroke Maternal Grandmother     Alcohol abuse Maternal Grandfather     No Known Problems Paternal Grandmother     Other Paternal Grandfather         heart problems    No Known Problems Daughter     No Known Problems Maternal Aunt     No Known Problems Paternal Aunt       reports that she has never smoked. She has never used smokeless tobacco. She reports current alcohol use. She reports that she does not use drugs.  Current Outpatient Medications on File Prior to Visit   Medication Sig Dispense Refill    atorvastatin (LIPITOR) 20 mg tablet TAKE ONE TABLET BY MOUTH EVERY DAY 90 tablet 1    doxycycline hyclate (VIBRAMYCIN) 100 mg capsule Take 100 mg by mouth daily as needed      hydroCHLOROthiazide 12.5 mg tablet TAKE ONE TABLET BY MOUTH EVERY DAY 30 tablet 0    Multiple Vitamin (MULTIVITAMINS PO) Take by mouth      nebivolol (BYSTOLIC) 20 MG tablet TAKE ONE TABLET BY MOUTH EVERY DAY 90 tablet 1    celecoxib (CeleBREX) 100 mg capsule Take 1 capsule (100 mg total) by mouth 2 (two) times a day (Patient not taking: Reported on 11/18/2024) 30 capsule 1    zolpidem (AMBIEN) 10 mg tablet Take 1 tablet (10 mg total) by mouth daily at bedtime as needed for sleep (Patient not taking: Reported on 11/18/2024) 10 tablet 0     No current facility-administered medications on file prior to visit.   No Known Allergies      Objective   /74 (BP Location: Left arm, Patient Position: Sitting, Cuff Size: Standard)   Wt 108 kg (237 lb 12.8 oz)   LMP  (LMP Unknown)   BMI 38.38 kg/m²      Physical Exam  Constitutional:       General: She is not in acute distress.  HENT:      Head: Normocephalic and atraumatic.      Mouth/Throat:      Mouth: Mucous membranes are moist.   Cardiovascular:      Rate and Rhythm: Normal rate.   Pulmonary:      Effort: Pulmonary effort is normal. No respiratory distress.   Chest:   Breasts:     Right: Mass present. No bleeding, inverted nipple, nipple discharge, skin change or tenderness.       Left: No bleeding, inverted nipple, mass, nipple discharge, skin change or tenderness.       Abdominal:      General: There is no distension.   Genitourinary:     Comments: deferred  Lymphadenopathy:      Upper Body:      Right upper body: No axillary adenopathy.      Left upper body: No axillary adenopathy.   Skin:     General: Skin is warm and dry.   Neurological:      Mental Status: She is alert.   Psychiatric:         Mood and Affect: Mood normal.         Behavior: Behavior normal.

## 2024-11-18 NOTE — TELEPHONE ENCOUNTER
Patient called in, was seen this morning and had a dx mammo and breast US ordered. Patient called to schedule this and the soonest available, was Jan 29. Patient scheduled this and was placed on the waitlist. Just wanted to call and let us know. Will route to provider as FYI.

## 2024-11-22 DIAGNOSIS — I10 ESSENTIAL HYPERTENSION: ICD-10-CM

## 2024-11-22 DIAGNOSIS — E78.5 HYPERLIPIDEMIA, UNSPECIFIED HYPERLIPIDEMIA TYPE: ICD-10-CM

## 2024-11-22 RX ORDER — HYDROCHLOROTHIAZIDE 12.5 MG/1
12.5 TABLET ORAL DAILY
Qty: 30 TABLET | Refills: 0 | OUTPATIENT
Start: 2024-11-22

## 2024-11-22 RX ORDER — ATORVASTATIN CALCIUM 20 MG/1
20 TABLET, FILM COATED ORAL DAILY
Qty: 90 TABLET | Refills: 1 | Status: SHIPPED | OUTPATIENT
Start: 2024-11-22

## 2024-12-18 ENCOUNTER — HOSPITAL ENCOUNTER (OUTPATIENT)
Dept: MAMMOGRAPHY | Facility: CLINIC | Age: 64
Discharge: HOME | End: 2024-12-18
Payer: COMMERCIAL

## 2024-12-18 ENCOUNTER — HOSPITAL ENCOUNTER (OUTPATIENT)
Dept: ULTRASOUND IMAGING | Facility: CLINIC | Age: 64
Discharge: HOME | End: 2024-12-18
Payer: COMMERCIAL

## 2024-12-18 VITALS — HEIGHT: 66 IN | BODY MASS INDEX: 38.09 KG/M2 | WEIGHT: 237 LBS

## 2024-12-18 DIAGNOSIS — N63.10 MASS OF RIGHT BREAST, UNSPECIFIED QUADRANT: ICD-10-CM

## 2024-12-18 PROCEDURE — 76642 ULTRASOUND BREAST LIMITED: CPT

## 2024-12-18 PROCEDURE — 77066 DX MAMMO INCL CAD BI: CPT

## 2024-12-18 PROCEDURE — G0279 TOMOSYNTHESIS, MAMMO: HCPCS

## 2024-12-18 NOTE — PROGRESS NOTES
Met with patient and Dr. Torres                regarding recommendation for;      ___x__ RIGHT ______LEFT      ___x__Ultrasound guided  ______Stereotactic  Breast biopsy.      ___x__Verbalized understanding.      Blood thinners:  _____yes _____no    Date stopped: ____n/a_______    Biopsy teaching sheet given:  ____x___yes ______no    Pre procedure health information obtained. Pt has HTN, hyperlipidemia, arthritis and Rosacea.

## 2024-12-20 ENCOUNTER — RESULTS FOLLOW-UP (OUTPATIENT)
Dept: LABOR AND DELIVERY | Facility: HOSPITAL | Age: 64
End: 2024-12-20

## 2024-12-23 ENCOUNTER — HOSPITAL ENCOUNTER (OUTPATIENT)
Dept: MAMMOGRAPHY | Facility: CLINIC | Age: 64
Discharge: HOME | End: 2024-12-23
Payer: COMMERCIAL

## 2024-12-23 ENCOUNTER — HOSPITAL ENCOUNTER (OUTPATIENT)
Dept: ULTRASOUND IMAGING | Facility: CLINIC | Age: 64
Discharge: HOME | End: 2024-12-23
Payer: COMMERCIAL

## 2024-12-23 VITALS — DIASTOLIC BLOOD PRESSURE: 94 MMHG | SYSTOLIC BLOOD PRESSURE: 162 MMHG | HEART RATE: 67 BPM

## 2024-12-23 DIAGNOSIS — R92.8 ABNORMAL ULTRASOUND OF BREAST: ICD-10-CM

## 2024-12-23 DIAGNOSIS — R92.8 ABNORMAL MAMMOGRAM: ICD-10-CM

## 2024-12-23 DIAGNOSIS — N63.0 BREAST LUMP IN FEMALE: ICD-10-CM

## 2024-12-23 PROCEDURE — 88342 IMHCHEM/IMCYTCHM 1ST ANTB: CPT | Performed by: PATHOLOGY

## 2024-12-23 PROCEDURE — 88305 TISSUE EXAM BY PATHOLOGIST: CPT | Performed by: PATHOLOGY

## 2024-12-23 PROCEDURE — 88341 IMHCHEM/IMCYTCHM EA ADD ANTB: CPT | Performed by: PATHOLOGY

## 2024-12-23 PROCEDURE — A4648 IMPLANTABLE TISSUE MARKER: HCPCS

## 2024-12-23 PROCEDURE — 88374 M/PHMTRC ALYS ISHQUANT/SEMIQ: CPT | Performed by: PATHOLOGY

## 2024-12-23 PROCEDURE — 88360 TUMOR IMMUNOHISTOCHEM/MANUAL: CPT | Performed by: PATHOLOGY

## 2024-12-23 PROCEDURE — 19083 BX BREAST 1ST LESION US IMAG: CPT

## 2024-12-23 RX ORDER — LIDOCAINE HYDROCHLORIDE 10 MG/ML
5 INJECTION, SOLUTION EPIDURAL; INFILTRATION; INTRACAUDAL; PERINEURAL ONCE
Status: COMPLETED | OUTPATIENT
Start: 2024-12-23 | End: 2024-12-23

## 2024-12-23 RX ADMIN — LIDOCAINE HYDROCHLORIDE 5 ML: 10 INJECTION, SOLUTION EPIDURAL; INFILTRATION; INTRACAUDAL; PERINEURAL at 10:00

## 2024-12-23 NOTE — PROGRESS NOTES
Procedure type:    __x___ultrasound guided _____stereotactic    Breast:    _____Left ___x__Right    Location: 10 o'clock 6 cmfn    Needle: 12g    # of passes: 3    Clip: 10cm Seema    Performed by: Dr. Morales     Pressure held for 5 minutes by: Saad Jean Strips:    ___X__yes _____no    Band aid:    __X___yes_____no    Tolerated procedure:    __X___yes _____no

## 2024-12-24 NOTE — PROGRESS NOTES
Post procedure call completed    Bleeding: _____yes ___x__no    Pain: _____yes ___x___no    Redness/Swelling: ______yes __x____no    Band aid removed: _____yes ___x__no    Steri-Strips intact: ____x__yes _____no

## 2024-12-26 ENCOUNTER — TELEPHONE (OUTPATIENT)
Dept: MAMMOGRAPHY | Facility: CLINIC | Age: 64
End: 2024-12-26

## 2024-12-26 DIAGNOSIS — C50.911 INVASIVE DUCTAL CARCINOMA OF BREAST, FEMALE, RIGHT (HCC): Primary | ICD-10-CM

## 2024-12-26 PROCEDURE — 88305 TISSUE EXAM BY PATHOLOGIST: CPT | Performed by: PATHOLOGY

## 2024-12-26 PROCEDURE — 88360 TUMOR IMMUNOHISTOCHEM/MANUAL: CPT | Performed by: PATHOLOGY

## 2024-12-26 PROCEDURE — 88342 IMHCHEM/IMCYTCHM 1ST ANTB: CPT | Performed by: PATHOLOGY

## 2024-12-26 PROCEDURE — 88341 IMHCHEM/IMCYTCHM EA ADD ANTB: CPT | Performed by: PATHOLOGY

## 2024-12-26 NOTE — TELEPHONE ENCOUNTER
Patient was given positive breast biopsy results by Dr. Morales. The patient was referred to breast surgeon Dr. Esparza and was advised that she will receive a call to schedule an appointment. Patient verbalized understanding.

## 2024-12-26 NOTE — TELEPHONE ENCOUNTER
[facility] - Newly Diagnosed Breast Cancer     ALL Newly Diagnosed Breast Cancer       Notes to Genetics Team (not required):  Please call patient.              Route to 'Oncology Genetics Breast Pool' in Psychiatric

## 2024-12-27 ENCOUNTER — TELEPHONE (OUTPATIENT)
Dept: GENETICS | Facility: CLINIC | Age: 64
End: 2024-12-27

## 2024-12-27 ENCOUNTER — TELEPHONE (OUTPATIENT)
Age: 64
End: 2024-12-27

## 2024-12-27 ENCOUNTER — RESULTS FOLLOW-UP (OUTPATIENT)
Dept: OBGYN CLINIC | Facility: CLINIC | Age: 64
End: 2024-12-27

## 2024-12-27 ENCOUNTER — APPOINTMENT (OUTPATIENT)
Dept: LAB | Facility: CLINIC | Age: 64
End: 2024-12-27
Payer: COMMERCIAL

## 2024-12-27 DIAGNOSIS — Z80.0 FAMILY HISTORY OF PANCREATIC CANCER: ICD-10-CM

## 2024-12-27 DIAGNOSIS — I10 ESSENTIAL HYPERTENSION: ICD-10-CM

## 2024-12-27 DIAGNOSIS — Z80.3 FAMILY HISTORY OF BREAST CANCER: ICD-10-CM

## 2024-12-27 DIAGNOSIS — C50.911 INVASIVE DUCTAL CARCINOMA OF BREAST, FEMALE, RIGHT (HCC): Primary | ICD-10-CM

## 2024-12-27 PROCEDURE — 36415 COLL VENOUS BLD VENIPUNCTURE: CPT

## 2024-12-27 RX ORDER — HYDROCHLOROTHIAZIDE 12.5 MG/1
12.5 TABLET ORAL DAILY
Qty: 30 TABLET | Refills: 5 | Status: SHIPPED | OUTPATIENT
Start: 2024-12-27

## 2024-12-27 NOTE — TELEPHONE ENCOUNTER
Patient has a STAT referral to see Dr. Esparza for Invasive ductal carcinoma of breast, female, right (HCC). Please call the patient at [phone] to schedule accordingly.

## 2024-12-27 NOTE — TELEPHONE ENCOUNTER
I introduced myself to Fátima and let her know that her nurse navigator reached out to the cancer risk and genetics program on her behalf to begin STAT genetic testing process.    I reviewed the following with Fátima:    While the majority of cancer occurs by chance, approximately 5-10% of breast cancer has an underlying genetic cause. Genetic testing is available which can determine if there is an underlying genetic cause to your cancer. Understanding if there is an underlying genetic cause can:  Provide your surgeon with additional information to help with surgical decisions (i.e. lumpectomy vs mastectomy), treatment decisions and eligibility for clinical trials.    It can determine if you have an increased risk for any additional cancers.  Help family members understand their cancer risk.       We work closely with the [facility] breast surgeons and are reaching out to see if you have interest in genetic testing. This test is not a requirement but can take 5-10 days to complete so we would like to start the process as soon as possible so the results are ready for your appointment with your surgeon.       If interested, family history will be collected to initiate STAT genetic testing process.        Patient elected to pursue testing     Diagnosis Details:  Invasive Ductal Carcinoma  Right  Hormone receptors pending    Personal History:  Do you have a personal history of any other cancer? Yes  If yes type/age of diagnosis: BCC age age 44 ([place])    Family history:   Do you have Ashkenazi Moravian ancestry? No  If yes, maternal, paternal, or both?    Do you have any children? Yes  How many sons? 0  How many daughters? 2  Do any of your children have a history of cancer? Yes  If yes type/age of diagnosis:   Sister - BCC age 28 ([place])    Do you have any brothers or sisters? Yes  How many brothers? 1  How many sisters? 0  Are they from the same parents? Yes  If no how maternal/paternal half-siblings:  Do any of your  brothers or sisters have a history of cancer? No  If yes who and the type/age of diagnosis:           Do you have nieces or nephews? Yes  Do any of them have a history of cancer? No  If yes type/age of diagnosis:    Does your mother have a history of cancer? Yes  If yes, cancer type and age of diagnosis: Breast Cancer age 85  Is your mother still living? Yes  Age/Age of death: 90    Thinking about your mother's family (aunts, uncles, cousins, grandparents) is there anyone with a history of cancer? Yes  If yes, list relationship, cancer type and age of diagnosis:  Maternal Uncle - Pancreatic Cancer age 70s ()    Does your father have a history of cancer? No  If yes, cancer type and age of diagnosis:   Is your father still living? Yes  Age/age of death: 91    Thinking about your father's family (aunts, uncles, cousins, grandparents) is there anyone with a history of cancer? No  If yes, list relationship, cancer type and age of diagnosis:      Types of Results  Positive Result - May explain personal diagnosis/family history. Can give surgeon information on treatment plan, inform future screening/management or tell a person about other possible risks. Positive results can initiate testing for other family members who may be at risk (children, siblings, etc)  Negative Result - Does not give an explanation. Surgical/treatment plan will be based on clinical presentation and will be part of discussion with surgeon. Negative result cannot be passed down to children, but they are still at elevated risk.  Uncertain Result - Common, but treated like a negative result clinically. 90% are downgraded over time.     [facility] billing policy   Most individuals pay <$100 for hereditary cancer genetic testing. If insurance covers the cost of the testing, individuals may still pay out of pocket secondary to co-pays, co-insurance, or deductibles. If the cost of the testing exceeds $100, the lab will reach out to the  patient via phone or e-mail. The patient will then have the option to proceed with the testing, cancel the testing, or elect the self-pay option of $250. Fátima verbalized understanding.    We have genetic counselors available, if you have any additional questions or would like to speak with them we can schedule you a 15 minute appointment.      Plan:  An order was placed and the patient was instructed to go to a [facility] lab for a blood collection    Genetic Testing Preformed: [facility] BRCA[facility] STAT Panel (13 genes): EVA, BARD1, BRCA1, BRCA2, CDH1, CHEK2, NF1, PALB2, PTEN, RAD51C, RAD51D, STK11, TP53 with reflex to [facility] CustomNext: Cancer+RNAinsight (59 genes): APC, EVA, AXIN2, BAP1, BARD1, BMPR1A, BRCA1, BRCA2, BRIP1, CDH1, CDK4, CDKN1B, CDKN2A, CHEK2, CTNNA1, DICER1, EGLN1, EPCAM, FH, FLCN, GREM1, HOXB13, KIF1B, KIT, MAX, MEN1, MET, MITF, MLH1, MSH2, MSH3, MSH6, MUTYH, NF1, NTHL1, PALB2, PDGFRA PMS2, POLD1, POLE, POT1, PTEN, RAD51C, RAD51D, RB1, RET, SDHA, SDHAF2, SDHB, SDHC, SDHD, SMAD4, SMARCA4, STK11, KAGR750, TP53, TSC1, TSC2, VHL     Initial results will take approximately 5-12 days to return     Additional results may take up to 2-3 weeks to complete once test is started    Result Call Information:  In the event that we need to reach Fátima via telephone:  I confirmed the patient's mobile number on file as the best number to call with results  I confirmed with the patient that we can leave a voicemail on the provided numbers    When your results are ready, your results will be available in your my chart. If you would like, someone from the genetics team will call you with any type of result- positive, negative or uncertain. Otherwise our team will only call to review significant or complex result. We will make your care team aware of your result.    Patent agreeable to phone call only if results are positive/complex     Patient does not have any further questions, declined meeting with genetic counselor

## 2024-12-27 NOTE — TELEPHONE ENCOUNTER
Can offer her 1/14 at 11:30 am. There is a 30 minute opening, it will just be the last patient of the morning, if you are okay with this Dr. Esparza.    Her [id number] is pending.

## 2025-01-06 ENCOUNTER — RESULTS FOLLOW-UP (OUTPATIENT)
Dept: GENETICS | Facility: CLINIC | Age: 65
End: 2025-01-06

## 2025-01-06 LAB
GENE DIS ANL INTERP-IMP: NORMAL
GENES NOT REPORTED: NORMAL
INTERPRETATION: NORMAL

## 2025-01-08 ENCOUNTER — TELEPHONE (OUTPATIENT)
Age: 65
End: 2025-01-08

## 2025-01-08 DIAGNOSIS — Z17.421 TRIPLE NEGATIVE MALIGNANT NEOPLASM OF BREAST (HCC): Primary | ICD-10-CM

## 2025-01-08 DIAGNOSIS — C50.919 TRIPLE NEGATIVE MALIGNANT NEOPLASM OF BREAST (HCC): Primary | ICD-10-CM

## 2025-01-08 NOTE — TELEPHONE ENCOUNTER
Pt is having a CT scan on 01/20 and needs to have bloodwork done for her BP- per the appt note. Please put the orders on her chart and call her to let her know because she would like to go today or tomorrow if possible. Central scheduling said if blood work done, there is a possibility for sooner appt.

## 2025-01-09 ENCOUNTER — APPOINTMENT (OUTPATIENT)
Dept: LAB | Facility: CLINIC | Age: 65
End: 2025-01-09
Payer: COMMERCIAL

## 2025-01-09 ENCOUNTER — HOSPITAL ENCOUNTER (OUTPATIENT)
Dept: ULTRASOUND IMAGING | Facility: CLINIC | Age: 65
Discharge: HOME | End: 2025-01-09

## 2025-01-09 DIAGNOSIS — C50.919 TRIPLE NEGATIVE MALIGNANT NEOPLASM OF BREAST (HCC): ICD-10-CM

## 2025-01-09 DIAGNOSIS — Z17.421 TRIPLE NEGATIVE MALIGNANT NEOPLASM OF BREAST (HCC): ICD-10-CM

## 2025-01-09 LAB
ALBUMIN SERPL BCG-MCNC: 4.1 G/DL (ref 3.5–5)
ALP SERPL-CCNC: 101 U/L (ref 34–104)
ALT SERPL W P-5'-P-CCNC: 11 U/L (ref 7–52)
ANION GAP SERPL CALCULATED.3IONS-SCNC: 12 MMOL/L (ref 4–13)
AST SERPL W P-5'-P-CCNC: 15 U/L (ref 13–39)
BILIRUB SERPL-MCNC: 0.6 MG/DL (ref 0.2–1)
BUN SERPL-MCNC: 20 MG/DL (ref 5–25)
CALCIUM SERPL-MCNC: 8.8 MG/DL (ref 8.4–10.2)
CHLORIDE SERPL-SCNC: 103 MMOL/L (ref 96–108)
CO2 SERPL-SCNC: 24 MMOL/L (ref 21–32)
CREAT SERPL-MCNC: 0.68 MG/DL (ref 0.6–1.3)
GFR SERPL CREATININE-BSD FRML MDRD: 92 ML/MIN/1.73SQ M
GLUCOSE P FAST SERPL-MCNC: 113 MG/DL (ref 65–99)
POTASSIUM SERPL-SCNC: 4 MMOL/L (ref 3.5–5.3)
PROT SERPL-MCNC: 7.1 G/DL (ref 6.4–8.4)
SODIUM SERPL-SCNC: 139 MMOL/L (ref 135–147)

## 2025-01-09 PROCEDURE — 80053 COMPREHEN METABOLIC PANEL: CPT

## 2025-01-09 PROCEDURE — 36415 COLL VENOUS BLD VENIPUNCTURE: CPT

## 2025-01-14 ENCOUNTER — OFFICE VISIT (OUTPATIENT)
Age: 65
End: 2025-01-14
Payer: COMMERCIAL

## 2025-01-14 VITALS
TEMPERATURE: 98.6 F | OXYGEN SATURATION: 97 % | BODY MASS INDEX: 38.57 KG/M2 | WEIGHT: 240 LBS | HEART RATE: 65 BPM | DIASTOLIC BLOOD PRESSURE: 66 MMHG | SYSTOLIC BLOOD PRESSURE: 118 MMHG | HEIGHT: 66 IN

## 2025-01-14 DIAGNOSIS — C50.911 INVASIVE DUCTAL CARCINOMA OF BREAST, FEMALE, RIGHT (HCC): Primary | ICD-10-CM

## 2025-01-14 PROCEDURE — 99245 OFF/OP CONSLTJ NEW/EST HI 55: CPT | Performed by: SURGERY

## 2025-01-16 ENCOUNTER — DOCUMENTATION (OUTPATIENT)
Dept: HEMATOLOGY ONCOLOGY | Facility: CLINIC | Age: 65
End: 2025-01-16

## 2025-01-16 NOTE — PROGRESS NOTES
In-basket message received from Dr. Esparza to add patient to the breast [facility] on 1/27/2025. Chart reviewed and prep completed.

## 2025-01-16 NOTE — PROGRESS NOTES
Referral to medical oncology received from Dr Esparza.  Chart reviewed by oncology nurse navigator at this time.      Diagnosis: triple negative breast cancer.  Consideration for NAC.    After review of chart, instructions for scheduling added to referral and sent to be scheduled as advised.  Requested patient be scheduled once staging studies have been completed after 1/20/25.

## 2025-01-17 ENCOUNTER — DOCUMENTATION (OUTPATIENT)
Dept: HEMATOLOGY ONCOLOGY | Facility: CLINIC | Age: 65
End: 2025-01-17

## 2025-01-17 ENCOUNTER — HOSPITAL ENCOUNTER (OUTPATIENT)
Dept: NUCLEAR MEDICINE | Facility: HOSPITAL | Age: 65
Discharge: HOME | End: 2025-01-17
Attending: SURGERY
Payer: COMMERCIAL

## 2025-01-17 ENCOUNTER — PATIENT OUTREACH (OUTPATIENT)
Dept: HEMATOLOGY ONCOLOGY | Facility: CLINIC | Age: 65
End: 2025-01-17

## 2025-01-17 ENCOUNTER — TELEPHONE (OUTPATIENT)
Dept: HEMATOLOGY ONCOLOGY | Facility: CLINIC | Age: 65
End: 2025-01-17

## 2025-01-17 DIAGNOSIS — C50.919 TRIPLE NEGATIVE MALIGNANT NEOPLASM OF BREAST (HCC): ICD-10-CM

## 2025-01-17 DIAGNOSIS — Z85.3 PERSONAL HISTORY OF MALIGNANT NEOPLASM OF BREAST: Primary | ICD-10-CM

## 2025-01-17 DIAGNOSIS — Z17.421 TRIPLE NEGATIVE MALIGNANT NEOPLASM OF BREAST (HCC): ICD-10-CM

## 2025-01-17 PROCEDURE — 78306 BONE IMAGING WHOLE BODY: CPT

## 2025-01-17 PROCEDURE — A9503 TC99M MEDRONATE: HCPCS

## 2025-01-17 NOTE — PROGRESS NOTES
I reached out and spoke with Fátima, now that consults have been completed with the oncology teams. I introduced myself and explained my role as their Patient Navigator. I reviewed for any barriers to care and offered supportive services as needed. I reviewed and updated the members assigned to the care team in [facility]. She knows the members of the care team as well as how and when to contact them with any needs.     Distress Thermometer completed at this time. Patient scored 5/10. Referral to  placed.    Patient mentioned that all is going well she has no questions or concerns at this time however patient did rate herself a 5 on the DT.    She is currently able to drive and denies any transportation needs.      She denies any uncontrolled symptoms. Discussed role of Palliative Care in symptom and side effect management. Declined referral at this time.    She states that she is eating and drinking as per usual with no unintentional weight loss.       Patient does not smoke.     She states she is well supported by family and friends.  Community support information provided via Pamphlets, including brochures.    She feels she has adequate insurance coverage and denies any financial concerns at this time.     She verbalizes managing the schedules well.   Future Appointments   Date Time Provider Department Center   1/20/2025  3:30 PM AN CT 1 AN CT [facility]   1/27/2025  9:00 AM Kosta Machado MD [facility]   2/6/2025 10:30 AM DO GIANNA Yanez [facility]        Based on individual needs I will follow up in about 4/5 weeks. I have provided my direct contact information and welcome them to contact me if needs as discussed above change. She was appreciative for the call.      Patient aware should she have questions, concerns or any new barriers to care that she can call me directly.

## 2025-01-17 NOTE — PROGRESS NOTES
All records needed are in patients chart. No records retrieval needed at this time.         Referral received/ Chart reviewed for work up completed     Imaging completed: [facility]   [] PET/CT   [] MRI   [] CT   [x] US   [x] Mammo   [] Bone scan   [] N/A    Pathology completed: [facility]   Date:2024   Location:   []N/A    Additional records needed:    [] Genomic report   [] Genetic testing results   [] Office Note   [] Procedure/ Operative note   [] Lab results   [x] N/A      [] Radiation Oncology records retrieval needed (PN to route to rad/onc clerical pool once scheduled) N/A  Date:  Location:    Breast Cancer Nurse Navigator    Chart reviewed for prepping for initial outreach by nurse navigator.    Diagnosis: R Invasive breast carcinoma, Grade 2 ER-, NC-, Her 2-    Recent Imagin2024-Bilateral Diagnostic mammogram  2024-Right breast ultrasound  2024-Right ultrasound guided breast biopsy      Recent Pathology: 2024 tissue exam-Right ultrasound guided breast biopsy    Previous Breast Cancer Diagnosis:  No    Does patient have a JUAN DAVID ? yes    Is patient diabetic?  YES/NO: no  Controlled? N/A    Is patient on a blood thinner?  no    [place]    Medical Oncology: Dr. Kosta Machado 2024    Radiation Oncology: TBD    Genetic testin2024    Family history of cancer: breast cancer in mother    Any further needs: one scan and chest ct      Information forwarded to Outbound PEP      Nurse Navigator will call patient for initial outreach.      Will have Patient Navigator outreach patient after surgical oncology consultation.  Any clinical questions to be directed to ONN or office nurse.

## 2025-01-18 ENCOUNTER — RESULTS FOLLOW-UP (OUTPATIENT)
Dept: SURGERY | Facility: CLINIC | Age: 65
End: 2025-01-18

## 2025-01-18 DIAGNOSIS — I10 HYPERTENSION, UNSPECIFIED TYPE: ICD-10-CM

## 2025-01-18 DIAGNOSIS — R91.1 LUNG NODULE: Primary | ICD-10-CM

## 2025-01-20 ENCOUNTER — TELEPHONE (OUTPATIENT)
Age: 65
End: 2025-01-20

## 2025-01-20 ENCOUNTER — PATIENT OUTREACH (OUTPATIENT)
Dept: CASE MANAGEMENT | Facility: OTHER | Age: 65
End: 2025-01-20

## 2025-01-20 ENCOUNTER — HOSPITAL ENCOUNTER (OUTPATIENT)
Dept: CT IMAGING | Facility: HOSPITAL | Age: 65
Discharge: HOME | End: 2025-01-20
Attending: SURGERY
Payer: COMMERCIAL

## 2025-01-20 DIAGNOSIS — Z17.421 TRIPLE NEGATIVE MALIGNANT NEOPLASM OF BREAST (HCC): ICD-10-CM

## 2025-01-20 DIAGNOSIS — C50.919 TRIPLE NEGATIVE MALIGNANT NEOPLASM OF BREAST (HCC): ICD-10-CM

## 2025-01-20 PROCEDURE — 71260 CT THORAX DX C+: CPT

## 2025-01-20 PROCEDURE — 74177 CT ABD & PELVIS W/CONTRAST: CPT

## 2025-01-20 RX ORDER — IODIXANOL 320 MG/ML
74 INJECTION, SOLUTION INTRAVASCULAR
Status: COMPLETED | OUTPATIENT
Start: 2025-01-20 | End: 2025-01-20

## 2025-01-20 RX ADMIN — IODIXANOL 74 ML: 320 INJECTION, SOLUTION INTRAVASCULAR at 15:40

## 2025-01-20 NOTE — TELEPHONE ENCOUNTER
Gabriel from the reading room had called in regards to the patients CT scan that was completed today the 20th. He mentioned there  was immediate findings.     Please have provider review asap.

## 2025-01-21 ENCOUNTER — PATIENT OUTREACH (OUTPATIENT)
Dept: HEMATOLOGY ONCOLOGY | Facility: CLINIC | Age: 65
End: 2025-01-21

## 2025-01-21 DIAGNOSIS — R91.1 LUNG NODULE: Primary | ICD-10-CM

## 2025-01-21 DIAGNOSIS — Z17.421 TRIPLE NEGATIVE MALIGNANT NEOPLASM OF BREAST (HCC): ICD-10-CM

## 2025-01-21 DIAGNOSIS — C50.919 TRIPLE NEGATIVE MALIGNANT NEOPLASM OF BREAST (HCC): ICD-10-CM

## 2025-01-21 DIAGNOSIS — C50.911 INVASIVE DUCTAL CARCINOMA OF BREAST, FEMALE, RIGHT (HCC): ICD-10-CM

## 2025-01-21 LAB
GENE DIS ANL INTERP-IMP: NORMAL
INTERPRETATION: NORMAL

## 2025-01-21 NOTE — PROGRESS NOTES
Breast Oncology Nurse Navigator    Called patient for initial outreach from nurse navigator.  Introduced myself and my role as well as members of the navigation team.  Oncology Nurse Navigator will follow patient until they are seen by surgical oncology, after which the patient navigator initiate outreach and follow the patient to survivorship.      Referral received from [facility]  Breast cancer diagnosis was made after pt presented for diagnostic mammogram and right breast ultrasound for a palpable lump. Patient was recommended to have a breast biopsy, pathology was positive for right invasive breast cancer, triple negative, Grade 2.  Patient states some understanding/awareness of diagnosis    Any other issues/diagnoses?  Yes, pt has hypertension, hypothyroidism and hyperlipidemia  Is patient a current smoker: no    Confirmed upcoming appointment with Dr. Machado, including date, time and location.  Patient will be accompanied by self  Provided brief explanation of what to expect at this visit.  Patient has transportation to appointments.      Patient lives with   Support system includes: , children friends and family  Referral placed to oncology social worker: no    Cancer family history includes: breast cancer in mother  Referral to oncology genetics: yes done  Does patient have a prior cancer diagnosis?  no  Any previous radiation treatment to the chest?  no  Is patient pre/post menopausal?  post   Is patient taking hormone replacement therapy? no       Discussed the Cancer Support Community and some of the programs and services offered  Discussed Breast Cancer Support group that meets monthly at [facility].  Information sent via [facility].    Patient has my contact information and knows she can reach out with questions.  Office hours provided.  Patient provided with the phone number for [facility]-[phone].  General assessment completed.  Patient does have [facility] set up  [facility] message sent with  our team's contact information.

## 2025-01-21 NOTE — RESULT ENCOUNTER NOTE
Great news, the scan overall looks good and there is no confirmed distant spread, there is one lung nodule that is very small and will require follow up to make sure that it does not enlarge. Please order a non-contrast chest CT for three months from now and schedule.

## 2025-01-22 ENCOUNTER — TELEPHONE (OUTPATIENT)
Dept: GENETICS | Facility: CLINIC | Age: 65
End: 2025-01-22

## 2025-01-22 NOTE — TELEPHONE ENCOUNTER
Genetic Test Result Note  Today I spoke with Fátima over the phone to review the results of her genetic test for hereditary cancer. She underwent genetic testing through our program on 12/27/2024 due to her recent diagnosis of breast cancer.    A separate report of her STAT genetic test results were disclosed to her on 1/6/25 by [facility] via [employer]. This result includes new genes and does not change her initial genetic test result.     Initial Test: [employer] BRCAplus STAT Panel (13 genes): EVA, BARD1, BRCA1, BRCA2, CDH1, CHEK2, NF1, PALB2, PTEN, RAD51C, RAD51D, STK11, TP53    Result: Negative - No Clinically Significant Variants Detected     Reflexed Test: [employer] CustomNext: Cancer+RNAinsight (46 genes): APC, AXIN2, BAP1, BMPR1A, BRIP1, CDK4, CDKN1B, CDKN2A, CTNNA1, DICER1, EGLN1, EPCAM, FH, FLCN, GREM1, HOXB13, KIF1B, KIT, MAX, MEN1, MET, MITF, MLH1, MSH2, MSH3, MSH6, MUTYH, NTHL1, PDGFRA PMS2, POLD1, POLE, POT1, RB1, RET, SDHA, SDHAF2, SDHB, SDHC, SDHD, SMAD4, SMARCA4, UTQK137,  TSC1, TSC2, VHL     Final Results: Four Variants of Uncertain Significance     Variant 1  MSH3 c.353A>T (p.N118I); heterozygous; uncertain significance      Variant 2  MSH3 c.2581A>C (p.N861H); heterozygous; uncertain significance      Variant 3  POLE c.4168C>T (p.U6621V); heterozygous; uncertain significance      Variant 4  SDHA  c.1438A>G (p.K480E); heterozygous; uncertain significance      Variant of uncertain significance (VUS)  A variant of uncertain significance (VUS) means that a change was identified in a specific gene but it cannot be determined whether the variant is associated with an increased risk of cancer or is a harmless genetic change.     It is possible that the variant was seen in only a handful of individuals, or there may be conflicting or incomplete information in the medical literature about the variant and its association with hereditary cancer.     The significance of the MSH3, POLE, and SDHA variants are  currently not known and therefore this test result cannot be used to help determine Fátima surgical plan, medical management or cancer risks.      The laboratory will continue to accumulate information on these variants and will reclassify it as either a positive or negative genetic test result when they are confident that they have adequate information. We will notify Fátima as updated information is obtained.  It is important to note that the majority of variants of uncertain significance are reclassified as likely benign or benign as additional information about the variant becomes available.     Risks for Family Members:  Fátima was made aware that all of her first-degree relatives are at increased risk to develop breast cancer based on her recent diagnosis and family history of breast cancer. Her family members may also be at increased risk to develop other cancers in her family history, specifically basal cell carcinoma and pancreatic cancer. We recommend that her first-degree relatives make their healthcare providers aware of the family history and discuss their options for screening and risk-reduction.    At this time, these VUS results are not clinically actionable and is not recommended to be tested in other family members.  If Fátima has any affected family members with a cancer diagnosis, especially at a young age, they may still consider genetic testing. Relatives who wish to pursue genetic testing can reach out to the [facility] at [phone] to schedule an appointment or visit [email or web address] to identify a local genetic counselor.      VUS Result: This result does not have surgical implications and surgical options should be discussed with her healthcare provider. Fátima's surgeon, Dr. Esparza will be made aware of her results

## 2025-01-23 DIAGNOSIS — E03.9 HYPOTHYROIDISM, UNSPECIFIED TYPE: Primary | ICD-10-CM

## 2025-01-23 DIAGNOSIS — C50.411 MALIGNANT NEOPLASM OF UPPER-OUTER QUADRANT OF RIGHT BREAST IN FEMALE, ESTROGEN RECEPTOR NEGATIVE (HCC): Primary | ICD-10-CM

## 2025-01-23 DIAGNOSIS — Z17.1 MALIGNANT NEOPLASM OF UPPER-OUTER QUADRANT OF RIGHT BREAST IN FEMALE, ESTROGEN RECEPTOR NEGATIVE (HCC): Primary | ICD-10-CM

## 2025-01-23 NOTE — ASSESSMENT & PLAN NOTE
Orders:  •  CARBOplatin (PARAPLATIN) 38.52 mg in sodium chloride 0.9 % 250 mL IVPB  •  Oncology Provider Communication  •  Oncology Provider Communication

## 2025-01-23 NOTE — PROGRESS NOTES
Name: Fátima Reyes      : 1960      MRN: [id number]  Encounter Provider: Kosta Machado MD  Encounter Date: 2025   Encounter department: [facility] [place]    Chief Complaint   Patient presents with   • Consult     :  Assessment & Plan  Malignant neoplasm of upper-outer quadrant of right breast in female, estrogen receptor negative (HCC)  Newly diagnosed palpable clinical T2 clinical N0 triple negative breast cancer established by biopsy 2024.   CT scan of the chest abdomen pelvis and nuclear medicine whole-body bone scan from 2025 revealed no evidence of metastatic disease    Expedited reviews in the contemplation of neoadjuvant systemic chemoimmunotherapy underway  Laboratories, echocardiogram, port placement    Initiate preoperative neoadjuvant chemoimmunotherapy per keynote 522 trial approach with pembrolizumab 200 mg IV flat dose every 3 weeks plus paclitaxel 80 mg/m² IV weekly x 12 and carboplatin AUC 1.5 weekly x 12    Patient will need serial noncontrast CT scans of the chest to assess for interstitial lung disease changes secondary to immunotherapy    Orders:  •  CARBOplatin (PARAPLATIN) 38.52 mg in sodium chloride 0.9 % 250 mL IVPB  •  Oncology Provider Communication  •  Oncology Provider Communication  •  Oncology Provider Communication  •  Oncology Provider Communication  •  Oncology Provider Communication  •  Infusion Calculated Appointment Request; Future  •  CBC and differential; Future  •  Comprehensive metabolic panel; Future  •  TSH, 3rd generation with Free T4 reflex; Future  •  T3, free; Future  •  Infusion Calculated Appointment Request; Future  •  CBC and differential; Future  •  Comprehensive metabolic panel; Future  •  Infusion Calculated Appointment Request; Future  •  CBC and differential; Future  •  Comprehensive metabolic panel; Future    Class 2 obesity due to excess calories without serious comorbidity with  body mass index (BMI) of 39.0 to 39.9 in adult         Family history of malignant neoplasm  Positive breast cancer in mother       Essential hypertension         Hypothyroidism, unspecified type    Orders:  •  CARBOplatin (PARAPLATIN) 38.52 mg in sodium chloride 0.9 % 250 mL IVPB  •  Oncology Provider Communication  •  Oncology Provider Communication    Hyperlipidemia, unspecified hyperlipidemia type         Invasive ductal carcinoma of breast, female, right (HCC)    Orders:  •  Ambulatory Referral to Hematology / Oncology    Dehydration    Orders:  •  Oncology Provider Communication  •  Oncology Provider Communication  •  Oncology Provider Communication  •  Oncology Provider Communication  •  Oncology Provider Communication  •  Infusion Calculated Appointment Request; Future  •  CBC and differential; Future  •  Comprehensive metabolic panel; Future  •  TSH, 3rd generation with Free T4 reflex; Future  •  T3, free; Future  •  Infusion Calculated Appointment Request; Future  •  CBC and differential; Future  •  Comprehensive metabolic panel; Future  •  Infusion Calculated Appointment Request; Future  •  CBC and differential; Future  •  Comprehensive metabolic panel; Future    Solitary pulmonary nodule               Clinical/Pathologic Stage IIB    Cancer Staging   No matching staging information was found for the patient.      Current Therapy  Keynote 522 trial data set approach phase 1 with pembrolizumab every 3 weeks IV plus paclitaxel IV weekly x 12+ carboplatin IV weekly x 12    Discussion  This delightful postmenopausal woman presents with her  to review therapeutic approaches to her new diagnosis of triple negative breast cancer of stage II designation.      The patient became self-aware of palpable right breast mass in the fall 2024 and presented for diagnostic reviews.  Her prior mammography on  February 19,2024 was BI-RADS 1 were negative.    The patient's risk factors for the development of breast  cancer and solid tumor malignancy include age, postmenopausal status, gender and obesity. Her mother has a diagnosis of breast cancer at age 80 plus of unclear documentation and scope. Her mother is living and well.    The patient has no clinical evidence of metastases.    This patient is approached with favor in a curative intent.  She clearly carries the expectation of an excellent opportunity for a robust tumor response and favorable outcomes over time.  I had a long discussion with the patient and her  regarding the use of chemoimmunotherapy in a neoadjuvant approach fashion and after the keynote 522 trial data set.  I discussed opportunities and the implication of pathologic complete remission and extension with favor of both event free survival and overall survival.  Please see [place] Journal of Medicine from September 2024 lead author Ambreen WOODWARD    The patient and her  were given handwritten sheet outlining all aspects of our discussion today and expressed understanding clarity and gratitude.  She is scheduled for Mediport catheter placement and we will move forward with phase 1 of care that includes the use of pembrolizumab every 3 weeks plus paclitaxel plus carboplatin weekly x 12.      Special Studies  December 27, 2024 germline genetic panel  No high risk germline mutations    GENETIC ANALYSIS OVERALL INTERPRETATION   Date Value Ref Range Status   12/27/2024   Final    NEGATIVE: No Clinically Significant Variants Detected   12/27/2024 Variants of Unknown Significance Detected  Final     INTERPRETATION   Date Value Ref Range Status   12/27/2024 See Notes  Final     Comment:     No pathogenic mutations, variants of unknown significance, or gross deletions or duplications were detected.  Risk Estimate: low likelihood of variants in the genes analyzed contributing to this individual's clinical history.  Genetic counseling is a recommended option for all individuals undergoing genetic  testing.  Genes Analyzed (13 total): EVA, BARD1, BRCA1, BRCA2, CDH1, CHEK2, NF1, PALB2, PTEN, RAD51C, RAD51D, STK11 and TP53 (sequencing and deletion/duplication).   12/27/2024 See Notes  Final     Comment:     No known clinically actionable alterations were detected.  Four variants of unknown significance were detected: two in the MSH3 gene, one in the POLE gene, and one in the SDHA gene.  Risk Estimate: should be based on clinical and family history, as the clinical significance of this result is unknown.  Genetic counseling is a recommended option for all individuals undergoing genetic testing.  This individual is heterozygous for the p.N118I (c.353A>T) and p.N861H (c.2581A>C) variants of unknown significance in the MSH3 gene, heterozygous for the p.Z3650N (c.4168C>T) variant of unknown significance in the POLE gene, and heterozygous for the p.K480E (c.1438A>G) variant of unknown significance in the SDHA gene, which may or may not contribute to this individual's clinical history. Familial testing would be necessary to determine if the alterations in MSH3 are on the same chromosome or on different chromosomes (in cis or trans). Refer to the supplementary pages for additional information on these variants. No additional pathogenic mutations, variants of unknown significance, or gross deletions or duplications were detected. Genes Analyzed (59 total): APC, EVA, BAP1, BARD1, BMPR1A, BRCA1, BRCA2, BRIP1, CDH1, CDK4, CDKN1B, CDKN2A, CHEK2, DICER1, FH, FLCN, KIF1B, MAX, MEN1, MET, MLH1, MSH2, MSH6, MUTYH, NF1, NTHL1, PALB2, PMS2, POT1, PTEN, RAD51C, RAD51D, RB1, RET, SDHA, SDHAF2, SDHB, SDHC, SDHD, SMAD4, SMARCA4, STK11, UWAL666, TP53, TSC1, TSC2 and VHL (sequencing and deletion/duplication); AXIN2, CTNNA1, EGLN1, HOXB13, KIT, MITF, MSH3, PDGFRA, POLD1 and POLE (sequencing only); EPCAM and GREM1 (deletion/duplication only). RNA data is routinely analyzed for use in variant interpretation for all genes.     GENES NOT  REPORTED   Date Value Ref Range Status   2024   Final    EVA,BARD1,CDH1,CHEK2,PALB2,PTEN,RAD51C,RAD51D,STK11,TP53,NF1,BRCA1,BRCA2   2024   Final    APC,EVA,AXIN2,BAP1,BARD1,BMPR1A,BRCA1,BRCA2,BRIP1,CDH1,CDK4,CDKN1B,CDKN2A,CHEK2,CTNNA1,DICER1,EGLN1,EPCAM,FH,FLCN,GREM1,HOXB13,KIF1B,KIT,MAX,MEN1,MET,MITF,MLH1,MSH2,MSH6,MUTYH,NF1,NTHL1,PALB2,PDGFRA,PMS2,POLD1,POT1,PTEN,RAD51C,RAD51D,RB1,RET,SDHAF2,SDHB,  SDHC,SDHD,SMAD4,SMARCA4,STK11,EVER709,TP53,TSC1,TSC2,VHL         History of Present Illness     This delightful 65-year-old presents with her  to review a new diagnosis of invasive carcinoma of the right breast.  The patient is  2 para 2 and postmenopausal.  The patient's mother at age 80 was revealed to have a diagnosis of breast cancer status post excision only.  The mother is living and well    The patient presented in the 2024 [place] with self-awareness of her right breast mass.  Patient presented for diagnostic reviews through her gynecologist and primary care physicians.  Bilateral diagnostic mammogram with targeted right breast ultrasound was performed on 2024.  25 mm oval mass density with indistinct margins was seen in the upper outer quadrant of the right breast at the 10 o'clock position at 6 cm from the nipple.  This mass correlating with the palpable finding on examination.  Targeted ultrasound of the right breast showed a 19 mm x 17 mm x 22 mm oval hypoechoic mass with indistinct margins seen in the upper outer quadrant of the right breast at 10:00.  There were no suspicious adenopathy in the right axilla.  The left breast showed no suspicious masses, grouped microcalcifications or areas of unexplained architectural distortion.    On 2024 ultrasound-guided biopsy of the right breast mass with mammography was performed without complication.  Final pathology revealed an invasive breast carcinoma no special type ductal of grade 2.   Ductal carcinoma in situ was present.  Estrogen receptor was negative, progesterone receptor was negative and HER2/nathanael was low at 2+ by IHC but subsequently nonamplified by FISH amplification..  Ductal carcinoma in situ.    The patient reports a medical oncology for therapeutic planning and review for possible neoadjuvant therapies      Pertinent History from December 2024    No new complaints          December 23, 2024 S 24 [id number] status post ultrasound-guided biopsy right breast lesion 10 o'clock position 6 cm from the nipple    Cancer Screening and Prevention  Mammography: 12/18/2024   GI/Colonoscopy: 03/02/2021   GYN: 01/12/2022   Bone Density: Not on file   Covid 19 Vaccination Status:     Oncology Therapeutic Summary:      12/23/2024 S 24 [id number]  status post ultrasound-guided biopsy right breast    Oncology History   Cancer Staging   Invasive ductal carcinoma of breast, female, right (HCC)  Staging form: Breast, AJCC 8th Edition  - Clinical stage from 1/14/2025: Stage IIB (cT2, cN0, cM0, G2, ER-, ME-, HER2-) - Signed by Jordan Esparza MD on 1/27/2025  Histopathologic type: Infiltrating duct carcinoma, NOS  Stage prefix: Initial diagnosis  Method of lymph node assessment: Clinical  Histologic grading system: 3 grade system  Oncology History   Invasive ductal carcinoma of breast, female, right (HCC)   1/14/2025 -  Cancer Staged    Staging form: Breast, AJCC 8th Edition  - Clinical stage from 1/14/2025: Stage IIB (cT2, cN0, cM0, G2, ER-, ME-, HER2-) - Signed by Jordan Esparza MD on 1/27/2025  Histopathologic type: Infiltrating duct carcinoma, NOS  Stage prefix: Initial diagnosis  Method of lymph node assessment: Clinical  Histologic grading system: 3 grade system       1/27/2025 Initial Diagnosis    Invasive ductal carcinoma of breast, female, right (HCC)     Malignant neoplasm of upper-outer quadrant of right breast in female, estrogen receptor negative (HCC)   1/27/2025 Initial Diagnosis    Malignant  neoplasm of upper-outer quadrant of right breast in female, estrogen receptor negative (HCC)     1/28/2025 -  Chemotherapy    DOXOrubicin (ADRIAMYCIN), 60 mg/m2 = 129 mg, Intravenous, Once, 0 of 4 cycles  alteplase (CATHFLO), 2 mg, Intracatheter, Every 1 Minute as needed, 0 of 17 cycles  pegfilgrastim-apgf (Nyvepria), 6 mg, Subcutaneous, Once, 0 of 4 cycles  cyclophosphamide (CYTOXAN) IVPB, 600 mg/m2 = 1,290 mg, Intravenous, Once, 0 of 4 cycles  fosaprepitant (EMEND) IVPB, 150 mg, Intravenous, Once, 0 of 4 cycles  CARBOplatin (PARAPLATIN) IVPB (GOG AUC DOSING), 38.52 mg (100 % of original dose 38.52 mg), Intravenous, Once, 0 of 4 cycles  Dose modification:   (original dose 38.52 mg, Cycle 1),   (original dose 38.52 mg, Cycle 1, Reason: Other (Must fill in a comment), Comment: lab estimates),   (original dose 38.52 mg, Cycle 1),   (original dose 38.52 mg, Cycle 1, Reason: Other (Must fill in a comment))  PACLItaxel (TAXOL) chemo IVPB, 80 mg/m2 = 172.2 mg, Intravenous, Once, 0 of 4 cycles  pembrolizumab (KEYTRUDA) IVPB, 200 mg, Intravenous, Once, 0 of 17 cycles        Review of Systems   Constitutional: Negative.    All other systems reviewed and are negative.    Medical History Reviewed by provider this encounter:     .    Current Outpatient Medications on File Prior to Visit   Medication Sig Dispense Refill   • atorvastatin (LIPITOR) 20 mg tablet TAKE ONE TABLET BY MOUTH EVERY DAY 90 tablet 1   • doxycycline hyclate (VIBRAMYCIN) 100 mg capsule Take 100 mg by mouth daily as needed     • hydroCHLOROthiazide 12.5 mg tablet TAKE ONE TABLET BY MOUTH EVERY DAY 30 tablet 5   • Multiple Vitamin (MULTIVITAMINS PO) Take by mouth     • nebivolol (BYSTOLIC) 20 MG tablet TAKE ONE TABLET BY MOUTH EVERY DAY 90 tablet 1     No current facility-administered medications on file prior to visit.      Social History     Tobacco Use   • Smoking status: Never   • Smokeless tobacco: Never   Vaping Use   • Vaping status: Never Used  "  Substance and Sexual Activity   • Alcohol use: Yes   • Drug use: No   • Sexual activity: Yes     Partners: Male     Birth control/protection: Post-menopausal         Objective   /76 (BP Location: Left arm, Patient Position: Sitting, Cuff Size: Adult)   Pulse 79   Temp 97.8 °F (36.6 °C) (Temporal)   Resp 18   Ht 5' 6\" (1.676 m)   Wt 108 kg (239 lb)   LMP  (LMP Unknown)   SpO2 95%   BMI 38.58 kg/m²     Pain Screening:  Pain Score: 0-No pain  ECOG ECOG Performance Status: 0 - Fully active, able to carry on all pre-disease performance without restriction 0  Physical Exam  Vitals and nursing note reviewed. Exam conducted with a chaperone present.   Neck:      Thyroid: No thyroid mass, thyromegaly or thyroid tenderness.      Trachea: Trachea normal.   Cardiovascular:      Rate and Rhythm: Normal rate and regular rhythm.      Pulses: Normal pulses.      Heart sounds: Murmur heard.      Systolic murmur is present with a grade of 2/6.      No diastolic murmur is present.      No friction rub. No gallop. No S3 or S4 sounds.   Pulmonary:      Effort: Pulmonary effort is normal.      Breath sounds: Normal breath sounds. No stridor, decreased air movement or transmitted upper airway sounds. No decreased breath sounds, wheezing, rhonchi or rales.   Chest:      Chest wall: Mass present. No deformity, swelling, tenderness, crepitus or edema. There is no dullness to percussion.   Breasts:     Right: Mass present. No nipple discharge, skin change or tenderness.      Left: Normal.      Comments: 1.55 1.7 cm mass in the right mid to upper outer breasts  Abdominal:      General: Abdomen is protuberant. Bowel sounds are normal.      Palpations: Abdomen is soft. There is no shifting dullness, hepatomegaly, splenomegaly, mass or pulsatile mass.      Tenderness: There is no abdominal tenderness. There is no right CVA tenderness or left CVA tenderness.   Musculoskeletal:      Cervical back: Full passive range of motion " without pain and normal range of motion. No edema, rigidity, bony tenderness or crepitus. No pain with movement, spinous process tenderness or muscular tenderness.      Thoracic back: No bony tenderness.      Lumbar back: No bony tenderness.      Right lower leg: No edema.      Left lower leg: No edema.   Lymphadenopathy:      Cervical: No cervical adenopathy.      Right cervical: No superficial, deep or posterior cervical adenopathy.     Left cervical: No superficial, deep or posterior cervical adenopathy.      Upper Body:      Right upper body: No supraclavicular, axillary or pectoral adenopathy.      Left upper body: No supraclavicular, axillary or pectoral adenopathy.   Neurological:      Mental Status: She is alert.         Labs: I have reviewed the following labs:  Lab Results   Component Value Date/Time    WBC 8.89 09/13/2024 11:01 AM    RBC 4.36 09/13/2024 11:01 AM    Hemoglobin 13.8 09/13/2024 11:01 AM    Hematocrit 42.8 09/13/2024 11:01 AM    MCV 98 09/13/2024 11:01 AM    MCH 31.7 09/13/2024 11:01 AM    RDW 12.4 09/13/2024 11:01 AM    Platelets 181 09/13/2024 11:01 AM     Lab Results   Component Value Date/Time    Potassium 4.0 01/09/2025 09:38 AM    Chloride 103 01/09/2025 09:38 AM    CO2 24 01/09/2025 09:38 AM    BUN 20 01/09/2025 09:38 AM    Creatinine 0.68 01/09/2025 09:38 AM    Glucose, Fasting 113 (H) 01/09/2025 09:38 AM    Calcium 8.8 01/09/2025 09:38 AM    AST 15 01/09/2025 09:38 AM    ALT 11 01/09/2025 09:38 AM    Alkaline Phosphatase 101 01/09/2025 09:38 AM    Total Protein 7.1 01/09/2025 09:38 AM    Albumin 4.1 01/09/2025 09:38 AM    Total Bilirubin 0.60 01/09/2025 09:38 AM    eGFR 92 01/09/2025 09:38 AM         Pathology:     December 23, 2024 S 24 [id number] status post ultrasound-guided biopsy right breast lesion 10 o'clock position 6 cm from the nipple  Final Diagnosis   A. Breast, Right, us guided breast bx, 10 ocl, 6 cm fn 3 passes, 12 g:  - Invasive breast carcinoma of no special type (ductal  NST/invasive ductal carcinoma).   * Remington grade 2 of 3 (total score: 6 of 9)    -- tubule formation < 10%, score 3    -- nuclear grade 2 of 3, score 2    -- mitoses < 3/mm2, (</= 7 mitoses/10HPF), score 1.   * Confirmed by tumor cell immunophenotype:    -- positive: nuclear stain for GATA3 and membranous stain for E-cadherin and p120.    -- negative:  p63, calponin-B.     * Invasive carcinoma involves 3 of 3 submitted core biopsies, max. dimension = 16 millimeters.  - Ductal carcinoma in situ.   * Estrogen, progesterone & HER2 receptor studies pending, to be described in a separate receptor report.        Synoptic Checklist INVASIVE CARCINOMA OF THE BREAST: Biopsy (INVASIVE CARCINOMA OF THE BREAST: BIOPSY - All Specimens)Protocol posted: 3/22/2023 SPECIMEN Procedure Needle biopsy Specimen Laterality Right .     TUMOR Tumor Site Clock position 10 o'clock   Tumor Site Distance from nipple (Centimeters): 6 cm    Histologic Type Invasive carcinoma of no special type (ductal) Histologic Grade (Mina Histologic Score) Glandular (Acinar) / Tubular Differentiation Score 3 Nuclear Pleomorphism Score 2 Mitotic Rate [id number] Evadiallo Fátima [id number]   Overall Grade Grade 2 (scores of 6 or 7)     Largest Invasive Focus in this Limited Biopsy Sample 16 mm     Ductal Carcinoma In Situ (DCIS) Present Architectural Patterns Solid Nuclear Grade Grade II (intermediate) Necrosis Not identified     Lymphatic and / or Vascular Invasion Not identified Microcalcifications Not identified .     Estrogen Receptor (ER) Status Negative (less than 1%)     Progesterone Receptor (PgR) Status Negative (less than 1%)     HER2 by Immunohistochemistry Equivocal (Score 2+)     Addendum  01/02/2025 [employer]  Fish  Non amplified  Negative      Imaging:       January 20, 2025 CT scan of the chest abdomen and pelvis with contrast  IMPRESSION:     A 2.0 cm right breast mass with a biopsy clip in keeping with the patient's recently  diagnosed breast cancer.     A 3 mm left upper lobe pulmonary nodule. Based on current Fleischner Society 2017 Guidelines on incidental pulmonary nodule, patients with a known malignancy are at increased risk of metastasis and should receive initial three-month follow-up chest CT.     No evidence of metastatic disease in the abdomen and pelvis.     The study was marked in EPIC for immediate notification.       January 17, 2025 nuclear medicine whole-body bone scan  IMPRESSION:     1. No scintigraphic evidence of osseous metastasis.       December 18, 2024 targeted right breast ultrasound with bilateral diagnostic mammogram    FINDINGS:   RIGHT  1) MASS [B]  Mammo diagnostic bilateral w 3d and cad: There is a 25 mm high density, oval mass with indistinct margins seen in the upper outer quadrant of the right breast at 10 o'clock, 6 cm from the nipple. The mass correlates with the palpable finding noted during physical examination.   US breast right limited (diagnostic): There is a 19 mm x 17 mm x 22 mm oval, hypoechoic mass with indistinct margins seen in the upper outer quadrant of the right breast at 10 o'clock, 6 cm from the nipple. The mass correlates with the palpable finding noted during physical examination.  Sonographic exam of the right axilla shows no suspicious adenopathy.  Scattered benign-appearing calcifications are demonstrated in the right breast.  Right breast biopsy clip.     Left  Mammo diagnostic bilateral w 3d and cad  There are no suspicious masses, grouped microcalcifications or areas of unexplained architectural distortion. The skin and nipple areolar complex are unremarkable.   Results were discussed with the patient.  Ultrasound-guided biopsy was recommended.     IMPRESSION:  Solid mass in the upper outer right breast corresponding to the area of clinical concern and highly suspicious for malignancy.     ASSESSMENT/BI-RADS CATEGORY:  Left: 1 - Negative  Right: 5 - Highly Suggestive of  Malignancy  Overall: 5 - Highly Suggestive of Malignancy     RECOMMENDATION:       - Ultrasound-guided breast biopsy for the right breast.       - Routine screening mammogram in 1 year for the left breast.        February 19, 2024 bilateral screening mammography    FINDINGS:   There are no suspicious masses, grouped microcalcifications or areas of architectural distortion. The skin and nipple areolar complex are unremarkable.     IMPRESSION:  No mammographic evidence of malignancy.           ASSESSMENT/BI-RADS CATEGORY:  Left: 1 - Negative  Right: 1 - Negative  Overall: 1 - Negative     RECOMMENDATION:       - Routine screening mammogram in 1 year for both breasts.

## 2025-01-27 ENCOUNTER — HOSPITAL ENCOUNTER (OUTPATIENT)
Dept: NON INVASIVE DIAGNOSTICS | Facility: CLINIC | Age: 65
Discharge: HOME | End: 2025-01-27
Payer: COMMERCIAL

## 2025-01-27 ENCOUNTER — CONSULT (OUTPATIENT)
Dept: HEMATOLOGY ONCOLOGY | Facility: CLINIC | Age: 65
End: 2025-01-27
Payer: COMMERCIAL

## 2025-01-27 ENCOUNTER — DOCUMENTATION (OUTPATIENT)
Dept: HEMATOLOGY ONCOLOGY | Facility: CLINIC | Age: 65
End: 2025-01-27

## 2025-01-27 ENCOUNTER — TELEPHONE (OUTPATIENT)
Dept: RADIOLOGY | Facility: HOSPITAL | Age: 65
End: 2025-01-27

## 2025-01-27 ENCOUNTER — APPOINTMENT (OUTPATIENT)
Dept: LAB | Facility: CLINIC | Age: 65
End: 2025-01-27
Payer: COMMERCIAL

## 2025-01-27 VITALS
BODY MASS INDEX: 38.41 KG/M2 | WEIGHT: 239 LBS | SYSTOLIC BLOOD PRESSURE: 138 MMHG | OXYGEN SATURATION: 95 % | RESPIRATION RATE: 18 BRPM | HEIGHT: 66 IN | HEART RATE: 79 BPM | TEMPERATURE: 97.8 F | DIASTOLIC BLOOD PRESSURE: 76 MMHG

## 2025-01-27 VITALS
BODY MASS INDEX: 38.41 KG/M2 | DIASTOLIC BLOOD PRESSURE: 76 MMHG | SYSTOLIC BLOOD PRESSURE: 138 MMHG | WEIGHT: 239 LBS | HEART RATE: 79 BPM | HEIGHT: 66 IN

## 2025-01-27 DIAGNOSIS — I10 ESSENTIAL HYPERTENSION: ICD-10-CM

## 2025-01-27 DIAGNOSIS — C50.911 INVASIVE DUCTAL CARCINOMA OF BREAST, FEMALE, RIGHT (HCC): ICD-10-CM

## 2025-01-27 DIAGNOSIS — E86.0 DEHYDRATION: ICD-10-CM

## 2025-01-27 DIAGNOSIS — C50.411 MALIGNANT NEOPLASM OF UPPER-OUTER QUADRANT OF RIGHT BREAST IN FEMALE, ESTROGEN RECEPTOR NEGATIVE (HCC): ICD-10-CM

## 2025-01-27 DIAGNOSIS — E03.9 HYPOTHYROIDISM, UNSPECIFIED TYPE: ICD-10-CM

## 2025-01-27 DIAGNOSIS — I10 HYPERTENSION, UNSPECIFIED TYPE: ICD-10-CM

## 2025-01-27 DIAGNOSIS — Z17.1 MALIGNANT NEOPLASM OF UPPER-OUTER QUADRANT OF RIGHT BREAST IN FEMALE, ESTROGEN RECEPTOR NEGATIVE (HCC): Primary | ICD-10-CM

## 2025-01-27 DIAGNOSIS — Z80.9 FAMILY HISTORY OF MALIGNANT NEOPLASM: ICD-10-CM

## 2025-01-27 DIAGNOSIS — E66.09 CLASS 2 OBESITY DUE TO EXCESS CALORIES WITHOUT SERIOUS COMORBIDITY WITH BODY MASS INDEX (BMI) OF 39.0 TO 39.9 IN ADULT: ICD-10-CM

## 2025-01-27 DIAGNOSIS — C50.411 MALIGNANT NEOPLASM OF UPPER-OUTER QUADRANT OF RIGHT BREAST IN FEMALE, ESTROGEN RECEPTOR NEGATIVE (HCC): Primary | ICD-10-CM

## 2025-01-27 DIAGNOSIS — R91.1 SOLITARY PULMONARY NODULE: ICD-10-CM

## 2025-01-27 DIAGNOSIS — Z17.1 MALIGNANT NEOPLASM OF UPPER-OUTER QUADRANT OF RIGHT BREAST IN FEMALE, ESTROGEN RECEPTOR NEGATIVE (HCC): ICD-10-CM

## 2025-01-27 DIAGNOSIS — E78.5 HYPERLIPIDEMIA, UNSPECIFIED HYPERLIPIDEMIA TYPE: ICD-10-CM

## 2025-01-27 DIAGNOSIS — E66.812 CLASS 2 OBESITY DUE TO EXCESS CALORIES WITHOUT SERIOUS COMORBIDITY WITH BODY MASS INDEX (BMI) OF 39.0 TO 39.9 IN ADULT: ICD-10-CM

## 2025-01-27 LAB
ALBUMIN SERPL BCG-MCNC: 4.2 G/DL (ref 3.5–5)
ALP SERPL-CCNC: 97 U/L (ref 34–104)
ALT SERPL W P-5'-P-CCNC: 16 U/L (ref 7–52)
ANION GAP SERPL CALCULATED.3IONS-SCNC: 9 MMOL/L (ref 4–13)
AORTIC ROOT: 3 CM
ASCENDING AORTA: 3.2 CM
AST SERPL W P-5'-P-CCNC: 18 U/L (ref 13–39)
BASOPHILS # BLD AUTO: 0.03 THOUSANDS/ΜL (ref 0–0.1)
BASOPHILS NFR BLD AUTO: 0 % (ref 0–1)
BILIRUB SERPL-MCNC: 0.55 MG/DL (ref 0.2–1)
BSA FOR ECHO PROCEDURE: 2.16 M2
BUN SERPL-MCNC: 17 MG/DL (ref 5–25)
CALCIUM SERPL-MCNC: 9.2 MG/DL (ref 8.4–10.2)
CHLORIDE SERPL-SCNC: 102 MMOL/L (ref 96–108)
CO2 SERPL-SCNC: 28 MMOL/L (ref 21–32)
CREAT SERPL-MCNC: 0.68 MG/DL (ref 0.6–1.3)
E WAVE DECELERATION TIME: 214 MS
E/A RATIO: 0.77
EOSINOPHIL # BLD AUTO: 0.15 THOUSAND/ΜL (ref 0–0.61)
EOSINOPHIL NFR BLD AUTO: 2 % (ref 0–6)
ERYTHROCYTE [DISTWIDTH] IN BLOOD BY AUTOMATED COUNT: 12.7 % (ref 11.6–15.1)
FRACTIONAL SHORTENING: 31 (ref 28–44)
GFR SERPL CREATININE-BSD FRML MDRD: 92 ML/MIN/1.73SQ M
GLUCOSE P FAST SERPL-MCNC: 113 MG/DL (ref 65–99)
HCT VFR BLD AUTO: 40 % (ref 34.8–46.1)
HGB BLD-MCNC: 13 G/DL (ref 11.5–15.4)
IMM GRANULOCYTES # BLD AUTO: 0.03 THOUSAND/UL (ref 0–0.2)
IMM GRANULOCYTES NFR BLD AUTO: 0 % (ref 0–2)
INTERVENTRICULAR SEPTUM IN DIASTOLE (PARASTERNAL SHORT AXIS VIEW): 1.1 CM
INTERVENTRICULAR SEPTUM: 1.2 CM (ref 0.6–1.1)
LAAS-AP2: 25.5 CM2
LAAS-AP4: 22.3 CM2
LEFT ATRIUM SIZE: 5.2 CM
LEFT ATRIUM VOLUME (MOD BIPLANE): 87 ML
LEFT ATRIUM VOLUME INDEX (MOD BIPLANE): 40.3 ML/M2
LEFT INTERNAL DIMENSION IN SYSTOLE: 3.3 CM (ref 2.1–4)
LEFT VENTRICULAR INTERNAL DIMENSION IN DIASTOLE: 4.8 CM (ref 3.5–6)
LEFT VENTRICULAR POSTERIOR WALL IN END DIASTOLE: 1 CM
LEFT VENTRICULAR STROKE VOLUME: 63 ML
LV EF US.2D.A4C+ESTIMATED: 62 %
LVSV (TEICH): 63 ML
LYMPHOCYTES # BLD AUTO: 1.72 THOUSANDS/ΜL (ref 0.6–4.47)
LYMPHOCYTES NFR BLD AUTO: 19 % (ref 14–44)
MAGNESIUM SERPL-MCNC: 2.2 MG/DL (ref 1.9–2.7)
MCH RBC QN AUTO: 31.5 PG (ref 26.8–34.3)
MCHC RBC AUTO-ENTMCNC: 32.5 G/DL (ref 31.4–37.4)
MCV RBC AUTO: 97 FL (ref 82–98)
MONOCYTES # BLD AUTO: 0.42 THOUSAND/ΜL (ref 0.17–1.22)
MONOCYTES NFR BLD AUTO: 5 % (ref 4–12)
MV E'TISSUE VEL-LAT: 5 CM/S
MV E'TISSUE VEL-SEP: 6 CM/S
MV PEAK A VEL: 0.92 M/S
MV PEAK E VEL: 71 CM/S
MV STENOSIS PRESSURE HALF TIME: 62 MS
MV VALVE AREA P 1/2 METHOD: 3.55
NEUTROPHILS # BLD AUTO: 6.69 THOUSANDS/ΜL (ref 1.85–7.62)
NEUTS SEG NFR BLD AUTO: 74 % (ref 43–75)
NRBC BLD AUTO-RTO: 0 /100 WBCS
PLATELET # BLD AUTO: 292 THOUSANDS/UL (ref 149–390)
PMV BLD AUTO: 10.2 FL (ref 8.9–12.7)
POTASSIUM SERPL-SCNC: 4.4 MMOL/L (ref 3.5–5.3)
PROT SERPL-MCNC: 7.4 G/DL (ref 6.4–8.4)
RA PRESSURE ESTIMATED: 3 MMHG
RBC # BLD AUTO: 4.13 MILLION/UL (ref 3.81–5.12)
RIGHT ATRIUM AREA SYSTOLE A4C: 16.4 CM2
RIGHT VENTRICLE ID DIMENSION: 3.4 CM
SL CV LEFT ATRIUM LENGTH A2C: 6.5 CM
SL CV LV EF: 60
SL CV PED ECHO LEFT VENTRICLE DIASTOLIC VOLUME (MOD BIPLANE) 2D: 107 ML
SL CV PED ECHO LEFT VENTRICLE SYSTOLIC VOLUME (MOD BIPLANE) 2D: 44 ML
SODIUM SERPL-SCNC: 139 MMOL/L (ref 135–147)
T3 SERPL-MCNC: 1.1 NG/ML (ref 0.9–1.8)
T3FREE SERPL-MCNC: 3.34 PG/ML (ref 2.5–3.9)
T4 FREE SERPL-MCNC: 0.6 NG/DL (ref 0.61–1.12)
TRICUSPID ANNULAR PLANE SYSTOLIC EXCURSION: 2.4 CM
TSH SERPL DL<=0.05 MIU/L-ACNC: 4.54 UIU/ML (ref 0.45–4.5)
WBC # BLD AUTO: 9.04 THOUSAND/UL (ref 4.31–10.16)

## 2025-01-27 PROCEDURE — 84439 ASSAY OF FREE THYROXINE: CPT

## 2025-01-27 PROCEDURE — 93306 TTE W/DOPPLER COMPLETE: CPT

## 2025-01-27 PROCEDURE — 84480 ASSAY TRIIODOTHYRONINE (T3): CPT

## 2025-01-27 PROCEDURE — 99245 OFF/OP CONSLTJ NEW/EST HI 55: CPT | Performed by: INTERNAL MEDICINE

## 2025-01-27 PROCEDURE — 85025 COMPLETE CBC W/AUTO DIFF WBC: CPT

## 2025-01-27 PROCEDURE — 93306 TTE W/DOPPLER COMPLETE: CPT | Performed by: INTERNAL MEDICINE

## 2025-01-27 PROCEDURE — 83735 ASSAY OF MAGNESIUM: CPT

## 2025-01-27 PROCEDURE — 36415 COLL VENOUS BLD VENIPUNCTURE: CPT

## 2025-01-27 PROCEDURE — 84481 FREE ASSAY (FT-3): CPT

## 2025-01-27 PROCEDURE — 80053 COMPREHEN METABOLIC PANEL: CPT

## 2025-01-27 PROCEDURE — 84443 ASSAY THYROID STIM HORMONE: CPT

## 2025-01-27 RX ORDER — SODIUM CHLORIDE 9 MG/ML
500 INJECTION, SOLUTION INTRAVENOUS ONCE
Status: CANCELLED | OUTPATIENT
Start: 2025-02-05

## 2025-01-27 RX ORDER — CEFAZOLIN SODIUM 2 G/50ML
2000 SOLUTION INTRAVENOUS ONCE
Status: CANCELLED | OUTPATIENT
Start: 2025-01-27 | End: 2025-01-27

## 2025-01-27 RX ORDER — SODIUM CHLORIDE 9 MG/ML
75 INJECTION, SOLUTION INTRAVENOUS CONTINUOUS
Status: CANCELLED | OUTPATIENT
Start: 2025-01-27

## 2025-01-27 RX ORDER — SODIUM CHLORIDE 9 MG/ML
500 INJECTION, SOLUTION INTRAVENOUS ONCE
Status: CANCELLED | OUTPATIENT
Start: 2025-02-12

## 2025-01-27 RX ORDER — SODIUM CHLORIDE 9 MG/ML
500 INJECTION, SOLUTION INTRAVENOUS CONTINUOUS
Status: CANCELLED
Start: 2025-02-05

## 2025-01-27 RX ORDER — SODIUM CHLORIDE 9 MG/ML
500 INJECTION, SOLUTION INTRAVENOUS ONCE
Status: CANCELLED | OUTPATIENT
Start: 2025-02-19

## 2025-01-27 NOTE — PROGRESS NOTES
Consult - Surgical Oncology  Fátima Reyes 65 y.o. 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 Genetics negative. Bone scan negative. CT scan torso negative other than a 3mm left upper lobe lung nodule that requires 3 month follow up. CT chest ordered and scheduled for 3 months.    We discussed the modern, individualized, multidisciplinary management of breast cancer in depth. All questions answered. Breast cancer handbook given. We discussed the triple negative breast cancer and the approach to treatment. In light of her palpable mass and 22mm size, I am recommending consideration for neoadjuvant chemotherapy/immunotherapy with the Keynote 522 regimen. She understands the rationale and has agreed to meet with medical oncology. Consult placed. We will confirm that the tumor board is in agreement.    We did review surgical options including lumpectomy versus mastectomy, the management of axillary ivanna staging, the role of adjuvant radiation, and multidisciplinary decision making. We will plan to see her back as she completes her treatment for surgical planning.       History of Present Illness     Negative screening mammo 2/2024, noticed a palpable breast mass in the right outer breast in October 2024, diagnostic imaging showed a 25mm mass in right breast at 10 oclock, 6cm from the nipple, on US measured 19 x 17 x 22mm, axilla negative, left breast negative. Seema reflector placed at the time of biopsy, biopsy showed IDC grade 2 triple negative with DCIS, considered 22mm in greatest size on concordance report. No systemic symptoms. Genetics negative. Bone scan negative. CT torso with small lung nodule and breast mass otherwise negative.      Social - patient is close with Dr. Shetty, her  Bharat accompanies her today and is a [employer] graduate    Menarche - 14  Pregnancies - 2  Age at youngest pregnancy - 29  OCP - 20 years  Menopause - 52  HRT - no  Personal - basal cell on her ears and chest, follows with derm, prior R breast biopsy 2005, benign, UTD on colonoscopy, no abnormal pap smears  Family - breast cancer in her mother in her 80's, alive in her 90's, had a lumpectomy, maternal uncle with pancreas cancer in his 70's, mother and father with non melanoma skin cancers        Review of Systems   Constitutional: Negative.    Skin:         Biopsy proven triple negative right breast cancer   Hematological: Negative.    All other systems reviewed and are negative.      Historical Information   Past Medical History:   Diagnosis Date    Hyperlipidemia 9/18/2012    Hypertension     Hypothyroidism 10/23/2018    Lyme disease 07/21/2015    Rosacea     Skin cancer     H/O Basal Cell Cancer     Past Surgical History:   Procedure Laterality Date    BREAST BIOPSY Right 08/05/2005    benign aprocrine metaplasia    ORBITAL FRACTURE SURGERY Left 5/1/2019    Procedure: OPEN REDUCTION W/ INTERNAL FIXATION (ORIF) ORBITAL;  Surgeon: Quirino Silverio MD;  Location: [facility];  Service: Plastics    US GUIDED BREAST BIOPSY RIGHT COMPLETE Right 12/23/2024    WISDOM TOOTH EXTRACTION       Social History   Social History     Substance and Sexual Activity   Alcohol Use Yes     Social History     Substance and Sexual Activity   Drug Use No     Social History     Tobacco Use   Smoking Status Never   Smokeless Tobacco Never     Family History   Problem Relation Age of Onset    Breast cancer Mother 85    Heart disease Father         Cardiac Disorder    Dementia Father     Hyperlipidemia Father     Pancreatic cancer Maternal Uncle     No Known Problems Brother     No Known Problems Daughter     Stroke Maternal Grandmother     Alcohol abuse Maternal Grandfather     No Known  "Problems Paternal Grandmother     Other Paternal Grandfather         heart problems    No Known Problems Daughter     No Known Problems Maternal Aunt     No Known Problems Paternal Aunt        Meds/Allergies     Current Outpatient Medications:     atorvastatin (LIPITOR) 20 mg tablet, TAKE ONE TABLET BY MOUTH EVERY DAY, Disp: 90 tablet, Rfl: 1    doxycycline hyclate (VIBRAMYCIN) 100 mg capsule, Take 100 mg by mouth daily as needed, Disp: , Rfl:     hydroCHLOROthiazide 12.5 mg tablet, TAKE ONE TABLET BY MOUTH EVERY DAY, Disp: 30 tablet, Rfl: 5    Multiple Vitamin (MULTIVITAMINS PO), Take by mouth, Disp: , Rfl:     nebivolol (BYSTOLIC) 20 MG tablet, TAKE ONE TABLET BY MOUTH EVERY DAY, Disp: 90 tablet, Rfl: 1  No Known Allergies    The following portions of the patient's history were reviewed and updated as appropriate: allergies, current medications, past family history, past medical history, past social history, past surgical history, and problem list.    Objective   Current Vitals:   Blood pressure 118/66, pulse 65, temperature 98.6 °F (37 °C), temperature source Tympanic, height 5' 6\" (1.676 m), weight 109 kg (240 lb), SpO2 97%.    Physical Exam  Vitals reviewed.   Constitutional:       General: She is not in acute distress.     Appearance: She is obese. She is not ill-appearing or toxic-appearing.   HENT:      Head: Normocephalic.      Nose: No congestion.      Mouth/Throat:      Mouth: Mucous membranes are moist.   Eyes:      Pupils: Pupils are equal, round, and reactive to light.   Cardiovascular:      Rate and Rhythm: Normal rate.   Pulmonary:      Effort: Pulmonary effort is normal. No respiratory distress.   Abdominal:      Palpations: Abdomen is soft.   Musculoskeletal:         General: Normal range of motion.      Cervical back: Normal range of motion and neck supple.   Lymphadenopathy:      Cervical: No cervical adenopathy.   Skin:     General: Skin is warm and dry.      Capillary Refill: Capillary refill " takes less than 2 seconds.   Neurological:      General: No focal deficit present.      Mental Status: She is alert.   Psychiatric:         Mood and Affect: Mood normal.       The patient has no palpable cervical, supraclavicular, or axillary lymphadenopathy bilaterally.  The breasts are symmetric in appearance. She has a palpable nonfixed, mobile mass in the right upper outer breast.  There are otherwise no dominant lumps, masses, skin changes or nipple retraction bilaterally.     I have spent a total time of 60 minutes in caring for this patient on the day of the visit/encounter including Diagnostic results, Prognosis, Risks and benefits of tx options, Instructions for management, Patient and family education, Impressions, Counseling / Coordination of care, Reviewing / ordering tests, medicine, procedures  , Obtaining or reviewing history  , and Communicating with other healthcare professionals .     Signature:  Jordan Esparza MD  Date: 1/27/2025 Time: 7:37 AM

## 2025-01-27 NOTE — ASSESSMENT & PLAN NOTE
Newly diagnosed palpable clinical T2 clinical N0 triple negative breast cancer established by biopsy December 23, 2024.   CT scan of the chest abdomen pelvis and nuclear medicine whole-body bone scan from January 20, 2025 revealed no evidence of metastatic disease    Expedited reviews in the contemplation of neoadjuvant systemic chemoimmunotherapy underway  Laboratories, echocardiogram, port placement    Initiate preoperative neoadjuvant chemoimmunotherapy per keynote 522 trial approach with pembrolizumab 200 mg IV flat dose every 3 weeks plus paclitaxel 80 mg/m² IV weekly x 12 and carboplatin AUC 1.5 weekly x 12    Patient will need serial noncontrast CT scans of the chest to assess for interstitial lung disease changes secondary to immunotherapy    Orders:  •  CARBOplatin (PARAPLATIN) 38.52 mg in sodium chloride 0.9 % 250 mL IVPB  •  Oncology Provider Communication  •  Oncology Provider Communication  •  Oncology Provider Communication  •  Oncology Provider Communication  •  Oncology Provider Communication  •  Infusion Calculated Appointment Request; Future  •  CBC and differential; Future  •  Comprehensive metabolic panel; Future  •  TSH, 3rd generation with Free T4 reflex; Future  •  T3, free; Future  •  Infusion Calculated Appointment Request; Future  •  CBC and differential; Future  •  Comprehensive metabolic panel; Future  •  Infusion Calculated Appointment Request; Future  •  CBC and differential; Future  •  Comprehensive metabolic panel; Future

## 2025-01-27 NOTE — PRE-PROCEDURE INSTRUCTIONS
Pre-procedure Instructions for Interventional Radiology  [address] [address]  INTERVENTIONAL RADIOLOGY [phone]    You are scheduled for a/an port placement.    On Wednesday 1-29-25.    Your tentative arrival time is 9:30am.  Short stay will notify you the day before your procedure with the exact arrival time and the location to arrive.    To prepare for your procedure:  Please arrange for someone to drive you home after the procedure and stay with you until the next morning if you are instructed to do so.  This is typically for patients receiving some type of sedative or anesthetic for the procedure.  DO NOT EAT OR DRINK ANYTHING after midnight on the evening before your procedure including candy & gum.  ONLY SIPS OF WATER with your medications are allowed on the morning of your procedure.  TAKE ALL OF YOUR REGULAR MEDICATIONS THE MORNING OF YOUR PROCEDURE with sips of water!  We may call you to stop some of your blood sugar, blood pressure and blood thinning medications depending on the procedure.  Please take all of these medications unless we instruct you to stop them.  If you have an allergy to x-ray dye, please contact Interventional Radiology for an x-ray dye preparation which usually consists of an oral steroid and Benadryl.  If you wear a Glucose Monitor, you may be asked to remove it for your procedure if we are using x-ray.  These devices need to be removed when we are imaging with x-ray near the device since the radiation can cause the unit to malfunction.  If possible and not too inconvenient, you may want to schedule your exam closer to day 14 of your 14 day device so your device is not wasted.    The day of your procedure:  Bring a list of the medications you take at home.  Bring medications you take for breathing problems (such as inhalers), medications for chest pain, or both.  Bring a case for your glasses or contacts.  Bring your insurance card and  a form of photo ID.  Please leave all valuables such as credit cards and jewelry at home.  Report to the admitting office to the left of the registration desk in the main lobby at the [facility], Entrance B.  You will then be directed to the Short Stay Center.  While your procedure is being performed, your family may wait in the Radiology Waiting Room on the 1st floor in Radiology.  if they need to leave, they may provide a number to be called following the procedure.   Be prepared to stay overnight just in case. Sometimes procedures will indicate the need for further observation or treatment.   If you are scheduled for a follow-up visit with the Interventional Radiologist after your procedure, you will be called with a date and time.    Special Instructions (Medications to stop taking before your procedure etc.):  No aspirin products for 5 days before the procedure.

## 2025-01-27 NOTE — ASSESSMENT & PLAN NOTE
Orders:  •  Oncology Provider Communication  •  Oncology Provider Communication  •  Oncology Provider Communication  •  Oncology Provider Communication  •  Oncology Provider Communication  •  Infusion Calculated Appointment Request; Future  •  CBC and differential; Future  •  Comprehensive metabolic panel; Future  •  TSH, 3rd generation with Free T4 reflex; Future  •  T3, free; Future  •  Infusion Calculated Appointment Request; Future  •  CBC and differential; Future  •  Comprehensive metabolic panel; Future  •  Infusion Calculated Appointment Request; Future  •  CBC and differential; Future  •  Comprehensive metabolic panel; Future

## 2025-01-27 NOTE — PROGRESS NOTES
BREAST CANCER MULTIDISCIPLINARY CASE CONFERENCE    DATE: 1/27/2025      PRESENTING DOCTOR: Isaias  OTHER RELEVANT PROVIDERS: Carter      DIAGNOSIS: Right invasive breast carcinoma, DCIS, Grade 2 [place]-, [place]-, Her 2-        Fátima Reyes is a 65 y.o. female who was presented at the Breast Multidisciplinary Case Conference today to discuss neoadjuvant chemotherapy      GENETICS: 12/17/2024-negative    IMAGING REVIEWED:   12/18/2024-Diagnostic mammogram and ultrasound      PATHOLOGY REVIEWED:none      PHYSICIAN RECOMMENDED PLAN:  Recommend neoadjuvant chemotherapy        FOLLOW UP APPOINTMENTS:      Future Appointments   Date Time Provider Department Center   1/27/2025  2:30 PM [facility] ECHO 2 [facility] Car [facility] [address]   1/29/2025 10:30 AM BE IR 2 (BIPLANE) BE IR [facility]   2/3/2025 10:00 AM Kosta Machado MD [facility]   2/6/2025 10:30 AM DO GIANNA Yanez [facility]   2/10/2025 10:00 AM Kosta Machado MD [facility]   2/17/2025 10:20 AM Kosta Machado MD [facility]   2/24/2025 10:00 AM Kosta Machado MD [facility]   3/3/2025 10:00 AM Kosta Machado MD [facility]   3/10/2025 10:00 AM Kosta Machado MD [facility]   4/21/2025 10:00 AM AN CT 2 AN CT [facility] HOS[facility]       Team agreed to plan.    The final treatment plan will be left to the discretion of the patient and the treating physician.     DISCLAIMERS:  TO THE TREATING PHYSICIAN:  This conference is a meeting of clinicians from various specialty areas who evaluate and discuss patients for whom a multidisciplinary treatment approach is being considered. Please note that the above opinion was a consensus of the conference attendees and is intended only to assist in quality care of the discussed patient.  The responsibility for follow up on the input given during the conference, along with any final decisions regarding plan of care, is that of the patient and  the patient's provider. Accordingly, appointments have only been recommended based on this information and have NOT been scheduled unless otherwise noted.      TO THE PATIENT:  This summary is a brief record of major aspects of your cancer treatment. You may choose to share a copy with any of your doctors or nurses. However, this is not a detailed or comprehensive record of your care.      NCCN guidelines were readily available for review at this discussion

## 2025-01-28 ENCOUNTER — PATIENT OUTREACH (OUTPATIENT)
Dept: CASE MANAGEMENT | Facility: OTHER | Age: 65
End: 2025-01-28

## 2025-01-28 NOTE — PROGRESS NOTES
VISIT  2025  [facility] Nic Machado MD  Medical Oncology Malignant neoplasm of upper-outer quadrant of right breast in female, estrogen receptor negative (HCC) +8 more  Dx Consult; Referred by Jordan Esparza MD  Reason for Visit     Progress Notes  Kosta Machado MD (Physician)  Medical Oncology  Expand All Collapse All  Name: Fátima Reyes      : 1960      MRN: [id number]  Encounter Provider: Kosta Machado MD  Encounter Date: 2025  Encounter department: [facility] [place]         Chief Complaint   Patient presents with    visit      :  Assessment & Plan  Malignant neoplasm of upper-outer quadrant of right breast in female, estrogen receptor negative (HCC)  Newly diagnosed palpable clinical T2 clinical N0 triple negative Invasive ductal breast cancer established by biopsy 2024.   CT scan of the chest abdomen pelvis and nuclear medicine whole-body bone scan from 2025 reveal no evidence of metastatic disease     Port placed. C 1 W 1 to be scheduled asap     Initiate preoperative neoadjuvant chemoimmunotherapy per keynote 522 trial approach with pembrolizumab 200 mg IV flat dose every 3 weeks plus paclitaxel 80 mg/m² IV weekly x 12 and carboplatin AUC 1.5 weekly x 12     Patient will need serial noncontrast CT scans of the chest to assess for interstitial lung disease changes secondary to immunotherapy     Orders:    CARBOplatin (PARAPLATIN) 38.52 mg in sodium chloride 0.9 % 250 mL IVPB    Oncology Provider Communication    Oncology Provider Communication    Oncology Provider Communication    Oncology Provider Communication    Oncology Provider Communication    Infusion Calculated Appointment Request; Future    CBC and differential; Future    Comprehensive metabolic panel; Future    TSH, 3rd generation with Free T4 reflex; Future    T3, free; Future    Infusion Calculated Appointment  Request; Future    CBC and differential; Future    Comprehensive metabolic panel; Future    Infusion Calculated Appointment Request; Future    CBC and differential; Future    Comprehensive metabolic panel; Future     Class 2 obesity due to excess calories without serious comorbidity with body mass index (BMI) of 39.0 to 39.9 in adult        Family history of malignant neoplasm  Positive breast cancer in mother     Essential hypertension        Hypothyroidism, unspecified type     Orders:    CARBOplatin (PARAPLATIN) 38.52 mg in sodium chloride 0.9 % 250 mL IVPB    Oncology Provider Communication    Oncology Provider Communication     Hyperlipidemia, unspecified hyperlipidemia type        Invasive ductal carcinoma of breast, female, right (HCC)     Dehydration         Solitary pulmonary nodule                 Clinical/Pathologic Stage IIB     Cancer Staging   No matching staging information was found for the patient.        Current Therapy C1W1  Keynote 522 trial data set approach phase 1 with pembrolizumab every 3 weeks IV plus paclitaxel IV weekly x 12+ carboplatin IV weekly x 12     Discussion  This delightful postmenopausal woman presents with her  to begin preoperative systemic therapy for newly diagnosed grade 2 invasive ductal carcinoma of the right breast.  She will be initiated on the keynote 522 regimen with chemo and immunotherapy.  Phase 1 will include intravenous pembrolizumab every 3 weeks along with low-dose weekly chemotherapy as paclitaxel weekly x 12+ carboplatin weekly x 12.      Today represents cycle 1 week 1     reviewed therapeutic approaches to her new diagnosis of triple negative breast cancer of stage II designation.         Background:   The patient became self-aware of palpable right breast mass in the fall 2024 and presented for diagnostic reviews.  Her prior mammography on  February 19,2024 was BI-RADS 1 were negative.     The patient's risk factors for the development of breast  cancer and solid tumor malignancy include age, postmenopausal status, gender and obesity. Her mother has a diagnosis of breast cancer at age 80 plus of unclear documentation and scope. Her mother is living and well.     The patient has no clinical evidence of metastases.     This patient is approached with favor in a curative intent.  She clearly carries the expectation of an excellent opportunity for a robust tumor response and favorable outcomes over time.  I had a long discussion with the patient and her  regarding the use of chemoimmunotherapy in a neoadjuvant approach fashion and after the keynote 522 trial data set.  I discussed opportunities and the implication of pathologic complete remission and extension with favor of both event free survival and overall survival.  Please see [place] Journal of Medicine from September 2024 lead author Ambreen WOODWARD     The patient and her  were given handwritten sheet outlining all aspects of our discussion today and expressed understanding clarity and gratitude.  She is scheduled for Mediport catheter placement and we will move forward with phase 1 of care that includes the use of pembrolizumab every 3 weeks plus paclitaxel plus carboplatin weekly x 12.         Special Studies  December 27, 2024 germline genetic panel  No high risk germline mutations            GENETIC ANALYSIS OVERALL INTERPRETATION   Date Value Ref Range Status   12/27/2024     Final     NEGATIVE: No Clinically Significant Variants Detected   12/27/2024 Variants of Unknown Significance Detected   Final              INTERPRETATION   Date Value Ref Range Status   12/27/2024 See Notes   Final       Comment:       No pathogenic mutations, variants of unknown significance, or gross deletions or duplications were detected.  Risk Estimate: low likelihood of variants in the genes analyzed contributing to this individual's clinical history.  Genetic counseling is a recommended option for all  individuals undergoing genetic testing.  Genes Analyzed (13 total): EVA, BARD1, BRCA1, BRCA2, CDH1, CHEK2, NF1, PALB2, PTEN, RAD51C, RAD51D, STK11 and TP53 (sequencing and deletion/duplication).   12/27/2024 See Notes   Final       Comment:       No known clinically actionable alterations were detected.  Four variants of unknown significance were detected: two in the MSH3 gene, one in the POLE gene, and one in the SDHA gene.  Risk Estimate: should be based on clinical and family history, as the clinical significance of this result is unknown.  Genetic counseling is a recommended option for all individuals undergoing genetic testing.  This individual is heterozygous for the p.N118I (c.353A>T) and p.N861H (c.2581A>C) variants of unknown significance in the MSH3 gene, heterozygous for the p.X3706U (c.4168C>T) variant of unknown significance in the POLE gene, and heterozygous for the p.K480E (c.1438A>G) variant of unknown significance in the SDHA gene, which may or may not contribute to this individual's clinical history. Familial testing would be necessary to determine if the alterations in MSH3 are on the same chromosome or on different chromosomes (in cis or trans). Refer to the supplementary pages for additional information on these variants. No additional pathogenic mutations, variants of unknown significance, or gross deletions or duplications were detected. Genes Analyzed (59 total): APC, EVA, BAP1, BARD1, BMPR1A, BRCA1, BRCA2, BRIP1, CDH1, CDK4, CDKN1B, CDKN2A, CHEK2, DICER1, FH, FLCN, KIF1B, MAX, MEN1, MET, MLH1, MSH2, MSH6, MUTYH, NF1, NTHL1, PALB2, PMS2, POT1, PTEN, RAD51C, RAD51D, RB1, RET, SDHA, SDHAF2, SDHB, SDHC, SDHD, SMAD4, SMARCA4, STK11, JJDZ418, TP53, TSC1, TSC2 and VHL (sequencing and deletion/duplication); AXIN2, CTNNA1, EGLN1, HOXB13, KIT, MITF, MSH3, PDGFRA, POLD1 and POLE (sequencing only); EPCAM and GREM1 (deletion/duplication only). RNA data is routinely analyzed for use in variant  interpretation for all genes.             GENES NOT REPORTED   Date Value Ref Range Status   2024     Final     EVA,BARD1,CDH1,CHEK2,PALB2,PTEN,RAD51C,RAD51D,STK11,TP53,NF1,BRCA1,BRCA2   2024     Final     APC,EAV,AXIN2,BAP1,BARD1,BMPR1A,BRCA1,BRCA2,BRIP1,CDH1,CDK4,CDKN1B,CDKN2A,CHEK2,CTNNA1,DICER1,EGLN1,EPCAM,FH,FLCN,GREM1,HOXB13,KIF1B,KIT,MAX,MEN1,MET,MITF,MLH1,MSH2,MSH6,MUTYH,NF1,NTHL1,PALB2,PDGFRA,PMS2,POLD1,POT1,PTEN,RAD51C,RAD51D,RB1,RET,SDHAF2,SDHB,  SDHC,SDHD,SMAD4,SMARCA4,STK11,VIQX839,TP53,TSC1,TSC2,VHL            History of Present Illness        This delightful 65-year-old presents with her  to review a new diagnosis of invasive carcinoma of the right breast.  The patient is  2 para 2 and postmenopausal.  The patient's mother at age 80 was revealed to have a diagnosis of breast cancer status post excision only.  The mother is living and well     The patient presented in the 2024 [place] with self-awareness of her right breast mass.  Patient presented for diagnostic reviews through her gynecologist and primary care physicians.  Bilateral diagnostic mammogram with targeted right breast ultrasound was performed on 2024.  25 mm oval mass density with indistinct margins was seen in the upper outer quadrant of the right breast at the 10 o'clock position at 6 cm from the nipple.  This mass correlating with the palpable finding on examination.  Targeted ultrasound of the right breast showed a 19 mm x 17 mm x 22 mm oval hypoechoic mass with indistinct margins seen in the upper outer quadrant of the right breast at 10:00.  There were no suspicious adenopathy in the right axilla.  The left breast showed no suspicious masses, grouped microcalcifications or areas of unexplained architectural distortion.     On 2024 ultrasound-guided biopsy of the right breast mass with mammography was performed without complication.  Final pathology revealed  an invasive breast carcinoma no special type ductal of grade 2.  Ductal carcinoma in situ was present.  Estrogen receptor was negative, progesterone receptor was negative and HER2/nathanael was low at 2+ by IHC but subsequently nonamplified by FISH amplification..  Ductal carcinoma in situ.     The patient reports a medical oncology for therapeutic planning and review for possible neoadjuvant therapies        Pertinent History from December 2024     No new complaints              December 23, 2024 S 24 [id number] status post ultrasound-guided biopsy right breast lesion 10 o'clock position 6 cm from the nipple     Cancer Screening and Prevention  Mammography: 12/18/2024   GI/Colonoscopy: 03/02/2021   GYN: 01/12/2022   Bone Density: Not on file   Covid 19 Vaccination Status:      Oncology Therapeutic Summary:     From February 3, 2025 cycle 1 week 1   initiation phase 1 keynote 522 approach with pembrolizumab 200 mg 5 pills IV every 3 weeks plus paclitaxel 80 mg/m² IV weekly x 12 and carboplatin AUC 1.5 weekly x 12     12/23/2024 S 24 [id number]  status post ultrasound-guided biopsy right breast     Oncology History   Cancer Staging   Invasive ductal carcinoma of breast, female, right (HCC)  Staging form: Breast, AJCC 8th Edition  - Clinical stage from 1/14/2025: Stage IIB (cT2, cN0, cM0, G2, ER-, NE-, HER2-) - Signed by Jordan Esparza MD on 1/27/2025  Histopathologic type: Infiltrating duct carcinoma, NOS  Stage prefix: Initial diagnosis  Method of lymph node assessment: Clinical  Histologic grading system: 3 grade system      Oncology History   Invasive ductal carcinoma of breast, female, right (HCC)   1/14/2025 -  Cancer Staged     Staging form: Breast, AJCC 8th Edition  - Clinical stage from 1/14/2025: Stage IIB (cT2, cN0, cM0, G2, ER-, NE-, HER2-) - Signed by Jordan Esparza MD on 1/27/2025  Histopathologic type: Infiltrating duct carcinoma, NOS  Stage prefix: Initial diagnosis  Method of lymph node assessment:  Clinical  Histologic grading system: 3 grade system         1/27/2025 Initial Diagnosis     Invasive ductal carcinoma of breast, female, right (HCC)      Malignant neoplasm of upper-outer quadrant of right breast in female, estrogen receptor negative (HCC)   1/27/2025 Initial Diagnosis     Malignant neoplasm of upper-outer quadrant of right breast in female, estrogen receptor negative (HCC)      1/28/2025 -  Chemotherapy     DOXOrubicin (ADRIAMYCIN), 60 mg/m2 = 129 mg, Intravenous, Once, 0 of 4 cycles  alteplase (CATHFLO), 2 mg, Intracatheter, Every 1 Minute as needed, 0 of 17 cycles  pegfilgrastim-apgf (Nyvepria), 6 mg, Subcutaneous, Once, 0 of 4 cycles  cyclophosphamide (CYTOXAN) IVPB, 600 mg/m2 = 1,290 mg, Intravenous, Once, 0 of 4 cycles  fosaprepitant (EMEND) IVPB, 150 mg, Intravenous, Once, 0 of 4 cycles  CARBOplatin (PARAPLATIN) IVPB (GOG AUC DOSING), 38.52 mg (100 % of original dose 38.52 mg), Intravenous, Once, 0 of 4 cycles  Dose modification:   (original dose 38.52 mg, Cycle 1),   (original dose 38.52 mg, Cycle 1, Reason: Other (Must fill in a comment), Comment: lab estimates),   (original dose 38.52 mg, Cycle 1),   (original dose 38.52 mg, Cycle 1, Reason: Other (Must fill in a comment))  PACLItaxel (TAXOL) chemo IVPB, 80 mg/m2 = 172.2 mg, Intravenous, Once, 0 of 4 cycles  pembrolizumab (KEYTRUDA) IVPB, 200 mg, Intravenous, Once, 0 of 17 cycles         Review of Systems   Constitutional: Negative.    All other systems reviewed and are negative.     Medical History Reviewed by provider this encounter:     .    Medications Ordered Prior to Encounter          Current Outpatient Medications on File Prior to Visit   Medication Sig Dispense Refill    atorvastatin (LIPITOR) 20 mg tablet TAKE ONE TABLET BY MOUTH EVERY DAY 90 tablet 1    doxycycline hyclate (VIBRAMYCIN) 100 mg capsule Take 100 mg by mouth daily as needed        hydroCHLOROthiazide 12.5 mg tablet TAKE ONE TABLET BY MOUTH EVERY DAY 30 tablet 5     "Multiple Vitamin (MULTIVITAMINS PO) Take by mouth        nebivolol (BYSTOLIC) 20 MG tablet TAKE ONE TABLET BY MOUTH EVERY DAY 90 tablet 1      No current facility-administered medications on file prior to visit.         Social History               Tobacco Use    Smoking status: Never    Smokeless tobacco: Never   Vaping Use    Vaping status: Never Used   Substance and Sexual Activity    Alcohol use: Yes    Drug use: No    Sexual activity: Yes       Partners: Male       Birth control/protection: Post-menopausal             Objective     /76 (BP Location: Left arm, Patient Position: Sitting, Cuff Size: Adult)   Pulse 79   Temp 97.8 °F (36.6 °C) (Temporal)   Resp 18   Ht 5' 6\" (1.676 m)   Wt 108 kg (239 lb)   LMP  (LMP Unknown)   SpO2 95%   BMI 38.58 kg/m²      Pain Screening:  Pain Score: 0-No pain  ECOG ECOG Performance Status: 0 - Fully active, able to carry on all pre-disease performance without restriction 0  Physical Exam  Vitals and nursing note reviewed. Exam conducted with a chaperone present.   Neck:      Thyroid: No thyroid mass, thyromegaly or thyroid tenderness.      Trachea: Trachea normal.   Cardiovascular:      Rate and Rhythm: Normal rate and regular rhythm.      Pulses: Normal pulses.      Heart sounds: Murmur heard.      Systolic murmur is present with a grade of 2/6.      No diastolic murmur is present.      No friction rub. No gallop. No S3 or S4 sounds.   Pulmonary:      Effort: Pulmonary effort is normal.      Breath sounds: Normal breath sounds. No stridor, decreased air movement or transmitted upper airway sounds. No decreased breath sounds, wheezing, rhonchi or rales.   Chest:      Chest wall: Mass present. No deformity, swelling, tenderness, crepitus or edema. There is no dullness to percussion.   Breasts:     Right: Mass present. No nipple discharge, skin change or tenderness.      Left: Normal.      Comments: 1.55 1.7 cm mass in the right mid to upper outer " breasts  Abdominal:      General: Abdomen is protuberant. Bowel sounds are normal.      Palpations: Abdomen is soft. There is no shifting dullness, hepatomegaly, splenomegaly, mass or pulsatile mass.      Tenderness: There is no abdominal tenderness. There is no right CVA tenderness or left CVA tenderness.   Musculoskeletal:      Cervical back: Full passive range of motion without pain and normal range of motion. No edema, rigidity, bony tenderness or crepitus. No pain with movement, spinous process tenderness or muscular tenderness.      Thoracic back: No bony tenderness.      Lumbar back: No bony tenderness.      Right lower leg: No edema.      Left lower leg: No edema.   Lymphadenopathy:      Cervical: No cervical adenopathy.      Right cervical: No superficial, deep or posterior cervical adenopathy.     Left cervical: No superficial, deep or posterior cervical adenopathy.      Upper Body:      Right upper body: No supraclavicular, axillary or pectoral adenopathy.      Left upper body: No supraclavicular, axillary or pectoral adenopathy.   Neurological:      Mental Status: She is alert.            Labs: I have reviewed the following labs:        Lab Results   Component Value Date/Time     WBC 8.89 09/13/2024 11:01 AM     RBC 4.36 09/13/2024 11:01 AM     Hemoglobin 13.8 09/13/2024 11:01 AM     Hematocrit 42.8 09/13/2024 11:01 AM     MCV 98 09/13/2024 11:01 AM     MCH 31.7 09/13/2024 11:01 AM     RDW 12.4 09/13/2024 11:01 AM     Platelets 181 09/13/2024 11:01 AM            Lab Results   Component Value Date/Time     Potassium 4.0 01/09/2025 09:38 AM     Chloride 103 01/09/2025 09:38 AM     CO2 24 01/09/2025 09:38 AM     BUN 20 01/09/2025 09:38 AM     Creatinine 0.68 01/09/2025 09:38 AM     Glucose, Fasting 113 (H) 01/09/2025 09:38 AM     Calcium 8.8 01/09/2025 09:38 AM     AST 15 01/09/2025 09:38 AM     ALT 11 01/09/2025 09:38 AM     Alkaline Phosphatase 101 01/09/2025 09:38 AM     Total Protein 7.1 01/09/2025  09:38 AM     Albumin 4.1 01/09/2025 09:38 AM     Total Bilirubin 0.60 01/09/2025 09:38 AM     eGFR 92 01/09/2025 09:38 AM            Pathology:      December 23, 2024 S 24 [id number] status post ultrasound-guided biopsy right breast lesion 10 o'clock position 6 cm from the nipple  Final Diagnosis   A. Breast, Right, us guided breast bx, 10 ocl, 6 cm fn 3 passes, 12 g:  - Invasive breast carcinoma of no special type (ductal NST/invasive ductal carcinoma).   * [place] grade 2 of 3 (total score: 6 of 9)    -- tubule formation < 10%, score 3    -- nuclear grade 2 of 3, score 2    -- mitoses < 3/mm2, (</= 7 mitoses/10HPF), score 1.   * Confirmed by tumor cell immunophenotype:    -- positive: nuclear stain for GATA3 and membranous stain for E-cadherin and p120.    -- negative:  p63, calponin-B.     * Invasive carcinoma involves 3 of 3 submitted core biopsies, max. dimension = 16 millimeters.  - Ductal carcinoma in situ.   * Estrogen, progesterone & HER2 receptor studies pending, to be described in a separate receptor report.          Synoptic Checklist INVASIVE CARCINOMA OF THE BREAST: Biopsy (INVASIVE CARCINOMA OF THE BREAST: BIOPSY - All Specimens)Protocol posted: 3/22/2023 SPECIMEN Procedure Needle biopsy Specimen Laterality Right .      TUMOR Tumor Site Clock position 10 o'clock   Tumor Site Distance from nipple (Centimeters): 6 cm     Histologic Type Invasive carcinoma of no special type (ductal) Histologic Grade (Mina Histologic Score) Glandular (Acinar) / Tubular Differentiation Score 3 Nuclear Pleomorphism Score 2 Mitotic Rate [id number] Fátima Reyes [id number]   Overall Grade Grade 2 (scores of 6 or 7)      Largest Invasive Focus in this Limited Biopsy Sample 16 mm      Ductal Carcinoma In Situ (DCIS) Present Architectural Patterns Solid Nuclear Grade Grade II (intermediate) Necrosis Not identified      Lymphatic and / or Vascular Invasion Not identified Microcalcifications Not identified .       Estrogen Receptor (ER) Status Negative (less than 1%)      Progesterone Receptor (PgR) Status Negative (less than 1%)      HER2 by Immunohistochemistry Equivocal (Score 2+)      Addendum  01/02/2025 [employer]  Fish  Non amplified  Negative        Imaging:   January 27, 2025 2D cardiac echo  Summary    Left Ventricle: Left ventricular cavity size is normal. Wall thickness is normal. The left ventricular ejection fraction is 60%. Systolic function is normal. Wall motion is normal. Diastolic function is mildly abnormal, consistent with grade I (abnormal) relaxation.    IVS: There is sigmoid appearance of the septum.    Right Ventricle: Right ventricular cavity size is normal. Systolic function is normal.    Left Atrium: The atrium is mildly dilated (35-41 mL/m2).    Right Atrium: The atrium is normal in size.                   January 20, 2025 CT scan of the chest abdomen and pelvis with contrast  IMPRESSION:     A 2.0 cm right breast mass with a biopsy clip in keeping with the patient's recently diagnosed breast cancer.     A 3 mm left upper lobe pulmonary nodule. Based on current Fleischner Society 2017 Guidelines on incidental pulmonary nodule, patients with a known malignancy are at increased risk of metastasis and should receive initial three-month follow-up chest CT.     No evidence of metastatic disease in the abdomen and pelvis.     The study was marked in EPIC for immediate notification.        January 17, 2025 nuclear medicine whole-body bone scan  IMPRESSION:     1. No scintigraphic evidence of osseous metastasis.        December 18, 2024 targeted right breast ultrasound with bilateral diagnostic mammogram     FINDINGS:   RIGHT  1) MASS [B]  Mammo diagnostic bilateral w 3d and cad: There is a 25 mm high density, oval mass with indistinct margins seen in the upper outer quadrant of the right breast at 10 o'clock, 6 cm from the nipple. The mass correlates with the palpable finding noted during physical  examination.   US breast right limited (diagnostic): There is a 19 mm x 17 mm x 22 mm oval, hypoechoic mass with indistinct margins seen in the upper outer quadrant of the right breast at 10 o'clock, 6 cm from the nipple. The mass correlates with the palpable finding noted during physical examination.  Sonographic exam of the right axilla shows no suspicious adenopathy.  Scattered benign-appearing calcifications are demonstrated in the right breast.  Right breast biopsy clip.     Left  Mammo diagnostic bilateral w 3d and cad  There are no suspicious masses, grouped microcalcifications or areas of unexplained architectural distortion. The skin and nipple areolar complex are unremarkable.   Results were discussed with the patient.  Ultrasound-guided biopsy was recommended.     IMPRESSION:  Solid mass in the upper outer right breast corresponding to the area of clinical concern and highly suspicious for malignancy.     ASSESSMENT/BI-RADS CATEGORY:  Left: 1 - Negative  Right: 5 - Highly Suggestive of Malignancy  Overall: 5 - Highly Suggestive of Malignancy     RECOMMENDATION:       - Ultrasound-guided breast biopsy for the right breast.       - Routine screening mammogram in 1 year for the left breast.           February 19, 2024 bilateral screening mammography     FINDINGS:   There are no suspicious masses, grouped microcalcifications or areas of architectural distortion. The skin and nipple areolar complex are unremarkable.     IMPRESSION:  No mammographic evidence of malignancy.           ASSESSMENT/BI-RADS CATEGORY:  Left: 1 - Negative  Right: 1 - Negative  Overall: 1 - Negative     RECOMMENDATION:       - Routine screening mammogram in 1 year for both breasts.                          Instructions      Return in about 1 week (around 2/3/2025).  After Visit Summary (Printed 1/27/2025)  Additional Documentation    Vitals: /76 (BP Location: Left arm, Patient Position: Sitting, Cuff Size: Adult)     Pulse 79    "  Temp 97.8 °F (36.6 °C) (Temporal)     Resp 18     Ht 5' 6\" (1.676 m)     Wt 108 kg (239 lb)     LMP  (LMP Unknown)     SpO2 95%     BMI 38.58 kg/m²     BSA 2.15 m²     Pain Sc 0-No pain   Flowsheets: Vitals Reassessment,     ECOG Performance Status   SmartForms:  AMB TALIN PRE-CHARTING   Encounter Info: Billing Info,     History,     Allergies,     Detailed Report     Communications    View All Conversations on this Encounter    Provider Notes sent to Wagner Gillis MD and Jordan Esparza MD  Media  From this encounter  Procedure Consents - Document on 1/27/2025 10:14 AM: Chemo consent     Linked Episodes    ONCOLOGY TREATMENT Noted 1/26/2025  Orders Placed      Labs    TSH, 3rd generation with Free T4 reflex    T3, free  ...(6 more)    Other Orders    Infusion Calculated Appointment Request    Infusion Calculated Appointment Request  ...(6 more)  All Encounter Results  Other Orders Performed    Ambulatory Referral to Hematology / Oncology Pending Review  Medication Changes      None  Medication List  "

## 2025-01-29 ENCOUNTER — HOSPITAL ENCOUNTER (OUTPATIENT)
Dept: RADIOLOGY | Facility: HOSPITAL | Age: 65
Discharge: HOME | End: 2025-01-29
Payer: COMMERCIAL

## 2025-01-29 VITALS
DIASTOLIC BLOOD PRESSURE: 74 MMHG | BODY MASS INDEX: 32.23 KG/M2 | WEIGHT: 238 LBS | RESPIRATION RATE: 16 BRPM | HEIGHT: 72 IN | TEMPERATURE: 97.6 F | SYSTOLIC BLOOD PRESSURE: 119 MMHG | OXYGEN SATURATION: 93 % | HEART RATE: 72 BPM

## 2025-01-29 DIAGNOSIS — Z17.1 MALIGNANT NEOPLASM OF UPPER-OUTER QUADRANT OF RIGHT BREAST IN FEMALE, ESTROGEN RECEPTOR NEGATIVE (HCC): ICD-10-CM

## 2025-01-29 DIAGNOSIS — C50.411 MALIGNANT NEOPLASM OF UPPER-OUTER QUADRANT OF RIGHT BREAST IN FEMALE, ESTROGEN RECEPTOR NEGATIVE (HCC): ICD-10-CM

## 2025-01-29 LAB
ERYTHROCYTE [DISTWIDTH] IN BLOOD BY AUTOMATED COUNT: 12.8 % (ref 11.6–15.1)
HCT VFR BLD AUTO: 41.2 % (ref 34.8–46.1)
HGB BLD-MCNC: 13.6 G/DL (ref 11.5–15.4)
MCH RBC QN AUTO: 31.5 PG (ref 26.8–34.3)
MCHC RBC AUTO-ENTMCNC: 33 G/DL (ref 31.4–37.4)
MCV RBC AUTO: 95 FL (ref 82–98)
PLATELET # BLD AUTO: 311 THOUSANDS/UL (ref 149–390)
PMV BLD AUTO: 9.5 FL (ref 8.9–12.7)
RBC # BLD AUTO: 4.32 MILLION/UL (ref 3.81–5.12)
WBC # BLD AUTO: 8.54 THOUSAND/UL (ref 4.31–10.16)

## 2025-01-29 PROCEDURE — 99152 MOD SED SAME PHYS/QHP 5/>YRS: CPT | Performed by: RADIOLOGY

## 2025-01-29 PROCEDURE — 77001 FLUOROGUIDE FOR VEIN DEVICE: CPT

## 2025-01-29 PROCEDURE — 77001 FLUOROGUIDE FOR VEIN DEVICE: CPT | Performed by: RADIOLOGY

## 2025-01-29 PROCEDURE — 36561 INSERT TUNNELED CV CATH: CPT | Performed by: RADIOLOGY

## 2025-01-29 PROCEDURE — 76937 US GUIDE VASCULAR ACCESS: CPT

## 2025-01-29 PROCEDURE — 36561 INSERT TUNNELED CV CATH: CPT

## 2025-01-29 PROCEDURE — 76937 US GUIDE VASCULAR ACCESS: CPT | Performed by: RADIOLOGY

## 2025-01-29 PROCEDURE — 85027 COMPLETE CBC AUTOMATED: CPT | Performed by: RADIOLOGY

## 2025-01-29 PROCEDURE — 99152 MOD SED SAME PHYS/QHP 5/>YRS: CPT

## 2025-01-29 PROCEDURE — C1788 PORT, INDWELLING, IMP: HCPCS

## 2025-01-29 PROCEDURE — 99153 MOD SED SAME PHYS/QHP EA: CPT

## 2025-01-29 RX ORDER — MIDAZOLAM HYDROCHLORIDE 2 MG/2ML
INJECTION, SOLUTION INTRAMUSCULAR; INTRAVENOUS AS NEEDED
Status: COMPLETED | OUTPATIENT
Start: 2025-01-29 | End: 2025-01-29

## 2025-01-29 RX ORDER — CEFAZOLIN SODIUM 2 G/50ML
2000 SOLUTION INTRAVENOUS ONCE
Status: COMPLETED | OUTPATIENT
Start: 2025-01-29 | End: 2025-01-29

## 2025-01-29 RX ORDER — ACETAMINOPHEN 325 MG/1
650 TABLET ORAL EVERY 4 HOURS PRN
Status: DISCONTINUED | OUTPATIENT
Start: 2025-01-29 | End: 2025-01-30 | Stop reason: HOSPADM

## 2025-01-29 RX ORDER — SODIUM CHLORIDE 9 MG/ML
75 INJECTION, SOLUTION INTRAVENOUS CONTINUOUS
Status: DISCONTINUED | OUTPATIENT
Start: 2025-01-29 | End: 2025-01-30 | Stop reason: HOSPADM

## 2025-01-29 RX ORDER — FENTANYL CITRATE 50 UG/ML
INJECTION, SOLUTION INTRAMUSCULAR; INTRAVENOUS AS NEEDED
Status: COMPLETED | OUTPATIENT
Start: 2025-01-29 | End: 2025-01-29

## 2025-01-29 RX ADMIN — FENTANYL CITRATE 50 MCG: 50 INJECTION INTRAMUSCULAR; INTRAVENOUS at 10:56

## 2025-01-29 RX ADMIN — CEFAZOLIN SODIUM 2000 MG: 2 SOLUTION INTRAVENOUS at 10:38

## 2025-01-29 RX ADMIN — MIDAZOLAM 1 MG: 1 INJECTION INTRAMUSCULAR; INTRAVENOUS at 11:06

## 2025-01-29 RX ADMIN — Medication 10 ML: at 11:12

## 2025-01-29 RX ADMIN — SODIUM CHLORIDE 75 ML/HR: 0.9 INJECTION, SOLUTION INTRAVENOUS at 09:51

## 2025-01-29 RX ADMIN — MIDAZOLAM 1 MG: 1 INJECTION INTRAMUSCULAR; INTRAVENOUS at 11:16

## 2025-01-29 RX ADMIN — MIDAZOLAM 1 MG: 1 INJECTION INTRAMUSCULAR; INTRAVENOUS at 11:23

## 2025-01-29 RX ADMIN — FENTANYL CITRATE 50 MCG: 50 INJECTION INTRAMUSCULAR; INTRAVENOUS at 11:16

## 2025-01-29 RX ADMIN — FENTANYL CITRATE 50 MCG: 50 INJECTION INTRAMUSCULAR; INTRAVENOUS at 11:06

## 2025-01-29 RX ADMIN — Medication 10 ML: at 11:19

## 2025-01-29 RX ADMIN — MIDAZOLAM 1 MG: 1 INJECTION INTRAMUSCULAR; INTRAVENOUS at 10:56

## 2025-01-29 RX ADMIN — FENTANYL CITRATE 50 MCG: 50 INJECTION INTRAMUSCULAR; INTRAVENOUS at 11:23

## 2025-01-29 NOTE — BRIEF OP NOTE (RAD/CATH)
INTERVENTIONAL RADIOLOGY PROCEDURE NOTE    Date: 1/29/2025    Procedure:   Procedure Summary       Date: 01/29/25 Room / Location: [facility] Interventional Radiology    Anesthesia Start:  Anesthesia Stop:     Procedure: IR PORT PLACEMENT Diagnosis:       Malignant neoplasm of upper-outer quadrant of right breast in female, estrogen receptor negative (HCC)      (urgent need chemo)    Scheduled Providers:  Responsible Provider:     Anesthesia Type: Not recorded ASA Status: Not recorded            Preoperative diagnosis:   1. Malignant neoplasm of upper-outer quadrant of right breast in female, estrogen receptor negative (HCC)         Postoperative diagnosis: Same.    Surgeon: Jerrell Rowell MD     Assistant: None. No qualified resident was available.    Blood loss: minimal     Specimens: none     Findings: Left chest port placed with image guidance     Complications: None immediate.    Anesthesia: conscious sedation

## 2025-01-29 NOTE — SEDATION DOCUMENTATION
Pt in IR for port placement procedure performed by Dr. Rowell. Pt tolerated procedure well. Left IJ accessed and closed with suture and glue. Port placed left chest wall. IR bedrest 1 hour start time 1145am. Report given to PUNEET RN.

## 2025-01-29 NOTE — DISCHARGE INSTRUCTIONS
Implanted Venous Access Port     WHAT YOU NEED TO KNOW:   An implanted venous access port is a device used to give treatments and take blood. It may also be called a central venous access device (CVAD). The port is a small container that is placed under your skin, usually in your upper chest. The port is attached to a catheter that enters a large vein.   DISCHARGE INSTRUCTIONS:   Resume your normal diet. Small sips of flat soda will help with mild nausea.  Prevent an infection:   Wash your hands often.  Use soap and water. Clean your hands before and after you care for your port. Remind everyone who cares for your port to wash their hands.   Check your skin for infection every day.  Look for redness, swelling, or fluid oozing from the port site.  Care for your port:   1. You may shower beginning 48 hours after procedure.     2.  Leave glue in place.    3. It is normal for some bruising to occur.    4. Use Tylenol for pain.    5. Limit use of arm on the side that your port was placed. Lift nothing heavier than 5 pounds for 1 week, and then gradually increase activity as tolerated.    6. DO NOT apply ointment, lotion or cream to port site until incision is healed. Allow glue to fall off. DO NOT attempt to peel glue from skin even it it begins to flake.     7. After the port incision is healed you may swim, bathe.  Notify the Interventional Radiologist if you have any of the followin. Fever above 101 F    2. Increased redness or swelling after 1st day.     3. Increased pain after 1st day.    4. Any sign of infection (drainage from port site, skin separation, hot to touch).    5. Persistent nausea or vomiting.    Contact Interventional Radiology at [phone] (AALIYAH PATIENTS: Contact Interventional Radiology at [phone]) (ALEJANDRINA PATIENTS: Contact Interventional Radiology at [phone]).

## 2025-02-03 ENCOUNTER — OFFICE VISIT (OUTPATIENT)
Dept: HEMATOLOGY ONCOLOGY | Facility: CLINIC | Age: 65
End: 2025-02-03
Payer: COMMERCIAL

## 2025-02-03 ENCOUNTER — TELEPHONE (OUTPATIENT)
Dept: HEMATOLOGY ONCOLOGY | Facility: CLINIC | Age: 65
End: 2025-02-03

## 2025-02-03 VITALS
BODY MASS INDEX: 38.41 KG/M2 | RESPIRATION RATE: 16 BRPM | OXYGEN SATURATION: 97 % | HEART RATE: 62 BPM | HEIGHT: 66 IN | SYSTOLIC BLOOD PRESSURE: 122 MMHG | WEIGHT: 239 LBS | TEMPERATURE: 97.2 F | DIASTOLIC BLOOD PRESSURE: 74 MMHG

## 2025-02-03 DIAGNOSIS — C50.411 MALIGNANT NEOPLASM OF UPPER-OUTER QUADRANT OF RIGHT BREAST IN FEMALE, ESTROGEN RECEPTOR NEGATIVE (HCC): Primary | ICD-10-CM

## 2025-02-03 DIAGNOSIS — Z17.1 MALIGNANT NEOPLASM OF UPPER-OUTER QUADRANT OF RIGHT BREAST IN FEMALE, ESTROGEN RECEPTOR NEGATIVE (HCC): Primary | ICD-10-CM

## 2025-02-03 DIAGNOSIS — C50.411 MALIGNANT NEOPLASM OF UPPER-OUTER QUADRANT OF RIGHT BREAST IN FEMALE, ESTROGEN RECEPTOR NEGATIVE (HCC): ICD-10-CM

## 2025-02-03 DIAGNOSIS — Z17.1 MALIGNANT NEOPLASM OF UPPER-OUTER QUADRANT OF RIGHT BREAST IN FEMALE, ESTROGEN RECEPTOR NEGATIVE (HCC): ICD-10-CM

## 2025-02-03 DIAGNOSIS — E86.0 DEHYDRATION: Primary | ICD-10-CM

## 2025-02-03 PROCEDURE — 99214 OFFICE O/P EST MOD 30 MIN: CPT | Performed by: INTERNAL MEDICINE

## 2025-02-03 RX ORDER — SODIUM CHLORIDE 9 MG/ML
20 INJECTION, SOLUTION INTRAVENOUS ONCE
OUTPATIENT
Start: 2025-02-19

## 2025-02-03 RX ORDER — SODIUM CHLORIDE 9 MG/ML
20 INJECTION, SOLUTION INTRAVENOUS ONCE
Status: CANCELLED | OUTPATIENT
Start: 2025-02-05

## 2025-02-03 RX ORDER — ONDANSETRON 8 MG/1
8 TABLET, FILM COATED ORAL EVERY 8 HOURS PRN
Qty: 20 TABLET | Refills: 0 | Status: SHIPPED | OUTPATIENT
Start: 2025-02-03

## 2025-02-03 RX ORDER — SODIUM CHLORIDE 9 MG/ML
20 INJECTION, SOLUTION INTRAVENOUS ONCE
Status: CANCELLED | OUTPATIENT
Start: 2025-02-12

## 2025-02-03 NOTE — TELEPHONE ENCOUNTER
Port placed on January 29th 2025    [facility] would like for patient to start infusions by Wednesday    Pt states voicemail can be left if phone if not answered

## 2025-02-04 ENCOUNTER — PATIENT OUTREACH (OUTPATIENT)
Dept: CASE MANAGEMENT | Facility: OTHER | Age: 65
End: 2025-02-04

## 2025-02-04 ENCOUNTER — TELEPHONE (OUTPATIENT)
Dept: HEMATOLOGY ONCOLOGY | Facility: CLINIC | Age: 65
End: 2025-02-04

## 2025-02-04 ENCOUNTER — APPOINTMENT (OUTPATIENT)
Dept: LAB | Facility: CLINIC | Age: 65
End: 2025-02-04
Payer: COMMERCIAL

## 2025-02-04 DIAGNOSIS — C50.411 MALIGNANT NEOPLASM OF UPPER-OUTER QUADRANT OF RIGHT BREAST IN FEMALE, ESTROGEN RECEPTOR NEGATIVE (HCC): ICD-10-CM

## 2025-02-04 DIAGNOSIS — E86.0 DEHYDRATION: ICD-10-CM

## 2025-02-04 DIAGNOSIS — Z17.1 MALIGNANT NEOPLASM OF UPPER-OUTER QUADRANT OF RIGHT BREAST IN FEMALE, ESTROGEN RECEPTOR NEGATIVE (HCC): ICD-10-CM

## 2025-02-04 LAB
ALBUMIN SERPL BCG-MCNC: 4.4 G/DL (ref 3.5–5)
ALP SERPL-CCNC: 110 U/L (ref 34–104)
ALT SERPL W P-5'-P-CCNC: 14 U/L (ref 7–52)
ANION GAP SERPL CALCULATED.3IONS-SCNC: 11 MMOL/L (ref 4–13)
AST SERPL W P-5'-P-CCNC: 16 U/L (ref 13–39)
BASOPHILS # BLD AUTO: 0.05 THOUSANDS/ΜL (ref 0–0.1)
BASOPHILS NFR BLD AUTO: 1 % (ref 0–1)
BILIRUB SERPL-MCNC: 0.69 MG/DL (ref 0.2–1)
BUN SERPL-MCNC: 17 MG/DL (ref 5–25)
CALCIUM SERPL-MCNC: 9.6 MG/DL (ref 8.4–10.2)
CHLORIDE SERPL-SCNC: 100 MMOL/L (ref 96–108)
CO2 SERPL-SCNC: 28 MMOL/L (ref 21–32)
CREAT SERPL-MCNC: 0.74 MG/DL (ref 0.6–1.3)
EOSINOPHIL # BLD AUTO: 0.19 THOUSAND/ΜL (ref 0–0.61)
EOSINOPHIL NFR BLD AUTO: 2 % (ref 0–6)
ERYTHROCYTE [DISTWIDTH] IN BLOOD BY AUTOMATED COUNT: 12.6 % (ref 11.6–15.1)
GFR SERPL CREATININE-BSD FRML MDRD: 85 ML/MIN/1.73SQ M
GLUCOSE SERPL-MCNC: 113 MG/DL (ref 65–140)
HCT VFR BLD AUTO: 40.7 % (ref 34.8–46.1)
HGB BLD-MCNC: 13.3 G/DL (ref 11.5–15.4)
IMM GRANULOCYTES # BLD AUTO: 0.04 THOUSAND/UL (ref 0–0.2)
IMM GRANULOCYTES NFR BLD AUTO: 0 % (ref 0–2)
LYMPHOCYTES # BLD AUTO: 1.89 THOUSANDS/ΜL (ref 0.6–4.47)
LYMPHOCYTES NFR BLD AUTO: 20 % (ref 14–44)
MCH RBC QN AUTO: 31.4 PG (ref 26.8–34.3)
MCHC RBC AUTO-ENTMCNC: 32.7 G/DL (ref 31.4–37.4)
MCV RBC AUTO: 96 FL (ref 82–98)
MONOCYTES # BLD AUTO: 0.61 THOUSAND/ΜL (ref 0.17–1.22)
MONOCYTES NFR BLD AUTO: 7 % (ref 4–12)
NEUTROPHILS # BLD AUTO: 6.5 THOUSANDS/ΜL (ref 1.85–7.62)
NEUTS SEG NFR BLD AUTO: 70 % (ref 43–75)
NRBC BLD AUTO-RTO: 0 /100 WBCS
PLATELET # BLD AUTO: 329 THOUSANDS/UL (ref 149–390)
PMV BLD AUTO: 10.3 FL (ref 8.9–12.7)
POTASSIUM SERPL-SCNC: 4.4 MMOL/L (ref 3.5–5.3)
PROT SERPL-MCNC: 7.3 G/DL (ref 6.4–8.4)
RBC # BLD AUTO: 4.24 MILLION/UL (ref 3.81–5.12)
SODIUM SERPL-SCNC: 139 MMOL/L (ref 135–147)
WBC # BLD AUTO: 9.28 THOUSAND/UL (ref 4.31–10.16)

## 2025-02-04 PROCEDURE — 85025 COMPLETE CBC W/AUTO DIFF WBC: CPT

## 2025-02-04 PROCEDURE — 36415 COLL VENOUS BLD VENIPUNCTURE: CPT

## 2025-02-04 PROCEDURE — 80053 COMPREHEN METABOLIC PANEL: CPT

## 2025-02-04 NOTE — PROGRESS NOTES
Biopsychosocial and Barriers Assessment    Type of Cancer: Breast  Treatment plan: Begins chemo tomorrow  Noted barriers to care: none  Cultural/Cheondoism concerns: none  Hair Loss/ Wig resources needed: yes discussed    DT completed: yes  DT score: 5/10  Issues noted: none noted    Marital status/Lives with: /Spouse  Pt's support system: Strong family support  Mental Health history: none  Substance Abuse: none    Employment/income source: retired  Concerns with bills (treatment vs household): none  Noted issues with home: none    Narrative note:   OSW placed outreach TC to pt this day. OSW introduced self and role. Pt is a pleasant woman with a dx of breast cancer. She will be starting her chemo tomorrow. She states that she is well supported by her family. OSW educated on the [facility] and the [facility]. She requested that this writer send the information to her. OSW emailed to [email or web address]. We spoke about hair loss and she states she will most likely wear scarves. She plans on having her  shave her head. OSW educated her on the wig salon that the [facility] has.   Pt states she is trying to get as much accomplished as she can, as she is unsure of what to expect once she receives her chemo.   OSW offered to call her and check in, however she states she will call this writer with any needs.  OSW will close the referral at this time, however will be available if any needs present in the future.

## 2025-02-04 NOTE — TELEPHONE ENCOUNTER
Call placed to patient to let her know we need new labs prior to every treatment no more then 7 days prior. Patient is going to obtain today for treatment tomorrow for carbo dosing.

## 2025-02-05 ENCOUNTER — HOSPITAL ENCOUNTER (OUTPATIENT)
Dept: INFUSION CENTER | Facility: CLINIC | Age: 65
Discharge: HOME | End: 2025-02-05
Payer: COMMERCIAL

## 2025-02-05 VITALS
OXYGEN SATURATION: 94 % | HEIGHT: 68 IN | SYSTOLIC BLOOD PRESSURE: 125 MMHG | WEIGHT: 238 LBS | BODY MASS INDEX: 36.07 KG/M2 | HEART RATE: 65 BPM | TEMPERATURE: 97.6 F | DIASTOLIC BLOOD PRESSURE: 78 MMHG

## 2025-02-05 DIAGNOSIS — Z17.1 MALIGNANT NEOPLASM OF UPPER-OUTER QUADRANT OF RIGHT BREAST IN FEMALE, ESTROGEN RECEPTOR NEGATIVE (HCC): Primary | ICD-10-CM

## 2025-02-05 DIAGNOSIS — C50.411 MALIGNANT NEOPLASM OF UPPER-OUTER QUADRANT OF RIGHT BREAST IN FEMALE, ESTROGEN RECEPTOR NEGATIVE (HCC): Primary | ICD-10-CM

## 2025-02-05 DIAGNOSIS — E86.0 DEHYDRATION: ICD-10-CM

## 2025-02-05 RX ORDER — SODIUM CHLORIDE 9 MG/ML
20 INJECTION, SOLUTION INTRAVENOUS ONCE
OUTPATIENT
Start: 2025-02-26

## 2025-02-05 RX ORDER — SODIUM CHLORIDE 9 MG/ML
20 INJECTION, SOLUTION INTRAVENOUS ONCE
OUTPATIENT
Start: 2025-03-12

## 2025-02-05 RX ORDER — SODIUM CHLORIDE 9 MG/ML
20 INJECTION, SOLUTION INTRAVENOUS ONCE
OUTPATIENT
Start: 2025-03-05

## 2025-02-05 RX ORDER — SODIUM CHLORIDE 9 MG/ML
20 INJECTION, SOLUTION INTRAVENOUS ONCE
Status: COMPLETED | OUTPATIENT
Start: 2025-02-05 | End: 2025-02-05

## 2025-02-05 RX ADMIN — CARBOPLATIN 178.95 MG: 10 INJECTION INTRAVENOUS at 16:17

## 2025-02-05 RX ADMIN — SODIUM CHLORIDE 20 ML/HR: 9 INJECTION, SOLUTION INTRAVENOUS at 12:39

## 2025-02-05 RX ADMIN — PACLITAXEL 172.2 MG: 6 INJECTION, SOLUTION INTRAVENOUS at 15:01

## 2025-02-05 RX ADMIN — DIPHENHYDRAMINE HYDROCHLORIDE 25 MG: 50 INJECTION, SOLUTION INTRAMUSCULAR; INTRAVENOUS at 14:18

## 2025-02-05 RX ADMIN — SODIUM CHLORIDE 200 MG: 9 INJECTION, SOLUTION INTRAVENOUS at 13:14

## 2025-02-05 RX ADMIN — DEXAMETHASONE SODIUM PHOSPHATE: 10 INJECTION, SOLUTION INTRAMUSCULAR; INTRAVENOUS at 13:58

## 2025-02-05 RX ADMIN — SODIUM CHLORIDE 500 ML: 0.9 INJECTION, SOLUTION INTRAVENOUS at 13:58

## 2025-02-05 RX ADMIN — FAMOTIDINE 20 MG: 10 INJECTION INTRAVENOUS at 14:39

## 2025-02-05 NOTE — PROGRESS NOTES
Patient tolerated her treatment without any adverse reactions. Next appointment confirmed 2/12/25 at 1300 at [place]. Copy of her schedule given

## 2025-02-07 NOTE — PROGRESS NOTES
Office Visit    2/10/2025  [facility] Nic Machado MD  Medical Oncology Malignant neoplasm of upper-outer quadrant of right breast in female, estrogen receptor negative (HCC)  Dx Follow-up   Reason for Visit     Progress Notes  Kosta Machado MD (Physician)  Medical Oncology  VISIT  02/10/2025  [facility] Nic Machado MD  Medical Oncology Malignant neoplasm of upper-outer quadrant of right breast in female, estrogen receptor negative (HCC) +8 more  Dx Consult; Referred by Jordan Esparza MD  Reason for Visit      Progress Notes  Kosta Machado MD (Physician)  Medical Oncology  Expand All Collapse All  Name: Fátima Reyes      : 1960      MRN: [id number]  Encounter Provider: Kosta Machado MD  Encounter Date: 02/10/2025  Encounter department: [facility] [place]           Chief Complaint   Patient presents with    visit      :  Assessment & Plan  Malignant neoplasm of upper-outer quadrant of right breast in female, estrogen receptor negative (HCC)  Newly diagnosed palpable clinical T2 clinical N0 triple negative Invasive ductal breast cancer established by biopsy 2024.   CT scan of the chest abdomen pelvis and nuclear medicine whole-body bone scan from 2025 reveal no evidence of metastatic disease     The patient tolerated Week 1 of 12 exceedingly well. With virtually no ill effects.     Will continue preoperative neoadjuvant chemoimmunotherapy per keynote 522 trial approach with pembrolizumab 200 mg IV flat dose every 3 weeks plus paclitaxel 80 mg/m² IV weekly x 12 and carboplatin AUC 1.5 weekly x 12          Orders:    CARBOplatin (PARAPLATIN) 38.52 mg in sodium chloride 0.9 % 250 mL IVPB    Oncology Provider Communication    Oncology Provider Communication    Oncology Provider Communication    Oncology Provider Communication    Oncology Provider  Communication    Infusion Calculated Appointment Request; Future    CBC and differential; Future    Comprehensive metabolic panel; Future    TSH, 3rd generation with Free T4 reflex; Future    T3, free; Future    Infusion Calculated Appointment Request; Future    CBC and differential; Future    Comprehensive metabolic panel; Future    Infusion Calculated Appointment Request; Future    CBC and differential; Future    Comprehensive metabolic panel; Future     Class 2 obesity due to excess calories without serious comorbidity with body mass index (BMI) of 39.0 to 39.9 in adult        Family history of malignant neoplasm  Positive breast cancer in mother     Essential hypertension        Hypothyroidism, unspecified type     Orders:    CARBOplatin (PARAPLATIN) 38.52 mg in sodium chloride 0.9 % 250 mL IVPB    Oncology Provider Communication    Oncology Provider Communication     Hyperlipidemia, unspecified hyperlipidemia type        Invasive ductal carcinoma of breast, female, right (HCC)     Dehydration           Solitary pulmonary nodule                 Clinical/Pathologic Stage IIB     Cancer Staging   No matching staging information was found for the patient.        Current Therapy C1W1  Keynote 522 trial data set approach phase 1 with pembrolizumab every 3 weeks IV plus paclitaxel IV weekly x 12+ carboplatin IV weekly x 12     Discussion  This delightful postmenopausal woman presents following week 1 of 12 preoperative systemic therapy for newly diagnosed grade 2 invasive ductal carcinoma of the right breast.  She has been initiated on the keynote 522 regimen with chemo and immunotherapy.  Phase 1 will include intravenous pembrolizumab every 3 weeks along with low-dose weekly chemotherapy as paclitaxel weekly x 12+ carboplatin weekly x 12.       Today represents cycle 1 day 6     I reviewed therapeutic approaches and precautions.          Background History:   The patient became self-aware of palpable right breast  mass in the fall 2024 and presented for diagnostic reviews.  Her prior mammography on  February 19,2024 was BI-RADS 1 were negative.     The patient's risk factors for the development of breast cancer and solid tumor malignancy include age, postmenopausal status, gender and obesity. Her mother has a diagnosis of breast cancer at age 80 plus of unclear documentation and scope. Her mother is living and well.     The patient has no clinical evidence of metastases.     This patient is approached with favor in a curative intent.  She clearly carries the expectation of an excellent opportunity for a robust tumor response and favorable outcomes over time.  I had a long discussion with the patient and her  regarding the use of chemoimmunotherapy in a neoadjuvant approach fashion and after the keynote 522 trial data set.  I discussed opportunities and the implication of pathologic complete remission and extension with favor of both event free survival and overall survival.  Please see [place] Journal of Medicine from September 2024 lead author Ambreen WOODWARD     The patient and her  were given handwritten sheet outlining all aspects of our discussion today and expressed understanding clarity and gratitude.  She is scheduled for Mediport catheter placement and we will move forward with phase 1 of care that includes the use of pembrolizumab every 3 weeks plus paclitaxel plus carboplatin weekly x 12.           Special Studies  December 27, 2024 germline genetic panel  No high risk germline mutations                 GENETIC ANALYSIS OVERALL INTERPRETATION   Date Value Ref Range Status   12/27/2024     Final     NEGATIVE: No Clinically Significant Variants Detected   12/27/2024 Variants of Unknown Significance Detected   Final                    INTERPRETATION   Date Value Ref Range Status   12/27/2024 See Notes   Final       Comment:       No pathogenic mutations, variants of unknown significance, or gross deletions  or duplications were detected.  Risk Estimate: low likelihood of variants in the genes analyzed contributing to this individual's clinical history.  Genetic counseling is a recommended option for all individuals undergoing genetic testing.  Genes Analyzed (13 total): EVA, BARD1, BRCA1, BRCA2, CDH1, CHEK2, NF1, PALB2, PTEN, RAD51C, RAD51D, STK11 and TP53 (sequencing and deletion/duplication).   12/27/2024 See Notes   Final       Comment:       No known clinically actionable alterations were detected.  Four variants of unknown significance were detected: two in the MSH3 gene, one in the POLE gene, and one in the SDHA gene.  Risk Estimate: should be based on clinical and family history, as the clinical significance of this result is unknown.  Genetic counseling is a recommended option for all individuals undergoing genetic testing.  This individual is heterozygous for the p.N118I (c.353A>T) and p.N861H (c.2581A>C) variants of unknown significance in the MSH3 gene, heterozygous for the p.Y8958L (c.4168C>T) variant of unknown significance in the POLE gene, and heterozygous for the p.K480E (c.1438A>G) variant of unknown significance in the SDHA gene, which may or may not contribute to this individual's clinical history. Familial testing would be necessary to determine if the alterations in MSH3 are on the same chromosome or on different chromosomes (in cis or trans). Refer to the supplementary pages for additional information on these variants. No additional pathogenic mutations, variants of unknown significance, or gross deletions or duplications were detected. Genes Analyzed (59 total): APC, EVA, BAP1, BARD1, BMPR1A, BRCA1, BRCA2, BRIP1, CDH1, CDK4, CDKN1B, CDKN2A, CHEK2, DICER1, FH, FLCN, KIF1B, MAX, MEN1, MET, MLH1, MSH2, MSH6, MUTYH, NF1, NTHL1, PALB2, PMS2, POT1, PTEN, RAD51C, RAD51D, RB1, RET, SDHA, SDHAF2, SDHB, SDHC, SDHD, SMAD4, SMARCA4, STK11, EWVZ567, TP53, TSC1, TSC2 and VHL (sequencing and  deletion/duplication); AXIN2, CTNNA1, EGLN1, HOXB13, KIT, MITF, MSH3, PDGFRA, POLD1 and POLE (sequencing only); EPCAM and GREM1 (deletion/duplication only). RNA data is routinely analyzed for use in variant interpretation for all genes.                  GENES NOT REPORTED   Date Value Ref Range Status   2024     Final     EVA,BARD1,CDH1,CHEK2,PALB2,PTEN,RAD51C,RAD51D,STK11,TP53,NF1,BRCA1,BRCA2   2024     Final     APC,EVA,AXIN2,BAP1,BARD1,BMPR1A,BRCA1,BRCA2,BRIP1,CDH1,CDK4,CDKN1B,CDKN2A,CHEK2,CTNNA1,DICER1,EGLN1,EPCAM,FH,FLCN,GREM1,HOXB13,KIF1B,KIT,MAX,MEN1,MET,MITF,MLH1,MSH2,MSH6,MUTYH,NF1,NTHL1,PALB2,PDGFRA,PMS2,POLD1,POT1,PTEN,RAD51C,RAD51D,RB1,RET,SDHAF2,SDHB,  SDHC,SDHD,SMAD4,SMARCA4,STK11,OBLC849,TP53,TSC1,TSC2,VHL            History of Present Illness        This delightful 65-year-old presents with her  to review a new diagnosis of invasive carcinoma of the right breast.  The patient is  2 para 2 and postmenopausal.  The patient's mother at age 80 was revealed to have a diagnosis of breast cancer status post excision only.  The mother is living and well     The patient presented in the 2024 [place] with self-awareness of her right breast mass.  Patient presented for diagnostic reviews through her gynecologist and primary care physicians.  Bilateral diagnostic mammogram with targeted right breast ultrasound was performed on 2024.  25 mm oval mass density with indistinct margins was seen in the upper outer quadrant of the right breast at the 10 o'clock position at 6 cm from the nipple.  This mass correlating with the palpable finding on examination.  Targeted ultrasound of the right breast showed a 19 mm x 17 mm x 22 mm oval hypoechoic mass with indistinct margins seen in the upper outer quadrant of the right breast at 10:00.  There were no suspicious adenopathy in the right axilla.  The left breast showed no suspicious masses, grouped  microcalcifications or areas of unexplained architectural distortion.     On December 23, 2024 ultrasound-guided biopsy of the right breast mass with mammography was performed without complication.  Final pathology revealed an invasive breast carcinoma no special type ductal of grade 2.  Ductal carcinoma in situ was present.  Estrogen receptor was negative, progesterone receptor was negative and HER2/nathanael was low at 2+ by IHC but subsequently nonamplified by FISH amplification..  Ductal carcinoma in situ.     The patient reports a medical oncology for therapeutic planning and review for possible neoadjuvant therapies        Pertinent History from December 2024     No new complaints              December 23, 2024 S 24 [id number] status post ultrasound-guided biopsy right breast lesion 10 o'clock position 6 cm from the nipple     Cancer Screening and Prevention  Mammography: 12/18/2024   GI/Colonoscopy: 03/02/2021   GYN: 01/12/2022   Bone Density: Not on file   Covid 19 Vaccination Status:      Oncology Therapeutic Summary:     From February 3, 2025 cycle 1 week 1   initiation phase 1 keynote 522 approach with pembrolizumab 200 mg 5 pills IV every 3 weeks plus paclitaxel 80 mg/m² IV weekly x 12 and carboplatin AUC 1.5 weekly x 12     12/23/2024 S 24 [id number]  status post ultrasound-guided biopsy right breast     Oncology History   Cancer Staging   Invasive ductal carcinoma of breast, female, right (HCC)  Staging form: Breast, AJCC 8th Edition  - Clinical stage from 1/14/2025: Stage IIB (cT2, cN0, cM0, G2, ER-, KY-, HER2-) - Signed by Jordan Esparza MD on 1/27/2025  Histopathologic type: Infiltrating duct carcinoma, NOS  Stage prefix: Initial diagnosis  Method of lymph node assessment: Clinical  Histologic grading system: 3 grade system        Oncology History   Invasive ductal carcinoma of breast, female, right (HCC)   1/14/2025 -  Cancer Staged     Staging form: Breast, AJCC 8th Edition  - Clinical stage from 1/14/2025:  Stage IIB (cT2, cN0, cM0, G2, ER-, [place]-, HER2-) - Signed by Jordan Esparza MD on 1/27/2025  Histopathologic type: Infiltrating duct carcinoma, NOS  Stage prefix: Initial diagnosis  Method of lymph node assessment: Clinical  Histologic grading system: 3 grade system         1/27/2025 Initial Diagnosis     Invasive ductal carcinoma of breast, female, right (HCC)      Malignant neoplasm of upper-outer quadrant of right breast in female, estrogen receptor negative (HCC)   1/27/2025 Initial Diagnosis     Malignant neoplasm of upper-outer quadrant of right breast in female, estrogen receptor negative (HCC)      1/28/2025 -  Chemotherapy     DOXOrubicin (ADRIAMYCIN), 60 mg/m2 = 129 mg, Intravenous, Once, 0 of 4 cycles  alteplase (CATHFLO), 2 mg, Intracatheter, Every 1 Minute as needed, 0 of 17 cycles  pegfilgrastim-apgf (Nyvepria), 6 mg, Subcutaneous, Once, 0 of 4 cycles  cyclophosphamide (CYTOXAN) IVPB, 600 mg/m2 = 1,290 mg, Intravenous, Once, 0 of 4 cycles  fosaprepitant (EMEND) IVPB, 150 mg, Intravenous, Once, 0 of 4 cycles  CARBOplatin (PARAPLATIN) IVPB (GOG AUC DOSING), 38.52 mg (100 % of original dose 38.52 mg), Intravenous, Once, 0 of 4 cycles  Dose modification:   (original dose 38.52 mg, Cycle 1),   (original dose 38.52 mg, Cycle 1, Reason: Other (Must fill in a comment), Comment: lab estimates),   (original dose 38.52 mg, Cycle 1),   (original dose 38.52 mg, Cycle 1, Reason: Other (Must fill in a comment))  PACLItaxel (TAXOL) chemo IVPB, 80 mg/m2 = 172.2 mg, Intravenous, Once, 0 of 4 cycles  pembrolizumab (KEYTRUDA) IVPB, 200 mg, Intravenous, Once, 0 of 17 cycles         Review of Systems   Constitutional: Negative.    All other systems reviewed and are negative.     Medical History Reviewed by provider this encounter:     .    Medications Ordered Prior to Encounter               Current Outpatient Medications on File Prior to Visit   Medication Sig Dispense Refill    atorvastatin (LIPITOR) 20 mg tablet TAKE  "ONE TABLET BY MOUTH EVERY DAY 90 tablet 1    doxycycline hyclate (VIBRAMYCIN) 100 mg capsule Take 100 mg by mouth daily as needed        hydroCHLOROthiazide 12.5 mg tablet TAKE ONE TABLET BY MOUTH EVERY DAY 30 tablet 5    Multiple Vitamin (MULTIVITAMINS PO) Take by mouth        nebivolol (BYSTOLIC) 20 MG tablet TAKE ONE TABLET BY MOUTH EVERY DAY 90 tablet 1      No current facility-administered medications on file prior to visit.         Social History                   Tobacco Use    Smoking status: Never    Smokeless tobacco: Never   Vaping Use    Vaping status: Never Used   Substance and Sexual Activity    Alcohol use: Yes    Drug use: No    Sexual activity: Yes       Partners: Male       Birth control/protection: Post-menopausal             Objective     /76 (BP Location: Left arm, Patient Position: Sitting, Cuff Size: Adult)   Pulse 79   Temp 97.8 °F (36.6 °C) (Temporal)   Resp 18   Ht 5' 6\" (1.676 m)   Wt 108 kg (239 lb)   LMP  (LMP Unknown)   SpO2 95%   BMI 38.58 kg/m²      Pain Screening:  Pain Score: 0-No pain  ECOG ECOG Performance Status: 0 - Fully active, able to carry on all pre-disease performance without restriction 0  Physical Exam  Vitals and nursing note reviewed. Exam conducted with a chaperone present.   Neck:      Thyroid: No thyroid mass, thyromegaly or thyroid tenderness.      Trachea: Trachea normal.   Cardiovascular:      Rate and Rhythm: Normal rate and regular rhythm.      Pulses: Normal pulses.      Heart sounds: Murmur heard.      Systolic murmur is present with a grade of 2/6.      No diastolic murmur is present.      No friction rub. No gallop. No S3 or S4 sounds.   Pulmonary:      Effort: Pulmonary effort is normal.      Breath sounds: Normal breath sounds. No stridor, decreased air movement or transmitted upper airway sounds. No decreased breath sounds, wheezing, rhonchi or rales.   Chest:      Chest wall: Mass present. No deformity, swelling, tenderness, crepitus or " edema. There is no dullness to percussion.   Breasts:     Right: Mass present. No nipple discharge, skin change or tenderness.      Left: Normal.      Comments: 1.55 1.7 cm mass in the right mid to upper outer breasts  Abdominal:      General: Abdomen is protuberant. Bowel sounds are normal.      Palpations: Abdomen is soft. There is no shifting dullness, hepatomegaly, splenomegaly, mass or pulsatile mass.      Tenderness: There is no abdominal tenderness. There is no right CVA tenderness or left CVA tenderness.   Musculoskeletal:      Cervical back: Full passive range of motion without pain and normal range of motion. No edema, rigidity, bony tenderness or crepitus. No pain with movement, spinous process tenderness or muscular tenderness.      Thoracic back: No bony tenderness.      Lumbar back: No bony tenderness.      Right lower leg: No edema.      Left lower leg: No edema.   Lymphadenopathy:      Cervical: No cervical adenopathy.      Right cervical: No superficial, deep or posterior cervical adenopathy.     Left cervical: No superficial, deep or posterior cervical adenopathy.      Upper Body:      Right upper body: No supraclavicular, axillary or pectoral adenopathy.      Left upper body: No supraclavicular, axillary or pectoral adenopathy.   Neurological:      Mental Status: She is alert.            Labs: I have reviewed the following labs:            Lab Results   Component Value Date/Time     WBC 8.89 09/13/2024 11:01 AM     RBC 4.36 09/13/2024 11:01 AM     Hemoglobin 13.8 09/13/2024 11:01 AM     Hematocrit 42.8 09/13/2024 11:01 AM     MCV 98 09/13/2024 11:01 AM     MCH 31.7 09/13/2024 11:01 AM     RDW 12.4 09/13/2024 11:01 AM     Platelets 181 09/13/2024 11:01 AM                Lab Results   Component Value Date/Time     Potassium 4.0 01/09/2025 09:38 AM     Chloride 103 01/09/2025 09:38 AM     CO2 24 01/09/2025 09:38 AM     BUN 20 01/09/2025 09:38 AM     Creatinine 0.68 01/09/2025 09:38 AM     Glucose,  Fasting 113 (H) 01/09/2025 09:38 AM     Calcium 8.8 01/09/2025 09:38 AM     AST 15 01/09/2025 09:38 AM     ALT 11 01/09/2025 09:38 AM     Alkaline Phosphatase 101 01/09/2025 09:38 AM     Total Protein 7.1 01/09/2025 09:38 AM     Albumin 4.1 01/09/2025 09:38 AM     Total Bilirubin 0.60 01/09/2025 09:38 AM     eGFR 92 01/09/2025 09:38 AM            Pathology:      December 23, 2024 S 24 [id number] status post ultrasound-guided biopsy right breast lesion 10 o'clock position 6 cm from the nipple  Final Diagnosis   A. Breast, Right, us guided breast bx, 10 ocl, 6 cm fn 3 passes, 12 g:  - Invasive breast carcinoma of no special type (ductal NST/invasive ductal carcinoma).   * [place] grade 2 of 3 (total score: 6 of 9)    -- tubule formation < 10%, score 3    -- nuclear grade 2 of 3, score 2    -- mitoses < 3/mm2, (</= 7 mitoses/10HPF), score 1.   * Confirmed by tumor cell immunophenotype:    -- positive: nuclear stain for GATA3 and membranous stain for E-cadherin and p120.    -- negative:  p63, calponin-B.     * Invasive carcinoma involves 3 of 3 submitted core biopsies, max. dimension = 16 millimeters.  - Ductal carcinoma in situ.   * Estrogen, progesterone & HER2 receptor studies pending, to be described in a separate receptor report.          Synoptic Checklist INVASIVE CARCINOMA OF THE BREAST: Biopsy (INVASIVE CARCINOMA OF THE BREAST: BIOPSY - All Specimens)Protocol posted: 3/22/2023 SPECIMEN Procedure Needle biopsy Specimen Laterality Right .      TUMOR Tumor Site Clock position 10 o'clock   Tumor Site Distance from nipple (Centimeters): 6 cm     Histologic Type Invasive carcinoma of no special type (ductal) Histologic Grade (Mina Histologic Score) Glandular (Acinar) / Tubular Differentiation Score 3 Nuclear Pleomorphism Score 2 Mitotic Rate [id number] Fátima Reyes [id number]   Overall Grade Grade 2 (scores of 6 or 7)      Largest Invasive Focus in this Limited Biopsy Sample 16 mm      Ductal Carcinoma In  Situ (DCIS) Present Architectural Patterns Solid Nuclear Grade Grade II (intermediate) Necrosis Not identified      Lymphatic and / or Vascular Invasion Not identified Microcalcifications Not identified .      Estrogen Receptor (ER) Status Negative (less than 1%)      Progesterone Receptor (PgR) Status Negative (less than 1%)      HER2 by Immunohistochemistry Equivocal (Score 2+)      Addendum  01/02/2025 [employer]  Fish  Non amplified  Negative        Imaging:   January 27, 2025 2D cardiac echo  Summary    Left Ventricle: Left ventricular cavity size is normal. Wall thickness is normal. The left ventricular ejection fraction is 60%. Systolic function is normal. Wall motion is normal. Diastolic function is mildly abnormal, consistent with grade I (abnormal) relaxation.    IVS: There is sigmoid appearance of the septum.    Right Ventricle: Right ventricular cavity size is normal. Systolic function is normal.    Left Atrium: The atrium is mildly dilated (35-41 mL/m2).    Right Atrium: The atrium is normal in size.                       January 20, 2025 CT scan of the chest abdomen and pelvis with contrast  IMPRESSION:     A 2.0 cm right breast mass with a biopsy clip in keeping with the patient's recently diagnosed breast cancer.     A 3 mm left upper lobe pulmonary nodule. Based on current Fleischner Society 2017 Guidelines on incidental pulmonary nodule, patients with a known malignancy are at increased risk of metastasis and should receive initial three-month follow-up chest CT.     No evidence of metastatic disease in the abdomen and pelvis.     The study was marked in EPIC for immediate notification.        January 17, 2025 nuclear medicine whole-body bone scan  IMPRESSION:     1. No scintigraphic evidence of osseous metastasis.        December 18, 2024 targeted right breast ultrasound with bilateral diagnostic mammogram     FINDINGS:   RIGHT  1) MASS [B]  Mammo diagnostic bilateral w 3d and cad: There is a 25  mm high density, oval mass with indistinct margins seen in the upper outer quadrant of the right breast at 10 o'clock, 6 cm from the nipple. The mass correlates with the palpable finding noted during physical examination.   US breast right limited (diagnostic): There is a 19 mm x 17 mm x 22 mm oval, hypoechoic mass with indistinct margins seen in the upper outer quadrant of the right breast at 10 o'clock, 6 cm from the nipple. The mass correlates with the palpable finding noted during physical examination.  Sonographic exam of the right axilla shows no suspicious adenopathy.  Scattered benign-appearing calcifications are demonstrated in the right breast.  Right breast biopsy clip.     Left  Mammo diagnostic bilateral w 3d and cad  There are no suspicious masses, grouped microcalcifications or areas of unexplained architectural distortion. The skin and nipple areolar complex are unremarkable.   Results were discussed with the patient.  Ultrasound-guided biopsy was recommended.     IMPRESSION:  Solid mass in the upper outer right breast corresponding to the area of clinical concern and highly suspicious for malignancy.     ASSESSMENT/BI-RADS CATEGORY:  Left: 1 - Negative  Right: 5 - Highly Suggestive of Malignancy  Overall: 5 - Highly Suggestive of Malignancy     RECOMMENDATION:       - Ultrasound-guided breast biopsy for the right breast.       - Routine screening mammogram in 1 year for the left breast.           February 19, 2024 bilateral screening mammography     FINDINGS:   There are no suspicious masses, grouped microcalcifications or areas of architectural distortion. The skin and nipple areolar complex are unremarkable.     IMPRESSION:  No mammographic evidence of malignancy.           ASSESSMENT/BI-RADS CATEGORY:  Left: 1 - Negative  Right: 1 - Negative  Overall: 1 - Negative     RECOMMENDATION:       - Routine screening mammogram in 1 year for both breasts.                           Instructions    "      Return in about 1 week (around 2/3/2025).  After Visit Summary (Printed 1/27/2025)  Additional Documentation     Vitals: /76 (BP Location: Left arm, Patient Position: Sitting, Cuff Size: Adult)     Pulse 79     Temp 97.8 °F (36.6 °C) (Temporal)     Resp 18     Ht 5' 6\" (1.676 m)     Wt 108 kg (239 lb)     LMP  (LMP Unknown)     SpO2 95%     BMI 38.58 kg/m²     BSA 2.15 m²     Pain Sc 0-No pain   Flowsheets: Vitals Reassessment,     ECOG Performance Status   SmartForms:  JANE CROCKETT PRE-CHARTING   Encounter Info: Billing Info,     History,     Allergies,     Detailed Report              "

## 2025-02-10 ENCOUNTER — APPOINTMENT (OUTPATIENT)
Dept: LAB | Facility: CLINIC | Age: 65
End: 2025-02-10
Payer: COMMERCIAL

## 2025-02-10 ENCOUNTER — OFFICE VISIT (OUTPATIENT)
Dept: HEMATOLOGY ONCOLOGY | Facility: CLINIC | Age: 65
End: 2025-02-10
Payer: COMMERCIAL

## 2025-02-10 VITALS
RESPIRATION RATE: 18 BRPM | BODY MASS INDEX: 36.1 KG/M2 | HEART RATE: 77 BPM | WEIGHT: 230 LBS | SYSTOLIC BLOOD PRESSURE: 128 MMHG | TEMPERATURE: 97.3 F | HEIGHT: 67 IN | OXYGEN SATURATION: 95 % | DIASTOLIC BLOOD PRESSURE: 78 MMHG

## 2025-02-10 DIAGNOSIS — Z17.1 MALIGNANT NEOPLASM OF UPPER-OUTER QUADRANT OF RIGHT BREAST IN FEMALE, ESTROGEN RECEPTOR NEGATIVE (HCC): ICD-10-CM

## 2025-02-10 DIAGNOSIS — C50.411 MALIGNANT NEOPLASM OF UPPER-OUTER QUADRANT OF RIGHT BREAST IN FEMALE, ESTROGEN RECEPTOR NEGATIVE (HCC): Primary | ICD-10-CM

## 2025-02-10 DIAGNOSIS — E86.0 DEHYDRATION: ICD-10-CM

## 2025-02-10 DIAGNOSIS — C50.411 MALIGNANT NEOPLASM OF UPPER-OUTER QUADRANT OF RIGHT BREAST IN FEMALE, ESTROGEN RECEPTOR NEGATIVE (HCC): ICD-10-CM

## 2025-02-10 DIAGNOSIS — Z17.1 MALIGNANT NEOPLASM OF UPPER-OUTER QUADRANT OF RIGHT BREAST IN FEMALE, ESTROGEN RECEPTOR NEGATIVE (HCC): Primary | ICD-10-CM

## 2025-02-10 LAB
BASOPHILS # BLD AUTO: 0.04 THOUSANDS/ΜL (ref 0–0.1)
BASOPHILS NFR BLD AUTO: 1 % (ref 0–1)
EOSINOPHIL # BLD AUTO: 0.19 THOUSAND/ΜL (ref 0–0.61)
EOSINOPHIL NFR BLD AUTO: 2 % (ref 0–6)
ERYTHROCYTE [DISTWIDTH] IN BLOOD BY AUTOMATED COUNT: 12.7 % (ref 11.6–15.1)
HCT VFR BLD AUTO: 42.6 % (ref 34.8–46.1)
HGB BLD-MCNC: 13.7 G/DL (ref 11.5–15.4)
IMM GRANULOCYTES # BLD AUTO: 0.02 THOUSAND/UL (ref 0–0.2)
IMM GRANULOCYTES NFR BLD AUTO: 0 % (ref 0–2)
LYMPHOCYTES # BLD AUTO: 1.38 THOUSANDS/ΜL (ref 0.6–4.47)
LYMPHOCYTES NFR BLD AUTO: 18 % (ref 14–44)
MCH RBC QN AUTO: 31.9 PG (ref 26.8–34.3)
MCHC RBC AUTO-ENTMCNC: 32.2 G/DL (ref 31.4–37.4)
MCV RBC AUTO: 99 FL (ref 82–98)
MONOCYTES # BLD AUTO: 0.24 THOUSAND/ΜL (ref 0.17–1.22)
MONOCYTES NFR BLD AUTO: 3 % (ref 4–12)
NEUTROPHILS # BLD AUTO: 5.97 THOUSANDS/ΜL (ref 1.85–7.62)
NEUTS SEG NFR BLD AUTO: 76 % (ref 43–75)
NRBC BLD AUTO-RTO: 0 /100 WBCS
PLATELET # BLD AUTO: 310 THOUSANDS/UL (ref 149–390)
PMV BLD AUTO: 10.8 FL (ref 8.9–12.7)
RBC # BLD AUTO: 4.29 MILLION/UL (ref 3.81–5.12)
WBC # BLD AUTO: 7.84 THOUSAND/UL (ref 4.31–10.16)

## 2025-02-10 PROCEDURE — 99215 OFFICE O/P EST HI 40 MIN: CPT | Performed by: INTERNAL MEDICINE

## 2025-02-10 PROCEDURE — 36415 COLL VENOUS BLD VENIPUNCTURE: CPT

## 2025-02-10 PROCEDURE — 85025 COMPLETE CBC W/AUTO DIFF WBC: CPT

## 2025-02-10 PROCEDURE — 80053 COMPREHEN METABOLIC PANEL: CPT

## 2025-02-11 LAB
ALBUMIN SERPL BCG-MCNC: 4.4 G/DL (ref 3.5–5)
ALP SERPL-CCNC: 89 U/L (ref 34–104)
ALT SERPL W P-5'-P-CCNC: 21 U/L (ref 7–52)
ANION GAP SERPL CALCULATED.3IONS-SCNC: 10 MMOL/L (ref 4–13)
AST SERPL W P-5'-P-CCNC: 23 U/L (ref 13–39)
BILIRUB SERPL-MCNC: 0.78 MG/DL (ref 0.2–1)
BUN SERPL-MCNC: 16 MG/DL (ref 5–25)
CALCIUM SERPL-MCNC: 10.1 MG/DL (ref 8.4–10.2)
CHLORIDE SERPL-SCNC: 100 MMOL/L (ref 96–108)
CO2 SERPL-SCNC: 28 MMOL/L (ref 21–32)
CREAT SERPL-MCNC: 0.76 MG/DL (ref 0.6–1.3)
GFR SERPL CREATININE-BSD FRML MDRD: 82 ML/MIN/1.73SQ M
GLUCOSE P FAST SERPL-MCNC: 115 MG/DL (ref 65–99)
POTASSIUM SERPL-SCNC: 4.8 MMOL/L (ref 3.5–5.3)
PROT SERPL-MCNC: 7.3 G/DL (ref 6.4–8.4)
SODIUM SERPL-SCNC: 138 MMOL/L (ref 135–147)

## 2025-02-12 ENCOUNTER — HOSPITAL ENCOUNTER (OUTPATIENT)
Dept: INFUSION CENTER | Facility: CLINIC | Age: 65
Discharge: HOME | End: 2025-02-12
Payer: COMMERCIAL

## 2025-02-12 VITALS
HEART RATE: 70 BPM | TEMPERATURE: 97.2 F | BODY MASS INDEX: 37.04 KG/M2 | SYSTOLIC BLOOD PRESSURE: 106 MMHG | OXYGEN SATURATION: 97 % | HEIGHT: 67 IN | WEIGHT: 236 LBS | DIASTOLIC BLOOD PRESSURE: 74 MMHG

## 2025-02-12 DIAGNOSIS — E86.0 DEHYDRATION: ICD-10-CM

## 2025-02-12 DIAGNOSIS — Z17.1 MALIGNANT NEOPLASM OF UPPER-OUTER QUADRANT OF RIGHT BREAST IN FEMALE, ESTROGEN RECEPTOR NEGATIVE (HCC): ICD-10-CM

## 2025-02-12 DIAGNOSIS — E03.9 HYPOTHYROIDISM, UNSPECIFIED TYPE: Primary | ICD-10-CM

## 2025-02-12 DIAGNOSIS — C50.411 MALIGNANT NEOPLASM OF UPPER-OUTER QUADRANT OF RIGHT BREAST IN FEMALE, ESTROGEN RECEPTOR NEGATIVE (HCC): ICD-10-CM

## 2025-02-12 PROCEDURE — 96367 TX/PROPH/DG ADDL SEQ IV INF: CPT

## 2025-02-12 PROCEDURE — 96417 CHEMO IV INFUS EACH ADDL SEQ: CPT

## 2025-02-12 PROCEDURE — 96413 CHEMO IV INFUSION 1 HR: CPT

## 2025-02-12 RX ORDER — SODIUM CHLORIDE 9 MG/ML
20 INJECTION, SOLUTION INTRAVENOUS ONCE
Status: COMPLETED | OUTPATIENT
Start: 2025-02-12 | End: 2025-02-12

## 2025-02-12 RX ADMIN — PACLITAXEL 172.2 MG: 6 INJECTION, SOLUTION INTRAVENOUS at 14:37

## 2025-02-12 RX ADMIN — SODIUM CHLORIDE 20 ML/HR: 0.9 INJECTION, SOLUTION INTRAVENOUS at 13:13

## 2025-02-12 RX ADMIN — FAMOTIDINE 20 MG: 10 INJECTION INTRAVENOUS at 13:56

## 2025-02-12 RX ADMIN — DEXAMETHASONE SODIUM PHOSPHATE: 10 INJECTION, SOLUTION INTRAMUSCULAR; INTRAVENOUS at 13:15

## 2025-02-12 RX ADMIN — SODIUM CHLORIDE 500 ML: 0.9 INJECTION, SOLUTION INTRAVENOUS at 13:14

## 2025-02-12 RX ADMIN — DIPHENHYDRAMINE HYDROCHLORIDE 25 MG: 50 INJECTION, SOLUTION INTRAMUSCULAR; INTRAVENOUS at 13:36

## 2025-02-12 RX ADMIN — CARBOPLATIN 175.2 MG: 10 INJECTION INTRAVENOUS at 15:53

## 2025-02-12 NOTE — PROGRESS NOTES
Patient tolerated treatment today without issue. PAC remains with brisk blood return, flushed well. Deaccessed by Shanice LERMA, bandaid in place. Patient aware of next appt at [facility] infusion on 2/19 at 0900, declines AVS.

## 2025-02-14 ENCOUNTER — PATIENT OUTREACH (OUTPATIENT)
Dept: HEMATOLOGY ONCOLOGY | Facility: CLINIC | Age: 65
End: 2025-02-14

## 2025-02-14 NOTE — PROGRESS NOTES
I reached out and spoke with Fátima to follow up and to review for any new changes in barriers to care and offer supportive services as needed.     Barriers noted previously and outcome of interventions;  No previous barriers and no new barriers at this time. Fátima mentioned that currently all is well and she has no questions or concerns at this time. Patient did mention she has a follow up with her physician on Monday.    Reviewed for any new barriers as noted below.    Are you having any side effects from your treatment? NO.    Are you eating and drinking normally? YES-NO ISSUES.    Have you been experiencing any uncontrolled pain related to your cancer diagnosis? NO.    If not already established, have needs changed for a palliative care referral? NO.    Do you have a good support system? YES.    Are you interested in any support groups? N/A.    How are you doing with transportation to your appointments? PATIENT DRIVES/ HAS BEEN TAKING HER TO AND FROM HER APPOINTMENTS RECENTLY.    Do you have any questions or concerns regarding your treatment plan? NO- PATIENT MENTIONED THAT SHE HAS AN APPOINTMENT WITH [facility]ICAL [facility] ON MONDAY 2/17.    Any new financial concerns for your household or medical bills? No.    Do you know when your upcoming appointments are? YES.    Future Appointments   Date Time Provider Department Center   2/17/2025  8:00 AM [facility] FAST TRACK 2 [facility]   2/17/2025  8:40 AM Kosta Machado MD [facility]   2/19/2025  9:00 AM [facility] INF CHAIR 5 [facility]   2/24/2025  8:40 AM Kosta Machado MD [facility]   2/26/2025  8:30 AM [facility] INF CHAIR 4 [facility]   3/3/2025  8:40 AM Kosta Machado MD [facility]   3/5/2025  8:30 AM AN INF CHAIR 12 [facility]   3/10/2025  8:40 AM Kosta Machado MD [facility]   3/12/2025  8:30 AM [facility] INF CHAIR 8 [facility]   3/17/2025  8:40 AM Kosta  GILBERTO Machado MD [facility]   3/19/2025  9:00 AM AN INF CHAIR 5 [facility]   3/24/2025  8:40 AM Kosta Machado MD [facility]   3/26/2025  8:30 AM AN INF CHAIR 8 [facility]fusion [facility]   3/31/2025  8:40 AM Kosta Machado MD [facility]   4/2/2025  8:30 AM AN INF CHAIR 8 [facility] Infusion [facility]   4/7/2025  8:40 AM Kosta Machado MD [facility]   4/8/2025 11:00 AM DO [facility]   4/21/2025 10:00 AM AN CT 2 AN [facility]          Based on individual needs I will follow up with them in 4/6 weeks. I have provided my direct contact information and welcome them to contact me if their needs as discussed above change. They were appreciative for the call.

## 2025-02-17 ENCOUNTER — OFFICE VISIT (OUTPATIENT)
Dept: HEMATOLOGY ONCOLOGY | Facility: CLINIC | Age: 65
End: 2025-02-17
Payer: COMMERCIAL

## 2025-02-17 ENCOUNTER — HOSPITAL ENCOUNTER (OUTPATIENT)
Dept: INFUSION CENTER | Facility: CLINIC | Age: 65
Discharge: HOME | End: 2025-02-17
Payer: COMMERCIAL

## 2025-02-17 VITALS
HEIGHT: 67 IN | OXYGEN SATURATION: 98 % | BODY MASS INDEX: 36.88 KG/M2 | DIASTOLIC BLOOD PRESSURE: 80 MMHG | HEART RATE: 73 BPM | RESPIRATION RATE: 15 BRPM | TEMPERATURE: 97.2 F | WEIGHT: 235 LBS | SYSTOLIC BLOOD PRESSURE: 120 MMHG

## 2025-02-17 DIAGNOSIS — I10 ESSENTIAL HYPERTENSION: ICD-10-CM

## 2025-02-17 DIAGNOSIS — C50.411 MALIGNANT NEOPLASM OF UPPER-OUTER QUADRANT OF RIGHT BREAST IN FEMALE, ESTROGEN RECEPTOR NEGATIVE (HCC): Primary | ICD-10-CM

## 2025-02-17 DIAGNOSIS — Z95.828 PORT-A-CATH IN PLACE: Primary | ICD-10-CM

## 2025-02-17 DIAGNOSIS — Z17.1 MALIGNANT NEOPLASM OF UPPER-OUTER QUADRANT OF RIGHT BREAST IN FEMALE, ESTROGEN RECEPTOR NEGATIVE (HCC): Primary | ICD-10-CM

## 2025-02-17 LAB
ALBUMIN SERPL BCG-MCNC: 4.1 G/DL (ref 3.5–5)
ALP SERPL-CCNC: 87 U/L (ref 34–104)
ALT SERPL W P-5'-P-CCNC: 16 U/L (ref 7–52)
ANION GAP SERPL CALCULATED.3IONS-SCNC: 7 MMOL/L (ref 4–13)
AST SERPL W P-5'-P-CCNC: 14 U/L (ref 13–39)
BASOPHILS # BLD AUTO: 0.03 THOUSANDS/ΜL (ref 0–0.1)
BASOPHILS NFR BLD AUTO: 1 % (ref 0–1)
BILIRUB SERPL-MCNC: 0.72 MG/DL (ref 0.2–1)
BUN SERPL-MCNC: 15 MG/DL (ref 5–25)
CALCIUM SERPL-MCNC: 9.1 MG/DL (ref 8.4–10.2)
CHLORIDE SERPL-SCNC: 103 MMOL/L (ref 96–108)
CO2 SERPL-SCNC: 28 MMOL/L (ref 21–32)
CREAT SERPL-MCNC: 0.68 MG/DL (ref 0.6–1.3)
EOSINOPHIL # BLD AUTO: 0.11 THOUSAND/ΜL (ref 0–0.61)
EOSINOPHIL NFR BLD AUTO: 2 % (ref 0–6)
ERYTHROCYTE [DISTWIDTH] IN BLOOD BY AUTOMATED COUNT: 12.5 % (ref 11.6–15.1)
GFR SERPL CREATININE-BSD FRML MDRD: 92 ML/MIN/1.73SQ M
GLUCOSE SERPL-MCNC: 130 MG/DL (ref 65–140)
HCT VFR BLD AUTO: 38.8 % (ref 34.8–46.1)
HGB BLD-MCNC: 12.9 G/DL (ref 11.5–15.4)
IMM GRANULOCYTES # BLD AUTO: 0.02 THOUSAND/UL (ref 0–0.2)
IMM GRANULOCYTES NFR BLD AUTO: 0 % (ref 0–2)
LYMPHOCYTES # BLD AUTO: 1.39 THOUSANDS/ΜL (ref 0.6–4.47)
LYMPHOCYTES NFR BLD AUTO: 21 % (ref 14–44)
MCH RBC QN AUTO: 31.8 PG (ref 26.8–34.3)
MCHC RBC AUTO-ENTMCNC: 33.2 G/DL (ref 31.4–37.4)
MCV RBC AUTO: 96 FL (ref 82–98)
MONOCYTES # BLD AUTO: 0.16 THOUSAND/ΜL (ref 0.17–1.22)
MONOCYTES NFR BLD AUTO: 2 % (ref 4–12)
NEUTROPHILS # BLD AUTO: 4.89 THOUSANDS/ΜL (ref 1.85–7.62)
NEUTS SEG NFR BLD AUTO: 74 % (ref 43–75)
NRBC BLD AUTO-RTO: 0 /100 WBCS
PLATELET # BLD AUTO: 322 THOUSANDS/UL (ref 149–390)
PMV BLD AUTO: 9.5 FL (ref 8.9–12.7)
POTASSIUM SERPL-SCNC: 4.1 MMOL/L (ref 3.5–5.3)
PROT SERPL-MCNC: 7.2 G/DL (ref 6.4–8.4)
RBC # BLD AUTO: 4.06 MILLION/UL (ref 3.81–5.12)
SODIUM SERPL-SCNC: 138 MMOL/L (ref 135–147)
WBC # BLD AUTO: 6.6 THOUSAND/UL (ref 4.31–10.16)

## 2025-02-17 PROCEDURE — 85025 COMPLETE CBC W/AUTO DIFF WBC: CPT | Performed by: INTERNAL MEDICINE

## 2025-02-17 PROCEDURE — 99214 OFFICE O/P EST MOD 30 MIN: CPT | Performed by: INTERNAL MEDICINE

## 2025-02-17 PROCEDURE — 80053 COMPREHEN METABOLIC PANEL: CPT | Performed by: INTERNAL MEDICINE

## 2025-02-17 NOTE — PROGRESS NOTES
Pt arrives to infusion center for lab draw. Offering no complaints. PAC accessed without issue, brisk blood returned noted, labs obtained, line flushed, and deaccessed. Pt confirmed next appoint on 2/19 @0900 [facility]. WILMA declined.

## 2025-02-17 NOTE — PROGRESS NOTES
Office Visit    2025  [facility] Nic Machado MD  Medical Oncology Malignant neoplasm of upper-outer quadrant of right breast in female, estrogen receptor negative (HCC)  Dx Follow-up   Reason for Visit      Progress Notes  Kosta Machado MD (Physician)  Medical Oncology  VISIT  2025  [facility] Nic Machado MD  Medical Oncology Malignant neoplasm of upper-outer quadrant of right breast in female, estrogen receptor negative (HCC) +8 more  Dx Consult; Referred by Jordan Esparza MD  Reason for Visit      Progress Notes  Kosta Machado MD (Physician)  Medical Oncology  Expand All Collapse All  Name: Fátima Reyes      : 1960      MRN: [id number]  Encounter Provider: Kosta Machado MD  Encounter Date: 2025  Encounter department: [facility] [place]           Chief Complaint   Patient presents with    visit      :  Assessment & Plan  Malignant neoplasm of upper-outer quadrant of right breast in female, estrogen receptor negative (HCC)  Newly diagnosed palpable clinical T2 clinical N0 triple negative Invasive ductal breast cancer established by biopsy 2024.   CT scan of the chest abdomen pelvis and nuclear medicine whole-body bone scan from 2025 reveal no evidence of metastatic disease     The patient tolerated Week 2 of 12 exceedingly well. With virtually no ill effects. Having some mild constipation ( slowing). Advised stool softeners.     Will continue preoperative neoadjuvant chemoimmunotherapy per keynote 522 trial approach with pembrolizumab 200 mg IV flat dose every 3 weeks plus paclitaxel 80 mg/m² IV weekly x 12 and carboplatin AUC 1.5 weekly x 12           Orders:    CARBOplatin (PARAPLATIN) 38.52 mg in sodium chloride 0.9 % 250 mL IVPB    Oncology Provider Communication    Oncology Provider Communication    Oncology Provider  Communication    Oncology Provider Communication    Oncology Provider Communication    Infusion Calculated Appointment Request; Future    CBC and differential; Future    Comprehensive metabolic panel; Future    TSH, 3rd generation with Free T4 reflex; Future    T3, free; Future    Infusion Calculated Appointment Request; Future    CBC and differential; Future    Comprehensive metabolic panel; Future    Infusion Calculated Appointment Request; Future    CBC and differential; Future    Comprehensive metabolic panel; Future     Class 2 obesity due to excess calories without serious comorbidity with body mass index (BMI) of 39.0 to 39.9 in adult        Family history of malignant neoplasm  Positive breast cancer in mother     Essential hypertension        Hypothyroidism, unspecified type     Orders:    CARBOplatin (PARAPLATIN) 38.52 mg in sodium chloride 0.9 % 250 mL IVPB    Oncology Provider Communication    Oncology Provider Communication     Hyperlipidemia, unspecified hyperlipidemia type        Invasive ductal carcinoma of breast, female, right (HCC)     Dehydration           Solitary pulmonary nodule                 Clinical/Pathologic Stage IIB     Cancer Staging   No matching staging information was found for the patient.        Current Therapy C1W1  Keynote 522 trial data set approach phase 1 with pembrolizumab every 3 weeks IV plus paclitaxel IV weekly x 12+ carboplatin IV weekly x 12     Discussion  This delightful postmenopausal woman presents following week 1 of 12 preoperative systemic therapy for newly diagnosed grade 2 invasive ductal carcinoma of the right breast.  She has been initiated on the keynote 522 regimen with chemo and immunotherapy.  Phase 1 will include intravenous pembrolizumab every 3 weeks along with low-dose weekly chemotherapy as paclitaxel weekly x 12+ carboplatin weekly x 12.       Today represents cycle 1 day 6     I reviewed therapeutic approaches and precautions.          Background  History:   The patient became self-aware of palpable right breast mass in the fall 2024 and presented for diagnostic reviews.  Her prior mammography on  February 19,2024 was BI-RADS 1 were negative.     The patient's risk factors for the development of breast cancer and solid tumor malignancy include age, postmenopausal status, gender and obesity. Her mother has a diagnosis of breast cancer at age 80 plus of unclear documentation and scope. Her mother is living and well.     The patient has no clinical evidence of metastases.     This patient is approached with favor in a curative intent.  She clearly carries the expectation of an excellent opportunity for a robust tumor response and favorable outcomes over time.  I had a long discussion with the patient and her  regarding the use of chemoimmunotherapy in a neoadjuvant approach fashion and after the keynote 522 trial data set.  I discussed opportunities and the implication of pathologic complete remission and extension with favor of both event free survival and overall survival.  Please see [place] Journal of Medicine from September 2024 lead author Ambreen WOODWARD     The patient and her  were given handwritten sheet outlining all aspects of our discussion today and expressed understanding clarity and gratitude.  She is scheduled for Mediport catheter placement and we will move forward with phase 1 of care that includes the use of pembrolizumab every 3 weeks plus paclitaxel plus carboplatin weekly x 12.           Special Studies  December 27, 2024 germline genetic panel  No high risk germline mutations                 GENETIC ANALYSIS OVERALL INTERPRETATION   Date Value Ref Range Status   12/27/2024     Final     NEGATIVE: No Clinically Significant Variants Detected   12/27/2024 Variants of Unknown Significance Detected   Final                    INTERPRETATION   Date Value Ref Range Status   12/27/2024 See Notes   Final       Comment:       No  pathogenic mutations, variants of unknown significance, or gross deletions or duplications were detected.  Risk Estimate: low likelihood of variants in the genes analyzed contributing to this individual's clinical history.  Genetic counseling is a recommended option for all individuals undergoing genetic testing.  Genes Analyzed (13 total): EVA, BARD1, BRCA1, BRCA2, CDH1, CHEK2, NF1, PALB2, PTEN, RAD51C, RAD51D, STK11 and TP53 (sequencing and deletion/duplication).   12/27/2024 See Notes   Final       Comment:       No known clinically actionable alterations were detected.  Four variants of unknown significance were detected: two in the MSH3 gene, one in the POLE gene, and one in the SDHA gene.  Risk Estimate: should be based on clinical and family history, as the clinical significance of this result is unknown.  Genetic counseling is a recommended option for all individuals undergoing genetic testing.  This individual is heterozygous for the p.N118I (c.353A>T) and p.N861H (c.2581A>C) variants of unknown significance in the MSH3 gene, heterozygous for the p.G2975U (c.4168C>T) variant of unknown significance in the POLE gene, and heterozygous for the p.K480E (c.1438A>G) variant of unknown significance in the SDHA gene, which may or may not contribute to this individual's clinical history. Familial testing would be necessary to determine if the alterations in MSH3 are on the same chromosome or on different chromosomes (in cis or trans). Refer to the supplementary pages for additional information on these variants. No additional pathogenic mutations, variants of unknown significance, or gross deletions or duplications were detected. Genes Analyzed (59 total): APC, EVA, BAP1, BARD1, BMPR1A, BRCA1, BRCA2, BRIP1, CDH1, CDK4, CDKN1B, CDKN2A, CHEK2, DICER1, FH, FLCN, KIF1B, MAX, MEN1, MET, MLH1, MSH2, MSH6, MUTYH, NF1, NTHL1, PALB2, PMS2, POT1, PTEN, RAD51C, RAD51D, RB1, RET, SDHA, SDHAF2, SDHB, SDHC, SDHD, SMAD4, SMARCA4,  STK11, FALC565, TP53, TSC1, TSC2 and VHL (sequencing and deletion/duplication); AXIN2, CTNNA1, EGLN1, HOXB13, KIT, MITF, MSH3, PDGFRA, POLD1 and POLE (sequencing only); EPCAM and GREM1 (deletion/duplication only). RNA data is routinely analyzed for use in variant interpretation for all genes.                  GENES NOT REPORTED   Date Value Ref Range Status   2024     Final     EVA,BARD1,CDH1,CHEK2,PALB2,PTEN,RAD51C,RAD51D,STK11,TP53,NF1,BRCA1,BRCA2   2024     Final     APC,EVA,AXIN2,BAP1,BARD1,BMPR1A,BRCA1,BRCA2,BRIP1,CDH1,CDK4,CDKN1B,CDKN2A,CHEK2,CTNNA1,DICER1,EGLN1,EPCAM,FH,FLCN,GREM1,HOXB13,KIF1B,KIT,MAX,MEN1,MET,MITF,MLH1,MSH2,MSH6,MUTYH,NF1,NTHL1,PALB2,PDGFRA,PMS2,POLD1,POT1,PTEN,RAD51C,RAD51D,RB1,RET,SDHAF2,SDHB,  SDHC,SDHD,SMAD4,SMARCA4,STK11,TTNH944,TP53,TSC1,TSC2,VHL            History of Present Illness        This delightful 65-year-old presents with her  to review a new diagnosis of invasive carcinoma of the right breast.  The patient is  2 para 2 and postmenopausal.  The patient's mother at age 80 was revealed to have a diagnosis of breast cancer status post excision only.  The mother is living and well     The patient presented in the 2024 [place] with self-awareness of her right breast mass.  Patient presented for diagnostic reviews through her gynecologist and primary care physicians.  Bilateral diagnostic mammogram with targeted right breast ultrasound was performed on 2024.  25 mm oval mass density with indistinct margins was seen in the upper outer quadrant of the right breast at the 10 o'clock position at 6 cm from the nipple.  This mass correlating with the palpable finding on examination.  Targeted ultrasound of the right breast showed a 19 mm x 17 mm x 22 mm oval hypoechoic mass with indistinct margins seen in the upper outer quadrant of the right breast at 10:00.  There were no suspicious adenopathy in the right axilla.  The left  breast showed no suspicious masses, grouped microcalcifications or areas of unexplained architectural distortion.     On December 23, 2024 ultrasound-guided biopsy of the right breast mass with mammography was performed without complication.  Final pathology revealed an invasive breast carcinoma no special type ductal of grade 2.  Ductal carcinoma in situ was present.  Estrogen receptor was negative, progesterone receptor was negative and HER2/nathanael was low at 2+ by IHC but subsequently nonamplified by FISH amplification..  Ductal carcinoma in situ.     The patient reports a medical oncology for therapeutic planning and review for possible neoadjuvant therapies        Pertinent History from December 2024     No new complaints              December 23, 2024 S 24 [id number] status post ultrasound-guided biopsy right breast lesion 10 o'clock position 6 cm from the nipple     Cancer Screening and Prevention  Mammography: 12/18/2024   GI/Colonoscopy: 03/02/2021   GYN: 01/12/2022   Bone Density: Not on file   Covid 19 Vaccination Status:      Oncology Therapeutic Summary:     From February 3, 2025 cycle 1 week 1   initiation phase 1 keynote 522 approach with pembrolizumab 200 mg 5 pills IV every 3 weeks plus paclitaxel 80 mg/m² IV weekly x 12 and carboplatin AUC 1.5 weekly x 12     12/23/2024 S 24 [id number]  status post ultrasound-guided biopsy right breast     Oncology History   Cancer Staging   Invasive ductal carcinoma of breast, female, right (HCC)  Staging form: Breast, AJCC 8th Edition  - Clinical stage from 1/14/2025: Stage IIB (cT2, cN0, cM0, G2, ER-, TX-, HER2-) - Signed by Jordan Esparza MD on 1/27/2025  Histopathologic type: Infiltrating duct carcinoma, NOS  Stage prefix: Initial diagnosis  Method of lymph node assessment: Clinical  Histologic grading system: 3 grade system        Oncology History   Invasive ductal carcinoma of breast, female, right (HCC)   1/14/2025 -  Cancer Staged     Staging form: Breast, AJCC  8th Edition  - Clinical stage from 1/14/2025: Stage IIB (cT2, cN0, cM0, G2, ER-, AR-, HER2-) - Signed by Jordan Esparza MD on 1/27/2025  Histopathologic type: Infiltrating duct carcinoma, NOS  Stage prefix: Initial diagnosis  Method of lymph node assessment: Clinical  Histologic grading system: 3 grade system         1/27/2025 Initial Diagnosis     Invasive ductal carcinoma of breast, female, right (HCC)      Malignant neoplasm of upper-outer quadrant of right breast in female, estrogen receptor negative (HCC)   1/27/2025 Initial Diagnosis     Malignant neoplasm of upper-outer quadrant of right breast in female, estrogen receptor negative (HCC)      1/28/2025 -  Chemotherapy     DOXOrubicin (ADRIAMYCIN), 60 mg/m2 = 129 mg, Intravenous, Once, 0 of 4 cycles  alteplase (CATHFLO), 2 mg, Intracatheter, Every 1 Minute as needed, 0 of 17 cycles  pegfilgrastim-apgf (Nyvepria), 6 mg, Subcutaneous, Once, 0 of 4 cycles  cyclophosphamide (CYTOXAN) IVPB, 600 mg/m2 = 1,290 mg, Intravenous, Once, 0 of 4 cycles  fosaprepitant (EMEND) IVPB, 150 mg, Intravenous, Once, 0 of 4 cycles  CARBOplatin (PARAPLATIN) IVPB (GOG AUC DOSING), 38.52 mg (100 % of original dose 38.52 mg), Intravenous, Once, 0 of 4 cycles  Dose modification:   (original dose 38.52 mg, Cycle 1),   (original dose 38.52 mg, Cycle 1, Reason: Other (Must fill in a comment), Comment: lab estimates),   (original dose 38.52 mg, Cycle 1),   (original dose 38.52 mg, Cycle 1, Reason: Other (Must fill in a comment))  PACLItaxel (TAXOL) chemo IVPB, 80 mg/m2 = 172.2 mg, Intravenous, Once, 0 of 4 cycles  pembrolizumab (KEYTRUDA) IVPB, 200 mg, Intravenous, Once, 0 of 17 cycles         Review of Systems   Constitutional: Negative.    All other systems reviewed and are negative.     Medical History Reviewed by provider this encounter:     .    Medications Ordered Prior to Encounter               Current Outpatient Medications on File Prior to Visit   Medication Sig Dispense Refill  "   atorvastatin (LIPITOR) 20 mg tablet TAKE ONE TABLET BY MOUTH EVERY DAY 90 tablet 1    doxycycline hyclate (VIBRAMYCIN) 100 mg capsule Take 100 mg by mouth daily as needed        hydroCHLOROthiazide 12.5 mg tablet TAKE ONE TABLET BY MOUTH EVERY DAY 30 tablet 5    Multiple Vitamin (MULTIVITAMINS PO) Take by mouth        nebivolol (BYSTOLIC) 20 MG tablet TAKE ONE TABLET BY MOUTH EVERY DAY 90 tablet 1      No current facility-administered medications on file prior to visit.         Social History                   Tobacco Use    Smoking status: Never    Smokeless tobacco: Never   Vaping Use    Vaping status: Never Used   Substance and Sexual Activity    Alcohol use: Yes    Drug use: No    Sexual activity: Yes       Partners: Male       Birth control/protection: Post-menopausal             Objective     /76 (BP Location: Left arm, Patient Position: Sitting, Cuff Size: Adult)   Pulse 79   Temp 97.8 °F (36.6 °C) (Temporal)   Resp 18   Ht 5' 6\" (1.676 m)   Wt 108 kg (239 lb)   LMP  (LMP Unknown)   SpO2 95%   BMI 38.58 kg/m²      Pain Screening:  Pain Score: 0-No pain  ECOG ECOG Performance Status: 0 - Fully active, able to carry on all pre-disease performance without restriction 0  Physical Exam  Vitals and nursing note reviewed. Exam conducted with a chaperone present.   Neck:      Thyroid: No thyroid mass, thyromegaly or thyroid tenderness.      Trachea: Trachea normal.   Cardiovascular:      Rate and Rhythm: Normal rate and regular rhythm.      Pulses: Normal pulses.      Heart sounds: Murmur heard.      Systolic murmur is present with a grade of 2/6.      No diastolic murmur is present.      No friction rub. No gallop. No S3 or S4 sounds.   Pulmonary:      Effort: Pulmonary effort is normal.      Breath sounds: Normal breath sounds. No stridor, decreased air movement or transmitted upper airway sounds. No decreased breath sounds, wheezing, rhonchi or rales.   Chest:      Chest wall: Mass present. No " deformity, swelling, tenderness, crepitus or edema. There is no dullness to percussion.   Breasts:     Right: Mass present. No nipple discharge, skin change or tenderness.      Left: Normal.      Comments: 1.55 1.7 cm mass in the right mid to upper outer breasts  Abdominal:      General: Abdomen is protuberant. Bowel sounds are normal.      Palpations: Abdomen is soft. There is no shifting dullness, hepatomegaly, splenomegaly, mass or pulsatile mass.      Tenderness: There is no abdominal tenderness. There is no right CVA tenderness or left CVA tenderness.   Musculoskeletal:      Cervical back: Full passive range of motion without pain and normal range of motion. No edema, rigidity, bony tenderness or crepitus. No pain with movement, spinous process tenderness or muscular tenderness.      Thoracic back: No bony tenderness.      Lumbar back: No bony tenderness.      Right lower leg: No edema.      Left lower leg: No edema.   Lymphadenopathy:      Cervical: No cervical adenopathy.      Right cervical: No superficial, deep or posterior cervical adenopathy.     Left cervical: No superficial, deep or posterior cervical adenopathy.      Upper Body:      Right upper body: No supraclavicular, axillary or pectoral adenopathy.      Left upper body: No supraclavicular, axillary or pectoral adenopathy.   Neurological:      Mental Status: She is alert.            Labs: I have reviewed the following labs:            Lab Results   Component Value Date/Time     WBC 8.89 09/13/2024 11:01 AM     RBC 4.36 09/13/2024 11:01 AM     Hemoglobin 13.8 09/13/2024 11:01 AM     Hematocrit 42.8 09/13/2024 11:01 AM     MCV 98 09/13/2024 11:01 AM     MCH 31.7 09/13/2024 11:01 AM     RDW 12.4 09/13/2024 11:01 AM     Platelets 181 09/13/2024 11:01 AM                Lab Results   Component Value Date/Time     Potassium 4.0 01/09/2025 09:38 AM     Chloride 103 01/09/2025 09:38 AM     CO2 24 01/09/2025 09:38 AM     BUN 20 01/09/2025 09:38 AM      Creatinine 0.68 01/09/2025 09:38 AM     Glucose, Fasting 113 (H) 01/09/2025 09:38 AM     Calcium 8.8 01/09/2025 09:38 AM     AST 15 01/09/2025 09:38 AM     ALT 11 01/09/2025 09:38 AM     Alkaline Phosphatase 101 01/09/2025 09:38 AM     Total Protein 7.1 01/09/2025 09:38 AM     Albumin 4.1 01/09/2025 09:38 AM     Total Bilirubin 0.60 01/09/2025 09:38 AM     eGFR 92 01/09/2025 09:38 AM            Pathology:      December 23, 2024 S 24 [id number] status post ultrasound-guided biopsy right breast lesion 10 o'clock position 6 cm from the nipple  Final Diagnosis   A. Breast, Right, us guided breast bx, 10 ocl, 6 cm fn 3 passes, 12 g:  - Invasive breast carcinoma of no special type (ductal NST/invasive ductal carcinoma).   * Mina grade 2 of 3 (total score: 6 of 9)    -- tubule formation < 10%, score 3    -- nuclear grade 2 of 3, score 2    -- mitoses < 3/mm2, (</= 7 mitoses/10HPF), score 1.   * Confirmed by tumor cell immunophenotype:    -- positive: nuclear stain for GATA3 and membranous stain for E-cadherin and p120.    -- negative:  p63, calponin-B.     * Invasive carcinoma involves 3 of 3 submitted core biopsies, max. dimension = 16 millimeters.  - Ductal carcinoma in situ.   * Estrogen, progesterone & HER2 receptor studies pending, to be described in a separate receptor report.          Synoptic Checklist INVASIVE CARCINOMA OF THE BREAST: Biopsy (INVASIVE CARCINOMA OF THE BREAST: BIOPSY - All Specimens)Protocol posted: 3/22/2023 SPECIMEN Procedure Needle biopsy Specimen Laterality Right .      TUMOR Tumor Site Clock position 10 o'clock   Tumor Site Distance from nipple (Centimeters): 6 cm     Histologic Type Invasive carcinoma of no special type (ductal) Histologic Grade (Blackstone Histologic Score) Glandular (Acinar) / Tubular Differentiation Score 3 Nuclear Pleomorphism Score 2 Mitotic Rate [id number] Fátima Reyes [id number]   Overall Grade Grade 2 (scores of 6 or 7)      Largest Invasive Focus in this  Limited Biopsy Sample 16 mm      Ductal Carcinoma In Situ (DCIS) Present Architectural Patterns Solid Nuclear Grade Grade II (intermediate) Necrosis Not identified      Lymphatic and / or Vascular Invasion Not identified Microcalcifications Not identified .      Estrogen Receptor (ER) Status Negative (less than 1%)      Progesterone Receptor (PgR) Status Negative (less than 1%)      HER2 by Immunohistochemistry Equivocal (Score 2+)      Addendum  01/02/2025 [employer]  Fish  Non amplified  Negative        Imaging:   January 27, 2025 2D cardiac echo  Summary    Left Ventricle: Left ventricular cavity size is normal. Wall thickness is normal. The left ventricular ejection fraction is 60%. Systolic function is normal. Wall motion is normal. Diastolic function is mildly abnormal, consistent with grade I (abnormal) relaxation.    IVS: There is sigmoid appearance of the septum.    Right Ventricle: Right ventricular cavity size is normal. Systolic function is normal.    Left Atrium: The atrium is mildly dilated (35-41 mL/m2).    Right Atrium: The atrium is normal in size.                       January 20, 2025 CT scan of the chest abdomen and pelvis with contrast  IMPRESSION:     A 2.0 cm right breast mass with a biopsy clip in keeping with the patient's recently diagnosed breast cancer.     A 3 mm left upper lobe pulmonary nodule. Based on current Fleischner Society 2017 Guidelines on incidental pulmonary nodule, patients with a known malignancy are at increased risk of metastasis and should receive initial three-month follow-up chest CT.     No evidence of metastatic disease in the abdomen and pelvis.     The study was marked in EPIC for immediate notification.        January 17, 2025 nuclear medicine whole-body bone scan  IMPRESSION:     1. No scintigraphic evidence of osseous metastasis.        December 18, 2024 targeted right breast ultrasound with bilateral diagnostic mammogram     FINDINGS:   RIGHT  1) MASS  [B]  Mammo diagnostic bilateral w 3d and cad: There is a 25 mm high density, oval mass with indistinct margins seen in the upper outer quadrant of the right breast at 10 o'clock, 6 cm from the nipple. The mass correlates with the palpable finding noted during physical examination.   US breast right limited (diagnostic): There is a 19 mm x 17 mm x 22 mm oval, hypoechoic mass with indistinct margins seen in the upper outer quadrant of the right breast at 10 o'clock, 6 cm from the nipple. The mass correlates with the palpable finding noted during physical examination.  Sonographic exam of the right axilla shows no suspicious adenopathy.  Scattered benign-appearing calcifications are demonstrated in the right breast.  Right breast biopsy clip.     Left  Mammo diagnostic bilateral w 3d and cad  There are no suspicious masses, grouped microcalcifications or areas of unexplained architectural distortion. The skin and nipple areolar complex are unremarkable.   Results were discussed with the patient.  Ultrasound-guided biopsy was recommended.     IMPRESSION:  Solid mass in the upper outer right breast corresponding to the area of clinical concern and highly suspicious for malignancy.     ASSESSMENT/BI-RADS CATEGORY:  Left: 1 - Negative  Right: 5 - Highly Suggestive of Malignancy  Overall: 5 - Highly Suggestive of Malignancy     RECOMMENDATION:       - Ultrasound-guided breast biopsy for the right breast.       - Routine screening mammogram in 1 year for the left breast.           February 19, 2024 bilateral screening mammography     FINDINGS:   There are no suspicious masses, grouped microcalcifications or areas of architectural distortion. The skin and nipple areolar complex are unremarkable.     IMPRESSION:  No mammographic evidence of malignancy.           ASSESSMENT/BI-RADS CATEGORY:  Left: 1 - Negative  Right: 1 - Negative  Overall: 1 - Negative     RECOMMENDATION:       - Routine screening mammogram in 1 year for both  breasts.

## 2025-02-18 RX ORDER — NEBIVOLOL 20 MG/1
20 TABLET ORAL DAILY
Qty: 90 TABLET | Refills: 1 | Status: SHIPPED | OUTPATIENT
Start: 2025-02-18

## 2025-02-19 ENCOUNTER — HOSPITAL ENCOUNTER (OUTPATIENT)
Dept: INFUSION CENTER | Facility: CLINIC | Age: 65
Discharge: HOME | End: 2025-02-19
Payer: COMMERCIAL

## 2025-02-19 VITALS
OXYGEN SATURATION: 98 % | HEART RATE: 76 BPM | TEMPERATURE: 96.5 F | HEIGHT: 67 IN | BODY MASS INDEX: 37.04 KG/M2 | SYSTOLIC BLOOD PRESSURE: 125 MMHG | WEIGHT: 236 LBS | DIASTOLIC BLOOD PRESSURE: 78 MMHG

## 2025-02-19 DIAGNOSIS — Z17.1 MALIGNANT NEOPLASM OF UPPER-OUTER QUADRANT OF RIGHT BREAST IN FEMALE, ESTROGEN RECEPTOR NEGATIVE (HCC): Primary | ICD-10-CM

## 2025-02-19 DIAGNOSIS — E86.0 DEHYDRATION: ICD-10-CM

## 2025-02-19 DIAGNOSIS — C50.411 MALIGNANT NEOPLASM OF UPPER-OUTER QUADRANT OF RIGHT BREAST IN FEMALE, ESTROGEN RECEPTOR NEGATIVE (HCC): Primary | ICD-10-CM

## 2025-02-19 PROCEDURE — 96367 TX/PROPH/DG ADDL SEQ IV INF: CPT

## 2025-02-19 PROCEDURE — 96413 CHEMO IV INFUSION 1 HR: CPT

## 2025-02-19 PROCEDURE — 96417 CHEMO IV INFUS EACH ADDL SEQ: CPT

## 2025-02-19 RX ORDER — SODIUM CHLORIDE 9 MG/ML
20 INJECTION, SOLUTION INTRAVENOUS ONCE
Status: COMPLETED | OUTPATIENT
Start: 2025-02-19 | End: 2025-02-19

## 2025-02-19 RX ADMIN — SODIUM CHLORIDE 20 ML/HR: 0.9 INJECTION, SOLUTION INTRAVENOUS at 09:27

## 2025-02-19 RX ADMIN — FAMOTIDINE 20 MG: 10 INJECTION INTRAVENOUS at 10:10

## 2025-02-19 RX ADMIN — DEXAMETHASONE SODIUM PHOSPHATE: 10 INJECTION, SOLUTION INTRAMUSCULAR; INTRAVENOUS at 09:27

## 2025-02-19 RX ADMIN — DIPHENHYDRAMINE HYDROCHLORIDE 25 MG: 50 INJECTION INTRAMUSCULAR; INTRAVENOUS at 09:49

## 2025-02-19 RX ADMIN — PACLITAXEL 172.2 MG: 6 INJECTION, SOLUTION INTRAVENOUS at 10:42

## 2025-02-19 RX ADMIN — SODIUM CHLORIDE 500 ML: 0.9 INJECTION, SOLUTION INTRAVENOUS at 09:25

## 2025-02-19 RX ADMIN — CARBOPLATIN 187.05 MG: 10 INJECTION, SOLUTION INTRAVENOUS at 11:51

## 2025-02-21 NOTE — PROGRESS NOTES
MULTIDISCIPLINARY TUMOR BOARD CONFERENCE NOTE    Conference Date: 2/24/2025    Facilitating MD over case: ***    Case Status:  {GEN NEW FOLLOW-UP:71627}    Clinical Question(s) / Reason(s) for Presentation:  {TUMOR BOARD REASONS FOR PRESENTATION:70529}    Brief Patient History:  ***    Services Present (Check all that apply):  {:53624} Surgery   {:90987} Medical Oncology   {:88515} Radiation Oncology   {:48409} Radiology   {:41084} Pathology   {:80649} Other (list):     Staging:   Cancer Staging   Invasive ductal carcinoma of breast, female, right (HCC)  Staging form: Breast, AJCC 8th Edition  - Clinical stage from 1/14/2025: Stage IIB (cT2, cN0, cM0, G2, ER-, [place]-, HER2-) - Signed by Jordan Esparza MD on 1/27/2025  Histopathologic type: Infiltrating duct carcinoma, NOS  Stage prefix: Initial diagnosis  Method of lymph node assessment: Clinical  Histologic grading system: 3 grade system      Discussion Summary:  ***    Treatment Plan:  ***    Site-specific Guidelines:  {Yes/No:65804}  Office Visit    2/24/2025  [facility] Nic Machado MD  Medical Oncology Malignant neoplasm of upper-outer quadrant of right breast in female, estrogen receptor negative (HCC)  Dx Follow-up  Reason for Visit     Progress Notes  Kosta Mahcado MD (Physician) • Medical Oncology  Office Visit    2/24/2025  [facility] Nic Machado MD  Medical Oncology Malignant neoplasm of upper-outer quadrant of right breast in female, estrogen receptor negative (HCC)  Dx Follow-up   Reason for Visit      Progress Notes  Kosta Machado MD (Physician) • Medical Oncology  VISIT  02/24/2025  [facility] Nic Machado MD  Medical Oncology Malignant neoplasm of upper-outer quadrant of right breast in female, estrogen receptor negative (HCC) +8 more  Dx Consult; Referred by Jordan Esparza MD  Reason for Visit      Progress  Notes  Kosta Machado MD (Physician) • Medical Oncology  Expand All Collapse All  Name: Fátima Reyes      : 1960      MRN: [id number]  Encounter Provider: Kosta Machado MD  Encounter Date: 2025  Encounter department: [facility] [place]           Chief Complaint   Patient presents with   • visit      :  Assessment & Plan  Malignant neoplasm of upper-outer quadrant of right breast in female, estrogen receptor negative (HCC)  Newly diagnosed palpable clinical T2 clinical N0 triple negative Invasive ductal breast cancer established by biopsy 2024.   CT scan of the chest abdomen pelvis and nuclear medicine whole-body bone scan from 2025 reveal no evidence of metastatic disease     The patient tolerated Week 2 of 12 exceedingly well. With virtually no ill effects. Having some mild constipation ( slowing). Advised stool softeners.     Will continue preoperative neoadjuvant chemoimmunotherapy per keynote 522 trial approach with pembrolizumab 200 mg IV flat dose every 3 weeks plus paclitaxel 80 mg/m² IV weekly x 12 and carboplatin AUC 1.5 weekly x 12           Orders:  •  CARBOplatin (PARAPLATIN) 38.52 mg in sodium chloride 0.9 % 250 mL IVPB  •  Oncology Provider Communication  •  Oncology Provider Communication  •  Oncology Provider Communication  •  Oncology Provider Communication  •  Oncology Provider Communication  •  Infusion Calculated Appointment Request; Future  •  CBC and differential; Future  •  Comprehensive metabolic panel; Future  •  TSH, 3rd generation with Free T4 reflex; Future  •  T3, free; Future  •  Infusion Calculated Appointment Request; Future  •  CBC and differential; Future  •  Comprehensive metabolic panel; Future  •  Infusion Calculated Appointment Request; Future  •  CBC and differential; Future  •  Comprehensive metabolic panel; Future     Class 2 obesity due to excess calories without serious comorbidity with body  mass index (BMI) of 39.0 to 39.9 in adult        Family history of malignant neoplasm  Positive breast cancer in mother     Essential hypertension        Hypothyroidism, unspecified type     Orders:  •  CARBOplatin (PARAPLATIN) 38.52 mg in sodium chloride 0.9 % 250 mL IVPB  •  Oncology Provider Communication  •  Oncology Provider Communication     Hyperlipidemia, unspecified hyperlipidemia type        Invasive ductal carcinoma of breast, female, right (HCC)     Dehydration           Solitary pulmonary nodule                 Clinical/Pathologic Stage IIB     Cancer Staging   No matching staging information was found for the patient.        Current Therapy C1W1  Keynote 522 trial data set approach phase 1 with pembrolizumab every 3 weeks IV plus paclitaxel IV weekly x 12+ carboplatin IV weekly x 12     Discussion  This delightful postmenopausal woman presents following week 1 of 12 preoperative systemic therapy for newly diagnosed grade 2 invasive ductal carcinoma of the right breast.  She has been initiated on the keynote 522 regimen with chemo and immunotherapy.  Phase 1 will include intravenous pembrolizumab every 3 weeks along with low-dose weekly chemotherapy as paclitaxel weekly x 12+ carboplatin weekly x 12.       Today represents cycle 1 day 6     I reviewed therapeutic approaches and precautions.          Background History:   The patient became self-aware of palpable right breast mass in the fall 2024 and presented for diagnostic reviews.  Her prior mammography on  February 19,2024 was BI-RADS 1 were negative.     The patient's risk factors for the development of breast cancer and solid tumor malignancy include age, postmenopausal status, gender and obesity. Her mother has a diagnosis of breast cancer at age 80 plus of unclear documentation and scope. Her mother is living and well.     The patient has no clinical evidence of metastases.     This patient is approached with favor in a curative intent.  She  clearly carries the expectation of an excellent opportunity for a robust tumor response and favorable outcomes over time.  I had a long discussion with the patient and her  regarding the use of chemoimmunotherapy in a neoadjuvant approach fashion and after the keynote 522 trial data set.  I discussed opportunities and the implication of pathologic complete remission and extension with favor of both event free survival and overall survival.  Please see [place] Journal of Medicine from September 2024 lead author Ambreen WOODWARD     The patient and her  were given handwritten sheet outlining all aspects of our discussion today and expressed understanding clarity and gratitude.  She is scheduled for Mediport catheter placement and we will move forward with phase 1 of care that includes the use of pembrolizumab every 3 weeks plus paclitaxel plus carboplatin weekly x 12.           Special Studies  December 27, 2024 germline genetic panel  No high risk germline mutations                 GENETIC ANALYSIS OVERALL INTERPRETATION   Date Value Ref Range Status   12/27/2024     Final     NEGATIVE: No Clinically Significant Variants Detected   12/27/2024 Variants of Unknown Significance Detected   Final                    INTERPRETATION   Date Value Ref Range Status   12/27/2024 See Notes   Final       Comment:       No pathogenic mutations, variants of unknown significance, or gross deletions or duplications were detected.  Risk Estimate: low likelihood of variants in the genes analyzed contributing to this individual's clinical history.  Genetic counseling is a recommended option for all individuals undergoing genetic testing.  Genes Analyzed (13 total): EVA, BARD1, BRCA1, BRCA2, CDH1, CHEK2, NF1, PALB2, PTEN, RAD51C, RAD51D, STK11 and TP53 (sequencing and deletion/duplication).   12/27/2024 See Notes   Final       Comment:       No known clinically actionable alterations were detected.  Four variants of unknown  significance were detected: two in the MSH3 gene, one in the POLE gene, and one in the SDHA gene.  Risk Estimate: should be based on clinical and family history, as the clinical significance of this result is unknown.  Genetic counseling is a recommended option for all individuals undergoing genetic testing.  This individual is heterozygous for the p.N118I (c.353A>T) and p.N861H (c.2581A>C) variants of unknown significance in the MSH3 gene, heterozygous for the p.E3975D (c.4168C>T) variant of unknown significance in the POLE gene, and heterozygous for the p.K480E (c.1438A>G) variant of unknown significance in the SDHA gene, which may or may not contribute to this individual's clinical history. Familial testing would be necessary to determine if the alterations in MSH3 are on the same chromosome or on different chromosomes (in cis or trans). Refer to the supplementary pages for additional information on these variants. No additional pathogenic mutations, variants of unknown significance, or gross deletions or duplications were detected. Genes Analyzed (59 total): APC, EVA, BAP1, BARD1, BMPR1A, BRCA1, BRCA2, BRIP1, CDH1, CDK4, CDKN1B, CDKN2A, CHEK2, DICER1, FH, FLCN, KIF1B, MAX, MEN1, MET, MLH1, MSH2, MSH6, MUTYH, NF1, NTHL1, PALB2, PMS2, POT1, PTEN, RAD51C, RAD51D, RB1, RET, SDHA, SDHAF2, SDHB, SDHC, SDHD, SMAD4, SMARCA4, STK11, RGVA160, TP53, TSC1, TSC2 and VHL (sequencing and deletion/duplication); AXIN2, CTNNA1, EGLN1, HOXB13, KIT, MITF, MSH3, PDGFRA, POLD1 and POLE (sequencing only); EPCAM and GREM1 (deletion/duplication only). RNA data is routinely analyzed for use in variant interpretation for all genes.                  GENES NOT REPORTED   Date Value Ref Range Status   12/27/2024     Final     EVA,BARD1,CDH1,CHEK2,PALB2,PTEN,RAD51C,RAD51D,STK11,TP53,NF1,BRCA1,BRCA2   12/27/2024     Final      APC,EVA,AXIN2,BAP1,BARD1,BMPR1A,BRCA1,BRCA2,BRIP1,CDH1,CDK4,CDKN1B,CDKN2A,CHEK2,CTNNA1,DICER1,EGLN1,EPCAM,FH,FLCN,GREM1,HOXB13,KIF1B,KIT,MAX,MEN1,MET,MITF,MLH1,MSH2,MSH6,MUTYH,NF1,NTHL1,PALB2,PDGFRA,PMS2,POLD1,POT1,PTEN,RAD51C,RAD51D,RB1,RET,SDHAF2,SDHB,  SDHC,SDHD,SMAD4,SMARCA4,STK11,OODP427,TP53,TSC1,TSC2,VHL            History of Present Illness        This delightful 65-year-old presents with her  to review a new diagnosis of invasive carcinoma of the right breast.  The patient is  2 para 2 and postmenopausal.  The patient's mother at age 80 was revealed to have a diagnosis of breast cancer status post excision only.  The mother is living and well     The patient presented in the 2024 near the [facility] holiday with self-awareness of her right breast mass.  Patient presented for diagnostic reviews through her gynecologist and primary care physicians.  Bilateral diagnostic mammogram with targeted right breast ultrasound was performed on 2024.  25 mm oval mass density with indistinct margins was seen in the upper outer quadrant of the right breast at the 10 o'clock position at 6 cm from the nipple.  This mass correlating with the palpable finding on examination.  Targeted ultrasound of the right breast showed a 19 mm x 17 mm x 22 mm oval hypoechoic mass with indistinct margins seen in the upper outer quadrant of the right breast at 10:00.  There were no suspicious adenopathy in the right axilla.  The left breast showed no suspicious masses, grouped microcalcifications or areas of unexplained architectural distortion.     On 2024 ultrasound-guided biopsy of the right breast mass with mammography was performed without complication.  Final pathology revealed an invasive breast carcinoma no special type ductal of grade 2.  Ductal carcinoma in situ was present.  Estrogen receptor was negative, progesterone receptor was negative and HER2/nathanael was low at 2+ by IHC but  subsequently nonamplified by FISH amplification..  Ductal carcinoma in situ.     The patient reports a medical oncology for therapeutic planning and review for possible neoadjuvant therapies        Pertinent History from December 2024     No new complaints              December 23, 2024 S 24 [id number] status post ultrasound-guided biopsy right breast lesion 10 o'clock position 6 cm from the nipple     Cancer Screening and Prevention  Mammography: 12/18/2024   GI/Colonoscopy: 03/02/2021   GYN: 01/12/2022   Bone Density: Not on file   Covid 19 Vaccination Status:      Oncology Therapeutic Summary:     From February 3, 2025 cycle 1 week 1   initiation phase 1 keynote 522 approach with pembrolizumab 200 mg 5 pills IV every 3 weeks plus paclitaxel 80 mg/m² IV weekly x 12 and carboplatin AUC 1.5 weekly x 12     12/23/2024 S 24 [id number]  status post ultrasound-guided biopsy right breast     Oncology History   Cancer Staging   Invasive ductal carcinoma of breast, female, right (HCC)  Staging form: Breast, AJCC 8th Edition  - Clinical stage from 1/14/2025: Stage IIB (cT2, cN0, cM0, G2, ER-, [place]-, HER2-) - Signed by Jordan Esparza MD on 1/27/2025  Histopathologic type: Infiltrating duct carcinoma, NOS  Stage prefix: Initial diagnosis  Method of lymph node assessment: Clinical  Histologic grading system: 3 grade system        Oncology History   Invasive ductal carcinoma of breast, female, right (HCC)   1/14/2025 -  Cancer Staged     Staging form: Breast, AJCC 8th Edition  - Clinical stage from 1/14/2025: Stage IIB (cT2, cN0, cM0, G2, ER-, [place]-, HER2-) - Signed by Jordan Esparza MD on 1/27/2025  Histopathologic type: Infiltrating duct carcinoma, NOS  Stage prefix: Initial diagnosis  Method of lymph node assessment: Clinical  Histologic grading system: 3 grade system         1/27/2025 Initial Diagnosis     Invasive ductal carcinoma of breast, female, right (HCC)      Malignant neoplasm of upper-outer quadrant of right breast in  female, estrogen receptor negative (HCC)   1/27/2025 Initial Diagnosis     Malignant neoplasm of upper-outer quadrant of right breast in female, estrogen receptor negative (HCC)      1/28/2025 -  Chemotherapy     DOXOrubicin (ADRIAMYCIN), 60 mg/m2 = 129 mg, Intravenous, Once, 0 of 4 cycles  alteplase (CATHFLO), 2 mg, Intracatheter, Every 1 Minute as needed, 0 of 17 cycles  pegfilgrastim-apgf (Nyvepria), 6 mg, Subcutaneous, Once, 0 of 4 cycles  cyclophosphamide (CYTOXAN) IVPB, 600 mg/m2 = 1,290 mg, Intravenous, Once, 0 of 4 cycles  fosaprepitant (EMEND) IVPB, 150 mg, Intravenous, Once, 0 of 4 cycles  CARBOplatin (PARAPLATIN) IVPB (GOG AUC DOSING), 38.52 mg (100 % of original dose 38.52 mg), Intravenous, Once, 0 of 4 cycles  Dose modification:   (original dose 38.52 mg, Cycle 1),   (original dose 38.52 mg, Cycle 1, Reason: Other (Must fill in a comment), Comment: lab estimates),   (original dose 38.52 mg, Cycle 1),   (original dose 38.52 mg, Cycle 1, Reason: Other (Must fill in a comment))  PACLItaxel (TAXOL) chemo IVPB, 80 mg/m2 = 172.2 mg, Intravenous, Once, 0 of 4 cycles  pembrolizumab (KEYTRUDA) IVPB, 200 mg, Intravenous, Once, 0 of 17 cycles         Review of Systems   Constitutional: Negative.    All other systems reviewed and are negative.     Medical History Reviewed by provider this encounter:     .    Medications Ordered Prior to Encounter               Current Outpatient Medications on File Prior to Visit   Medication Sig Dispense Refill   • atorvastatin (LIPITOR) 20 mg tablet TAKE ONE TABLET BY MOUTH EVERY DAY 90 tablet 1   • doxycycline hyclate (VIBRAMYCIN) 100 mg capsule Take 100 mg by mouth daily as needed       • hydroCHLOROthiazide 12.5 mg tablet TAKE ONE TABLET BY MOUTH EVERY DAY 30 tablet 5   • Multiple Vitamin (MULTIVITAMINS PO) Take by mouth       • nebivolol (BYSTOLIC) 20 MG tablet TAKE ONE TABLET BY MOUTH EVERY DAY 90 tablet 1      No current facility-administered medications on file prior to  "visit.         Social History                   Tobacco Use   • Smoking status: Never   • Smokeless tobacco: Never   Vaping Use   • Vaping status: Never Used   Substance and Sexual Activity   • Alcohol use: Yes   • Drug use: No   • Sexual activity: Yes       Partners: Male       Birth control/protection: Post-menopausal             Objective     /76 (BP Location: Left arm, Patient Position: Sitting, Cuff Size: Adult)   Pulse 79   Temp 97.8 °F (36.6 °C) (Temporal)   Resp 18   Ht 5' 6\" (1.676 m)   Wt 108 kg (239 lb)   LMP  (LMP Unknown)   SpO2 95%   BMI 38.58 kg/m²      Pain Screening:  Pain Score: 0-No pain  ECOG ECOG Performance Status: 0 - Fully active, able to carry on all pre-disease performance without restriction 0  Physical Exam  Vitals and nursing note reviewed. Exam conducted with a chaperone present.   Neck:      Thyroid: No thyroid mass, thyromegaly or thyroid tenderness.      Trachea: Trachea normal.   Cardiovascular:      Rate and Rhythm: Normal rate and regular rhythm.      Pulses: Normal pulses.      Heart sounds: Murmur heard.      Systolic murmur is present with a grade of 2/6.      No diastolic murmur is present.      No friction rub. No gallop. No S3 or S4 sounds.   Pulmonary:      Effort: Pulmonary effort is normal.      Breath sounds: Normal breath sounds. No stridor, decreased air movement or transmitted upper airway sounds. No decreased breath sounds, wheezing, rhonchi or rales.   Chest:      Chest wall: Mass present. No deformity, swelling, tenderness, crepitus or edema. There is no dullness to percussion.   Breasts:     Right: Mass present. No nipple discharge, skin change or tenderness.      Left: Normal.      Comments: 1.55 1.7 cm mass in the right mid to upper outer breasts  Abdominal:      General: Abdomen is protuberant. Bowel sounds are normal.      Palpations: Abdomen is soft. There is no shifting dullness, hepatomegaly, splenomegaly, mass or pulsatile mass.      " Tenderness: There is no abdominal tenderness. There is no right CVA tenderness or left CVA tenderness.   Musculoskeletal:      Cervical back: Full passive range of motion without pain and normal range of motion. No edema, rigidity, bony tenderness or crepitus. No pain with movement, spinous process tenderness or muscular tenderness.      Thoracic back: No bony tenderness.      Lumbar back: No bony tenderness.      Right lower leg: No edema.      Left lower leg: No edema.   Lymphadenopathy:      Cervical: No cervical adenopathy.      Right cervical: No superficial, deep or posterior cervical adenopathy.     Left cervical: No superficial, deep or posterior cervical adenopathy.      Upper Body:      Right upper body: No supraclavicular, axillary or pectoral adenopathy.      Left upper body: No supraclavicular, axillary or pectoral adenopathy.   Neurological:      Mental Status: She is alert.            Labs: I have reviewed the following labs:            Lab Results   Component Value Date/Time     WBC 8.89 09/13/2024 11:01 AM     RBC 4.36 09/13/2024 11:01 AM     Hemoglobin 13.8 09/13/2024 11:01 AM     Hematocrit 42.8 09/13/2024 11:01 AM     MCV 98 09/13/2024 11:01 AM     MCH 31.7 09/13/2024 11:01 AM     RDW 12.4 09/13/2024 11:01 AM     Platelets 181 09/13/2024 11:01 AM                Lab Results   Component Value Date/Time     Potassium 4.0 01/09/2025 09:38 AM     Chloride 103 01/09/2025 09:38 AM     CO2 24 01/09/2025 09:38 AM     BUN 20 01/09/2025 09:38 AM     Creatinine 0.68 01/09/2025 09:38 AM     Glucose, Fasting 113 (H) 01/09/2025 09:38 AM     Calcium 8.8 01/09/2025 09:38 AM     AST 15 01/09/2025 09:38 AM     ALT 11 01/09/2025 09:38 AM     Alkaline Phosphatase 101 01/09/2025 09:38 AM     Total Protein 7.1 01/09/2025 09:38 AM     Albumin 4.1 01/09/2025 09:38 AM     Total Bilirubin 0.60 01/09/2025 09:38 AM     eGFR 92 01/09/2025 09:38 AM            Pathology:      December 23, 2024 S 24 [id number] status post  ultrasound-guided biopsy right breast lesion 10 o'clock position 6 cm from the nipple  Final Diagnosis   A. Breast, Right, us guided breast bx, 10 ocl, 6 cm fn 3 passes, 12 g:  - Invasive breast carcinoma of no special type (ductal NST/invasive ductal carcinoma).   * Mina grade 2 of 3 (total score: 6 of 9)    -- tubule formation < 10%, score 3    -- nuclear grade 2 of 3, score 2    -- mitoses < 3/mm2, (</= 7 mitoses/10HPF), score 1.   * Confirmed by tumor cell immunophenotype:    -- positive: nuclear stain for GATA3 and membranous stain for E-cadherin and p120.    -- negative:  p63, calponin-B.     * Invasive carcinoma involves 3 of 3 submitted core biopsies, max. dimension = 16 millimeters.  - Ductal carcinoma in situ.   * Estrogen, progesterone & HER2 receptor studies pending, to be described in a separate receptor report.          Synoptic Checklist INVASIVE CARCINOMA OF THE BREAST: Biopsy (INVASIVE CARCINOMA OF THE BREAST: BIOPSY - All Specimens)Protocol posted: 3/22/2023 SPECIMEN Procedure Needle biopsy Specimen Laterality Right .      TUMOR Tumor Site Clock position 10 o'clock   Tumor Site Distance from nipple (Centimeters): 6 cm     Histologic Type Invasive carcinoma of no special type (ductal) Histologic Grade ([place] Histologic Score) Glandular (Acinar) / Tubular Differentiation Score 3 Nuclear Pleomorphism Score 2 Mitotic Rate [id number] Fátima Reyes [id number]   Overall Grade Grade 2 (scores of 6 or 7)      Largest Invasive Focus in this Limited Biopsy Sample 16 mm      Ductal Carcinoma In Situ (DCIS) Present Architectural Patterns Solid Nuclear Grade Grade II (intermediate) Necrosis Not identified      Lymphatic and / or Vascular Invasion Not identified Microcalcifications Not identified .      Estrogen Receptor (ER) Status Negative (less than 1%)      Progesterone Receptor (PgR) Status Negative (less than 1%)      HER2 by Immunohistochemistry Equivocal (Score 2+)       Addendum  01/02/2025 Reveal  Fish  Non amplified  Negative        Imaging:   January 27, 2025 2D cardiac echo  Summary    Left Ventricle: Left ventricular cavity size is normal. Wall thickness is normal. The left ventricular ejection fraction is 60%. Systolic function is normal. Wall motion is normal. Diastolic function is mildly abnormal, consistent with grade I (abnormal) relaxation.  •  IVS: There is sigmoid appearance of the septum.  •  Right Ventricle: Right ventricular cavity size is normal. Systolic function is normal.  •  Left Atrium: The atrium is mildly dilated (35-41 mL/m2).  •  Right Atrium: The atrium is normal in size.                       January 20, 2025 CT scan of the chest abdomen and pelvis with contrast  IMPRESSION:     A 2.0 cm right breast mass with a biopsy clip in keeping with the patient's recently diagnosed breast cancer.     A 3 mm left upper lobe pulmonary nodule. Based on current Fleischner Society 2017 Guidelines on incidental pulmonary nodule, patients with a known malignancy are at increased risk of metastasis and should receive initial three-month follow-up chest CT.     No evidence of metastatic disease in the abdomen and pelvis.     The study was marked in EPIC for immediate notification.        January 17, 2025 nuclear medicine whole-body bone scan  IMPRESSION:     1. No scintigraphic evidence of osseous metastasis.        December 18, 2024 targeted right breast ultrasound with bilateral diagnostic mammogram     FINDINGS:   RIGHT  1) MASS [B]  Mammo diagnostic bilateral w 3d and cad: There is a 25 mm high density, oval mass with indistinct margins seen in the upper outer quadrant of the right breast at 10 o'clock, 6 cm from the nipple. The mass correlates with the palpable finding noted during physical examination.   US breast right limited (diagnostic): There is a 19 mm x 17 mm x 22 mm oval, hypoechoic mass with indistinct margins seen in the upper outer quadrant of the  right breast at 10 o'clock, 6 cm from the nipple. The mass correlates with the palpable finding noted during physical examination.  Sonographic exam of the right axilla shows no suspicious adenopathy.  Scattered benign-appearing calcifications are demonstrated in the right breast.  Right breast biopsy clip.     Left  Mammo diagnostic bilateral w 3d and cad  There are no suspicious masses, grouped microcalcifications or areas of unexplained architectural distortion. The skin and nipple areolar complex are unremarkable.   Results were discussed with the patient.  Ultrasound-guided biopsy was recommended.     IMPRESSION:  Solid mass in the upper outer right breast corresponding to the area of clinical concern and highly suspicious for malignancy.     ASSESSMENT/BI-RADS CATEGORY:  Left: 1 - Negative  Right: 5 - Highly Suggestive of Malignancy  Overall: 5 - Highly Suggestive of Malignancy     RECOMMENDATION:       - Ultrasound-guided breast biopsy for the right breast.       - Routine screening mammogram in 1 year for the left breast.           February 19, 2024 bilateral screening mammography     FINDINGS:   There are no suspicious masses, grouped microcalcifications or areas of architectural distortion. The skin and nipple areolar complex are unremarkable.     IMPRESSION:  No mammographic evidence of malignancy.           ASSESSMENT/BI-RADS CATEGORY:  Left: 1 - Negative  Right: 1 - Negative  Overall: 1 - Negative     RECOMMENDATION:       - Routine screening mammogram in 1 year for both breasts.                                       Instructions

## 2025-02-24 ENCOUNTER — OFFICE VISIT (OUTPATIENT)
Dept: HEMATOLOGY ONCOLOGY | Facility: CLINIC | Age: 65
End: 2025-02-24
Payer: COMMERCIAL

## 2025-02-24 ENCOUNTER — APPOINTMENT (OUTPATIENT)
Dept: LAB | Facility: CLINIC | Age: 65
End: 2025-02-24
Payer: COMMERCIAL

## 2025-02-24 VITALS
HEART RATE: 91 BPM | SYSTOLIC BLOOD PRESSURE: 130 MMHG | HEIGHT: 67 IN | OXYGEN SATURATION: 100 % | DIASTOLIC BLOOD PRESSURE: 80 MMHG | TEMPERATURE: 96.3 F | BODY MASS INDEX: 37.04 KG/M2 | WEIGHT: 236 LBS | RESPIRATION RATE: 16 BRPM

## 2025-02-24 DIAGNOSIS — Z17.1 MALIGNANT NEOPLASM OF UPPER-OUTER QUADRANT OF RIGHT BREAST IN FEMALE, ESTROGEN RECEPTOR NEGATIVE (HCC): ICD-10-CM

## 2025-02-24 DIAGNOSIS — R91.1 SOLITARY PULMONARY NODULE: ICD-10-CM

## 2025-02-24 DIAGNOSIS — C50.411 MALIGNANT NEOPLASM OF UPPER-OUTER QUADRANT OF RIGHT BREAST IN FEMALE, ESTROGEN RECEPTOR NEGATIVE (HCC): ICD-10-CM

## 2025-02-24 DIAGNOSIS — Z17.1 MALIGNANT NEOPLASM OF UPPER-OUTER QUADRANT OF RIGHT BREAST IN FEMALE, ESTROGEN RECEPTOR NEGATIVE (HCC): Primary | ICD-10-CM

## 2025-02-24 DIAGNOSIS — C50.411 MALIGNANT NEOPLASM OF UPPER-OUTER QUADRANT OF RIGHT BREAST IN FEMALE, ESTROGEN RECEPTOR NEGATIVE (HCC): Primary | ICD-10-CM

## 2025-02-24 DIAGNOSIS — E86.0 DEHYDRATION: ICD-10-CM

## 2025-02-24 LAB
ALBUMIN SERPL BCG-MCNC: 4.3 G/DL (ref 3.5–5)
ALP SERPL-CCNC: 106 U/L (ref 34–104)
ALT SERPL W P-5'-P-CCNC: 15 U/L (ref 7–52)
ANION GAP SERPL CALCULATED.3IONS-SCNC: 11 MMOL/L (ref 4–13)
AST SERPL W P-5'-P-CCNC: 17 U/L (ref 13–39)
BASOPHILS # BLD AUTO: 0.05 THOUSANDS/ÂΜL (ref 0–0.1)
BASOPHILS NFR BLD AUTO: 1 % (ref 0–1)
BILIRUB SERPL-MCNC: 0.71 MG/DL (ref 0.2–1)
BUN SERPL-MCNC: 18 MG/DL (ref 5–25)
CALCIUM SERPL-MCNC: 9.8 MG/DL (ref 8.4–10.2)
CHLORIDE SERPL-SCNC: 99 MMOL/L (ref 96–108)
CO2 SERPL-SCNC: 27 MMOL/L (ref 21–32)
CREAT SERPL-MCNC: 0.68 MG/DL (ref 0.6–1.3)
EOSINOPHIL # BLD AUTO: 0.08 THOUSAND/ÂΜL (ref 0–0.61)
EOSINOPHIL NFR BLD AUTO: 1 % (ref 0–6)
ERYTHROCYTE [DISTWIDTH] IN BLOOD BY AUTOMATED COUNT: 12.7 % (ref 11.6–15.1)
GFR SERPL CREATININE-BSD FRML MDRD: 92 ML/MIN/1.73SQ M
GLUCOSE P FAST SERPL-MCNC: 105 MG/DL (ref 65–99)
HCT VFR BLD AUTO: 39.2 % (ref 34.8–46.1)
HGB BLD-MCNC: 13.1 G/DL (ref 11.5–15.4)
IMM GRANULOCYTES # BLD AUTO: 0.04 THOUSAND/UL (ref 0–0.2)
IMM GRANULOCYTES NFR BLD AUTO: 1 % (ref 0–2)
LYMPHOCYTES # BLD AUTO: 1.36 THOUSANDS/ÂΜL (ref 0.6–4.47)
LYMPHOCYTES NFR BLD AUTO: 21 % (ref 14–44)
MCH RBC QN AUTO: 32.2 PG (ref 26.8–34.3)
MCHC RBC AUTO-ENTMCNC: 33.4 G/DL (ref 31.4–37.4)
MCV RBC AUTO: 96 FL (ref 82–98)
MONOCYTES # BLD AUTO: 0.2 THOUSAND/ÂΜL (ref 0.17–1.22)
MONOCYTES NFR BLD AUTO: 3 % (ref 4–12)
NEUTROPHILS # BLD AUTO: 4.7 THOUSANDS/ÂΜL (ref 1.85–7.62)
NEUTS SEG NFR BLD AUTO: 73 % (ref 43–75)
NRBC BLD AUTO-RTO: 0 /100 WBCS
PLATELET # BLD AUTO: 402 THOUSANDS/UL (ref 149–390)
PMV BLD AUTO: 10.1 FL (ref 8.9–12.7)
POTASSIUM SERPL-SCNC: 4.4 MMOL/L (ref 3.5–5.3)
PROT SERPL-MCNC: 7.6 G/DL (ref 6.4–8.4)
RBC # BLD AUTO: 4.07 MILLION/UL (ref 3.81–5.12)
SODIUM SERPL-SCNC: 137 MMOL/L (ref 135–147)
T3FREE SERPL-MCNC: 3.6 PG/ML (ref 2.5–3.9)
TSH SERPL DL<=0.05 MIU/L-ACNC: 2.28 UIU/ML (ref 0.45–4.5)
WBC # BLD AUTO: 6.43 THOUSAND/UL (ref 4.31–10.16)

## 2025-02-24 PROCEDURE — 84443 ASSAY THYROID STIM HORMONE: CPT

## 2025-02-24 PROCEDURE — 84481 FREE ASSAY (FT-3): CPT

## 2025-02-24 PROCEDURE — 80053 COMPREHEN METABOLIC PANEL: CPT

## 2025-02-24 PROCEDURE — 99214 OFFICE O/P EST MOD 30 MIN: CPT | Performed by: INTERNAL MEDICINE

## 2025-02-24 PROCEDURE — 85025 COMPLETE CBC W/AUTO DIFF WBC: CPT

## 2025-02-24 NOTE — PROGRESS NOTES
Progress Notes  Kosta Machado MD (Physician)  Medical Oncology  Office Visit    2025  [facility] [place]     Kosta Machado MD  Medical Oncology Malignant neoplasm of upper-outer quadrant of right breast in female, estrogen receptor negative (HCC)  Dx Follow-up   Reason for Visit      Progress Notes  Kosta Machado MD (Physician)  Medical Oncology  VISIT  2025  [facility] [place]     Kosta Machado MD  Medical Oncology Malignant neoplasm of upper-outer quadrant of right breast in female, estrogen receptor negative (HCC) +8 more  Dx Consult; Referred by Jordan Esparza MD  Reason for Visit     Progress Notes  Kosta Machado MD (Physician)  Medical Oncology  Expand All Collapse All  Name: Fátima Reyes      : 1960      MRN: [id number]  Encounter Provider: Kosta Machado MD  Encounter Date: 2025  Encounter department: [facility] [place]           Chief Complaint   Patient presents with    visit      :  Assessment & Plan  Malignant neoplasm of upper-outer quadrant of right breast in female, estrogen receptor negative (HCC)  Newly diagnosed palpable clinical T2 clinical N0 triple negative Invasive ductal breast cancer established by biopsy 2024.   CT scan of the chest abdomen pelvis and nuclear medicine whole-body bone scan from 2025 reveal no evidence of metastatic disease     The patient tolerated Week 3 of 12 exceedingly well. With virtually no ill effects. Having some mild constipation ( slowing). Advised stool softeners.     Will continue preoperative neoadjuvant chemoimmunotherapy per keynote 522 trial approach with pembrolizumab 200 mg IV flat dose every 3 weeks plus paclitaxel 80 mg/m² IV weekly x 12 and carboplatin AUC 1.5 weekly x 12           Orders:    CARBOplatin (PARAPLATIN) 38.52 mg in sodium chloride 0.9 % 250 mL IVPB    Oncology Provider  Communication    Oncology Provider Communication    Oncology Provider Communication    Oncology Provider Communication    Oncology Provider Communication    Infusion Calculated Appointment Request; Future    CBC and differential; Future    Comprehensive metabolic panel; Future    TSH, 3rd generation with Free T4 reflex; Future    T3, free; Future    Infusion Calculated Appointment Request; Future    CBC and differential; Future    Comprehensive metabolic panel; Future    Infusion Calculated Appointment Request; Future    CBC and differential; Future    Comprehensive metabolic panel; Future     Class 2 obesity due to excess calories without serious comorbidity with body mass index (BMI) of 39.0 to 39.9 in adult        Family history of malignant neoplasm  Positive breast cancer in mother     Essential hypertension        Hypothyroidism, unspecified type     Orders:    CARBOplatin (PARAPLATIN) 38.52 mg in sodium chloride 0.9 % 250 mL IVPB    Oncology Provider Communication    Oncology Provider Communication     Hyperlipidemia, unspecified hyperlipidemia type        Invasive ductal carcinoma of breast, female, right (HCC)     Dehydration           Solitary pulmonary nodule                 Clinical/Pathologic Stage IIB     Cancer Staging   No matching staging information was found for the patient.        Current Therapy C1W1  Keynote 522 trial data set approach phase 1 with pembrolizumab every 3 weeks IV plus paclitaxel IV weekly x 12+ carboplatin IV weekly x 12     Discussion  This delightful postmenopausal woman presents following week 1 of 12 preoperative systemic therapy for newly diagnosed grade 2 invasive ductal carcinoma of the right breast.  She has been initiated on the keynote 522 regimen with chemo and immunotherapy.  Phase 1 will include intravenous pembrolizumab every 3 weeks along with low-dose weekly chemotherapy as paclitaxel weekly x 12+ carboplatin weekly x 12.       This session represents cycle 1   week 4     I reviewed therapeutic approaches and precautions.          Background History:   The patient became self-aware of palpable right breast mass in the fall 2024 and presented for diagnostic reviews.  Her prior mammography on  February 19,2024 was BI-RADS 1 were negative.     The patient's risk factors for the development of breast cancer and solid tumor malignancy include age, postmenopausal status, gender and obesity. Her mother has a diagnosis of breast cancer at age 80 plus of unclear documentation and scope. Her mother is living and well.     The patient has no clinical evidence of metastases.     This patient is approached with favor in a curative intent.  She clearly carries the expectation of an excellent opportunity for a robust tumor response and favorable outcomes over time.  I had a long discussion with the patient and her  regarding the use of chemoimmunotherapy in a neoadjuvant approach fashion and after the keynote 522 trial data set.  I discussed opportunities and the implication of pathologic complete remission and extension with favor of both event free survival and overall survival.  Please see [place] Journal of Medicine from September 2024 lead author Ambreen WOODWARD     The patient and her  were given handwritten sheet outlining all aspects of our discussion today and expressed understanding clarity and gratitude.  She is scheduled for Mediport catheter placement and we will move forward with phase 1 of care that includes the use of pembrolizumab every 3 weeks plus paclitaxel plus carboplatin weekly x 12.           Special Studies  December 27, 2024 germline genetic panel  No high risk germline mutations                 GENETIC ANALYSIS OVERALL INTERPRETATION   Date Value Ref Range Status   12/27/2024     Final     NEGATIVE: No Clinically Significant Variants Detected   12/27/2024 Variants of Unknown Significance Detected   Final                    INTERPRETATION   Date Value  Ref Range Status   12/27/2024 See Notes   Final       Comment:       No pathogenic mutations, variants of unknown significance, or gross deletions or duplications were detected.  Risk Estimate: low likelihood of variants in the genes analyzed contributing to this individual's clinical history.  Genetic counseling is a recommended option for all individuals undergoing genetic testing.  Genes Analyzed (13 total): EVA, BARD1, BRCA1, BRCA2, CDH1, CHEK2, NF1, PALB2, PTEN, RAD51C, RAD51D, STK11 and TP53 (sequencing and deletion/duplication).   12/27/2024 See Notes   Final       Comment:       No known clinically actionable alterations were detected.  Four variants of unknown significance were detected: two in the MSH3 gene, one in the POLE gene, and one in the SDHA gene.  Risk Estimate: should be based on clinical and family history, as the clinical significance of this result is unknown.  Genetic counseling is a recommended option for all individuals undergoing genetic testing.  This individual is heterozygous for the p.N118I (c.353A>T) and p.N861H (c.2581A>C) variants of unknown significance in the MSH3 gene, heterozygous for the p.C1183A (c.4168C>T) variant of unknown significance in the POLE gene, and heterozygous for the p.K480E (c.1438A>G) variant of unknown significance in the SDHA gene, which may or may not contribute to this individual's clinical history. Familial testing would be necessary to determine if the alterations in MSH3 are on the same chromosome or on different chromosomes (in cis or trans). Refer to the supplementary pages for additional information on these variants. No additional pathogenic mutations, variants of unknown significance, or gross deletions or duplications were detected. Genes Analyzed (59 total): APC, EVA, BAP1, BARD1, BMPR1A, BRCA1, BRCA2, BRIP1, CDH1, CDK4, CDKN1B, CDKN2A, CHEK2, DICER1, FH, FLCN, KIF1B, MAX, MEN1, MET, MLH1, MSH2, MSH6, MUTYH, NF1, NTHL1, PALB2, PMS2, POT1, PTEN,  RAD51C, RAD51D, RB1, RET, SDHA, SDHAF2, SDHB, SDHC, SDHD, SMAD4, SMARCA4, STK11, YZIY964, TP53, TSC1, TSC2 and VHL (sequencing and deletion/duplication); AXIN2, CTNNA1, EGLN1, HOXB13, KIT, MITF, MSH3, PDGFRA, POLD1 and POLE (sequencing only); EPCAM and GREM1 (deletion/duplication only). RNA data is routinely analyzed for use in variant interpretation for all genes.                  GENES NOT REPORTED   Date Value Ref Range Status   2024     Final     EVA,BARD1,CDH1,CHEK2,PALB2,PTEN,RAD51C,RAD51D,STK11,TP53,NF1,BRCA1,BRCA2   2024     Final     APC,EVA,AXIN2,BAP1,BARD1,BMPR1A,BRCA1,BRCA2,BRIP1,CDH1,CDK4,CDKN1B,CDKN2A,CHEK2,CTNNA1,DICER1,EGLN1,EPCAM,FH,FLCN,GREM1,HOXB13,KIF1B,KIT,MAX,MEN1,MET,MITF,MLH1,MSH2,MSH6,MUTYH,NF1,NTHL1,PALB2,PDGFRA,PMS2,POLD1,POT1,PTEN,RAD51C,RAD51D,RB1,RET,SDHAF2,SDHB,  SDHC,SDHD,SMAD4,SMARCA4,STK11,RAWB220,TP53,TSC1,TSC2,VHL            History of Present Illness        This delightful 65-year-old presents with her  to review a new diagnosis of invasive carcinoma of the right breast.  The patient is  2 para 2 and postmenopausal.  The patient's mother at age 80 was revealed to have a diagnosis of breast cancer status post excision only.  The mother is living and well     The patient presented in the 2024 near the  holiday with self-awareness of her right breast mass.  Patient presented for diagnostic reviews through her gynecologist and primary care physicians.  Bilateral diagnostic mammogram with targeted right breast ultrasound was performed on 2024.  25 mm oval mass density with indistinct margins was seen in the upper outer quadrant of the right breast at the 10 o'clock position at 6 cm from the nipple.  This mass correlating with the palpable finding on examination.  Targeted ultrasound of the right breast showed a 19 mm x 17 mm x 22 mm oval hypoechoic mass with indistinct margins seen in the upper outer quadrant of the right breast at  10:00.  There were no suspicious adenopathy in the right axilla.  The left breast showed no suspicious masses, grouped microcalcifications or areas of unexplained architectural distortion.     On December 23, 2024 ultrasound-guided biopsy of the right breast mass with mammography was performed without complication.  Final pathology revealed an invasive breast carcinoma no special type ductal of grade 2.  Ductal carcinoma in situ was present.  Estrogen receptor was negative, progesterone receptor was negative and HER2/nathanael was low at 2+ by IHC but subsequently nonamplified by FISH amplification..  Ductal carcinoma in situ.     The patient reports a medical oncology for therapeutic planning and review for possible neoadjuvant therapies        Pertinent History from December 2024     No new complaints              December 23, 2024 S 24 [id number] status post ultrasound-guided biopsy right breast lesion 10 o'clock position 6 cm from the nipple     Cancer Screening and Prevention  Mammography: 12/18/2024   GI/Colonoscopy: 03/02/2021   GYN: 01/12/2022   Bone Density: Not on file   Covid 19 Vaccination Status:      Oncology Therapeutic Summary:     From February 3, 2025 cycle 1 week 1   initiation phase 1 keynote 522 approach with pembrolizumab 200 mg 5 pills IV every 3 weeks plus paclitaxel 80 mg/m² IV weekly x 12 and carboplatin AUC 1.5 weekly x 12     12/23/2024 S 24 [id number]  status post ultrasound-guided biopsy right breast     Oncology History   Cancer Staging   Invasive ductal carcinoma of breast, female, right (HCC)  Staging form: Breast, AJCC 8th Edition  - Clinical stage from 1/14/2025: Stage IIB (cT2, cN0, cM0, G2, ER-, VT-, HER2-) - Signed by Jordan Esparza MD on 1/27/2025  Histopathologic type: Infiltrating duct carcinoma, NOS  Stage prefix: Initial diagnosis  Method of lymph node assessment: Clinical  Histologic grading system: 3 grade system        Oncology History   Invasive ductal carcinoma of breast, female,  right (HCC)   1/14/2025 -  Cancer Staged     Staging form: Breast, AJCC 8th Edition  - Clinical stage from 1/14/2025: Stage IIB (cT2, cN0, cM0, G2, ER-, VT-, HER2-) - Signed by Jordan Esparza MD on 1/27/2025  Histopathologic type: Infiltrating duct carcinoma, NOS  Stage prefix: Initial diagnosis  Method of lymph node assessment: Clinical  Histologic grading system: 3 grade system         1/27/2025 Initial Diagnosis     Invasive ductal carcinoma of breast, female, right (HCC)      Malignant neoplasm of upper-outer quadrant of right breast in female, estrogen receptor negative (HCC)   1/27/2025 Initial Diagnosis     Malignant neoplasm of upper-outer quadrant of right breast in female, estrogen receptor negative (HCC)      1/28/2025 -  Chemotherapy     DOXOrubicin (ADRIAMYCIN), 60 mg/m2 = 129 mg, Intravenous, Once, 0 of 4 cycles  alteplase (CATHFLO), 2 mg, Intracatheter, Every 1 Minute as needed, 0 of 17 cycles  pegfilgrastim-apgf (Nyvepria), 6 mg, Subcutaneous, Once, 0 of 4 cycles  cyclophosphamide (CYTOXAN) IVPB, 600 mg/m2 = 1,290 mg, Intravenous, Once, 0 of 4 cycles  fosaprepitant (EMEND) IVPB, 150 mg, Intravenous, Once, 0 of 4 cycles  CARBOplatin (PARAPLATIN) IVPB (GOG AUC DOSING), 38.52 mg (100 % of original dose 38.52 mg), Intravenous, Once, 0 of 4 cycles  Dose modification:   (original dose 38.52 mg, Cycle 1),   (original dose 38.52 mg, Cycle 1, Reason: Other (Must fill in a comment), Comment: lab estimates),   (original dose 38.52 mg, Cycle 1),   (original dose 38.52 mg, Cycle 1, Reason: Other (Must fill in a comment))  PACLItaxel (TAXOL) chemo IVPB, 80 mg/m2 = 172.2 mg, Intravenous, Once, 0 of 4 cycles  pembrolizumab (KEYTRUDA) IVPB, 200 mg, Intravenous, Once, 0 of 17 cycles         Review of Systems   Constitutional: Negative.    All other systems reviewed and are negative.     Medical History Reviewed by provider this encounter:     .    Medications Ordered Prior to Encounter               Current  "Outpatient Medications on File Prior to Visit   Medication Sig Dispense Refill    atorvastatin (LIPITOR) 20 mg tablet TAKE ONE TABLET BY MOUTH EVERY DAY 90 tablet 1    doxycycline hyclate (VIBRAMYCIN) 100 mg capsule Take 100 mg by mouth daily as needed        hydroCHLOROthiazide 12.5 mg tablet TAKE ONE TABLET BY MOUTH EVERY DAY 30 tablet 5    Multiple Vitamin (MULTIVITAMINS PO) Take by mouth        nebivolol (BYSTOLIC) 20 MG tablet TAKE ONE TABLET BY MOUTH EVERY DAY 90 tablet 1      No current facility-administered medications on file prior to visit.         Social History                   Tobacco Use    Smoking status: Never    Smokeless tobacco: Never   Vaping Use    Vaping status: Never Used   Substance and Sexual Activity    Alcohol use: Yes    Drug use: No    Sexual activity: Yes       Partners: Male       Birth control/protection: Post-menopausal             Objective     /76 (BP Location: Left arm, Patient Position: Sitting, Cuff Size: Adult)   Pulse 79   Temp 97.8 °F (36.6 °C) (Temporal)   Resp 18   Ht 5' 6\" (1.676 m)   Wt 108 kg (239 lb)   LMP  (LMP Unknown)   SpO2 95%   BMI 38.58 kg/m²      Pain Screening:  Pain Score: 0-No pain  ECOG ECOG Performance Status: 0 - Fully active, able to carry on all pre-disease performance without restriction 0  Physical Exam  Vitals and nursing note reviewed. Exam conducted with a chaperone present.   Neck:      Thyroid: No thyroid mass, thyromegaly or thyroid tenderness.      Trachea: Trachea normal.   Cardiovascular:      Rate and Rhythm: Normal rate and regular rhythm.      Pulses: Normal pulses.      Heart sounds: Murmur heard.      Systolic murmur is present with a grade of 2/6.      No diastolic murmur is present.      No friction rub. No gallop. No S3 or S4 sounds.   Pulmonary:      Effort: Pulmonary effort is normal.      Breath sounds: Normal breath sounds. No stridor, decreased air movement or transmitted upper airway sounds. No decreased breath " sounds, wheezing, rhonchi or rales.   Chest:      Chest wall: Mass present. No deformity, swelling, tenderness, crepitus or edema. There is no dullness to percussion.   Breasts:     Right: Mass present. No nipple discharge, skin change or tenderness.      Left: Normal.      Comments: 1.55 1.7 cm mass in the right mid to upper outer breasts  Abdominal:      General: Abdomen is protuberant. Bowel sounds are normal.      Palpations: Abdomen is soft. There is no shifting dullness, hepatomegaly, splenomegaly, mass or pulsatile mass.      Tenderness: There is no abdominal tenderness. There is no right CVA tenderness or left CVA tenderness.   Musculoskeletal:      Cervical back: Full passive range of motion without pain and normal range of motion. No edema, rigidity, bony tenderness or crepitus. No pain with movement, spinous process tenderness or muscular tenderness.      Thoracic back: No bony tenderness.      Lumbar back: No bony tenderness.      Right lower leg: No edema.      Left lower leg: No edema.   Lymphadenopathy:      Cervical: No cervical adenopathy.      Right cervical: No superficial, deep or posterior cervical adenopathy.     Left cervical: No superficial, deep or posterior cervical adenopathy.      Upper Body:      Right upper body: No supraclavicular, axillary or pectoral adenopathy.      Left upper body: No supraclavicular, axillary or pectoral adenopathy.   Neurological:      Mental Status: She is alert.            Labs: I have reviewed the following labs:            Lab Results   Component Value Date/Time     WBC 8.89 09/13/2024 11:01 AM     RBC 4.36 09/13/2024 11:01 AM     Hemoglobin 13.8 09/13/2024 11:01 AM     Hematocrit 42.8 09/13/2024 11:01 AM     MCV 98 09/13/2024 11:01 AM     MCH 31.7 09/13/2024 11:01 AM     RDW 12.4 09/13/2024 11:01 AM     Platelets 181 09/13/2024 11:01 AM                Lab Results   Component Value Date/Time     Potassium 4.0 01/09/2025 09:38 AM     Chloride 103 01/09/2025  09:38 AM     CO2 24 01/09/2025 09:38 AM     BUN 20 01/09/2025 09:38 AM     Creatinine 0.68 01/09/2025 09:38 AM     Glucose, Fasting 113 (H) 01/09/2025 09:38 AM     Calcium 8.8 01/09/2025 09:38 AM     AST 15 01/09/2025 09:38 AM     ALT 11 01/09/2025 09:38 AM     Alkaline Phosphatase 101 01/09/2025 09:38 AM     Total Protein 7.1 01/09/2025 09:38 AM     Albumin 4.1 01/09/2025 09:38 AM     Total Bilirubin 0.60 01/09/2025 09:38 AM     eGFR 92 01/09/2025 09:38 AM            Pathology:      December 23, 2024 S 24 [id number] status post ultrasound-guided biopsy right breast lesion 10 o'clock position 6 cm from the nipple  Final Diagnosis   A. Breast, Right, us guided breast bx, 10 ocl, 6 cm fn 3 passes, 12 g:  - Invasive breast carcinoma of no special type (ductal NST/invasive ductal carcinoma).   * Mina grade 2 of 3 (total score: 6 of 9)    -- tubule formation < 10%, score 3    -- nuclear grade 2 of 3, score 2    -- mitoses < 3/mm2, (</= 7 mitoses/10HPF), score 1.   * Confirmed by tumor cell immunophenotype:    -- positive: nuclear stain for GATA3 and membranous stain for E-cadherin and p120.    -- negative:  p63, calponin-B.     * Invasive carcinoma involves 3 of 3 submitted core biopsies, max. dimension = 16 millimeters.  - Ductal carcinoma in situ.   * Estrogen, progesterone & HER2 receptor studies pending, to be described in a separate receptor report.          Synoptic Checklist INVASIVE CARCINOMA OF THE BREAST: Biopsy (INVASIVE CARCINOMA OF THE BREAST: BIOPSY - All Specimens)Protocol posted: 3/22/2023 SPECIMEN Procedure Needle biopsy Specimen Laterality Right .      TUMOR Tumor Site Clock position 10 o'clock   Tumor Site Distance from nipple (Centimeters): 6 cm     Histologic Type Invasive carcinoma of no special type (ductal) Histologic Grade (Hurley Histologic Score) Glandular (Acinar) / Tubular Differentiation Score 3 Nuclear Pleomorphism Score 2 Mitotic Rate [id number] Fátima Reyes [id number]    Overall Grade Grade 2 (scores of 6 or 7)      Largest Invasive Focus in this Limited Biopsy Sample 16 mm      Ductal Carcinoma In Situ (DCIS) Present Architectural Patterns Solid Nuclear Grade Grade II (intermediate) Necrosis Not identified      Lymphatic and / or Vascular Invasion Not identified Microcalcifications Not identified .      Estrogen Receptor (ER) Status Negative (less than 1%)      Progesterone Receptor (PgR) Status Negative (less than 1%)      HER2 by Immunohistochemistry Equivocal (Score 2+)      Addendum  01/02/2025 Arara  Fish  Non amplified  Negative        Imaging:   January 27, 2025 2D cardiac echo  Summary    Left Ventricle: Left ventricular cavity size is normal. Wall thickness is normal. The left ventricular ejection fraction is 60%. Systolic function is normal. Wall motion is normal. Diastolic function is mildly abnormal, consistent with grade I (abnormal) relaxation.    IVS: There is sigmoid appearance of the septum.    Right Ventricle: Right ventricular cavity size is normal. Systolic function is normal.    Left Atrium: The atrium is mildly dilated (35-41 mL/m2).    Right Atrium: The atrium is normal in size.                       January 20, 2025 CT scan of the chest abdomen and pelvis with contrast  IMPRESSION:     A 2.0 cm right breast mass with a biopsy clip in keeping with the patient's recently diagnosed breast cancer.     A 3 mm left upper lobe pulmonary nodule. Based on current Fleischner Society 2017 Guidelines on incidental pulmonary nodule, patients with a known malignancy are at increased risk of metastasis and should receive initial three-month follow-up chest CT.     No evidence of metastatic disease in the abdomen and pelvis.     The study was marked in EPIC for immediate notification.        January 17, 2025 nuclear medicine whole-body bone scan  IMPRESSION:     1. No scintigraphic evidence of osseous metastasis.        December 18, 2024 targeted right breast  ultrasound with bilateral diagnostic mammogram     FINDINGS:   RIGHT  1) MASS [B]  Mammo diagnostic bilateral w 3d and cad: There is a 25 mm high density, oval mass with indistinct margins seen in the upper outer quadrant of the right breast at 10 o'clock, 6 cm from the nipple. The mass correlates with the palpable finding noted during physical examination.   US breast right limited (diagnostic): There is a 19 mm x 17 mm x 22 mm oval, hypoechoic mass with indistinct margins seen in the upper outer quadrant of the right breast at 10 o'clock, 6 cm from the nipple. The mass correlates with the palpable finding noted during physical examination.  Sonographic exam of the right axilla shows no suspicious adenopathy.  Scattered benign-appearing calcifications are demonstrated in the right breast.  Right breast biopsy clip.     Left  Mammo diagnostic bilateral w 3d and cad  There are no suspicious masses, grouped microcalcifications or areas of unexplained architectural distortion. The skin and nipple areolar complex are unremarkable.   Results were discussed with the patient.  Ultrasound-guided biopsy was recommended.     IMPRESSION:  Solid mass in the upper outer right breast corresponding to the area of clinical concern and highly suspicious for malignancy.     ASSESSMENT/BI-RADS CATEGORY:  Left: 1 - Negative  Right: 5 - Highly Suggestive of Malignancy  Overall: 5 - Highly Suggestive of Malignancy     RECOMMENDATION:       - Ultrasound-guided breast biopsy for the right breast.       - Routine screening mammogram in 1 year for the left breast.           February 19, 2024 bilateral screening mammography     FINDINGS:   There are no suspicious masses, grouped microcalcifications or areas of architectural distortion. The skin and nipple areolar complex are unremarkable.     IMPRESSION:  No mammographic evidence of malignancy.           ASSESSMENT/BI-RADS CATEGORY:  Left: 1 - Negative  Right: 1 - Negative  Overall: 1 -  Negative     RECOMMENDATION:       - Routine screening mammogram in 1 year for both breasts.

## 2025-02-26 ENCOUNTER — HOSPITAL ENCOUNTER (OUTPATIENT)
Dept: INFUSION CENTER | Facility: CLINIC | Age: 65
Discharge: HOME | End: 2025-02-26
Payer: COMMERCIAL

## 2025-02-26 VITALS
BODY MASS INDEX: 36.73 KG/M2 | SYSTOLIC BLOOD PRESSURE: 137 MMHG | DIASTOLIC BLOOD PRESSURE: 86 MMHG | HEART RATE: 76 BPM | WEIGHT: 234 LBS | HEIGHT: 67 IN | TEMPERATURE: 97.3 F | OXYGEN SATURATION: 95 %

## 2025-02-26 DIAGNOSIS — C50.411 MALIGNANT NEOPLASM OF UPPER-OUTER QUADRANT OF RIGHT BREAST IN FEMALE, ESTROGEN RECEPTOR NEGATIVE (HCC): Primary | ICD-10-CM

## 2025-02-26 DIAGNOSIS — Z17.1 MALIGNANT NEOPLASM OF UPPER-OUTER QUADRANT OF RIGHT BREAST IN FEMALE, ESTROGEN RECEPTOR NEGATIVE (HCC): Primary | ICD-10-CM

## 2025-02-26 DIAGNOSIS — E86.0 DEHYDRATION: ICD-10-CM

## 2025-02-26 PROCEDURE — 96367 TX/PROPH/DG ADDL SEQ IV INF: CPT

## 2025-02-26 PROCEDURE — 96413 CHEMO IV INFUSION 1 HR: CPT

## 2025-02-26 PROCEDURE — 96417 CHEMO IV INFUS EACH ADDL SEQ: CPT

## 2025-02-26 RX ORDER — SODIUM CHLORIDE 9 MG/ML
20 INJECTION, SOLUTION INTRAVENOUS ONCE
Status: COMPLETED | OUTPATIENT
Start: 2025-02-26 | End: 2025-02-26

## 2025-02-26 RX ADMIN — PACLITAXEL 172.2 MG: 6 INJECTION, SOLUTION INTRAVENOUS at 11:16

## 2025-02-26 RX ADMIN — DIPHENHYDRAMINE HYDROCHLORIDE 25 MG: 50 INJECTION INTRAMUSCULAR; INTRAVENOUS at 09:24

## 2025-02-26 RX ADMIN — SODIUM CHLORIDE 500 ML: 0.9 INJECTION, SOLUTION INTRAVENOUS at 08:42

## 2025-02-26 RX ADMIN — FAMOTIDINE 20 MG: 10 INJECTION INTRAVENOUS at 09:47

## 2025-02-26 RX ADMIN — SODIUM CHLORIDE 20 ML/HR: 9 INJECTION, SOLUTION INTRAVENOUS at 08:40

## 2025-02-26 RX ADMIN — SODIUM CHLORIDE 200 MG: 9 INJECTION, SOLUTION INTRAVENOUS at 10:17

## 2025-02-26 RX ADMIN — DEXAMETHASONE SODIUM PHOSPHATE: 10 INJECTION, SOLUTION INTRAMUSCULAR; INTRAVENOUS at 08:44

## 2025-02-26 RX ADMIN — CARBOPLATIN 187.05 MG: 10 INJECTION, SOLUTION INTRAVENOUS at 12:16

## 2025-02-26 NOTE — PROGRESS NOTES
Received for keytruda/carbo/taxol. Offers no complaints. LPAC accessed wihtout issue, brisk blood return noted and flushes well. Pre meds infusing per md order. Pt also ordered hydration today. NS infusing per order.

## 2025-02-26 NOTE — PROGRESS NOTES
Patient tolerated treatment without incident. Next appt confirmed with patient for 3/5 at 0830 at [place]. AVS provided.

## 2025-02-27 NOTE — PROGRESS NOTES
Progress Notes  Kosta Machado MD (Physician)  Medical Oncology  Expand All Collapse All  Name: Fátima Reyes      : 1960      MRN: [id number]  Encounter Provider: Kosta Machado MD  Encounter Date: 2025  Encounter department: [facility] [place]           Chief Complaint   Patient presents with    visit      :  Assessment & Plan  Malignant neoplasm of upper-outer quadrant of right breast in female, estrogen receptor negative (HCC)  Newly diagnosed palpable clinical T2 clinical N0 triple negative Invasive ductal breast cancer established by biopsy 2024.   CT scan of the chest abdomen pelvis and nuclear medicine whole-body bone scan from 2025 reveal no evidence of metastatic disease     The patient tolerated Week 4 of 12 exceedingly well. With virtually no ill effects. Having some mild constipation ( slowing). Advised stool softeners.    New m-p rash!! Seek dermatology input. Question relationship to immunotherapy ?     Will continue preoperative neoadjuvant chemoimmunotherapy per keynote 522 trial approach with pembrolizumab 200 mg IV flat dose every 3 weeks plus paclitaxel 80 mg/m² IV weekly x 12 and carboplatin AUC 1.5 weekly x 12           Orders:    CARBOplatin (PARAPLATIN) 38.52 mg in sodium chloride 0.9 % 250 mL IVPB    Oncology Provider Communication    Oncology Provider Communication    Oncology Provider Communication    Oncology Provider Communication    Oncology Provider Communication    Infusion Calculated Appointment Request; Future    CBC and differential; Future    Comprehensive metabolic panel; Future    TSH, 3rd generation with Free T4 reflex; Future    T3, free; Future    Infusion Calculated Appointment Request; Future    CBC and differential; Future    Comprehensive metabolic panel; Future    Infusion Calculated Appointment Request; Future    CBC and differential; Future    Comprehensive metabolic panel; Future     Class  2 obesity due to excess calories without serious comorbidity with body mass index (BMI) of 39.0 to 39.9 in adult        Family history of malignant neoplasm  Positive breast cancer in mother     Essential hypertension        Hypothyroidism, unspecified type     Orders:    CARBOplatin (PARAPLATIN) 38.52 mg in sodium chloride 0.9 % 250 mL IVPB    Oncology Provider Communication    Oncology Provider Communication     Hyperlipidemia, unspecified hyperlipidemia type        Invasive ductal carcinoma of breast, female, right (HCC)     Dehydration           Solitary pulmonary nodule                 Clinical/Pathologic Stage IIB     Cancer Staging   No matching staging information was found for the patient.        Current Therapy W 5 of 12  Keynote 522 trial data set approach phase 1 with pembrolizumab every 3 weeks IV plus paclitaxel IV weekly x 12+ carboplatin IV weekly x 12     Discussion  This delightful postmenopausal woman presents following week 5 of 12 preoperative systemic therapy for newly diagnosed grade 2 invasive ductal carcinoma of the right breast.      She has been initiated on the keynote 522 regimen with chemo and immunotherapy.  Phase 1 will include intravenous pembrolizumab every 3 weeks along with low-dose weekly chemotherapy as paclitaxel weekly x 12+ carboplatin weekly x 12.       This session represents week 5 of 12    The patient is tolerating well overall. She has M-P skin eruption. If any progression, I will hold immunotherapy as initial step. Dermatology input requested. Precautions reviewed with patient     I reviewed therapeutic approaches and precautions.          Background History:   The patient became self-aware of palpable right breast mass in the fall 2024 and presented for diagnostic reviews.  Her prior mammography on  February 19,2024 was BI-RADS 1 were negative.     The patient's risk factors for the development of breast cancer and solid tumor malignancy include age, postmenopausal  status, gender and obesity. Her mother has a diagnosis of breast cancer at age 80 plus of unclear documentation and scope. Her mother is living and well.     The patient has no clinical evidence of metastases.     This patient is approached with favor in a curative intent.  She clearly carries the expectation of an excellent opportunity for a robust tumor response and favorable outcomes over time.  I had a long discussion with the patient and her  regarding the use of chemoimmunotherapy in a neoadjuvant approach fashion and after the keynote 522 trial data set.  I discussed opportunities and the implication of pathologic complete remission and extension with favor of both event free survival and overall survival.  Please see [place] Journal of Medicine from September 2024 lead author Ambreen WOODWARD     The patient and her  were given handwritten sheet outlining all aspects of our discussion today and expressed understanding clarity and gratitude.  She is scheduled for Mediport catheter placement and we will move forward with phase 1 of care that includes the use of pembrolizumab every 3 weeks plus paclitaxel plus carboplatin weekly x 12.           Special Studies  December 27, 2024 germline genetic panel  No high risk germline mutations                 GENETIC ANALYSIS OVERALL INTERPRETATION   Date Value Ref Range Status   12/27/2024     Final     NEGATIVE: No Clinically Significant Variants Detected   12/27/2024 Variants of Unknown Significance Detected   Final                    INTERPRETATION   Date Value Ref Range Status   12/27/2024 See Notes   Final       Comment:       No pathogenic mutations, variants of unknown significance, or gross deletions or duplications were detected.  Risk Estimate: low likelihood of variants in the genes analyzed contributing to this individual's clinical history.  Genetic counseling is a recommended option for all individuals undergoing genetic testing.  Genes Analyzed  (13 total): EVA, BARD1, BRCA1, BRCA2, CDH1, CHEK2, NF1, PALB2, PTEN, RAD51C, RAD51D, STK11 and TP53 (sequencing and deletion/duplication).   12/27/2024 See Notes   Final       Comment:       No known clinically actionable alterations were detected.  Four variants of unknown significance were detected: two in the MSH3 gene, one in the POLE gene, and one in the SDHA gene.  Risk Estimate: should be based on clinical and family history, as the clinical significance of this result is unknown.  Genetic counseling is a recommended option for all individuals undergoing genetic testing.  This individual is heterozygous for the p.N118I (c.353A>T) and p.N861H (c.2581A>C) variants of unknown significance in the MSH3 gene, heterozygous for the p.V9707L (c.4168C>T) variant of unknown significance in the POLE gene, and heterozygous for the p.K480E (c.1438A>G) variant of unknown significance in the SDHA gene, which may or may not contribute to this individual's clinical history. Familial testing would be necessary to determine if the alterations in MSH3 are on the same chromosome or on different chromosomes (in cis or trans). Refer to the supplementary pages for additional information on these variants. No additional pathogenic mutations, variants of unknown significance, or gross deletions or duplications were detected. Genes Analyzed (59 total): APC, EVA, BAP1, BARD1, BMPR1A, BRCA1, BRCA2, BRIP1, CDH1, CDK4, CDKN1B, CDKN2A, CHEK2, DICER1, FH, FLCN, KIF1B, MAX, MEN1, MET, MLH1, MSH2, MSH6, MUTYH, NF1, NTHL1, PALB2, PMS2, POT1, PTEN, RAD51C, RAD51D, RB1, RET, SDHA, SDHAF2, SDHB, SDHC, SDHD, SMAD4, SMARCA4, STK11, PTEG503, TP53, TSC1, TSC2 and VHL (sequencing and deletion/duplication); AXIN2, CTNNA1, EGLN1, HOXB13, KIT, MITF, MSH3, PDGFRA, POLD1 and POLE (sequencing only); EPCAM and GREM1 (deletion/duplication only). RNA data is routinely analyzed for use in variant interpretation for all genes.                  GENES NOT REPORTED    Date Value Ref Range Status   2024     Final     EVA,BARD1,CDH1,CHEK2,PALB2,PTEN,RAD51C,RAD51D,STK11,TP53,NF1,BRCA1,BRCA2   2024     Final     APC,EVA,AXIN2,BAP1,BARD1,BMPR1A,BRCA1,BRCA2,BRIP1,CDH1,CDK4,CDKN1B,CDKN2A,CHEK2,CTNNA1,DICER1,EGLN1,EPCAM,FH,FLCN,GREM1,HOXB13,KIF1B,KIT,MAX,MEN1,MET,MITF,MLH1,MSH2,MSH6,MUTYH,NF1,NTHL1,PALB2,PDGFRA,PMS2,POLD1,POT1,PTEN,RAD51C,RAD51D,RB1,RET,SDHAF2,SDHB,  SDHC,SDHD,SMAD4,SMARCA4,STK11,HTGV935,TP53,TSC1,TSC2,VHL            History of Present Illness        This delightful 65-year-old presents with her  to review a new diagnosis of invasive carcinoma of the right breast.  The patient is  2 para 2 and postmenopausal.  The patient's mother at age 80 was revealed to have a diagnosis of breast cancer status post excision only.  The mother is living and well     The patient presented in the 2024 near the [place] with self-awareness of her right breast mass.  Patient presented for diagnostic reviews through her gynecologist and primary care physicians.  Bilateral diagnostic mammogram with targeted right breast ultrasound was performed on 2024.  25 mm oval mass density with indistinct margins was seen in the upper outer quadrant of the right breast at the 10 o'clock position at 6 cm from the nipple.  This mass correlating with the palpable finding on examination.  Targeted ultrasound of the right breast showed a 19 mm x 17 mm x 22 mm oval hypoechoic mass with indistinct margins seen in the upper outer quadrant of the right breast at 10:00.  There were no suspicious adenopathy in the right axilla.  The left breast showed no suspicious masses, grouped microcalcifications or areas of unexplained architectural distortion.     On 2024 ultrasound-guided biopsy of the right breast mass with mammography was performed without complication.  Final pathology revealed an invasive breast carcinoma no special type ductal of grade  2.  Ductal carcinoma in situ was present.  Estrogen receptor was negative, progesterone receptor was negative and HER2/nathanael was low at 2+ by IHC but subsequently nonamplified by FISH amplification..  Ductal carcinoma in situ.     The patient reports a medical oncology for therapeutic planning and review for possible neoadjuvant therapies        Pertinent History from December 2024     No new complaints              December 23, 2024 S 24 [id number] status post ultrasound-guided biopsy right breast lesion 10 o'clock position 6 cm from the nipple     Cancer Screening and Prevention  Mammography: 12/18/2024   GI/Colonoscopy: 03/02/2021   GYN: 01/12/2022   Bone Density: Not on file   Covid 19 Vaccination Status:      Oncology Therapeutic Summary:     From February 3, 2025 cycle 1 week 1   initiation phase 1 keynote 522 approach with pembrolizumab 200 mg 5 pills IV every 3 weeks plus paclitaxel 80 mg/m² IV weekly x 12 and carboplatin AUC 1.5 weekly x 12     12/23/2024 S 24 [id number]  status post ultrasound-guided biopsy right breast     Oncology History   Cancer Staging   Invasive ductal carcinoma of breast, female, right (HCC)  Staging form: Breast, AJCC 8th Edition  - Clinical stage from 1/14/2025: Stage IIB (cT2, cN0, cM0, G2, ER-, TX-, HER2-) - Signed by Jordan Esparza MD on 1/27/2025  Histopathologic type: Infiltrating duct carcinoma, NOS  Stage prefix: Initial diagnosis  Method of lymph node assessment: Clinical  Histologic grading system: 3 grade system        Oncology History   Invasive ductal carcinoma of breast, female, right (HCC)   1/14/2025 -  Cancer Staged     Staging form: Breast, AJCC 8th Edition  - Clinical stage from 1/14/2025: Stage IIB (cT2, cN0, cM0, G2, ER-, TX-, HER2-) - Signed by Jordan Esparza MD on 1/27/2025  Histopathologic type: Infiltrating duct carcinoma, NOS  Stage prefix: Initial diagnosis  Method of lymph node assessment: Clinical  Histologic grading system: 3 grade system         1/27/2025  Initial Diagnosis     Invasive ductal carcinoma of breast, female, right (HCC)      Malignant neoplasm of upper-outer quadrant of right breast in female, estrogen receptor negative (HCC)   1/27/2025 Initial Diagnosis     Malignant neoplasm of upper-outer quadrant of right breast in female, estrogen receptor negative (HCC)      1/28/2025 -  Chemotherapy     DOXOrubicin (ADRIAMYCIN), 60 mg/m2 = 129 mg, Intravenous, Once, 0 of 4 cycles  alteplase (CATHFLO), 2 mg, Intracatheter, Every 1 Minute as needed, 0 of 17 cycles  pegfilgrastim-apgf (Nyvepria), 6 mg, Subcutaneous, Once, 0 of 4 cycles  cyclophosphamide (CYTOXAN) IVPB, 600 mg/m2 = 1,290 mg, Intravenous, Once, 0 of 4 cycles  fosaprepitant (EMEND) IVPB, 150 mg, Intravenous, Once, 0 of 4 cycles  CARBOplatin (PARAPLATIN) IVPB (GOG AUC DOSING), 38.52 mg (100 % of original dose 38.52 mg), Intravenous, Once, 0 of 4 cycles  Dose modification:   (original dose 38.52 mg, Cycle 1),   (original dose 38.52 mg, Cycle 1, Reason: Other (Must fill in a comment), Comment: lab estimates),   (original dose 38.52 mg, Cycle 1),   (original dose 38.52 mg, Cycle 1, Reason: Other (Must fill in a comment))  PACLItaxel (TAXOL) chemo IVPB, 80 mg/m2 = 172.2 mg, Intravenous, Once, 0 of 4 cycles  pembrolizumab (KEYTRUDA) IVPB, 200 mg, Intravenous, Once, 0 of 17 cycles         Review of Systems   Constitutional: Negative.    All other systems reviewed and are negative.     Medical History Reviewed by provider this encounter:     .    Medications Ordered Prior to Encounter               Current Outpatient Medications on File Prior to Visit   Medication Sig Dispense Refill    atorvastatin (LIPITOR) 20 mg tablet TAKE ONE TABLET BY MOUTH EVERY DAY 90 tablet 1    doxycycline hyclate (VIBRAMYCIN) 100 mg capsule Take 100 mg by mouth daily as needed        hydroCHLOROthiazide 12.5 mg tablet TAKE ONE TABLET BY MOUTH EVERY DAY 30 tablet 5    Multiple Vitamin (MULTIVITAMINS PO) Take by mouth        nebivolol  "(BYSTOLIC) 20 MG tablet TAKE ONE TABLET BY MOUTH EVERY DAY 90 tablet 1      No current facility-administered medications on file prior to visit.         Social History                   Tobacco Use    Smoking status: Never    Smokeless tobacco: Never   Vaping Use    Vaping status: Never Used   Substance and Sexual Activity    Alcohol use: Yes    Drug use: No    Sexual activity: Yes       Partners: Male       Birth control/protection: Post-menopausal             Objective     /76 (BP Location: Left arm, Patient Position: Sitting, Cuff Size: Adult)   Pulse 79   Temp 97.8 °F (36.6 °C) (Temporal)   Resp 18   Ht 5' 6\" (1.676 m)   Wt 108 kg (239 lb)   LMP  (LMP Unknown)   SpO2 95%   BMI 38.58 kg/m²      Pain Screening:  Pain Score: 0-No pain  ECOG ECOG Performance Status: 0 - Fully active, able to carry on all pre-disease performance without restriction 0  Physical Exam  Vitals and nursing note reviewed. Exam conducted with a chaperone present.   Neck:      Thyroid: No thyroid mass, thyromegaly or thyroid tenderness.      Trachea: Trachea normal.   Cardiovascular:      Rate and Rhythm: Normal rate and regular rhythm.      Pulses: Normal pulses.      Heart sounds: Murmur heard.      Systolic murmur is present with a grade of 2/6.      No diastolic murmur is present.      No friction rub. No gallop. No S3 or S4 sounds.   Pulmonary:      Effort: Pulmonary effort is normal.      Breath sounds: Normal breath sounds. No stridor, decreased air movement or transmitted upper airway sounds. No decreased breath sounds, wheezing, rhonchi or rales.   Chest:      Chest wall: Mass present. No deformity, swelling, tenderness, crepitus or edema. There is no dullness to percussion.   Breasts:     Right: Mass present. No nipple discharge, skin change or tenderness.      Left: Normal.      Comments: 1.55 1.7 cm mass in the right mid to upper outer breasts  Abdominal:      General: Abdomen is protuberant. Bowel sounds are " normal.      Palpations: Abdomen is soft. There is no shifting dullness, hepatomegaly, splenomegaly, mass or pulsatile mass.      Tenderness: There is no abdominal tenderness. There is no right CVA tenderness or left CVA tenderness.   Musculoskeletal:      Cervical back: Full passive range of motion without pain and normal range of motion. No edema, rigidity, bony tenderness or crepitus. No pain with movement, spinous process tenderness or muscular tenderness.      Thoracic back: No bony tenderness.      Lumbar back: No bony tenderness.      Right lower leg: No edema.      Left lower leg: No edema.   Lymphadenopathy:      Cervical: No cervical adenopathy.      Right cervical: No superficial, deep or posterior cervical adenopathy.     Left cervical: No superficial, deep or posterior cervical adenopathy.      Upper Body:      Right upper body: No supraclavicular, axillary or pectoral adenopathy.      Left upper body: No supraclavicular, axillary or pectoral adenopathy.   Neurological:      Mental Status: She is alert.            Labs: I have reviewed the following labs:            Lab Results   Component Value Date/Time     WBC 8.89 09/13/2024 11:01 AM     RBC 4.36 09/13/2024 11:01 AM     Hemoglobin 13.8 09/13/2024 11:01 AM     Hematocrit 42.8 09/13/2024 11:01 AM     MCV 98 09/13/2024 11:01 AM     MCH 31.7 09/13/2024 11:01 AM     RDW 12.4 09/13/2024 11:01 AM     Platelets 181 09/13/2024 11:01 AM                Lab Results   Component Value Date/Time     Potassium 4.0 01/09/2025 09:38 AM     Chloride 103 01/09/2025 09:38 AM     CO2 24 01/09/2025 09:38 AM     BUN 20 01/09/2025 09:38 AM     Creatinine 0.68 01/09/2025 09:38 AM     Glucose, Fasting 113 (H) 01/09/2025 09:38 AM     Calcium 8.8 01/09/2025 09:38 AM     AST 15 01/09/2025 09:38 AM     ALT 11 01/09/2025 09:38 AM     Alkaline Phosphatase 101 01/09/2025 09:38 AM     Total Protein 7.1 01/09/2025 09:38 AM     Albumin 4.1 01/09/2025 09:38 AM     Total Bilirubin 0.60  01/09/2025 09:38 AM     eGFR 92 01/09/2025 09:38 AM            Pathology:      December 23, 2024 S 24 [id number] status post ultrasound-guided biopsy right breast lesion 10 o'clock position 6 cm from the nipple  Final Diagnosis   A. Breast, Right, us guided breast bx, 10 ocl, 6 cm fn 3 passes, 12 g:  - Invasive breast carcinoma of no special type (ductal NST/invasive ductal carcinoma).   * Calhoun grade 2 of 3 (total score: 6 of 9)    -- tubule formation < 10%, score 3    -- nuclear grade 2 of 3, score 2    -- mitoses < 3/mm2, (</= 7 mitoses/10HPF), score 1.   * Confirmed by tumor cell immunophenotype:    -- positive: nuclear stain for GATA3 and membranous stain for E-cadherin and p120.    -- negative:  p63, calponin-B.     * Invasive carcinoma involves 3 of 3 submitted core biopsies, max. dimension = 16 millimeters.  - Ductal carcinoma in situ.   * Estrogen, progesterone & HER2 receptor studies pending, to be described in a separate receptor report.          Synoptic Checklist INVASIVE CARCINOMA OF THE BREAST: Biopsy (INVASIVE CARCINOMA OF THE BREAST: BIOPSY - All Specimens)Protocol posted: 3/22/2023 SPECIMEN Procedure Needle biopsy Specimen Laterality Right .      TUMOR Tumor Site Clock position 10 o'clock   Tumor Site Distance from nipple (Centimeters): 6 cm     Histologic Type Invasive carcinoma of no special type (ductal) Histologic Grade (Mina Histologic Score) Glandular (Acinar) / Tubular Differentiation Score 3 Nuclear Pleomorphism Score 2 Mitotic Rate [id number] Fátima Reyes [id number]   Overall Grade Grade 2 (scores of 6 or 7)      Largest Invasive Focus in this Limited Biopsy Sample 16 mm      Ductal Carcinoma In Situ (DCIS) Present Architectural Patterns Solid Nuclear Grade Grade II (intermediate) Necrosis Not identified      Lymphatic and / or Vascular Invasion Not identified Microcalcifications Not identified .      Estrogen Receptor (ER) Status Negative (less than 1%)      Progesterone  Receptor (PgR) Status Negative (less than 1%)      HER2 by Immunohistochemistry Equivocal (Score 2+)      Addendum  01/02/2025 Rijuven  Fish  Non amplified  Negative        Imaging:   January 27, 2025 2D cardiac echo  Summary    Left Ventricle: Left ventricular cavity size is normal. Wall thickness is normal. The left ventricular ejection fraction is 60%. Systolic function is normal. Wall motion is normal. Diastolic function is mildly abnormal, consistent with grade I (abnormal) relaxation.    IVS: There is sigmoid appearance of the septum.    Right Ventricle: Right ventricular cavity size is normal. Systolic function is normal.    Left Atrium: The atrium is mildly dilated (35-41 mL/m2).    Right Atrium: The atrium is normal in size.                       January 20, 2025 CT scan of the chest abdomen and pelvis with contrast  IMPRESSION:     A 2.0 cm right breast mass with a biopsy clip in keeping with the patient's recently diagnosed breast cancer.     A 3 mm left upper lobe pulmonary nodule. Based on current Fleischner Society 2017 Guidelines on incidental pulmonary nodule, patients with a known malignancy are at increased risk of metastasis and should receive initial three-month follow-up chest CT.     No evidence of metastatic disease in the abdomen and pelvis.     The study was marked in EPIC for immediate notification.        January 17, 2025 nuclear medicine whole-body bone scan  IMPRESSION:     1. No scintigraphic evidence of osseous metastasis.        December 18, 2024 targeted right breast ultrasound with bilateral diagnostic mammogram     FINDINGS:   RIGHT  1) MASS [B]  Mammo diagnostic bilateral w 3d and cad: There is a 25 mm high density, oval mass with indistinct margins seen in the upper outer quadrant of the right breast at 10 o'clock, 6 cm from the nipple. The mass correlates with the palpable finding noted during physical examination.   US breast right limited (diagnostic): There is a 19 mm x  17 mm x 22 mm oval, hypoechoic mass with indistinct margins seen in the upper outer quadrant of the right breast at 10 o'clock, 6 cm from the nipple. The mass correlates with the palpable finding noted during physical examination.  Sonographic exam of the right axilla shows no suspicious adenopathy.  Scattered benign-appearing calcifications are demonstrated in the right breast.  Right breast biopsy clip.     Left  Mammo diagnostic bilateral w 3d and cad  There are no suspicious masses, grouped microcalcifications or areas of unexplained architectural distortion. The skin and nipple areolar complex are unremarkable.   Results were discussed with the patient.  Ultrasound-guided biopsy was recommended.     IMPRESSION:  Solid mass in the upper outer right breast corresponding to the area of clinical concern and highly suspicious for malignancy.     ASSESSMENT/BI-RADS CATEGORY:  Left: 1 - Negative  Right: 5 - Highly Suggestive of Malignancy  Overall: 5 - Highly Suggestive of Malignancy     RECOMMENDATION:       - Ultrasound-guided breast biopsy for the right breast.       - Routine screening mammogram in 1 year for the left breast.           February 19, 2024 bilateral screening mammography     FINDINGS:   There are no suspicious masses, grouped microcalcifications or areas of architectural distortion. The skin and nipple areolar complex are unremarkable.     IMPRESSION:  No mammographic evidence of malignancy.           ASSESSMENT/BI-RADS CATEGORY:  Left: 1 - Negative  Right: 1 - Negative  Overall: 1 - Negative     RECOMMENDATION:       - Routine screening mammogram in 1 year for both breasts.                                              Instructions

## 2025-03-03 ENCOUNTER — OFFICE VISIT (OUTPATIENT)
Dept: HEMATOLOGY ONCOLOGY | Facility: CLINIC | Age: 65
End: 2025-03-03
Payer: COMMERCIAL

## 2025-03-03 ENCOUNTER — APPOINTMENT (OUTPATIENT)
Dept: LAB | Facility: CLINIC | Age: 65
End: 2025-03-03
Payer: COMMERCIAL

## 2025-03-03 VITALS
BODY MASS INDEX: 36.88 KG/M2 | RESPIRATION RATE: 16 BRPM | DIASTOLIC BLOOD PRESSURE: 88 MMHG | WEIGHT: 235 LBS | SYSTOLIC BLOOD PRESSURE: 134 MMHG | HEIGHT: 67 IN | TEMPERATURE: 96.6 F | HEART RATE: 75 BPM | OXYGEN SATURATION: 97 %

## 2025-03-03 DIAGNOSIS — R21 ACUTE MACULOPAPULAR RASH: Primary | ICD-10-CM

## 2025-03-03 DIAGNOSIS — Z17.1 MALIGNANT NEOPLASM OF UPPER-OUTER QUADRANT OF RIGHT BREAST IN FEMALE, ESTROGEN RECEPTOR NEGATIVE (HCC): ICD-10-CM

## 2025-03-03 DIAGNOSIS — E86.0 DEHYDRATION: ICD-10-CM

## 2025-03-03 DIAGNOSIS — C50.411 MALIGNANT NEOPLASM OF UPPER-OUTER QUADRANT OF RIGHT BREAST IN FEMALE, ESTROGEN RECEPTOR NEGATIVE (HCC): ICD-10-CM

## 2025-03-03 LAB
ALBUMIN SERPL BCG-MCNC: 4.1 G/DL (ref 3.5–5)
ALP SERPL-CCNC: 95 U/L (ref 34–104)
ALT SERPL W P-5'-P-CCNC: 19 U/L (ref 7–52)
ANION GAP SERPL CALCULATED.3IONS-SCNC: 11 MMOL/L (ref 4–13)
AST SERPL W P-5'-P-CCNC: 20 U/L (ref 13–39)
BASOPHILS # BLD AUTO: 0.03 THOUSANDS/ÂΜL (ref 0–0.1)
BASOPHILS NFR BLD AUTO: 1 % (ref 0–1)
BILIRUB SERPL-MCNC: 0.52 MG/DL (ref 0.2–1)
BUN SERPL-MCNC: 18 MG/DL (ref 5–25)
CALCIUM SERPL-MCNC: 9.5 MG/DL (ref 8.4–10.2)
CHLORIDE SERPL-SCNC: 102 MMOL/L (ref 96–108)
CO2 SERPL-SCNC: 27 MMOL/L (ref 21–32)
CREAT SERPL-MCNC: 0.61 MG/DL (ref 0.6–1.3)
EOSINOPHIL # BLD AUTO: 0.04 THOUSAND/ÂΜL (ref 0–0.61)
EOSINOPHIL NFR BLD AUTO: 1 % (ref 0–6)
ERYTHROCYTE [DISTWIDTH] IN BLOOD BY AUTOMATED COUNT: 13.1 % (ref 11.6–15.1)
GFR SERPL CREATININE-BSD FRML MDRD: 95 ML/MIN/1.73SQ M
GLUCOSE P FAST SERPL-MCNC: 94 MG/DL (ref 65–99)
HCT VFR BLD AUTO: 37.3 % (ref 34.8–46.1)
HGB BLD-MCNC: 12.2 G/DL (ref 11.5–15.4)
IMM GRANULOCYTES # BLD AUTO: 0.03 THOUSAND/UL (ref 0–0.2)
IMM GRANULOCYTES NFR BLD AUTO: 1 % (ref 0–2)
LYMPHOCYTES # BLD AUTO: 1.37 THOUSANDS/ÂΜL (ref 0.6–4.47)
LYMPHOCYTES NFR BLD AUTO: 25 % (ref 14–44)
MCH RBC QN AUTO: 31.9 PG (ref 26.8–34.3)
MCHC RBC AUTO-ENTMCNC: 32.7 G/DL (ref 31.4–37.4)
MCV RBC AUTO: 97 FL (ref 82–98)
MONOCYTES # BLD AUTO: 0.15 THOUSAND/ÂΜL (ref 0.17–1.22)
MONOCYTES NFR BLD AUTO: 3 % (ref 4–12)
NEUTROPHILS # BLD AUTO: 3.81 THOUSANDS/ÂΜL (ref 1.85–7.62)
NEUTS SEG NFR BLD AUTO: 69 % (ref 43–75)
NRBC BLD AUTO-RTO: 0 /100 WBCS
PLATELET # BLD AUTO: 318 THOUSANDS/UL (ref 149–390)
PMV BLD AUTO: 10.2 FL (ref 8.9–12.7)
POTASSIUM SERPL-SCNC: 4 MMOL/L (ref 3.5–5.3)
PROT SERPL-MCNC: 7.3 G/DL (ref 6.4–8.4)
RBC # BLD AUTO: 3.83 MILLION/UL (ref 3.81–5.12)
SODIUM SERPL-SCNC: 140 MMOL/L (ref 135–147)
WBC # BLD AUTO: 5.43 THOUSAND/UL (ref 4.31–10.16)

## 2025-03-03 PROCEDURE — 85025 COMPLETE CBC W/AUTO DIFF WBC: CPT

## 2025-03-03 PROCEDURE — 36415 COLL VENOUS BLD VENIPUNCTURE: CPT

## 2025-03-03 PROCEDURE — 99215 OFFICE O/P EST HI 40 MIN: CPT | Performed by: INTERNAL MEDICINE

## 2025-03-03 PROCEDURE — 80053 COMPREHEN METABOLIC PANEL: CPT

## 2025-03-05 ENCOUNTER — HOSPITAL ENCOUNTER (OUTPATIENT)
Dept: INFUSION CENTER | Facility: CLINIC | Age: 65
Discharge: HOME | End: 2025-03-05
Payer: COMMERCIAL

## 2025-03-05 VITALS
DIASTOLIC BLOOD PRESSURE: 77 MMHG | SYSTOLIC BLOOD PRESSURE: 127 MMHG | WEIGHT: 235 LBS | BODY MASS INDEX: 36.88 KG/M2 | OXYGEN SATURATION: 96 % | TEMPERATURE: 97.8 F | RESPIRATION RATE: 18 BRPM | HEART RATE: 77 BPM | HEIGHT: 67 IN

## 2025-03-05 DIAGNOSIS — C50.411 MALIGNANT NEOPLASM OF UPPER-OUTER QUADRANT OF RIGHT BREAST IN FEMALE, ESTROGEN RECEPTOR NEGATIVE (HCC): Primary | ICD-10-CM

## 2025-03-05 DIAGNOSIS — E86.0 DEHYDRATION: ICD-10-CM

## 2025-03-05 DIAGNOSIS — Z17.1 MALIGNANT NEOPLASM OF UPPER-OUTER QUADRANT OF RIGHT BREAST IN FEMALE, ESTROGEN RECEPTOR NEGATIVE (HCC): Primary | ICD-10-CM

## 2025-03-05 PROCEDURE — 96417 CHEMO IV INFUS EACH ADDL SEQ: CPT

## 2025-03-05 PROCEDURE — 96367 TX/PROPH/DG ADDL SEQ IV INF: CPT

## 2025-03-05 PROCEDURE — 96413 CHEMO IV INFUSION 1 HR: CPT

## 2025-03-05 RX ORDER — SODIUM CHLORIDE 9 MG/ML
20 INJECTION, SOLUTION INTRAVENOUS ONCE
Status: COMPLETED | OUTPATIENT
Start: 2025-03-05 | End: 2025-03-05

## 2025-03-05 RX ADMIN — FAMOTIDINE 20 MG: 10 INJECTION INTRAVENOUS at 09:20

## 2025-03-05 RX ADMIN — SODIUM CHLORIDE 20 ML/HR: 9 INJECTION, SOLUTION INTRAVENOUS at 08:33

## 2025-03-05 RX ADMIN — DEXAMETHASONE SODIUM PHOSPHATE: 10 INJECTION, SOLUTION INTRAMUSCULAR; INTRAVENOUS at 08:34

## 2025-03-05 RX ADMIN — DIPHENHYDRAMINE HYDROCHLORIDE 25 MG: 50 INJECTION INTRAMUSCULAR; INTRAVENOUS at 09:00

## 2025-03-05 RX ADMIN — CARBOPLATIN 187.05 MG: 10 INJECTION, SOLUTION INTRAVENOUS at 10:50

## 2025-03-05 RX ADMIN — SODIUM CHLORIDE 500 ML: 0.9 INJECTION, SOLUTION INTRAVENOUS at 08:34

## 2025-03-05 RX ADMIN — PACLITAXEL 172.2 MG: 6 INJECTION, SOLUTION INTRAVENOUS at 09:47

## 2025-03-05 NOTE — PROGRESS NOTES
Pt arrives to infusion center for Taxol/Carbo. Offers c/o b/l rash on shins and forearms. MD aware- will see dermatologist this week. Labs from 3/03 are within tx parameters. PAC accessed without issue, brisk blood return, flushed, and infusing. Pt sitting comfortably in chair, wheels locked, call bell within reach.

## 2025-03-05 NOTE — PROGRESS NOTES
Patient tolerated infusion without complications. Port flushed, good blood return noted. Patient aware of next appointment 3/12 at 0830. Patient provided AVS.

## 2025-03-07 NOTE — PROGRESS NOTES
Progress Notes  Kosta Machado MD (Physician)  Medical Oncology  Progress Notes  Kosta Machado MD (Physician)  Medical Oncology  Expand All Collapse All  Name: Fátima Reyes      : 1960      MRN: [id number]  Encounter Provider: Kosta Machado MD  Encounter Date: 03/10/2025  Encounter department: [facility] [place]           Chief Complaint   Patient presents with    visit      :  Assessment & Plan  Malignant neoplasm of upper-outer quadrant of right breast in female, estrogen receptor negative (HCC)  Newly diagnosed palpable clinical T2 clinical N0 triple negative Invasive ductal breast cancer established by biopsy 2024.   CT scan of the chest abdomen pelvis and nuclear medicine whole-body bone scan from 2025 reveal no evidence of metastatic disease     The patient tolerated Week 4 of 12 exceedingly well. With virtually no ill effects. Having some mild constipation ( slowing). Advised stool softeners.     New m-p rash!!   I  appreciate Dermatology input. Skin biopsies times 2 pending!    I agree that Pembrolizumab is likely implicated here.    Will d/c     Orders:    CARBOplatin (PARAPLATIN) 38.52 mg in sodium chloride 0.9 % 250 mL IVPB    Oncology Provider Communication    Oncology Provider Communication    Oncology Provider Communication    Oncology Provider Communication    Oncology Provider Communication    Infusion Calculated Appointment Request; Future    CBC and differential; Future    Comprehensive metabolic panel; Future    TSH, 3rd generation with Free T4 reflex; Future    T3, free; Future    Infusion Calculated Appointment Request; Future    CBC and differential; Future    Comprehensive metabolic panel; Future    Infusion Calculated Appointment Request; Future    CBC and differential; Future    Comprehensive metabolic panel; Future     Class 2 obesity due to excess calories without serious comorbidity with body mass index (BMI) of  39.0 to 39.9 in adult        Family history of malignant neoplasm  Positive breast cancer in mother     Essential hypertension        Hypothyroidism, unspecified type     Orders:    CARBOplatin (PARAPLATIN) 38.52 mg in sodium chloride 0.9 % 250 mL IVPB    Oncology Provider Communication    Oncology Provider Communication     Hyperlipidemia, unspecified hyperlipidemia type        Invasive ductal carcinoma of breast, female, right (HCC)     Dehydration           Solitary pulmonary nodule                 Clinical/Pathologic Stage IIB     Cancer Staging   No matching staging information was found for the patient.        Current Therapy W 5 of 12  Keynote 522 trial data set approach phase 1 with pembrolizumab every 3 weeks IV plus paclitaxel IV weekly x 12+ carboplatin IV weekly x 12     Discussion  This delightful postmenopausal woman presents following week 5 of 12 preoperative systemic therapy for newly diagnosed grade 2 invasive ductal carcinoma of the right breast.       She has been initiated on the keynote 522 regimen with chemo and immunotherapy.  Phase 1 will include intravenous pembrolizumab every 3 weeks along with low-dose weekly chemotherapy as paclitaxel weekly x 12+ carboplatin weekly x 12.       This session represents week 5 of 12     The patient is tolerating well overall. She has M-P skin eruption. If any progression, I will hold immunotherapy as initial step. Dermatology input requested. Precautions reviewed with patient     I reviewed therapeutic approaches and precautions.          Background History:   The patient became self-aware of palpable right breast mass in the fall 2024 and presented for diagnostic reviews.  Her prior mammography on  February 19,2024 was BI-RADS 1 were negative.     The patient's risk factors for the development of breast cancer and solid tumor malignancy include age, postmenopausal status, gender and obesity. Her mother has a diagnosis of breast cancer at age 80 plus of  unclear documentation and scope. Her mother is living and well.     The patient has no clinical evidence of metastases.     This patient is approached with favor in a curative intent.  She clearly carries the expectation of an excellent opportunity for a robust tumor response and favorable outcomes over time.  I had a long discussion with the patient and her  regarding the use of chemoimmunotherapy in a neoadjuvant approach fashion and after the keynote 522 trial data set.  I discussed opportunities and the implication of pathologic complete remission and extension with favor of both event free survival and overall survival.  Please see [place] Journal of Medicine from September 2024 lead author Ambreen WOODWARD     The patient and her  were given handwritten sheet outlining all aspects of our discussion today and expressed understanding clarity and gratitude.  She is scheduled for Mediport catheter placement and we will move forward with phase 1 of care that includes the use of pembrolizumab every 3 weeks plus paclitaxel plus carboplatin weekly x 12.           Special Studies  December 27, 2024 germline genetic panel  No high risk germline mutations                 GENETIC ANALYSIS OVERALL INTERPRETATION   Date Value Ref Range Status   12/27/2024     Final     NEGATIVE: No Clinically Significant Variants Detected   12/27/2024 Variants of Unknown Significance Detected   Final                    INTERPRETATION   Date Value Ref Range Status   12/27/2024 See Notes   Final       Comment:       No pathogenic mutations, variants of unknown significance, or gross deletions or duplications were detected.  Risk Estimate: low likelihood of variants in the genes analyzed contributing to this individual's clinical history.  Genetic counseling is a recommended option for all individuals undergoing genetic testing.  Genes Analyzed (13 total): EVA, BARD1, BRCA1, BRCA2, CDH1, CHEK2, NF1, PALB2, PTEN, RAD51C, RAD51D, STK11  and TP53 (sequencing and deletion/duplication).   12/27/2024 See Notes   Final       Comment:       No known clinically actionable alterations were detected.  Four variants of unknown significance were detected: two in the MSH3 gene, one in the POLE gene, and one in the SDHA gene.  Risk Estimate: should be based on clinical and family history, as the clinical significance of this result is unknown.  Genetic counseling is a recommended option for all individuals undergoing genetic testing.  This individual is heterozygous for the p.N118I (c.353A>T) and p.N861H (c.2581A>C) variants of unknown significance in the MSH3 gene, heterozygous for the p.Y8152F (c.4168C>T) variant of unknown significance in the POLE gene, and heterozygous for the p.K480E (c.1438A>G) variant of unknown significance in the SDHA gene, which may or may not contribute to this individual's clinical history. Familial testing would be necessary to determine if the alterations in MSH3 are on the same chromosome or on different chromosomes (in cis or trans). Refer to the supplementary pages for additional information on these variants. No additional pathogenic mutations, variants of unknown significance, or gross deletions or duplications were detected. Genes Analyzed (59 total): APC, EVA, BAP1, BARD1, BMPR1A, BRCA1, BRCA2, BRIP1, CDH1, CDK4, CDKN1B, CDKN2A, CHEK2, DICER1, FH, FLCN, KIF1B, MAX, MEN1, MET, MLH1, MSH2, MSH6, MUTYH, NF1, NTHL1, PALB2, PMS2, POT1, PTEN, RAD51C, RAD51D, RB1, RET, SDHA, SDHAF2, SDHB, SDHC, SDHD, SMAD4, SMARCA4, STK11, BYZG249, TP53, TSC1, TSC2 and VHL (sequencing and deletion/duplication); AXIN2, CTNNA1, EGLN1, HOXB13, KIT, MITF, MSH3, PDGFRA, POLD1 and POLE (sequencing only); EPCAM and GREM1 (deletion/duplication only). RNA data is routinely analyzed for use in variant interpretation for all genes.                  GENES NOT REPORTED   Date Value Ref Range Status   12/27/2024     Final      EVA,BARD1,CDH1,CHEK2,PALB2,PTEN,RAD51C,RAD51D,STK11,TP53,NF1,BRCA1,BRCA2   2024     Final     APC,EVA,AXIN2,BAP1,BARD1,BMPR1A,BRCA1,BRCA2,BRIP1,CDH1,CDK4,CDKN1B,CDKN2A,CHEK2,CTNNA1,DICER1,EGLN1,EPCAM,FH,FLCN,GREM1,HOXB13,KIF1B,KIT,MAX,MEN1,MET,MITF,MLH1,MSH2,MSH6,MUTYH,NF1,NTHL1,PALB2,PDGFRA,PMS2,POLD1,POT1,PTEN,RAD51C,RAD51D,RB1,RET,SDHAF2,SDHB,  SDHC,SDHD,SMAD4,SMARCA4,STK11,GLQY721,TP53,TSC1,TSC2,VHL            History of Present Illness        This delightful 65-year-old presents with her  to review a new diagnosis of invasive carcinoma of the right breast.  The patient is  2 para 2 and postmenopausal.  The patient's mother at age 80 was revealed to have a diagnosis of breast cancer status post excision only.  The mother is living and well     The patient presented in the 2024 near the  holiday with self-awareness of her right breast mass.  Patient presented for diagnostic reviews through her gynecologist and primary care physicians.  Bilateral diagnostic mammogram with targeted right breast ultrasound was performed on 2024.  25 mm oval mass density with indistinct margins was seen in the upper outer quadrant of the right breast at the 10 o'clock position at 6 cm from the nipple.  This mass correlating with the palpable finding on examination.  Targeted ultrasound of the right breast showed a 19 mm x 17 mm x 22 mm oval hypoechoic mass with indistinct margins seen in the upper outer quadrant of the right breast at 10:00.  There were no suspicious adenopathy in the right axilla.  The left breast showed no suspicious masses, grouped microcalcifications or areas of unexplained architectural distortion.     On 2024 ultrasound-guided biopsy of the right breast mass with mammography was performed without complication.  Final pathology revealed an invasive breast carcinoma no special type ductal of grade 2.  Ductal carcinoma in situ was present.  Estrogen  receptor was negative, progesterone receptor was negative and HER2/nathanael was low at 2+ by IHC but subsequently nonamplified by FISH amplification..  Ductal carcinoma in situ.     The patient reports a medical oncology for therapeutic planning and review for possible neoadjuvant therapies        Pertinent History from December 2024     No new complaints              December 23, 2024 S [id number] status post ultrasound-guided biopsy right breast lesion 10 o'clock position 6 cm from the nipple     Cancer Screening and Prevention  Mammography: 12/18/2024   GI/Colonoscopy: 03/02/2021   GYN: 01/12/2022   Bone Density: Not on file   Covid 19 Vaccination Status:      Oncology Therapeutic Summary:     From February 3, 2025 cycle 1 week 1   initiation phase 1 keynote 522 approach with pembrolizumab 200 mg 5 pills IV every 3 weeks plus paclitaxel 80 mg/m² IV weekly x 12 and carboplatin AUC 1.5 weekly x 12     12/23/2024 S 24 [id number]  status post ultrasound-guided biopsy right breast     Oncology History   Cancer Staging   Invasive ductal carcinoma of breast, female, right (HCC)  Staging form: Breast, AJCC 8th Edition  - Clinical stage from 1/14/2025: Stage IIB (cT2, cN0, cM0, G2, ER-, WA-, HER2-) - Signed by Jordan Esparza MD on 1/27/2025  Histopathologic type: Infiltrating duct carcinoma, NOS  Stage prefix: Initial diagnosis  Method of lymph node assessment: Clinical  Histologic grading system: 3 grade system        Oncology History   Invasive ductal carcinoma of breast, female, right (HCC)   1/14/2025 -  Cancer Staged     Staging form: Breast, AJCC 8th Edition  - Clinical stage from 1/14/2025: Stage IIB (cT2, cN0, cM0, G2, ER-, WA-, HER2-) - Signed by Jordan Esparza MD on 1/27/2025  Histopathologic type: Infiltrating duct carcinoma, NOS  Stage prefix: Initial diagnosis  Method of lymph node assessment: Clinical  Histologic grading system: 3 grade system         1/27/2025 Initial Diagnosis     Invasive ductal carcinoma of  breast, female, right (HCC)      Malignant neoplasm of upper-outer quadrant of right breast in female, estrogen receptor negative (HCC)   1/27/2025 Initial Diagnosis     Malignant neoplasm of upper-outer quadrant of right breast in female, estrogen receptor negative (HCC)      1/28/2025 -  Chemotherapy     DOXOrubicin (ADRIAMYCIN), 60 mg/m2 = 129 mg, Intravenous, Once, 0 of 4 cycles  alteplase (CATHFLO), 2 mg, Intracatheter, Every 1 Minute as needed, 0 of 17 cycles  pegfilgrastim-apgf (Nyvepria), 6 mg, Subcutaneous, Once, 0 of 4 cycles  cyclophosphamide (CYTOXAN) IVPB, 600 mg/m2 = 1,290 mg, Intravenous, Once, 0 of 4 cycles  fosaprepitant (EMEND) IVPB, 150 mg, Intravenous, Once, 0 of 4 cycles  CARBOplatin (PARAPLATIN) IVPB (GO AUC DOSING), 38.52 mg (100 % of original dose 38.52 mg), Intravenous, Once, 0 of 4 cycles  Dose modification:   (original dose 38.52 mg, Cycle 1),   (original dose 38.52 mg, Cycle 1, Reason: Other (Must fill in a comment), Comment: lab estimates),   (original dose 38.52 mg, Cycle 1),   (original dose 38.52 mg, Cycle 1, Reason: Other (Must fill in a comment))  PACLItaxel (TAXOL) chemo IVPB, 80 mg/m2 = 172.2 mg, Intravenous, Once, 0 of 4 cycles  pembrolizumab (KEYTRUDA) IVPB, 200 mg, Intravenous, Once, 0 of 17 cycles         Review of Systems   Constitutional: Negative.    All other systems reviewed and are negative.     Medical History Reviewed by provider this encounter:     .    Medications Ordered Prior to Encounter               Current Outpatient Medications on File Prior to Visit   Medication Sig Dispense Refill    atorvastatin (LIPITOR) 20 mg tablet TAKE ONE TABLET BY MOUTH EVERY DAY 90 tablet 1    doxycycline hyclate (VIBRAMYCIN) 100 mg capsule Take 100 mg by mouth daily as needed        hydroCHLOROthiazide 12.5 mg tablet TAKE ONE TABLET BY MOUTH EVERY DAY 30 tablet 5    Multiple Vitamin (MULTIVITAMINS PO) Take by mouth        nebivolol (BYSTOLIC) 20 MG tablet TAKE ONE TABLET BY MOUTH  "EVERY DAY 90 tablet 1      No current facility-administered medications on file prior to visit.         Social History                   Tobacco Use    Smoking status: Never    Smokeless tobacco: Never   Vaping Use    Vaping status: Never Used   Substance and Sexual Activity    Alcohol use: Yes    Drug use: No    Sexual activity: Yes       Partners: Male       Birth control/protection: Post-menopausal             Objective     /76 (BP Location: Left arm, Patient Position: Sitting, Cuff Size: Adult)   Pulse 79   Temp 97.8 °F (36.6 °C) (Temporal)   Resp 18   Ht 5' 6\" (1.676 m)   Wt 108 kg (239 lb)   LMP  (LMP Unknown)   SpO2 95%   BMI 38.58 kg/m²      Pain Screening:  Pain Score: 0-No pain  ECOG ECOG Performance Status: 0 - Fully active, able to carry on all pre-disease performance without restriction 0  Physical Exam  Vitals and nursing note reviewed. Exam conducted with a chaperone present.   Neck:      Thyroid: No thyroid mass, thyromegaly or thyroid tenderness.      Trachea: Trachea normal.   Cardiovascular:      Rate and Rhythm: Normal rate and regular rhythm.      Pulses: Normal pulses.      Heart sounds: Murmur heard.      Systolic murmur is present with a grade of 2/6.      No diastolic murmur is present.      No friction rub. No gallop. No S3 or S4 sounds.   Pulmonary:      Effort: Pulmonary effort is normal.      Breath sounds: Normal breath sounds. No stridor, decreased air movement or transmitted upper airway sounds. No decreased breath sounds, wheezing, rhonchi or rales.   Chest:      Chest wall: Mass present. No deformity, swelling, tenderness, crepitus or edema. There is no dullness to percussion.   Breasts:     Right: Mass present. No nipple discharge, skin change or tenderness.      Left: Normal.      Comments: 1.55 1.7 cm mass in the right mid to upper outer breasts  Abdominal:      General: Abdomen is protuberant. Bowel sounds are normal.      Palpations: Abdomen is soft. There is no " shifting dullness, hepatomegaly, splenomegaly, mass or pulsatile mass.      Tenderness: There is no abdominal tenderness. There is no right CVA tenderness or left CVA tenderness.   Musculoskeletal:      Cervical back: Full passive range of motion without pain and normal range of motion. No edema, rigidity, bony tenderness or crepitus. No pain with movement, spinous process tenderness or muscular tenderness.      Thoracic back: No bony tenderness.      Lumbar back: No bony tenderness.      Right lower leg: No edema.      Left lower leg: No edema.   Lymphadenopathy:      Cervical: No cervical adenopathy.      Right cervical: No superficial, deep or posterior cervical adenopathy.     Left cervical: No superficial, deep or posterior cervical adenopathy.      Upper Body:      Right upper body: No supraclavicular, axillary or pectoral adenopathy.      Left upper body: No supraclavicular, axillary or pectoral adenopathy.   Neurological:      Mental Status: She is alert.            Labs: I have reviewed the following labs:            Lab Results   Component Value Date/Time     WBC 8.89 09/13/2024 11:01 AM     RBC 4.36 09/13/2024 11:01 AM     Hemoglobin 13.8 09/13/2024 11:01 AM     Hematocrit 42.8 09/13/2024 11:01 AM     MCV 98 09/13/2024 11:01 AM     MCH 31.7 09/13/2024 11:01 AM     RDW 12.4 09/13/2024 11:01 AM     Platelets 181 09/13/2024 11:01 AM                Lab Results   Component Value Date/Time     Potassium 4.0 01/09/2025 09:38 AM     Chloride 103 01/09/2025 09:38 AM     CO2 24 01/09/2025 09:38 AM     BUN 20 01/09/2025 09:38 AM     Creatinine 0.68 01/09/2025 09:38 AM     Glucose, Fasting 113 (H) 01/09/2025 09:38 AM     Calcium 8.8 01/09/2025 09:38 AM     AST 15 01/09/2025 09:38 AM     ALT 11 01/09/2025 09:38 AM     Alkaline Phosphatase 101 01/09/2025 09:38 AM     Total Protein 7.1 01/09/2025 09:38 AM     Albumin 4.1 01/09/2025 09:38 AM     Total Bilirubin 0.60 01/09/2025 09:38 AM     eGFR 92 01/09/2025 09:38 AM             Pathology:      December 23, 2024 S 24 [id number] status post ultrasound-guided biopsy right breast lesion 10 o'clock position 6 cm from the nipple  Final Diagnosis   A. Breast, Right, us guided breast bx, 10 ocl, 6 cm fn 3 passes, 12 g:  - Invasive breast carcinoma of no special type (ductal NST/invasive ductal carcinoma).   * Mina grade 2 of 3 (total score: 6 of 9)    -- tubule formation < 10%, score 3    -- nuclear grade 2 of 3, score 2    -- mitoses < 3/mm2, (</= 7 mitoses/10HPF), score 1.   * Confirmed by tumor cell immunophenotype:    -- positive: nuclear stain for GATA3 and membranous stain for E-cadherin and p120.    -- negative:  p63, calponin-B.     * Invasive carcinoma involves 3 of 3 submitted core biopsies, max. dimension = 16 millimeters.  - Ductal carcinoma in situ.   * Estrogen, progesterone & HER2 receptor studies pending, to be described in a separate receptor report.          Synoptic Checklist INVASIVE CARCINOMA OF THE BREAST: Biopsy (INVASIVE CARCINOMA OF THE BREAST: BIOPSY - All Specimens)Protocol posted: 3/22/2023 SPECIMEN Procedure Needle biopsy Specimen Laterality Right .      TUMOR Tumor Site Clock position 10 o'clock   Tumor Site Distance from nipple (Centimeters): 6 cm     Histologic Type Invasive carcinoma of no special type (ductal) Histologic Grade (Mina Histologic Score) Glandular (Acinar) / Tubular Differentiation Score 3 Nuclear Pleomorphism Score 2 Mitotic Rate [id number] EvaFátima landrum [id number]   Overall Grade Grade 2 (scores of 6 or 7)      Largest Invasive Focus in this Limited Biopsy Sample 16 mm      Ductal Carcinoma In Situ (DCIS) Present Architectural Patterns Solid Nuclear Grade Grade II (intermediate) Necrosis Not identified      Lymphatic and / or Vascular Invasion Not identified Microcalcifications Not identified .      Estrogen Receptor (ER) Status Negative (less than 1%)      Progesterone Receptor (PgR) Status Negative (less than 1%)      HER2 by  Immunohistochemistry Equivocal (Score 2+)      Addendum  01/02/2025 [employer]  Fish  Non amplified  Negative        Imaging:   January 27, 2025 2D cardiac echo  Summary    Left Ventricle: Left ventricular cavity size is normal. Wall thickness is normal. The left ventricular ejection fraction is 60%. Systolic function is normal. Wall motion is normal. Diastolic function is mildly abnormal, consistent with grade I (abnormal) relaxation.    IVS: There is sigmoid appearance of the septum.    Right Ventricle: Right ventricular cavity size is normal. Systolic function is normal.    Left Atrium: The atrium is mildly dilated (35-41 mL/m2).    Right Atrium: The atrium is normal in size.                       January 20, 2025 CT scan of the chest abdomen and pelvis with contrast  IMPRESSION:     A 2.0 cm right breast mass with a biopsy clip in keeping with the patient's recently diagnosed breast cancer.     A 3 mm left upper lobe pulmonary nodule. Based on current Fleischner Society 2017 Guidelines on incidental pulmonary nodule, patients with a known malignancy are at increased risk of metastasis and should receive initial three-month follow-up chest CT.     No evidence of metastatic disease in the abdomen and pelvis.     The study was marked in EPIC for immediate notification.        January 17, 2025 nuclear medicine whole-body bone scan  IMPRESSION:     1. No scintigraphic evidence of osseous metastasis.        December 18, 2024 targeted right breast ultrasound with bilateral diagnostic mammogram     FINDINGS:   RIGHT  1) MASS [B]  Mammo diagnostic bilateral w 3d and cad: There is a 25 mm high density, oval mass with indistinct margins seen in the upper outer quadrant of the right breast at 10 o'clock, 6 cm from the nipple. The mass correlates with the palpable finding noted during physical examination.   US breast right limited (diagnostic): There is a 19 mm x 17 mm x 22 mm oval, hypoechoic mass with indistinct margins  "seen in the upper outer quadrant of the right breast at 10 o'clock, 6 cm from the nipple. The mass correlates with the palpable finding noted during physical examination.  Sonographic exam of the right axilla shows no suspicious adenopathy.  Scattered benign-appearing calcifications are demonstrated in the right breast.  Right breast biopsy clip.     Left  Mammo diagnostic bilateral w 3d and cad  There are no suspicious masses, grouped microcalcifications or areas of unexplained architectural distortion. The skin and nipple areolar complex are unremarkable.   Results were discussed with the patient.  Ultrasound-guided biopsy was recommended.     IMPRESSION:  Solid mass in the upper outer right breast corresponding to the area of clinical concern and highly suspicious for malignancy.     ASSESSMENT/BI-RADS CATEGORY:  Left: 1 - Negative  Right: 5 - Highly Suggestive of Malignancy  Overall: 5 - Highly Suggestive of Malignancy     RECOMMENDATION:       - Ultrasound-guided breast biopsy for the right breast.       - Routine screening mammogram in 1 year for the left breast.           February 19, 2024 bilateral screening mammography     FINDINGS:   There are no suspicious masses, grouped microcalcifications or areas of architectural distortion. The skin and nipple areolar complex are unremarkable.     IMPRESSION:  No mammographic evidence of malignancy.           ASSESSMENT/BI-RADS CATEGORY:  Left: 1 - Negative  Right: 1 - Negative  Overall: 1 - Negative     RECOMMENDATION:       - Routine screening mammogram in 1 year for both breasts.                                               Instructions        Instructions    After Visit Summary (Automatic SnapShot taken 3/3/2025)  Additional Documentation    Vitals: /88 (BP Location: Left arm, Patient Position: Sitting, Cuff Size: Adult)     Pulse 75     Temp 96.6 °F (35.9 °C) Important   (Temporal)     Resp 16     Ht 5' 7\" (1.702 m)     Wt 107 kg (235 lb)     LMP  (LMP " Unknown)     SpO2 97%     BMI 36.81 kg/m²     BSA 2.17 m²     Pain Sc 0-No pain   Flowsheets: Vitals Reassessment   SmartForms:  JANE CROCKETT PRE-CHARTING   Encounter Info: Billing Info,     History,     Allergies,     Detailed Report     Communications    View All Conversations on this Encounter

## 2025-03-10 ENCOUNTER — APPOINTMENT (OUTPATIENT)
Dept: LAB | Facility: CLINIC | Age: 65
End: 2025-03-10
Payer: COMMERCIAL

## 2025-03-10 ENCOUNTER — OFFICE VISIT (OUTPATIENT)
Dept: HEMATOLOGY ONCOLOGY | Facility: CLINIC | Age: 65
End: 2025-03-10
Payer: COMMERCIAL

## 2025-03-10 VITALS
TEMPERATURE: 97.5 F | HEART RATE: 76 BPM | OXYGEN SATURATION: 97 % | RESPIRATION RATE: 17 BRPM | DIASTOLIC BLOOD PRESSURE: 78 MMHG | WEIGHT: 236 LBS | BODY MASS INDEX: 37.04 KG/M2 | HEIGHT: 67 IN | SYSTOLIC BLOOD PRESSURE: 122 MMHG

## 2025-03-10 DIAGNOSIS — R21 ACUTE MACULOPAPULAR RASH: Primary | ICD-10-CM

## 2025-03-10 DIAGNOSIS — E86.0 DEHYDRATION: ICD-10-CM

## 2025-03-10 DIAGNOSIS — C50.411 MALIGNANT NEOPLASM OF UPPER-OUTER QUADRANT OF RIGHT BREAST IN FEMALE, ESTROGEN RECEPTOR NEGATIVE (HCC): ICD-10-CM

## 2025-03-10 DIAGNOSIS — Z17.1 MALIGNANT NEOPLASM OF UPPER-OUTER QUADRANT OF RIGHT BREAST IN FEMALE, ESTROGEN RECEPTOR NEGATIVE (HCC): ICD-10-CM

## 2025-03-10 LAB
ALBUMIN SERPL BCG-MCNC: 4.4 G/DL (ref 3.5–5)
ALP SERPL-CCNC: 93 U/L (ref 34–104)
ALT SERPL W P-5'-P-CCNC: 24 U/L (ref 7–52)
ANION GAP SERPL CALCULATED.3IONS-SCNC: 10 MMOL/L (ref 4–13)
AST SERPL W P-5'-P-CCNC: 20 U/L (ref 13–39)
BASOPHILS # BLD AUTO: 0.05 THOUSANDS/ÂΜL (ref 0–0.1)
BASOPHILS NFR BLD AUTO: 1 % (ref 0–1)
BILIRUB SERPL-MCNC: 0.78 MG/DL (ref 0.2–1)
BUN SERPL-MCNC: 18 MG/DL (ref 5–25)
CALCIUM SERPL-MCNC: 9.6 MG/DL (ref 8.4–10.2)
CHLORIDE SERPL-SCNC: 100 MMOL/L (ref 96–108)
CO2 SERPL-SCNC: 28 MMOL/L (ref 21–32)
CREAT SERPL-MCNC: 0.63 MG/DL (ref 0.6–1.3)
EOSINOPHIL # BLD AUTO: 0.03 THOUSAND/ÂΜL (ref 0–0.61)
EOSINOPHIL NFR BLD AUTO: 1 % (ref 0–6)
ERYTHROCYTE [DISTWIDTH] IN BLOOD BY AUTOMATED COUNT: 13.4 % (ref 11.6–15.1)
GFR SERPL CREATININE-BSD FRML MDRD: 94 ML/MIN/1.73SQ M
GLUCOSE P FAST SERPL-MCNC: 112 MG/DL (ref 65–99)
HCT VFR BLD AUTO: 38.5 % (ref 34.8–46.1)
HGB BLD-MCNC: 12.3 G/DL (ref 11.5–15.4)
IMM GRANULOCYTES # BLD AUTO: 0.03 THOUSAND/UL (ref 0–0.2)
IMM GRANULOCYTES NFR BLD AUTO: 1 % (ref 0–2)
LYMPHOCYTES # BLD AUTO: 1.33 THOUSANDS/ÂΜL (ref 0.6–4.47)
LYMPHOCYTES NFR BLD AUTO: 25 % (ref 14–44)
MCH RBC QN AUTO: 32 PG (ref 26.8–34.3)
MCHC RBC AUTO-ENTMCNC: 31.9 G/DL (ref 31.4–37.4)
MCV RBC AUTO: 100 FL (ref 82–98)
MONOCYTES # BLD AUTO: 0.2 THOUSAND/ÂΜL (ref 0.17–1.22)
MONOCYTES NFR BLD AUTO: 4 % (ref 4–12)
NEUTROPHILS # BLD AUTO: 3.6 THOUSANDS/ÂΜL (ref 1.85–7.62)
NEUTS SEG NFR BLD AUTO: 68 % (ref 43–75)
NRBC BLD AUTO-RTO: 0 /100 WBCS
PLATELET # BLD AUTO: 267 THOUSANDS/UL (ref 149–390)
PMV BLD AUTO: 10.4 FL (ref 8.9–12.7)
POTASSIUM SERPL-SCNC: 4.4 MMOL/L (ref 3.5–5.3)
PROT SERPL-MCNC: 6.4 G/DL (ref 6.4–8.4)
RBC # BLD AUTO: 3.84 MILLION/UL (ref 3.81–5.12)
SODIUM SERPL-SCNC: 138 MMOL/L (ref 135–147)
WBC # BLD AUTO: 5.24 THOUSAND/UL (ref 4.31–10.16)

## 2025-03-10 PROCEDURE — 36415 COLL VENOUS BLD VENIPUNCTURE: CPT

## 2025-03-10 PROCEDURE — 80053 COMPREHEN METABOLIC PANEL: CPT

## 2025-03-10 PROCEDURE — 99214 OFFICE O/P EST MOD 30 MIN: CPT | Performed by: INTERNAL MEDICINE

## 2025-03-10 PROCEDURE — 85025 COMPLETE CBC W/AUTO DIFF WBC: CPT

## 2025-03-10 RX ORDER — SODIUM CHLORIDE 9 MG/ML
20 INJECTION, SOLUTION INTRAVENOUS ONCE
OUTPATIENT
Start: 2025-04-09

## 2025-03-10 RX ORDER — SODIUM CHLORIDE 9 MG/ML
20 INJECTION, SOLUTION INTRAVENOUS ONCE
OUTPATIENT
Start: 2025-04-02

## 2025-03-10 RX ORDER — SODIUM CHLORIDE 9 MG/ML
20 INJECTION, SOLUTION INTRAVENOUS ONCE
Status: CANCELLED | OUTPATIENT
Start: 2025-03-19

## 2025-03-11 ENCOUNTER — HOSPITAL ENCOUNTER (OUTPATIENT)
Dept: CT IMAGING | Facility: HOSPITAL | Age: 65
Discharge: HOME | End: 2025-03-11
Payer: COMMERCIAL

## 2025-03-11 DIAGNOSIS — R91.1 SOLITARY PULMONARY NODULE: ICD-10-CM

## 2025-03-11 DIAGNOSIS — C50.411 MALIGNANT NEOPLASM OF UPPER-OUTER QUADRANT OF RIGHT BREAST IN FEMALE, ESTROGEN RECEPTOR NEGATIVE (HCC): ICD-10-CM

## 2025-03-11 DIAGNOSIS — Z17.1 MALIGNANT NEOPLASM OF UPPER-OUTER QUADRANT OF RIGHT BREAST IN FEMALE, ESTROGEN RECEPTOR NEGATIVE (HCC): ICD-10-CM

## 2025-03-11 PROCEDURE — 71250 CT THORAX DX C-: CPT

## 2025-03-12 ENCOUNTER — HOSPITAL ENCOUNTER (OUTPATIENT)
Dept: INFUSION CENTER | Facility: CLINIC | Age: 65
Discharge: HOME | End: 2025-03-12
Payer: COMMERCIAL

## 2025-03-12 VITALS
RESPIRATION RATE: 18 BRPM | WEIGHT: 235 LBS | SYSTOLIC BLOOD PRESSURE: 135 MMHG | HEART RATE: 70 BPM | DIASTOLIC BLOOD PRESSURE: 84 MMHG | HEIGHT: 67 IN | OXYGEN SATURATION: 97 % | BODY MASS INDEX: 36.88 KG/M2 | TEMPERATURE: 97 F

## 2025-03-12 DIAGNOSIS — E86.0 DEHYDRATION: ICD-10-CM

## 2025-03-12 DIAGNOSIS — Z17.1 MALIGNANT NEOPLASM OF UPPER-OUTER QUADRANT OF RIGHT BREAST IN FEMALE, ESTROGEN RECEPTOR NEGATIVE (HCC): Primary | ICD-10-CM

## 2025-03-12 DIAGNOSIS — C50.411 MALIGNANT NEOPLASM OF UPPER-OUTER QUADRANT OF RIGHT BREAST IN FEMALE, ESTROGEN RECEPTOR NEGATIVE (HCC): Primary | ICD-10-CM

## 2025-03-12 PROCEDURE — 96413 CHEMO IV INFUSION 1 HR: CPT

## 2025-03-12 PROCEDURE — 96367 TX/PROPH/DG ADDL SEQ IV INF: CPT

## 2025-03-12 PROCEDURE — 96417 CHEMO IV INFUS EACH ADDL SEQ: CPT

## 2025-03-12 RX ORDER — SODIUM CHLORIDE 9 MG/ML
20 INJECTION, SOLUTION INTRAVENOUS ONCE
Status: COMPLETED | OUTPATIENT
Start: 2025-03-12 | End: 2025-03-12

## 2025-03-12 RX ADMIN — SODIUM CHLORIDE 500 ML: 0.9 INJECTION, SOLUTION INTRAVENOUS at 08:55

## 2025-03-12 RX ADMIN — FAMOTIDINE 20 MG: 10 INJECTION INTRAVENOUS at 09:43

## 2025-03-12 RX ADMIN — SODIUM CHLORIDE 20 ML/HR: 0.9 INJECTION, SOLUTION INTRAVENOUS at 08:54

## 2025-03-12 RX ADMIN — CARBOPLATIN 187.05 MG: 10 INJECTION INTRAVENOUS at 11:25

## 2025-03-12 RX ADMIN — PACLITAXEL 172.2 MG: 6 INJECTION, SOLUTION INTRAVENOUS at 10:22

## 2025-03-12 RX ADMIN — DIPHENHYDRAMINE HYDROCHLORIDE 25 MG: 50 INJECTION INTRAMUSCULAR; INTRAVENOUS at 09:21

## 2025-03-12 RX ADMIN — DEXAMETHASONE SODIUM PHOSPHATE: 10 INJECTION, SOLUTION INTRAMUSCULAR; INTRAVENOUS at 08:54

## 2025-03-12 NOTE — PROGRESS NOTES
Patient tolerated treatment without incident. Next appt confirmed with patient for 3/19 at 0900 at [place]. Calendar of appts printed and provided to patient.

## 2025-03-12 NOTE — PROGRESS NOTES
Patient presents to Infusion center for Taxol and Carboplatin. Patient offers no acute complaints at this time. Labs reviewed from 3/10- within parameters for treatment today. Port accessed with brisk blood return.

## 2025-03-17 ENCOUNTER — RESULTS FOLLOW-UP (OUTPATIENT)
Dept: SURGERY | Facility: CLINIC | Age: 65
End: 2025-03-17

## 2025-03-17 ENCOUNTER — TELEPHONE (OUTPATIENT)
Dept: HEMATOLOGY ONCOLOGY | Facility: CLINIC | Age: 65
End: 2025-03-17

## 2025-03-17 ENCOUNTER — OFFICE VISIT (OUTPATIENT)
Dept: HEMATOLOGY ONCOLOGY | Facility: CLINIC | Age: 65
End: 2025-03-17
Payer: COMMERCIAL

## 2025-03-17 ENCOUNTER — APPOINTMENT (OUTPATIENT)
Dept: LAB | Facility: CLINIC | Age: 65
End: 2025-03-17
Payer: COMMERCIAL

## 2025-03-17 VITALS
RESPIRATION RATE: 17 BRPM | SYSTOLIC BLOOD PRESSURE: 116 MMHG | HEART RATE: 69 BPM | WEIGHT: 237 LBS | DIASTOLIC BLOOD PRESSURE: 70 MMHG | HEIGHT: 67 IN | BODY MASS INDEX: 37.2 KG/M2 | TEMPERATURE: 97.3 F | OXYGEN SATURATION: 96 %

## 2025-03-17 DIAGNOSIS — R91.1 LUNG NODULE: Primary | ICD-10-CM

## 2025-03-17 DIAGNOSIS — Z17.1 MALIGNANT NEOPLASM OF UPPER-OUTER QUADRANT OF RIGHT BREAST IN FEMALE, ESTROGEN RECEPTOR NEGATIVE (HCC): ICD-10-CM

## 2025-03-17 DIAGNOSIS — Z17.421 TRIPLE NEGATIVE MALIGNANT NEOPLASM OF BREAST (HCC): ICD-10-CM

## 2025-03-17 DIAGNOSIS — C50.911 INVASIVE DUCTAL CARCINOMA OF BREAST, FEMALE, RIGHT (HCC): ICD-10-CM

## 2025-03-17 DIAGNOSIS — E86.0 DEHYDRATION: Primary | ICD-10-CM

## 2025-03-17 DIAGNOSIS — E86.0 DEHYDRATION: ICD-10-CM

## 2025-03-17 DIAGNOSIS — C50.411 MALIGNANT NEOPLASM OF UPPER-OUTER QUADRANT OF RIGHT BREAST IN FEMALE, ESTROGEN RECEPTOR NEGATIVE (HCC): ICD-10-CM

## 2025-03-17 DIAGNOSIS — C50.919 TRIPLE NEGATIVE MALIGNANT NEOPLASM OF BREAST (HCC): ICD-10-CM

## 2025-03-17 PROBLEM — Z76.89 PREVENTION OF CHEMOTHERAPY-INDUCED NEUTROPENIA: Status: ACTIVE | Noted: 2023-01-24

## 2025-03-17 LAB
ALBUMIN SERPL BCG-MCNC: 4.2 G/DL (ref 3.5–5)
ALP SERPL-CCNC: 90 U/L (ref 34–104)
ALT SERPL W P-5'-P-CCNC: 20 U/L (ref 7–52)
ANION GAP SERPL CALCULATED.3IONS-SCNC: 9 MMOL/L (ref 4–13)
AST SERPL W P-5'-P-CCNC: 19 U/L (ref 13–39)
BASOPHILS # BLD AUTO: 0.02 THOUSANDS/ÂΜL (ref 0–0.1)
BASOPHILS NFR BLD AUTO: 1 % (ref 0–1)
BILIRUB SERPL-MCNC: 0.64 MG/DL (ref 0.2–1)
BUN SERPL-MCNC: 17 MG/DL (ref 5–25)
CALCIUM SERPL-MCNC: 9.5 MG/DL (ref 8.4–10.2)
CHLORIDE SERPL-SCNC: 100 MMOL/L (ref 96–108)
CO2 SERPL-SCNC: 30 MMOL/L (ref 21–32)
CREAT SERPL-MCNC: 0.57 MG/DL (ref 0.6–1.3)
EOSINOPHIL # BLD AUTO: 0.06 THOUSAND/ÂΜL (ref 0–0.61)
EOSINOPHIL NFR BLD AUTO: 2 % (ref 0–6)
ERYTHROCYTE [DISTWIDTH] IN BLOOD BY AUTOMATED COUNT: 13.5 % (ref 11.6–15.1)
GFR SERPL CREATININE-BSD FRML MDRD: 97 ML/MIN/1.73SQ M
GLUCOSE SERPL-MCNC: 86 MG/DL (ref 65–140)
HCT VFR BLD AUTO: 34.7 % (ref 34.8–46.1)
HGB BLD-MCNC: 11.6 G/DL (ref 11.5–15.4)
IMM GRANULOCYTES # BLD AUTO: 0.02 THOUSAND/UL (ref 0–0.2)
IMM GRANULOCYTES NFR BLD AUTO: 1 % (ref 0–2)
LYMPHOCYTES # BLD AUTO: 0.81 THOUSANDS/ÂΜL (ref 0.6–4.47)
LYMPHOCYTES NFR BLD AUTO: 23 % (ref 14–44)
MCH RBC QN AUTO: 32.8 PG (ref 26.8–34.3)
MCHC RBC AUTO-ENTMCNC: 33.4 G/DL (ref 31.4–37.4)
MCV RBC AUTO: 98 FL (ref 82–98)
MONOCYTES # BLD AUTO: 0.1 THOUSAND/ÂΜL (ref 0.17–1.22)
MONOCYTES NFR BLD AUTO: 3 % (ref 4–12)
NEUTROPHILS # BLD AUTO: 2.56 THOUSANDS/ÂΜL (ref 1.85–7.62)
NEUTS SEG NFR BLD AUTO: 70 % (ref 43–75)
NRBC BLD AUTO-RTO: 0 /100 WBCS
PLATELET # BLD AUTO: 222 THOUSANDS/UL (ref 149–390)
PMV BLD AUTO: 10.1 FL (ref 8.9–12.7)
POTASSIUM SERPL-SCNC: 4.4 MMOL/L (ref 3.5–5.3)
PROT SERPL-MCNC: 6.3 G/DL (ref 6.4–8.4)
RBC # BLD AUTO: 3.54 MILLION/UL (ref 3.81–5.12)
SODIUM SERPL-SCNC: 139 MMOL/L (ref 135–147)
T3FREE SERPL-MCNC: 3.57 PG/ML (ref 2.5–3.9)
TSH SERPL DL<=0.05 MIU/L-ACNC: 1.64 UIU/ML (ref 0.45–4.5)
WBC # BLD AUTO: 3.57 THOUSAND/UL (ref 4.31–10.16)

## 2025-03-17 PROCEDURE — 80053 COMPREHEN METABOLIC PANEL: CPT

## 2025-03-17 PROCEDURE — 99214 OFFICE O/P EST MOD 30 MIN: CPT | Performed by: INTERNAL MEDICINE

## 2025-03-17 PROCEDURE — 85025 COMPLETE CBC W/AUTO DIFF WBC: CPT

## 2025-03-17 PROCEDURE — 84443 ASSAY THYROID STIM HORMONE: CPT

## 2025-03-17 PROCEDURE — 84481 FREE ASSAY (FT-3): CPT

## 2025-03-17 PROCEDURE — 36415 COLL VENOUS BLD VENIPUNCTURE: CPT

## 2025-03-17 RX ORDER — SODIUM CHLORIDE 9 MG/ML
20 INJECTION, SOLUTION INTRAVENOUS ONCE
OUTPATIENT
Start: 2025-05-07

## 2025-03-17 RX ORDER — SODIUM CHLORIDE 9 MG/ML
20 INJECTION, SOLUTION INTRAVENOUS ONCE
OUTPATIENT
Start: 2025-04-16

## 2025-03-17 RX ORDER — SODIUM CHLORIDE 9 MG/ML
20 INJECTION, SOLUTION INTRAVENOUS ONCE
OUTPATIENT
Start: 2025-04-23

## 2025-03-17 RX ORDER — DOXORUBICIN HYDROCHLORIDE 2 MG/ML
60 INJECTION, SOLUTION INTRAVENOUS ONCE
OUTPATIENT
Start: 2025-05-07

## 2025-03-17 RX ORDER — SODIUM CHLORIDE 9 MG/ML
20 INJECTION, SOLUTION INTRAVENOUS ONCE
OUTPATIENT
Start: 2025-04-30

## 2025-03-17 NOTE — RESULT ENCOUNTER NOTE
Sophy please let the patient know that her prior lung nodule is stable without change, there is a new lung nodule seen that was not seen previously. We are recommending another 3 month follow up non contrast chest CT to monitor these findings. Please ask her how things are going with her treatment and send her our best. I have placed an order for a noncontrast chest CT for mid June 2025.
Sophy please update the patient regarding her chest CT. Prior lung nodule is stable, no growth, there is a second lung nodule that was not definitively seen before. We will continue close follow up. She will need a chest CT in 3 months, mid June 2025 if you can help her to schedule, order placed. Please send her our best as she continues with her treatment!
[place]

## 2025-03-17 NOTE — PROGRESS NOTES
Office Visit    3/17/2025  [facility] Nic Machado MD  Medical Oncology Acute maculopapular rash  Dx Follow-up  Reason for Visit     Progress Notes  Kosta Machado MD (Physician)  Medical Oncology  Progress Notes  Kosta Machado MD (Physician)  Medical Oncology  Progress Notes  Kosta Machado MD (Physician)  Medical Oncology  Expand All Collapse All  Name: Fátima Reyes      : 1960      MRN: [id number]  Encounter Provider: Kosta Machado MD  Encounter Date: 2025  Encounter department: [facility] [place]           Chief Complaint   Patient presents with    visit      :  Assessment & Plan  Malignant neoplasm of upper-outer quadrant of right breast in female, estrogen receptor negative (HCC)  Newly diagnosed palpable clinical T2 clinical N0 triple negative Invasive ductal breast cancer established by biopsy 2024.   CT scan of the chest abdomen pelvis and nuclear medicine whole-body bone scan from 2025 reveal no evidence of metastatic disease     The patient tolerated Week 6 of 12 exceedingly well. With virtually no ill effects. Having some mild constipation ( slowing). Advised stool softeners.     New m-p rash!!   Rash improved nicely with discontinuation of Pembrolizumab  I  appreciate Dermatology input. Skin biopsies times 2 pending!     I agree that Pembrolizumab is likely implicated here.        Orders:    CARBOplatin (PARAPLATIN) 38.52 mg in sodium chloride 0.9 % 250 mL IVPB    Oncology Provider Communication    Oncology Provider Communication    Oncology Provider Communication    Oncology Provider Communication    Oncology Provider Communication    Infusion Calculated Appointment Request; Future    CBC and differential; Future    Comprehensive metabolic panel; Future    TSH, 3rd generation with Free T4 reflex; Future    T3, free; Future    Infusion Calculated Appointment Request;  Future    CBC and differential; Future    Comprehensive metabolic panel; Future    Infusion Calculated Appointment Request; Future    CBC and differential; Future    Comprehensive metabolic panel; Future     Class 2 obesity due to excess calories without serious comorbidity with body mass index (BMI) of 39.0 to 39.9 in adult        Family history of malignant neoplasm  Positive breast cancer in mother     Essential hypertension        Hypothyroidism, unspecified type     Orders:    CARBOplatin (PARAPLATIN) 38.52 mg in sodium chloride 0.9 % 250 mL IVPB    Oncology Provider Communication    Oncology Provider Communication     Hyperlipidemia, unspecified hyperlipidemia type        Invasive ductal carcinoma of breast, female, right (HCC)     Dehydration           Solitary pulmonary nodule                 Clinical/Pathologic Stage IIB     Cancer Staging   No matching staging information was found for the patient.        Current Therapy W 7 of 12  Keynote 522 trial data set approach phase 1 with pembrolizumab every 3 weeks IV plus paclitaxel IV weekly x 12+ carboplatin IV weekly x 12     Discussion  This delightful postmenopausal woman presents following week 5 of 12 preoperative systemic therapy for newly diagnosed grade 2 invasive ductal carcinoma of the right breast.       She has been initiated on the keynote 522 regimen with chemo and immunotherapy.  Phase 1 will include intravenous pembrolizumab every 3 weeks along with low-dose weekly chemotherapy as paclitaxel weekly x 12+ carboplatin weekly x 12.       This session represents week 7 of 12    Maculopapular rashes tremendously better with discontinuation of pembrolizumab.  The patient has no other new adverse signs symptoms or complaints.  We will move forward with weekly paclitaxel plus carboplatin through week 12     The patient is tolerating well overall.          Background History:   The patient became self-aware of palpable right breast mass in the fall 2024  and presented for diagnostic reviews.  Her prior mammography on  February 19,2024 was BI-RADS 1 were negative.     The patient's risk factors for the development of breast cancer and solid tumor malignancy include age, postmenopausal status, gender and obesity. Her mother has a diagnosis of breast cancer at age 80 plus of unclear documentation and scope. Her mother is living and well.     The patient has no clinical evidence of metastases.     This patient is approached with favor in a curative intent.  She clearly carries the expectation of an excellent opportunity for a robust tumor response and favorable outcomes over time.  I had a long discussion with the patient and her  regarding the use of chemoimmunotherapy in a neoadjuvant approach fashion and after the keynote 522 trial data set.  I discussed opportunities and the implication of pathologic complete remission and extension with favor of both event free survival and overall survival.  Please see [place] Journal of Medicine from September 2024 lead author Ambreen WOODWARD     The patient and her  were given handwritten sheet outlining all aspects of our discussion today and expressed understanding clarity and gratitude.  She is scheduled for Mediport catheter placement and we will move forward with phase 1 of care that includes the use of pembrolizumab every 3 weeks plus paclitaxel plus carboplatin weekly x 12.           Special Studies  December 27, 2024 germline genetic panel  No high risk germline mutations                 GENETIC ANALYSIS OVERALL INTERPRETATION   Date Value Ref Range Status   12/27/2024     Final     NEGATIVE: No Clinically Significant Variants Detected   12/27/2024 Variants of Unknown Significance Detected   Final                    INTERPRETATION   Date Value Ref Range Status   12/27/2024 See Notes   Final       Comment:       No pathogenic mutations, variants of unknown significance, or gross deletions or duplications were  detected.  Risk Estimate: low likelihood of variants in the genes analyzed contributing to this individual's clinical history.  Genetic counseling is a recommended option for all individuals undergoing genetic testing.  Genes Analyzed (13 total): EVA, BARD1, BRCA1, BRCA2, CDH1, CHEK2, NF1, PALB2, PTEN, RAD51C, RAD51D, STK11 and TP53 (sequencing and deletion/duplication).   12/27/2024 See Notes   Final       Comment:       No known clinically actionable alterations were detected.  Four variants of unknown significance were detected: two in the MSH3 gene, one in the POLE gene, and one in the SDHA gene.  Risk Estimate: should be based on clinical and family history, as the clinical significance of this result is unknown.  Genetic counseling is a recommended option for all individuals undergoing genetic testing.  This individual is heterozygous for the p.N118I (c.353A>T) and p.N861H (c.2581A>C) variants of unknown significance in the MSH3 gene, heterozygous for the p.W6178C (c.4168C>T) variant of unknown significance in the POLE gene, and heterozygous for the p.K480E (c.1438A>G) variant of unknown significance in the SDHA gene, which may or may not contribute to this individual's clinical history. Familial testing would be necessary to determine if the alterations in MSH3 are on the same chromosome or on different chromosomes (in cis or trans). Refer to the supplementary pages for additional information on these variants. No additional pathogenic mutations, variants of unknown significance, or gross deletions or duplications were detected. Genes Analyzed (59 total): APC, EVA, BAP1, BARD1, BMPR1A, BRCA1, BRCA2, BRIP1, CDH1, CDK4, CDKN1B, CDKN2A, CHEK2, DICER1, FH, FLCN, KIF1B, MAX, MEN1, MET, MLH1, MSH2, MSH6, MUTYH, NF1, NTHL1, PALB2, PMS2, POT1, PTEN, RAD51C, RAD51D, RB1, RET, SDHA, SDHAF2, SDHB, SDHC, SDHD, SMAD4, SMARCA4, STK11, QPDH460, TP53, TSC1, TSC2 and VHL (sequencing and deletion/duplication); AXIN2, CTNNA1,  EGLN1, HOXB13, KIT, MITF, MSH3, PDGFRA, POLD1 and POLE (sequencing only); EPCAM and GREM1 (deletion/duplication only). RNA data is routinely analyzed for use in variant interpretation for all genes.                  GENES NOT REPORTED   Date Value Ref Range Status   2024     Final     EVA,BARD1,CDH1,CHEK2,PALB2,PTEN,RAD51C,RAD51D,STK11,TP53,NF1,BRCA1,BRCA2   2024     Final     APC,EVA,AXIN2,BAP1,BARD1,BMPR1A,BRCA1,BRCA2,BRIP1,CDH1,CDK4,CDKN1B,CDKN2A,CHEK2,CTNNA1,DICER1,EGLN1,EPCAM,FH,FLCN,GREM1,HOXB13,KIF1B,KIT,MAX,MEN1,MET,MITF,MLH1,MSH2,MSH6,MUTYH,NF1,NTHL1,PALB2,PDGFRA,PMS2,POLD1,POT1,PTEN,RAD51C,RAD51D,RB1,RET,SDHAF2,SDHB,  SDHC,SDHD,SMAD4,SMARCA4,STK11,KGOY999,TP53,TSC1,TSC2,VHL            History of Present Illness        This delightful 65-year-old presents with her  to review a new diagnosis of invasive carcinoma of the right breast.  The patient is  2 para 2 and postmenopausal.  The patient's mother at age 80 was revealed to have a diagnosis of breast cancer status post excision only.  The mother is living and well     The patient presented in the 2024 near the  holiday with self-awareness of her right breast mass.  Patient presented for diagnostic reviews through her gynecologist and primary care physicians.  Bilateral diagnostic mammogram with targeted right breast ultrasound was performed on 2024.  25 mm oval mass density with indistinct margins was seen in the upper outer quadrant of the right breast at the 10 o'clock position at 6 cm from the nipple.  This mass correlating with the palpable finding on examination.  Targeted ultrasound of the right breast showed a 19 mm x 17 mm x 22 mm oval hypoechoic mass with indistinct margins seen in the upper outer quadrant of the right breast at 10:00.  There were no suspicious adenopathy in the right axilla.  The left breast showed no suspicious masses, grouped microcalcifications or areas of unexplained  architectural distortion.     On December 23, 2024 ultrasound-guided biopsy of the right breast mass with mammography was performed without complication.  Final pathology revealed an invasive breast carcinoma no special type ductal of grade 2.  Ductal carcinoma in situ was present.  Estrogen receptor was negative, progesterone receptor was negative and HER2/nathanael was low at 2+ by IHC but subsequently nonamplified by FISH amplification..  Ductal carcinoma in situ.     The patient reports a medical oncology for therapeutic planning and review for possible neoadjuvant therapies        Pertinent History from December 2024     No new complaints              December 23, 2024 S 24 [id number] status post ultrasound-guided biopsy right breast lesion 10 o'clock position 6 cm from the nipple     Cancer Screening and Prevention  Mammography: 12/18/2024   GI/Colonoscopy: 03/02/2021   GYN: 01/12/2022   Bone Density: Not on file   Covid 19 Vaccination Status:      Oncology Therapeutic Summary:     From February 3, 2025 cycle 1 week 1   initiation phase 1 keynote 522 approach with pembrolizumab 200 mg 5 pills IV every 3 weeks plus paclitaxel 80 mg/m² IV weekly x 12 and carboplatin AUC 1.5 weekly x 12     12/23/2024 S 24 [id number]  status post ultrasound-guided biopsy right breast     Oncology History   Cancer Staging   Invasive ductal carcinoma of breast, female, right (HCC)  Staging form: Breast, AJCC 8th Edition  - Clinical stage from 1/14/2025: Stage IIB (cT2, cN0, cM0, G2, ER-, AK-, HER2-) - Signed by Jordan Esparza MD on 1/27/2025  Histopathologic type: Infiltrating duct carcinoma, NOS  Stage prefix: Initial diagnosis  Method of lymph node assessment: Clinical  Histologic grading system: 3 grade system        Oncology History   Invasive ductal carcinoma of breast, female, right (HCC)   1/14/2025 -  Cancer Staged     Staging form: Breast, AJCC 8th Edition  - Clinical stage from 1/14/2025: Stage IIB (cT2, cN0, cM0, G2, ER-, AK-,  HER2-) - Signed by Jordan Esparza MD on 1/27/2025  Histopathologic type: Infiltrating duct carcinoma, NOS  Stage prefix: Initial diagnosis  Method of lymph node assessment: Clinical  Histologic grading system: 3 grade system         1/27/2025 Initial Diagnosis     Invasive ductal carcinoma of breast, female, right (HCC)      Malignant neoplasm of upper-outer quadrant of right breast in female, estrogen receptor negative (HCC)   1/27/2025 Initial Diagnosis     Malignant neoplasm of upper-outer quadrant of right breast in female, estrogen receptor negative (HCC)      1/28/2025 -  Chemotherapy     DOXOrubicin (ADRIAMYCIN), 60 mg/m2 = 129 mg, Intravenous, Once, 0 of 4 cycles  alteplase (CATHFLO), 2 mg, Intracatheter, Every 1 Minute as needed, 0 of 17 cycles  pegfilgrastim-apgf (Nyvepria), 6 mg, Subcutaneous, Once, 0 of 4 cycles  cyclophosphamide (CYTOXAN) IVPB, 600 mg/m2 = 1,290 mg, Intravenous, Once, 0 of 4 cycles  fosaprepitant (EMEND) IVPB, 150 mg, Intravenous, Once, 0 of 4 cycles  CARBOplatin (PARAPLATIN) IVPB (GOG AUC DOSING), 38.52 mg (100 % of original dose 38.52 mg), Intravenous, Once, 0 of 4 cycles  Dose modification:   (original dose 38.52 mg, Cycle 1),   (original dose 38.52 mg, Cycle 1, Reason: Other (Must fill in a comment), Comment: lab estimates),   (original dose 38.52 mg, Cycle 1),   (original dose 38.52 mg, Cycle 1, Reason: Other (Must fill in a comment))  PACLItaxel (TAXOL) chemo IVPB, 80 mg/m2 = 172.2 mg, Intravenous, Once, 0 of 4 cycles  pembrolizumab (KEYTRUDA) IVPB, 200 mg, Intravenous, Once, 0 of 17 cycles         Review of Systems   Constitutional: Negative.    All other systems reviewed and are negative.     Medical History Reviewed by provider this encounter:     .    Medications Ordered Prior to Encounter               Current Outpatient Medications on File Prior to Visit   Medication Sig Dispense Refill    atorvastatin (LIPITOR) 20 mg tablet TAKE ONE TABLET BY MOUTH EVERY DAY 90 tablet 1  "   doxycycline hyclate (VIBRAMYCIN) 100 mg capsule Take 100 mg by mouth daily as needed        hydroCHLOROthiazide 12.5 mg tablet TAKE ONE TABLET BY MOUTH EVERY DAY 30 tablet 5    Multiple Vitamin (MULTIVITAMINS PO) Take by mouth        nebivolol (BYSTOLIC) 20 MG tablet TAKE ONE TABLET BY MOUTH EVERY DAY 90 tablet 1      No current facility-administered medications on file prior to visit.         Social History                   Tobacco Use    Smoking status: Never    Smokeless tobacco: Never   Vaping Use    Vaping status: Never Used   Substance and Sexual Activity    Alcohol use: Yes    Drug use: No    Sexual activity: Yes       Partners: Male       Birth control/protection: Post-menopausal             Objective     /76 (BP Location: Left arm, Patient Position: Sitting, Cuff Size: Adult)   Pulse 79   Temp 97.8 °F (36.6 °C) (Temporal)   Resp 18   Ht 5' 6\" (1.676 m)   Wt 108 kg (239 lb)   LMP  (LMP Unknown)   SpO2 95%   BMI 38.58 kg/m²      Pain Screening:  Pain Score: 0-No pain  ECOG ECOG Performance Status: 0 - Fully active, able to carry on all pre-disease performance without restriction 0  Physical Exam  Vitals and nursing note reviewed. Exam conducted with a chaperone present.   Neck:      Thyroid: No thyroid mass, thyromegaly or thyroid tenderness.      Trachea: Trachea normal.   Cardiovascular:      Rate and Rhythm: Normal rate and regular rhythm.      Pulses: Normal pulses.      Heart sounds: Murmur heard.      Systolic murmur is present with a grade of 2/6.      No diastolic murmur is present.      No friction rub. No gallop. No S3 or S4 sounds.   Pulmonary:      Effort: Pulmonary effort is normal.      Breath sounds: Normal breath sounds. No stridor, decreased air movement or transmitted upper airway sounds. No decreased breath sounds, wheezing, rhonchi or rales.   Chest:      Chest wall: Mass present. No deformity, swelling, tenderness, crepitus or edema. There is no dullness to percussion. "   Breasts:     Right: Mass present. No nipple discharge, skin change or tenderness.      Left: Normal.      Comments: 1.55 1.7 cm mass in the right mid to upper outer breasts  Abdominal:      General: Abdomen is protuberant. Bowel sounds are normal.      Palpations: Abdomen is soft. There is no shifting dullness, hepatomegaly, splenomegaly, mass or pulsatile mass.      Tenderness: There is no abdominal tenderness. There is no right CVA tenderness or left CVA tenderness.   Musculoskeletal:      Cervical back: Full passive range of motion without pain and normal range of motion. No edema, rigidity, bony tenderness or crepitus. No pain with movement, spinous process tenderness or muscular tenderness.      Thoracic back: No bony tenderness.      Lumbar back: No bony tenderness.      Right lower leg: No edema.      Left lower leg: No edema.   Lymphadenopathy:      Cervical: No cervical adenopathy.      Right cervical: No superficial, deep or posterior cervical adenopathy.     Left cervical: No superficial, deep or posterior cervical adenopathy.      Upper Body:      Right upper body: No supraclavicular, axillary or pectoral adenopathy.      Left upper body: No supraclavicular, axillary or pectoral adenopathy.   Neurological:      Mental Status: She is alert.            Labs: I have reviewed the following labs:            Lab Results   Component Value Date/Time     WBC 8.89 09/13/2024 11:01 AM     RBC 4.36 09/13/2024 11:01 AM     Hemoglobin 13.8 09/13/2024 11:01 AM     Hematocrit 42.8 09/13/2024 11:01 AM     MCV 98 09/13/2024 11:01 AM     MCH 31.7 09/13/2024 11:01 AM     RDW 12.4 09/13/2024 11:01 AM     Platelets 181 09/13/2024 11:01 AM                Lab Results   Component Value Date/Time     Potassium 4.0 01/09/2025 09:38 AM     Chloride 103 01/09/2025 09:38 AM     CO2 24 01/09/2025 09:38 AM     BUN 20 01/09/2025 09:38 AM     Creatinine 0.68 01/09/2025 09:38 AM     Glucose, Fasting 113 (H) 01/09/2025 09:38 AM      Calcium 8.8 01/09/2025 09:38 AM     AST 15 01/09/2025 09:38 AM     ALT 11 01/09/2025 09:38 AM     Alkaline Phosphatase 101 01/09/2025 09:38 AM     Total Protein 7.1 01/09/2025 09:38 AM     Albumin 4.1 01/09/2025 09:38 AM     Total Bilirubin 0.60 01/09/2025 09:38 AM     eGFR 92 01/09/2025 09:38 AM            Pathology:      December 23, 2024 S 24 [id number] status post ultrasound-guided biopsy right breast lesion 10 o'clock position 6 cm from the nipple  Final Diagnosis   A. Breast, Right, us guided breast bx, 10 ocl, 6 cm fn 3 passes, 12 g:  - Invasive breast carcinoma of no special type (ductal NST/invasive ductal carcinoma).   * [place] grade 2 of 3 (total score: 6 of 9)    -- tubule formation < 10%, score 3    -- nuclear grade 2 of 3, score 2    -- mitoses < 3/mm2, (</= 7 mitoses/10HPF), score 1.   * Confirmed by tumor cell immunophenotype:    -- positive: nuclear stain for GATA3 and membranous stain for E-cadherin and p120.    -- negative:  p63, calponin-B.     * Invasive carcinoma involves 3 of 3 submitted core biopsies, max. dimension = 16 millimeters.  - Ductal carcinoma in situ.   * Estrogen, progesterone & HER2 receptor studies pending, to be described in a separate receptor report.          Synoptic Checklist INVASIVE CARCINOMA OF THE BREAST: Biopsy (INVASIVE CARCINOMA OF THE BREAST: BIOPSY - All Specimens)Protocol posted: 3/22/2023 SPECIMEN Procedure Needle biopsy Specimen Laterality Right .      TUMOR Tumor Site Clock position 10 o'clock   Tumor Site Distance from nipple (Centimeters): 6 cm     Histologic Type Invasive carcinoma of no special type (ductal) Histologic Grade (Mina Histologic Score) Glandular (Acinar) / Tubular Differentiation Score 3 Nuclear Pleomorphism Score 2 Mitotic Rate [id number] Fátima Reyes [id number]   Overall Grade Grade 2 (scores of 6 or 7)      Largest Invasive Focus in this Limited Biopsy Sample 16 mm      Ductal Carcinoma In Situ (DCIS) Present Architectural  Patterns Solid Nuclear Grade Grade II (intermediate) Necrosis Not identified      Lymphatic and / or Vascular Invasion Not identified Microcalcifications Not identified .      Estrogen Receptor (ER) Status Negative (less than 1%)      Progesterone Receptor (PgR) Status Negative (less than 1%)      HER2 by Immunohistochemistry Equivocal (Score 2+)      Addendum  01/02/2025 Sonico  Fish  Non amplified  Negative        Imaging:   January 27, 2025 2D cardiac echo  Summary    Left Ventricle: Left ventricular cavity size is normal. Wall thickness is normal. The left ventricular ejection fraction is 60%. Systolic function is normal. Wall motion is normal. Diastolic function is mildly abnormal, consistent with grade I (abnormal) relaxation.    IVS: There is sigmoid appearance of the septum.    Right Ventricle: Right ventricular cavity size is normal. Systolic function is normal.    Left Atrium: The atrium is mildly dilated (35-41 mL/m2).    Right Atrium: The atrium is normal in size.                       January 20, 2025 CT scan of the chest abdomen and pelvis with contrast  IMPRESSION:     A 2.0 cm right breast mass with a biopsy clip in keeping with the patient's recently diagnosed breast cancer.     A 3 mm left upper lobe pulmonary nodule. Based on current Fleischner Society 2017 Guidelines on incidental pulmonary nodule, patients with a known malignancy are at increased risk of metastasis and should receive initial three-month follow-up chest CT.     No evidence of metastatic disease in the abdomen and pelvis.     The study was marked in EPIC for immediate notification.        January 17, 2025 nuclear medicine whole-body bone scan  IMPRESSION:     1. No scintigraphic evidence of osseous metastasis.        December 18, 2024 targeted right breast ultrasound with bilateral diagnostic mammogram     FINDINGS:   RIGHT  1) MASS [B]  Mammo diagnostic bilateral w 3d and cad: There is a 25 mm high density, oval mass with  indistinct margins seen in the upper outer quadrant of the right breast at 10 o'clock, 6 cm from the nipple. The mass correlates with the palpable finding noted during physical examination.   US breast right limited (diagnostic): There is a 19 mm x 17 mm x 22 mm oval, hypoechoic mass with indistinct margins seen in the upper outer quadrant of the right breast at 10 o'clock, 6 cm from the nipple. The mass correlates with the palpable finding noted during physical examination.  Sonographic exam of the right axilla shows no suspicious adenopathy.  Scattered benign-appearing calcifications are demonstrated in the right breast.  Right breast biopsy clip.     Left  Mammo diagnostic bilateral w 3d and cad  There are no suspicious masses, grouped microcalcifications or areas of unexplained architectural distortion. The skin and nipple areolar complex are unremarkable.   Results were discussed with the patient.  Ultrasound-guided biopsy was recommended.     IMPRESSION:  Solid mass in the upper outer right breast corresponding to the area of clinical concern and highly suspicious for malignancy.     ASSESSMENT/BI-RADS CATEGORY:  Left: 1 - Negative  Right: 5 - Highly Suggestive of Malignancy  Overall: 5 - Highly Suggestive of Malignancy     RECOMMENDATION:       - Ultrasound-guided breast biopsy for the right breast.       - Routine screening mammogram in 1 year for the left breast.           February 19, 2024 bilateral screening mammography     FINDINGS:   There are no suspicious masses, grouped microcalcifications or areas of architectural distortion. The skin and nipple areolar complex are unremarkable.     IMPRESSION:  No mammographic evidence of malignancy.           ASSESSMENT/BI-RADS CATEGORY:  Left: 1 - Negative  Right: 1 - Negative  Overall: 1 - Negative     RECOMMENDATION:       - Routine screening mammogram in 1 year for both breasts.                                               Instructions         Instructions      After Visit Sum

## 2025-03-18 ENCOUNTER — PATIENT OUTREACH (OUTPATIENT)
Dept: HEMATOLOGY ONCOLOGY | Facility: CLINIC | Age: 65
End: 2025-03-18

## 2025-03-18 NOTE — PROGRESS NOTES
I reached out and spoke with Fátima to follow up and to review for any new changes in barriers to care and offer supportive services as needed.     Barriers noted previously and outcome of interventions;  Fátima had no previous barriers and currently no new barriers.  She mentioned that all is well and she really did not have any questions or concerns at this time. Fátima mentioned that she had her follow up appointment with  yesterday.    Reviewed for any new barriers as noted below.    Are you having any side effects from your treatment?No    Are you eating and drinking normally? yes    Have you been experiencing any uncontrolled pain related to your cancer diagnosis? No    If not already established, have needs changed for a palliative care referral? no    Do you have a good support system? Yes     Are you interested in any support groups? N/A    How are you doing with transportation to your appointments? Patient is still actively driving and when she does not drive she has family and friends who help support her in this area.    Do you have any questions or concerns regarding your treatment plan? No    Any new financial concerns for your household or medical bills? No    Do you know when your upcoming appointments are? yes  Future Appointments   Date Time Provider Department Center   3/19/2025  9:00 AM AN INF CHAIR 5 [facility]   3/26/2025  8:30 AM AN INF CHAIR 8 [facility]   3/31/2025  8:40 AM Kosta Machado [facility]   4/2/2025  8:30 AM AN INF CHAIR 8 [facility]   4/7/2025  8:40 AM Kosta Machado [facility]   4/8/2025 11:00 AM DO GIANNA Ramirez [facility]   4/9/2025  8:30 AM AN INF CHAIR 19 [facility]   4/16/2025  8:30 AM AN INF CHAIR 25 [facility]   4/21/2025 10:00 AM AN CT 2 AN CT AN [facility]   4/22/2025  8:40 AM Kosta Machado MD [facility]   4/23/2025  8:30 AM AN  INF CHAIR 25 AN [facility]   4/30/2025  8:30 AM AN INF CHAIR 8 [facility]   5/1/2025 11:30 AM AN INF FAST TRACK 2 [facility]          Based on individual needs I will follow up with them in 4/5 weeks. I have provided my direct contact information and welcome them to contact me if their needs as discussed above change. They were appreciative for the call.

## 2025-03-19 ENCOUNTER — HOSPITAL ENCOUNTER (OUTPATIENT)
Dept: INFUSION CENTER | Facility: CLINIC | Age: 65
Discharge: HOME | End: 2025-03-19
Payer: COMMERCIAL

## 2025-03-19 ENCOUNTER — TELEPHONE (OUTPATIENT)
Age: 65
End: 2025-03-19

## 2025-03-19 VITALS
SYSTOLIC BLOOD PRESSURE: 124 MMHG | HEIGHT: 67 IN | OXYGEN SATURATION: 96 % | RESPIRATION RATE: 18 BRPM | BODY MASS INDEX: 37.12 KG/M2 | DIASTOLIC BLOOD PRESSURE: 82 MMHG | HEART RATE: 72 BPM | WEIGHT: 236.5 LBS | TEMPERATURE: 97.8 F

## 2025-03-19 DIAGNOSIS — Z17.1 MALIGNANT NEOPLASM OF UPPER-OUTER QUADRANT OF RIGHT BREAST IN FEMALE, ESTROGEN RECEPTOR NEGATIVE (HCC): Primary | ICD-10-CM

## 2025-03-19 DIAGNOSIS — C50.411 MALIGNANT NEOPLASM OF UPPER-OUTER QUADRANT OF RIGHT BREAST IN FEMALE, ESTROGEN RECEPTOR NEGATIVE (HCC): Primary | ICD-10-CM

## 2025-03-19 DIAGNOSIS — E86.0 DEHYDRATION: ICD-10-CM

## 2025-03-19 RX ORDER — SODIUM CHLORIDE 9 MG/ML
20 INJECTION, SOLUTION INTRAVENOUS ONCE
Status: COMPLETED | OUTPATIENT
Start: 2025-03-19 | End: 2025-03-19

## 2025-03-19 RX ADMIN — CARBOPLATIN 187.05 MG: 10 INJECTION INTRAVENOUS at 11:30

## 2025-03-19 RX ADMIN — DEXAMETHASONE SODIUM PHOSPHATE: 10 INJECTION, SOLUTION INTRAMUSCULAR; INTRAVENOUS at 09:10

## 2025-03-19 RX ADMIN — PACLITAXEL 172.2 MG: 6 INJECTION, SOLUTION INTRAVENOUS at 10:23

## 2025-03-19 RX ADMIN — FAMOTIDINE 20 MG: 10 INJECTION INTRAVENOUS at 09:52

## 2025-03-19 RX ADMIN — SODIUM CHLORIDE 20 ML/HR: 0.9 INJECTION, SOLUTION INTRAVENOUS at 09:09

## 2025-03-19 RX ADMIN — DIPHENHYDRAMINE HYDROCHLORIDE 25 MG: 50 INJECTION INTRAMUSCULAR; INTRAVENOUS at 09:31

## 2025-03-19 RX ADMIN — SODIUM CHLORIDE 500 ML: 0.9 INJECTION, SOLUTION INTRAVENOUS at 09:10

## 2025-03-19 NOTE — PROGRESS NOTES
Pt here for taxol and carboplatin, tolerated well with no complaints at this time. Next appt 3/26 at 0830 at . [facility] provided.

## 2025-03-19 NOTE — TELEPHONE ENCOUNTER
Rec'd return call from patient. She states that she's already been seen and treated by outside derm.    I thanked her for return call to office.    Closed referral.

## 2025-03-19 NOTE — TELEPHONE ENCOUNTER
Called and left message for patient requesting return call regarding ASAP referral received from [facility].    Phone number left in message.

## 2025-03-24 ENCOUNTER — TELEPHONE (OUTPATIENT)
Age: 65
End: 2025-03-24

## 2025-03-24 DIAGNOSIS — Z17.1 MALIGNANT NEOPLASM OF UPPER-OUTER QUADRANT OF RIGHT BREAST IN FEMALE, ESTROGEN RECEPTOR NEGATIVE (HCC): ICD-10-CM

## 2025-03-24 DIAGNOSIS — C50.411 MALIGNANT NEOPLASM OF UPPER-OUTER QUADRANT OF RIGHT BREAST IN FEMALE, ESTROGEN RECEPTOR NEGATIVE (HCC): ICD-10-CM

## 2025-03-24 DIAGNOSIS — E86.0 DEHYDRATION: Primary | ICD-10-CM

## 2025-03-24 NOTE — TELEPHONE ENCOUNTER
Subjective     Fátima Reyes is a 65 y.o. female who presents for evaluation of nausea and vomiting. Onset of symptoms was yesterday. Patient describes nausea as mild. Vomiting has occurred 3 times over the past 1 day. Vomitus is described as food particles. Symptoms have been associated with  unknown . Patient denies alcohol overuse, fever, hematemesis, and possibility of pregnancy. Symptoms have essentially resolved. Evaluation to date has been none and nausea has resolved.  . Treatment to date has been none.         Review of Systems  Pt no longer has nausea/vomiting. .    Objective     LMP  (LMP Unknown)     General Appearance:    Alert, cooperative, no distress, appears stated age   Head:    Normocephalic, without obvious abnormality, atraumatic   Eyes:    PERRL, conjunctiva/corneas clear, EOM's intact, fundi     benign, both eyes        Ears:    Normal TM's and external ear canals, both ears   Nose:   Nares normal, septum midline, mucosa normal, no drainage    or sinus tenderness   Throat:   Lips, mucosa, and tongue normal; teeth and gums normal   Neck:   Supple, symmetrical, trachea midline, no adenopathy;        thyroid:  No enlargement/tenderness/nodules; no carotid    bruit or JVD   Back:     Symmetric, no curvature, ROM normal, no CVA tenderness   Lungs:     Clear to auscultation bilaterally, respirations unlabored   Chest wall:    No tenderness or deformity   Heart:    Regular rate and rhythm, S1 and S2 normal, no murmur, rub   or gallop   Abdomen:     Soft, non-tender, bowel sounds active all four quadrants,     no masses, no organomegaly   Genitalia:    Normal male without lesion, discharge or tenderness   Rectal:    Normal tone, normal prostate, no masses or tenderness;    guaiac negative stool   Extremities:   Extremities normal, atraumatic, no cyanosis or edema   Pulses:   2+ and symmetric all extremities   Skin:   Skin color, texture, turgor normal, no rashes or lesions   Lymph nodes:    Cervical, supraclavicular, and axillary nodes normal   Neurologic:   CNII-XII intact. Normal strength, sensation and reflexes       throughout   .    Assessment & Plan     Family had the same virus.    Since resolved. .    Pt is asking if she should get her chemo on Wednesday.  .

## 2025-03-24 NOTE — TELEPHONE ENCOUNTER
Per Dr. Machado patient should hold treatment this week and delay to next week   I spoke with patient and she was agreeable  She will have labs drawn next week prior to treatment on 4/2  I adjusted the chemo order to reflect adjustment    Will send to infusion scheduling to notify patient will not be getting treatment this week

## 2025-03-26 ENCOUNTER — HOSPITAL ENCOUNTER (OUTPATIENT)
Dept: INFUSION CENTER | Facility: CLINIC | Age: 65
Discharge: HOME | End: 2025-03-26

## 2025-03-30 NOTE — PROGRESS NOTES
Progress Notes  Kosta Machado MD (Physician)  Medical Oncology  Expand All Collapse All  Name: Fátima Reyes      : 1960      MRN: [id number]  Encounter Provider: Kosta Machado MD  Encounter Date: 2025  Encounter department: [facility] [place]           Chief Complaint   Patient presents with    visit      :  Assessment & Plan  Malignant neoplasm of upper-outer quadrant of right breast in female, estrogen receptor negative (HCC)  Newly diagnosed palpable clinical T2 clinical N0 triple negative Invasive ductal breast cancer established by biopsy 2024.   CT scan of the chest abdomen pelvis and nuclear medicine whole-body bone scan from 2025 reveal no evidence of metastatic disease     The patient tolerated Week 7 of 12 exceedingly well. With virtually no ill effects. Having some mild constipation ( slowing). Advised stool softeners.    Week 8 was pushed back 1 week on to today due to norovirus with weakness.  All symptoms resolved within 24 hours.  We move forward    Patient denies numbness tingling or other signs or symptoms on paclitaxel plus carboplatin    Maculopapular rash is improving after discontinuation of pembrolizumab     Pembrolizumab was likely implicated.  Final dose of pembrolizumab was 2025        Orders:    CARBOplatin (PARAPLATIN) 38.52 mg in sodium chloride 0.9 % 250 mL IVPB    Oncology Provider Communication    Oncology Provider Communication    Oncology Provider Communication    Oncology Provider Communication    Oncology Provider Communication    Infusion Calculated Appointment Request; Future    CBC and differential; Future    Comprehensive metabolic panel; Future    TSH, 3rd generation with Free T4 reflex; Future    T3, free; Future    Infusion Calculated Appointment Request; Future    CBC and differential; Future    Comprehensive metabolic panel; Future    Infusion Calculated Appointment Request; Future     CBC and differential; Future    Comprehensive metabolic panel; Future     Class 2 obesity due to excess calories without serious comorbidity with body mass index (BMI) of 39.0 to 39.9 in adult        Family history of malignant neoplasm  Positive breast cancer in mother     Essential hypertension        Hypothyroidism, unspecified type     Orders:    CARBOplatin (PARAPLATIN) 38.52 mg in sodium chloride 0.9 % 250 mL IVPB    Oncology Provider Communication    Oncology Provider Communication     Hyperlipidemia, unspecified hyperlipidemia type        Invasive ductal carcinoma of breast, female, right (HCC)     Dehydration           Solitary pulmonary nodule                 Clinical/Pathologic Stage IIB     Cancer Staging   No matching staging information was found for the patient.        Current Therapy W 8 of 12  Keynote 522 trial data set approach phase 1 with pembrolizumab every 3 weeks IV plus paclitaxel IV weekly x 12+ carboplatin IV weekly x 12     Discussion  This delightful postmenopausal woman presents following week 5 of 12 preoperative systemic therapy for newly diagnosed grade 2 invasive ductal carcinoma of the right breast.       She has been initiated on the keynote 522 regimen with chemo and immunotherapy.  Phase 1 will include intravenous pembrolizumab every 3 weeks along with low-dose weekly chemotherapy as paclitaxel weekly x 12+ carboplatin weekly x 12.       This session represents week 8 of 12     Maculopapular rashes tremendously better with discontinuation of pembrolizumab.  The patient has no other new adverse signs symptoms or complaints.  We will move forward with weekly paclitaxel plus carboplatin through week 12     The patient is tolerating well overall.          Background History:   The patient became self-aware of palpable right breast mass in the fall 2024 and presented for diagnostic reviews.  Her prior mammography on  February 19,2024 was BI-RADS 1 were negative.     The patient's risk  factors for the development of breast cancer and solid tumor malignancy include age, postmenopausal status, gender and obesity. Her mother has a diagnosis of breast cancer at age 80 plus of unclear documentation and scope. Her mother is living and well.     The patient has no clinical evidence of metastases.     This patient is approached with favor in a curative intent.  She clearly carries the expectation of an excellent opportunity for a robust tumor response and favorable outcomes over time.  I had a long discussion with the patient and her  regarding the use of chemoimmunotherapy in a neoadjuvant approach fashion and after the keynote 522 trial data set.  I discussed opportunities and the implication of pathologic complete remission and extension with favor of both event free survival and overall survival.  Please see [place] Journal of Medicine from September 2024 lead author Ambreen WOODWARD     The patient and her  were given handwritten sheet outlining all aspects of our discussion today and expressed understanding clarity and gratitude.  She is scheduled for Mediport catheter placement and we will move forward with phase 1 of care that includes the use of pembrolizumab every 3 weeks plus paclitaxel plus carboplatin weekly x 12.           Special Studies  December 27, 2024 germline genetic panel  No high risk germline mutations                 GENETIC ANALYSIS OVERALL INTERPRETATION   Date Value Ref Range Status   12/27/2024     Final     NEGATIVE: No Clinically Significant Variants Detected   12/27/2024 Variants of Unknown Significance Detected   Final                    INTERPRETATION   Date Value Ref Range Status   12/27/2024 See Notes   Final       Comment:       No pathogenic mutations, variants of unknown significance, or gross deletions or duplications were detected.  Risk Estimate: low likelihood of variants in the genes analyzed contributing to this individual's clinical history.  Genetic  counseling is a recommended option for all individuals undergoing genetic testing.  Genes Analyzed (13 total): EVA, BARD1, BRCA1, BRCA2, CDH1, CHEK2, NF1, PALB2, PTEN, RAD51C, RAD51D, STK11 and TP53 (sequencing and deletion/duplication).   12/27/2024 See Notes   Final       Comment:       No known clinically actionable alterations were detected.  Four variants of unknown significance were detected: two in the MSH3 gene, one in the POLE gene, and one in the SDHA gene.  Risk Estimate: should be based on clinical and family history, as the clinical significance of this result is unknown.  Genetic counseling is a recommended option for all individuals undergoing genetic testing.  This individual is heterozygous for the p.N118I (c.353A>T) and p.N861H (c.2581A>C) variants of unknown significance in the MSH3 gene, heterozygous for the p.D1605Z (c.4168C>T) variant of unknown significance in the POLE gene, and heterozygous for the p.K480E (c.1438A>G) variant of unknown significance in the SDHA gene, which may or may not contribute to this individual's clinical history. Familial testing would be necessary to determine if the alterations in MSH3 are on the same chromosome or on different chromosomes (in cis or trans). Refer to the supplementary pages for additional information on these variants. No additional pathogenic mutations, variants of unknown significance, or gross deletions or duplications were detected. Genes Analyzed (59 total): APC, EVA, BAP1, BARD1, BMPR1A, BRCA1, BRCA2, BRIP1, CDH1, CDK4, CDKN1B, CDKN2A, CHEK2, DICER1, FH, FLCN, KIF1B, MAX, MEN1, MET, MLH1, MSH2, MSH6, MUTYH, NF1, NTHL1, PALB2, PMS2, POT1, PTEN, RAD51C, RAD51D, RB1, RET, SDHA, SDHAF2, SDHB, SDHC, SDHD, SMAD4, SMARCA4, STK11, OKZS461, TP53, TSC1, TSC2 and VHL (sequencing and deletion/duplication); AXIN2, CTNNA1, EGLN1, HOXB13, KIT, MITF, MSH3, PDGFRA, POLD1 and POLE (sequencing only); EPCAM and GREM1 (deletion/duplication only). RNA data is  routinely analyzed for use in variant interpretation for all genes.                  GENES NOT REPORTED   Date Value Ref Range Status   2024     Final     EVA,BARD1,CDH1,CHEK2,PALB2,PTEN,RAD51C,RAD51D,STK11,TP53,NF1,BRCA1,BRCA2   2024     Final     APC,EVA,AXIN2,BAP1,BARD1,BMPR1A,BRCA1,BRCA2,BRIP1,CDH1,CDK4,CDKN1B,CDKN2A,CHEK2,CTNNA1,DICER1,EGLN1,EPCAM,FH,FLCN,GREM1,HOXB13,KIF1B,KIT,MAX,MEN1,MET,MITF,MLH1,MSH2,MSH6,MUTYH,NF1,NTHL1,PALB2,PDGFRA,PMS2,POLD1,POT1,PTEN,RAD51C,RAD51D,RB1,RET,SDHAF2,SDHB,  SDHC,SDHD,SMAD4,SMARCA4,STK11,SOMU743,TP53,TSC1,TSC2,VHL            History of Present Illness        This delightful 65-year-old presents with her  to review a new diagnosis of invasive carcinoma of the right breast.  The patient is  2 para 2 and postmenopausal.  The patient's mother at age 80 was revealed to have a diagnosis of breast cancer status post excision only.  The mother is living and well     The patient presented in the 2024 near the  holiday with self-awareness of her right breast mass.  Patient presented for diagnostic reviews through her gynecologist and primary care physicians.  Bilateral diagnostic mammogram with targeted right breast ultrasound was performed on 2024.  25 mm oval mass density with indistinct margins was seen in the upper outer quadrant of the right breast at the 10 o'clock position at 6 cm from the nipple.  This mass correlating with the palpable finding on examination.  Targeted ultrasound of the right breast showed a 19 mm x 17 mm x 22 mm oval hypoechoic mass with indistinct margins seen in the upper outer quadrant of the right breast at 10:00.  There were no suspicious adenopathy in the right axilla.  The left breast showed no suspicious masses, grouped microcalcifications or areas of unexplained architectural distortion.     On 2024 ultrasound-guided biopsy of the right breast mass with mammography was performed  without complication.  Final pathology revealed an invasive breast carcinoma no special type ductal of grade 2.  Ductal carcinoma in situ was present.  Estrogen receptor was negative, progesterone receptor was negative and HER2/nathanael was low at 2+ by IHC but subsequently nonamplified by FISH amplification..  Ductal carcinoma in situ.     The patient reports a medical oncology for therapeutic planning and review for possible neoadjuvant therapies        Pertinent History from December 2024     No new complaints              December 23, 2024 S 24 [id number] status post ultrasound-guided biopsy right breast lesion 10 o'clock position 6 cm from the nipple     Cancer Screening and Prevention  Mammography: 12/18/2024   GI/Colonoscopy: 03/02/2021   GYN: 01/12/2022   Bone Density: Not on file   Covid 19 Vaccination Status:      Oncology Therapeutic Summary:    February 26, 2025 pembrolizumab discontinued after second dose due to diffuse symptomatic maculopapular skin eruption.  Patient continued on chemotherapy alone     From February 3, 2025 cycle 1 week 1   initiation phase 1 keynote 522 approach with pembrolizumab 200 mg 5 pills IV every 3 weeks plus paclitaxel 80 mg/m² IV weekly x 12 and carboplatin AUC 1.5 weekly x 12     12/23/2024 S 24 [id number]  status post ultrasound-guided biopsy right breast     Oncology History   Cancer Staging   Invasive ductal carcinoma of breast, female, right (HCC)  Staging form: Breast, AJCC 8th Edition  - Clinical stage from 1/14/2025: Stage IIB (cT2, cN0, cM0, G2, ER-, MI-, HER2-) - Signed by Jordan Esparza MD on 1/27/2025  Histopathologic type: Infiltrating duct carcinoma, NOS  Stage prefix: Initial diagnosis  Method of lymph node assessment: Clinical  Histologic grading system: 3 grade system        Oncology History   Invasive ductal carcinoma of breast, female, right (HCC)   1/14/2025 -  Cancer Staged     Staging form: Breast, AJCC 8th Edition  - Clinical stage from 1/14/2025: Stage IIB  (cT2, cN0, cM0, G2, ER-, NM-, HER2-) - Signed by Jordan Esparza MD on 1/27/2025  Histopathologic type: Infiltrating duct carcinoma, NOS  Stage prefix: Initial diagnosis  Method of lymph node assessment: Clinical  Histologic grading system: 3 grade system         1/27/2025 Initial Diagnosis     Invasive ductal carcinoma of breast, female, right (HCC)      Malignant neoplasm of upper-outer quadrant of right breast in female, estrogen receptor negative (HCC)   1/27/2025 Initial Diagnosis     Malignant neoplasm of upper-outer quadrant of right breast in female, estrogen receptor negative (HCC)      1/28/2025 -  Chemotherapy     DOXOrubicin (ADRIAMYCIN), 60 mg/m2 = 129 mg, Intravenous, Once, 0 of 4 cycles  alteplase (CATHFLO), 2 mg, Intracatheter, Every 1 Minute as needed, 0 of 17 cycles  pegfilgrastim-apgf (Nyvepria), 6 mg, Subcutaneous, Once, 0 of 4 cycles  cyclophosphamide (CYTOXAN) IVPB, 600 mg/m2 = 1,290 mg, Intravenous, Once, 0 of 4 cycles  fosaprepitant (EMEND) IVPB, 150 mg, Intravenous, Once, 0 of 4 cycles  CARBOplatin (PARAPLATIN) IVPB (GOG AUC DOSING), 38.52 mg (100 % of original dose 38.52 mg), Intravenous, Once, 0 of 4 cycles  Dose modification:   (original dose 38.52 mg, Cycle 1),   (original dose 38.52 mg, Cycle 1, Reason: Other (Must fill in a comment), Comment: lab estimates),   (original dose 38.52 mg, Cycle 1),   (original dose 38.52 mg, Cycle 1, Reason: Other (Must fill in a comment))  PACLItaxel (TAXOL) chemo IVPB, 80 mg/m2 = 172.2 mg, Intravenous, Once, 0 of 4 cycles  pembrolizumab (KEYTRUDA) IVPB, 200 mg, Intravenous, Once, 0 of 17 cycles         Review of Systems   Constitutional: Negative.    All other systems reviewed and are negative.     Medical History Reviewed by provider this encounter:     .    Medications Ordered Prior to Encounter               Current Outpatient Medications on File Prior to Visit   Medication Sig Dispense Refill    atorvastatin (LIPITOR) 20 mg tablet TAKE ONE TABLET  "BY MOUTH EVERY DAY 90 tablet 1    doxycycline hyclate (VIBRAMYCIN) 100 mg capsule Take 100 mg by mouth daily as needed        hydroCHLOROthiazide 12.5 mg tablet TAKE ONE TABLET BY MOUTH EVERY DAY 30 tablet 5    Multiple Vitamin (MULTIVITAMINS PO) Take by mouth        nebivolol (BYSTOLIC) 20 MG tablet TAKE ONE TABLET BY MOUTH EVERY DAY 90 tablet 1      No current facility-administered medications on file prior to visit.         Social History                   Tobacco Use    Smoking status: Never    Smokeless tobacco: Never   Vaping Use    Vaping status: Never Used   Substance and Sexual Activity    Alcohol use: Yes    Drug use: No    Sexual activity: Yes       Partners: Male       Birth control/protection: Post-menopausal             Objective     /76 (BP Location: Left arm, Patient Position: Sitting, Cuff Size: Adult)   Pulse 79   Temp 97.8 °F (36.6 °C) (Temporal)   Resp 18   Ht 5' 6\" (1.676 m)   Wt 108 kg (239 lb)   LMP  (LMP Unknown)   SpO2 95%   BMI 38.58 kg/m²      Pain Screening:  Pain Score: 0-No pain  ECOG ECOG Performance Status: 0 - Fully active, able to carry on all pre-disease performance without restriction 0  Physical Exam  Vitals and nursing note reviewed. Exam conducted with a chaperone present.   Neck:      Thyroid: No thyroid mass, thyromegaly or thyroid tenderness.      Trachea: Trachea normal.   Cardiovascular:      Rate and Rhythm: Normal rate and regular rhythm.      Pulses: Normal pulses.      Heart sounds: Murmur heard.      Systolic murmur is present with a grade of 2/6.      No diastolic murmur is present.      No friction rub. No gallop. No S3 or S4 sounds.   Pulmonary:      Effort: Pulmonary effort is normal.      Breath sounds: Normal breath sounds. No stridor, decreased air movement or transmitted upper airway sounds. No decreased breath sounds, wheezing, rhonchi or rales.   Chest:      Chest wall: Mass present. No deformity, swelling, tenderness, crepitus or edema. There " is no dullness to percussion.   Breasts:     Right: Mass present. No nipple discharge, skin change or tenderness.      Left: Normal.      Comments: 1.55 1.7 cm mass in the right mid to upper outer breasts  Abdominal:      General: Abdomen is protuberant. Bowel sounds are normal.      Palpations: Abdomen is soft. There is no shifting dullness, hepatomegaly, splenomegaly, mass or pulsatile mass.      Tenderness: There is no abdominal tenderness. There is no right CVA tenderness or left CVA tenderness.   Musculoskeletal:      Cervical back: Full passive range of motion without pain and normal range of motion. No edema, rigidity, bony tenderness or crepitus. No pain with movement, spinous process tenderness or muscular tenderness.      Thoracic back: No bony tenderness.      Lumbar back: No bony tenderness.      Right lower leg: No edema.      Left lower leg: No edema.   Lymphadenopathy:      Cervical: No cervical adenopathy.      Right cervical: No superficial, deep or posterior cervical adenopathy.     Left cervical: No superficial, deep or posterior cervical adenopathy.      Upper Body:      Right upper body: No supraclavicular, axillary or pectoral adenopathy.      Left upper body: No supraclavicular, axillary or pectoral adenopathy.   Neurological:      Mental Status: She is alert.            Labs: I have reviewed the following labs:            Lab Results   Component Value Date/Time     WBC 8.89 09/13/2024 11:01 AM     RBC 4.36 09/13/2024 11:01 AM     Hemoglobin 13.8 09/13/2024 11:01 AM     Hematocrit 42.8 09/13/2024 11:01 AM     MCV 98 09/13/2024 11:01 AM     MCH 31.7 09/13/2024 11:01 AM     RDW 12.4 09/13/2024 11:01 AM     Platelets 181 09/13/2024 11:01 AM                Lab Results   Component Value Date/Time     Potassium 4.0 01/09/2025 09:38 AM     Chloride 103 01/09/2025 09:38 AM     CO2 24 01/09/2025 09:38 AM     BUN 20 01/09/2025 09:38 AM     Creatinine 0.68 01/09/2025 09:38 AM     Glucose, Fasting 113 (H)  01/09/2025 09:38 AM     Calcium 8.8 01/09/2025 09:38 AM     AST 15 01/09/2025 09:38 AM     ALT 11 01/09/2025 09:38 AM     Alkaline Phosphatase 101 01/09/2025 09:38 AM     Total Protein 7.1 01/09/2025 09:38 AM     Albumin 4.1 01/09/2025 09:38 AM     Total Bilirubin 0.60 01/09/2025 09:38 AM     eGFR 92 01/09/2025 09:38 AM            Pathology:      December 23, 2024 S 24 [id number] status post ultrasound-guided biopsy right breast lesion 10 o'clock position 6 cm from the nipple  Final Diagnosis   A. Breast, Right, us guided breast bx, 10 ocl, 6 cm fn 3 passes, 12 g:  - Invasive breast carcinoma of no special type (ductal NST/invasive ductal carcinoma).   * Markham grade 2 of 3 (total score: 6 of 9)    -- tubule formation < 10%, score 3    -- nuclear grade 2 of 3, score 2    -- mitoses < 3/mm2, (</= 7 mitoses/10HPF), score 1.   * Confirmed by tumor cell immunophenotype:    -- positive: nuclear stain for GATA3 and membranous stain for E-cadherin and p120.    -- negative:  p63, calponin-B.     * Invasive carcinoma involves 3 of 3 submitted core biopsies, max. dimension = 16 millimeters.  - Ductal carcinoma in situ.   * Estrogen, progesterone & HER2 receptor studies pending, to be described in a separate receptor report.          Synoptic Checklist INVASIVE CARCINOMA OF THE BREAST: Biopsy (INVASIVE CARCINOMA OF THE BREAST: BIOPSY - All Specimens)Protocol posted: 3/22/2023 SPECIMEN Procedure Needle biopsy Specimen Laterality Right .      TUMOR Tumor Site Clock position 10 o'clock   Tumor Site Distance from nipple (Centimeters): 6 cm     Histologic Type Invasive carcinoma of no special type (ductal) Histologic Grade (Mina Histologic Score) Glandular (Acinar) / Tubular Differentiation Score 3 Nuclear Pleomorphism Score 2 Mitotic Rate [id number] Fátima Reyes [id number]   Overall Grade Grade 2 (scores of 6 or 7)      Largest Invasive Focus in this Limited Biopsy Sample 16 mm      Ductal Carcinoma In Situ (DCIS)  Present Architectural Patterns Solid Nuclear Grade Grade II (intermediate) Necrosis Not identified      Lymphatic and / or Vascular Invasion Not identified Microcalcifications Not identified .      Estrogen Receptor (ER) Status Negative (less than 1%)      Progesterone Receptor (PgR) Status Negative (less than 1%)      HER2 by Immunohistochemistry Equivocal (Score 2+)      Addendum  01/02/2025 Server Density  Fish  Non amplified  Negative        Imaging:   January 27, 2025 2D cardiac echo  Summary    Left Ventricle: Left ventricular cavity size is normal. Wall thickness is normal. The left ventricular ejection fraction is 60%. Systolic function is normal. Wall motion is normal. Diastolic function is mildly abnormal, consistent with grade I (abnormal) relaxation.    IVS: There is sigmoid appearance of the septum.    Right Ventricle: Right ventricular cavity size is normal. Systolic function is normal.    Left Atrium: The atrium is mildly dilated (35-41 mL/m2).    Right Atrium: The atrium is normal in size.                       January 20, 2025 CT scan of the chest abdomen and pelvis with contrast  IMPRESSION:     A 2.0 cm right breast mass with a biopsy clip in keeping with the patient's recently diagnosed breast cancer.     A 3 mm left upper lobe pulmonary nodule. Based on current Fleischner Society 2017 Guidelines on incidental pulmonary nodule, patients with a known malignancy are at increased risk of metastasis and should receive initial three-month follow-up chest CT.     No evidence of metastatic disease in the abdomen and pelvis.     The study was marked in EPIC for immediate notification.        January 17, 2025 nuclear medicine whole-body bone scan  IMPRESSION:     1. No scintigraphic evidence of osseous metastasis.        December 18, 2024 targeted right breast ultrasound with bilateral diagnostic mammogram     FINDINGS:   RIGHT  1) MASS [B]  Mammo diagnostic bilateral w 3d and cad: There is a 25 mm high  density, oval mass with indistinct margins seen in the upper outer quadrant of the right breast at 10 o'clock, 6 cm from the nipple. The mass correlates with the palpable finding noted during physical examination.   US breast right limited (diagnostic): There is a 19 mm x 17 mm x 22 mm oval, hypoechoic mass with indistinct margins seen in the upper outer quadrant of the right breast at 10 o'clock, 6 cm from the nipple. The mass correlates with the palpable finding noted during physical examination.  Sonographic exam of the right axilla shows no suspicious adenopathy.  Scattered benign-appearing calcifications are demonstrated in the right breast.  Right breast biopsy clip.     Left  Mammo diagnostic bilateral w 3d and cad  There are no suspicious masses, grouped microcalcifications or areas of unexplained architectural distortion. The skin and nipple areolar complex are unremarkable.   Results were discussed with the patient.  Ultrasound-guided biopsy was recommended.     IMPRESSION:  Solid mass in the upper outer right breast corresponding to the area of clinical concern and highly suspicious for malignancy.     ASSESSMENT/BI-RADS CATEGORY:  Left: 1 - Negative  Right: 5 - Highly Suggestive of Malignancy  Overall: 5 - Highly Suggestive of Malignancy     RECOMMENDATION:       - Ultrasound-guided breast biopsy for the right breast.       - Routine screening mammogram in 1 year for the left breast.           February 19, 2024 bilateral screening mammography     FINDINGS:   There are no suspicious masses, grouped microcalcifications or areas of architectural distortion. The skin and nipple areolar complex are unremarkable.     IMPRESSION:  No mammographic evidence of malignancy.           ASSESSMENT/BI-RADS CATEGORY:  Left: 1 - Negative  Right: 1 - Negative  Overall: 1 - Negative     RECOMMENDATION:       - Routine screening mammogram in 1 year for both breasts.                                               Instructions          Instructions     After Visit Sum               Instructions      Return in about

## 2025-03-31 ENCOUNTER — APPOINTMENT (OUTPATIENT)
Dept: LAB | Facility: CLINIC | Age: 65
End: 2025-03-31
Payer: COMMERCIAL

## 2025-03-31 ENCOUNTER — TELEPHONE (OUTPATIENT)
Dept: HEMATOLOGY ONCOLOGY | Facility: CLINIC | Age: 65
End: 2025-03-31

## 2025-03-31 ENCOUNTER — OFFICE VISIT (OUTPATIENT)
Dept: HEMATOLOGY ONCOLOGY | Facility: CLINIC | Age: 65
End: 2025-03-31
Payer: COMMERCIAL

## 2025-03-31 VITALS
SYSTOLIC BLOOD PRESSURE: 112 MMHG | RESPIRATION RATE: 17 BRPM | HEART RATE: 70 BPM | DIASTOLIC BLOOD PRESSURE: 60 MMHG | BODY MASS INDEX: 36.1 KG/M2 | OXYGEN SATURATION: 98 % | TEMPERATURE: 96.2 F | HEIGHT: 67 IN | WEIGHT: 230 LBS

## 2025-03-31 DIAGNOSIS — E86.0 DEHYDRATION: ICD-10-CM

## 2025-03-31 DIAGNOSIS — C50.411 MALIGNANT NEOPLASM OF UPPER-OUTER QUADRANT OF RIGHT BREAST IN FEMALE, ESTROGEN RECEPTOR NEGATIVE (HCC): ICD-10-CM

## 2025-03-31 DIAGNOSIS — Z17.1 MALIGNANT NEOPLASM OF UPPER-OUTER QUADRANT OF RIGHT BREAST IN FEMALE, ESTROGEN RECEPTOR NEGATIVE (HCC): ICD-10-CM

## 2025-03-31 DIAGNOSIS — E86.0 DEHYDRATION: Primary | ICD-10-CM

## 2025-03-31 LAB
ALBUMIN SERPL BCG-MCNC: 4.1 G/DL (ref 3.5–5)
ALP SERPL-CCNC: 83 U/L (ref 34–104)
ALT SERPL W P-5'-P-CCNC: 33 U/L (ref 7–52)
ANION GAP SERPL CALCULATED.3IONS-SCNC: 9 MMOL/L (ref 4–13)
ANISOCYTOSIS BLD QL SMEAR: PRESENT
AST SERPL W P-5'-P-CCNC: 31 U/L (ref 13–39)
BASOPHILS # BLD MANUAL: 0.07 THOUSAND/UL (ref 0–0.1)
BASOPHILS NFR MAR MANUAL: 2 % (ref 0–1)
BILIRUB SERPL-MCNC: 0.61 MG/DL (ref 0.2–1)
BUN SERPL-MCNC: 14 MG/DL (ref 5–25)
CALCIUM SERPL-MCNC: 9 MG/DL (ref 8.4–10.2)
CHLORIDE SERPL-SCNC: 97 MMOL/L (ref 96–108)
CO2 SERPL-SCNC: 32 MMOL/L (ref 21–32)
CREAT SERPL-MCNC: 0.77 MG/DL (ref 0.6–1.3)
EOSINOPHIL # BLD MANUAL: 0.04 THOUSAND/UL (ref 0–0.4)
EOSINOPHIL NFR BLD MANUAL: 1 % (ref 0–6)
ERYTHROCYTE [DISTWIDTH] IN BLOOD BY AUTOMATED COUNT: 15.5 % (ref 11.6–15.1)
GFR SERPL CREATININE-BSD FRML MDRD: 81 ML/MIN/1.73SQ M
GLUCOSE SERPL-MCNC: 116 MG/DL (ref 65–140)
HCT VFR BLD AUTO: 32.8 % (ref 34.8–46.1)
HGB BLD-MCNC: 11.2 G/DL (ref 11.5–15.4)
LYMPHOCYTES # BLD AUTO: 1.06 THOUSAND/UL (ref 0.6–4.47)
LYMPHOCYTES # BLD AUTO: 28 % (ref 14–44)
MACROCYTES BLD QL AUTO: PRESENT
MCH RBC QN AUTO: 33.2 PG (ref 26.8–34.3)
MCHC RBC AUTO-ENTMCNC: 34.1 G/DL (ref 31.4–37.4)
MCV RBC AUTO: 97 FL (ref 82–98)
MONOCYTES # BLD AUTO: 0.32 THOUSAND/UL (ref 0–1.22)
MONOCYTES NFR BLD: 9 % (ref 4–12)
NEUTROPHILS # BLD MANUAL: 2.05 THOUSAND/UL (ref 1.85–7.62)
NEUTS SEG NFR BLD AUTO: 58 % (ref 43–75)
PLATELET # BLD AUTO: 287 THOUSANDS/UL (ref 149–390)
PLATELET BLD QL SMEAR: ADEQUATE
PMV BLD AUTO: 9.7 FL (ref 8.9–12.7)
POTASSIUM SERPL-SCNC: 3.3 MMOL/L (ref 3.5–5.3)
PROT SERPL-MCNC: 6.6 G/DL (ref 6.4–8.4)
RBC # BLD AUTO: 3.37 MILLION/UL (ref 3.81–5.12)
RBC MORPH BLD: PRESENT
SODIUM SERPL-SCNC: 138 MMOL/L (ref 135–147)
VARIANT LYMPHS # BLD AUTO: 2 %
WBC # BLD AUTO: 3.54 THOUSAND/UL (ref 4.31–10.16)

## 2025-03-31 PROCEDURE — 80053 COMPREHEN METABOLIC PANEL: CPT

## 2025-03-31 PROCEDURE — 99214 OFFICE O/P EST MOD 30 MIN: CPT | Performed by: INTERNAL MEDICINE

## 2025-03-31 PROCEDURE — 85007 BL SMEAR W/DIFF WBC COUNT: CPT

## 2025-03-31 PROCEDURE — 85027 COMPLETE CBC AUTOMATED: CPT

## 2025-03-31 RX ORDER — SODIUM CHLORIDE 9 MG/ML
20 INJECTION, SOLUTION INTRAVENOUS ONCE
OUTPATIENT
Start: 2025-05-28

## 2025-03-31 RX ORDER — DOXORUBICIN HYDROCHLORIDE 2 MG/ML
60 INJECTION, SOLUTION INTRAVENOUS ONCE
OUTPATIENT
Start: 2025-05-28

## 2025-03-31 RX ORDER — TRIAMCINOLONE ACETONIDE 1 MG/G
CREAM TOPICAL
COMMUNITY
Start: 2025-03-06

## 2025-04-02 ENCOUNTER — HOSPITAL ENCOUNTER (OUTPATIENT)
Dept: INFUSION CENTER | Facility: CLINIC | Age: 65
Discharge: HOME | End: 2025-04-02
Payer: COMMERCIAL

## 2025-04-02 VITALS
SYSTOLIC BLOOD PRESSURE: 117 MMHG | OXYGEN SATURATION: 97 % | TEMPERATURE: 96.3 F | WEIGHT: 232 LBS | HEIGHT: 67 IN | RESPIRATION RATE: 16 BRPM | BODY MASS INDEX: 36.41 KG/M2 | HEART RATE: 71 BPM | DIASTOLIC BLOOD PRESSURE: 76 MMHG

## 2025-04-02 DIAGNOSIS — C50.411 MALIGNANT NEOPLASM OF UPPER-OUTER QUADRANT OF RIGHT BREAST IN FEMALE, ESTROGEN RECEPTOR NEGATIVE (HCC): Primary | ICD-10-CM

## 2025-04-02 DIAGNOSIS — E86.0 DEHYDRATION: ICD-10-CM

## 2025-04-02 DIAGNOSIS — Z17.1 MALIGNANT NEOPLASM OF UPPER-OUTER QUADRANT OF RIGHT BREAST IN FEMALE, ESTROGEN RECEPTOR NEGATIVE (HCC): Primary | ICD-10-CM

## 2025-04-02 PROCEDURE — 96367 TX/PROPH/DG ADDL SEQ IV INF: CPT

## 2025-04-02 PROCEDURE — 96417 CHEMO IV INFUS EACH ADDL SEQ: CPT

## 2025-04-02 PROCEDURE — 96413 CHEMO IV INFUSION 1 HR: CPT

## 2025-04-02 RX ORDER — SODIUM CHLORIDE 9 MG/ML
20 INJECTION, SOLUTION INTRAVENOUS ONCE
Status: COMPLETED | OUTPATIENT
Start: 2025-04-02 | End: 2025-04-02

## 2025-04-02 RX ADMIN — DIPHENHYDRAMINE HYDROCHLORIDE 25 MG: 50 INJECTION, SOLUTION INTRAMUSCULAR; INTRAVENOUS at 09:11

## 2025-04-02 RX ADMIN — SODIUM CHLORIDE 20 ML/HR: 9 INJECTION, SOLUTION INTRAVENOUS at 08:47

## 2025-04-02 RX ADMIN — DEXAMETHASONE SODIUM PHOSPHATE: 10 INJECTION, SOLUTION INTRAMUSCULAR; INTRAVENOUS at 08:47

## 2025-04-02 RX ADMIN — CARBOPLATIN 173.4 MG: 10 INJECTION INTRAVENOUS at 11:23

## 2025-04-02 RX ADMIN — SODIUM CHLORIDE 500 ML: 0.9 INJECTION, SOLUTION INTRAVENOUS at 08:47

## 2025-04-02 RX ADMIN — FAMOTIDINE 20 MG: 10 INJECTION INTRAVENOUS at 09:33

## 2025-04-02 RX ADMIN — PACLITAXEL 172.2 MG: 6 INJECTION, SOLUTION INTRAVENOUS at 10:17

## 2025-04-02 NOTE — PROGRESS NOTES
Patient presents to [facility] for Taxol and Carboplatin. Patient offers no complaints at this time. Labs reviewed from 3/31- within parameters for treatment today. Port accessed with brisk blood return.

## 2025-04-07 ENCOUNTER — APPOINTMENT (OUTPATIENT)
Dept: LAB | Facility: CLINIC | Age: 65
End: 2025-04-07
Payer: COMMERCIAL

## 2025-04-07 ENCOUNTER — OFFICE VISIT (OUTPATIENT)
Dept: HEMATOLOGY ONCOLOGY | Facility: CLINIC | Age: 65
End: 2025-04-07
Payer: COMMERCIAL

## 2025-04-07 VITALS
OXYGEN SATURATION: 94 % | WEIGHT: 237 LBS | RESPIRATION RATE: 18 BRPM | SYSTOLIC BLOOD PRESSURE: 118 MMHG | HEIGHT: 67 IN | BODY MASS INDEX: 37.2 KG/M2 | HEART RATE: 80 BPM | DIASTOLIC BLOOD PRESSURE: 68 MMHG | TEMPERATURE: 96.7 F

## 2025-04-07 DIAGNOSIS — Z17.1 MALIGNANT NEOPLASM OF UPPER-OUTER QUADRANT OF RIGHT BREAST IN FEMALE, ESTROGEN RECEPTOR NEGATIVE (HCC): ICD-10-CM

## 2025-04-07 DIAGNOSIS — E86.0 DEHYDRATION: ICD-10-CM

## 2025-04-07 DIAGNOSIS — C50.411 MALIGNANT NEOPLASM OF UPPER-OUTER QUADRANT OF RIGHT BREAST IN FEMALE, ESTROGEN RECEPTOR NEGATIVE (HCC): Primary | ICD-10-CM

## 2025-04-07 DIAGNOSIS — Z17.1 MALIGNANT NEOPLASM OF UPPER-OUTER QUADRANT OF RIGHT BREAST IN FEMALE, ESTROGEN RECEPTOR NEGATIVE (HCC): Primary | ICD-10-CM

## 2025-04-07 DIAGNOSIS — C50.411 MALIGNANT NEOPLASM OF UPPER-OUTER QUADRANT OF RIGHT BREAST IN FEMALE, ESTROGEN RECEPTOR NEGATIVE (HCC): ICD-10-CM

## 2025-04-07 LAB
ALBUMIN SERPL BCG-MCNC: 4.2 G/DL (ref 3.5–5)
ALP SERPL-CCNC: 83 U/L (ref 34–104)
ALT SERPL W P-5'-P-CCNC: 120 U/L (ref 7–52)
ANION GAP SERPL CALCULATED.3IONS-SCNC: 11 MMOL/L (ref 4–13)
AST SERPL W P-5'-P-CCNC: 78 U/L (ref 13–39)
BASOPHILS # BLD AUTO: 0.03 THOUSANDS/ÂΜL (ref 0–0.1)
BASOPHILS NFR BLD AUTO: 1 % (ref 0–1)
BILIRUB SERPL-MCNC: 0.72 MG/DL (ref 0.2–1)
BUN SERPL-MCNC: 13 MG/DL (ref 5–25)
CALCIUM SERPL-MCNC: 9.5 MG/DL (ref 8.4–10.2)
CHLORIDE SERPL-SCNC: 100 MMOL/L (ref 96–108)
CO2 SERPL-SCNC: 29 MMOL/L (ref 21–32)
CREAT SERPL-MCNC: 0.64 MG/DL (ref 0.6–1.3)
EOSINOPHIL # BLD AUTO: 0.06 THOUSAND/ÂΜL (ref 0–0.61)
EOSINOPHIL NFR BLD AUTO: 2 % (ref 0–6)
ERYTHROCYTE [DISTWIDTH] IN BLOOD BY AUTOMATED COUNT: 15.8 % (ref 11.6–15.1)
GFR SERPL CREATININE-BSD FRML MDRD: 93 ML/MIN/1.73SQ M
GLUCOSE P FAST SERPL-MCNC: 98 MG/DL (ref 65–99)
HCT VFR BLD AUTO: 34.7 % (ref 34.8–46.1)
HGB BLD-MCNC: 11.4 G/DL (ref 11.5–15.4)
IMM GRANULOCYTES # BLD AUTO: 0.02 THOUSAND/UL (ref 0–0.2)
IMM GRANULOCYTES NFR BLD AUTO: 1 % (ref 0–2)
LYMPHOCYTES # BLD AUTO: 1.06 THOUSANDS/ÂΜL (ref 0.6–4.47)
LYMPHOCYTES NFR BLD AUTO: 29 % (ref 14–44)
MCH RBC QN AUTO: 33.3 PG (ref 26.8–34.3)
MCHC RBC AUTO-ENTMCNC: 32.9 G/DL (ref 31.4–37.4)
MCV RBC AUTO: 102 FL (ref 82–98)
MONOCYTES # BLD AUTO: 0.16 THOUSAND/ÂΜL (ref 0.17–1.22)
MONOCYTES NFR BLD AUTO: 4 % (ref 4–12)
NEUTROPHILS # BLD AUTO: 2.37 THOUSANDS/ÂΜL (ref 1.85–7.62)
NEUTS SEG NFR BLD AUTO: 63 % (ref 43–75)
NRBC BLD AUTO-RTO: 0 /100 WBCS
PLATELET # BLD AUTO: 321 THOUSANDS/UL (ref 149–390)
PMV BLD AUTO: 10.2 FL (ref 8.9–12.7)
POTASSIUM SERPL-SCNC: 3.9 MMOL/L (ref 3.5–5.3)
PROT SERPL-MCNC: 6.9 G/DL (ref 6.4–8.4)
RBC # BLD AUTO: 3.42 MILLION/UL (ref 3.81–5.12)
SODIUM SERPL-SCNC: 140 MMOL/L (ref 135–147)
T3FREE SERPL-MCNC: 3.06 PG/ML (ref 2.5–3.9)
TSH SERPL DL<=0.05 MIU/L-ACNC: 2.49 UIU/ML (ref 0.45–4.5)
WBC # BLD AUTO: 3.7 THOUSAND/UL (ref 4.31–10.16)

## 2025-04-07 PROCEDURE — 99214 OFFICE O/P EST MOD 30 MIN: CPT | Performed by: INTERNAL MEDICINE

## 2025-04-07 PROCEDURE — 80053 COMPREHEN METABOLIC PANEL: CPT

## 2025-04-07 PROCEDURE — 36415 COLL VENOUS BLD VENIPUNCTURE: CPT

## 2025-04-07 PROCEDURE — 85025 COMPLETE CBC W/AUTO DIFF WBC: CPT

## 2025-04-07 PROCEDURE — 84481 FREE ASSAY (FT-3): CPT

## 2025-04-07 PROCEDURE — 84443 ASSAY THYROID STIM HORMONE: CPT

## 2025-04-07 NOTE — PROGRESS NOTES
Kosta Machado MD  Medical Oncology Dehydration +1 more  Dx Follow-up  Reason for Visit     Progress Notes  Kosta Machado MD (Physician)  Medical Oncology  Expand All Collapse All  Name: Fátima Reyes      : 1960      MRN: [id number]  Encounter Provider: Kosta Machado MD  Encounter Date: 2025  Encounter department: [facility] [place]           Chief Complaint   Patient presents with    visit      :  Assessment & Plan  Malignant neoplasm of upper-outer quadrant of right breast in female, estrogen receptor negative (HCC)  Newly diagnosed palpable clinical T2 clinical N0 triple negative Invasive ductal breast cancer established by biopsy 2024.   CT scan of the chest abdomen pelvis and nuclear medicine whole-body bone scan from 2025 reveal no evidence of metastatic disease     The patient tolerated Week 8 of 12 exceedingly well. With virtually no ill effects. Having some mild constipation ( slowing). Advised stool softeners.     Patient denies numbness tingling or other signs or symptoms on paclitaxel plus carboplatin     Maculopapular rash is improving and has nearly resolved after discontinuation of pembrolizumab. Pembrolizumab was likely implicated.  Final dose of pembrolizumab was 2025        Orders:    CARBOplatin (PARAPLATIN) 38.52 mg in sodium chloride 0.9 % 250 mL IVPB    Oncology Provider Communication    Oncology Provider Communication    Oncology Provider Communication    Oncology Provider Communication    Oncology Provider Communication    Infusion Calculated Appointment Request; Future    CBC and differential; Future    Comprehensive metabolic panel; Future    TSH, 3rd generation with Free T4 reflex; Future    T3, free; Future    Infusion Calculated Appointment Request; Future    CBC and differential; Future    Comprehensive metabolic panel; Future    Infusion Calculated Appointment Request; Future    CBC and  differential; Future    Comprehensive metabolic panel; Future     Class 2 obesity due to excess calories without serious comorbidity with body mass index (BMI) of 39.0 to 39.9 in adult        Family history of malignant neoplasm  Positive breast cancer in mother     Essential hypertension        Hypothyroidism, unspecified type     Orders:    CARBOplatin (PARAPLATIN) 38.52 mg in sodium chloride 0.9 % 250 mL IVPB    Oncology Provider Communication    Oncology Provider Communication     Hyperlipidemia, unspecified hyperlipidemia type        Invasive ductal carcinoma of breast, female, right (HCC)     Dehydration           Solitary pulmonary nodule                 Clinical/Pathologic Stage IIB     Cancer Staging   No matching staging information was found for the patient.        Current Therapy W 8 of 12  Keynote 522 trial data set approach phase 1 with pembrolizumab every 3 weeks IV plus paclitaxel IV weekly x 12+ carboplatin IV weekly x 12     Discussion  This delightful postmenopausal woman presents following week 5 of 12 preoperative systemic therapy for newly diagnosed grade 2 invasive ductal carcinoma of the right breast.       She has been initiated on the keynote 522 regimen with chemo and immunotherapy.  Phase 1 will include intravenous pembrolizumab every 3 weeks along with low-dose weekly chemotherapy as paclitaxel weekly x 12+ carboplatin weekly x 12.       This session represents week 9 of 12     Maculopapular rashes tremendously better with discontinuation of pembrolizumab.  The patient has no other new adverse signs symptoms or complaints.  We will move forward with weekly paclitaxel plus carboplatin through week 12     The patient is tolerating well overall.          Background History:   The patient became self-aware of palpable right breast mass in the fall 2024 and presented for diagnostic reviews.  Her prior mammography on  February 19,2024 was BI-RADS 1 were negative.     The patient's risk factors  for the development of breast cancer and solid tumor malignancy include age, postmenopausal status, gender and obesity. Her mother has a diagnosis of breast cancer at age 80 plus of unclear documentation and scope. Her mother is living and well.     The patient has no clinical evidence of metastases.     This patient is approached with favor in a curative intent.  She clearly carries the expectation of an excellent opportunity for a robust tumor response and favorable outcomes over time.  I had a long discussion with the patient and her  regarding the use of chemoimmunotherapy in a neoadjuvant approach fashion and after the keynote 522 trial data set.  I discussed opportunities and the implication of pathologic complete remission and extension with favor of both event free survival and overall survival.  Please see [place] Journal of Medicine from September 2024 lead author Ambreen WOODWARD     The patient and her  were given handwritten sheet outlining all aspects of our discussion today and expressed understanding clarity and gratitude.  She is scheduled for Mediport catheter placement and we will move forward with phase 1 of care that includes the use of pembrolizumab every 3 weeks plus paclitaxel plus carboplatin weekly x 12.           Special Studies  December 27, 2024 germline genetic panel  No high risk germline mutations                 GENETIC ANALYSIS OVERALL INTERPRETATION   Date Value Ref Range Status   12/27/2024     Final     NEGATIVE: No Clinically Significant Variants Detected   12/27/2024 Variants of Unknown Significance Detected   Final                    INTERPRETATION   Date Value Ref Range Status   12/27/2024 See Notes   Final       Comment:       No pathogenic mutations, variants of unknown significance, or gross deletions or duplications were detected.  Risk Estimate: low likelihood of variants in the genes analyzed contributing to this individual's clinical history.  Genetic  counseling is a recommended option for all individuals undergoing genetic testing.  Genes Analyzed (13 total): EVA, BARD1, BRCA1, BRCA2, CDH1, CHEK2, NF1, PALB2, PTEN, RAD51C, RAD51D, STK11 and TP53 (sequencing and deletion/duplication).   12/27/2024 See Notes   Final       Comment:       No known clinically actionable alterations were detected.  Four variants of unknown significance were detected: two in the MSH3 gene, one in the POLE gene, and one in the SDHA gene.  Risk Estimate: should be based on clinical and family history, as the clinical significance of this result is unknown.  Genetic counseling is a recommended option for all individuals undergoing genetic testing.  This individual is heterozygous for the p.N118I (c.353A>T) and p.N861H (c.2581A>C) variants of unknown significance in the MSH3 gene, heterozygous for the p.H5251Q (c.4168C>T) variant of unknown significance in the POLE gene, and heterozygous for the p.K480E (c.1438A>G) variant of unknown significance in the SDHA gene, which may or may not contribute to this individual's clinical history. Familial testing would be necessary to determine if the alterations in MSH3 are on the same chromosome or on different chromosomes (in cis or trans). Refer to the supplementary pages for additional information on these variants. No additional pathogenic mutations, variants of unknown significance, or gross deletions or duplications were detected. Genes Analyzed (59 total): APC, EVA, BAP1, BARD1, BMPR1A, BRCA1, BRCA2, BRIP1, CDH1, CDK4, CDKN1B, CDKN2A, CHEK2, DICER1, FH, FLCN, KIF1B, MAX, MEN1, MET, MLH1, MSH2, MSH6, MUTYH, NF1, NTHL1, PALB2, PMS2, POT1, PTEN, RAD51C, RAD51D, RB1, RET, SDHA, SDHAF2, SDHB, SDHC, SDHD, SMAD4, SMARCA4, STK11, KMCH327, TP53, TSC1, TSC2 and VHL (sequencing and deletion/duplication); AXIN2, CTNNA1, EGLN1, HOXB13, KIT, MITF, MSH3, PDGFRA, POLD1 and POLE (sequencing only); EPCAM and GREM1 (deletion/duplication only). RNA data is  routinely analyzed for use in variant interpretation for all genes.                  GENES NOT REPORTED   Date Value Ref Range Status   2024     Final     EVA,BARD1,CDH1,CHEK2,PALB2,PTEN,RAD51C,RAD51D,STK11,TP53,NF1,BRCA1,BRCA2   2024     Final     APC,EVA,AXIN2,BAP1,BARD1,BMPR1A,BRCA1,BRCA2,BRIP1,CDH1,CDK4,CDKN1B,CDKN2A,CHEK2,CTNNA1,DICER1,EGLN1,EPCAM,FH,FLCN,GREM1,HOXB13,KIF1B,KIT,MAX,MEN1,MET,MITF,MLH1,MSH2,MSH6,MUTYH,NF1,NTHL1,PALB2,PDGFRA,PMS2,POLD1,POT1,PTEN,RAD51C,RAD51D,RB1,RET,SDHAF2,SDHB,  SDHC,SDHD,SMAD4,SMARCA4,STK11,WNHO426,TP53,TSC1,TSC2,VHL            History of Present Illness        This delightful 65-year-old presents with her  to review a new diagnosis of invasive carcinoma of the right breast.  The patient is  2 para 2 and postmenopausal.  The patient's mother at age 80 was revealed to have a diagnosis of breast cancer status post excision only.  The mother is living and well     The patient presented in the 2024 near the  holiday with self-awareness of her right breast mass.  Patient presented for diagnostic reviews through her gynecologist and primary care physicians.  Bilateral diagnostic mammogram with targeted right breast ultrasound was performed on 2024.  25 mm oval mass density with indistinct margins was seen in the upper outer quadrant of the right breast at the 10 o'clock position at 6 cm from the nipple.  This mass correlating with the palpable finding on examination.  Targeted ultrasound of the right breast showed a 19 mm x 17 mm x 22 mm oval hypoechoic mass with indistinct margins seen in the upper outer quadrant of the right breast at 10:00.  There were no suspicious adenopathy in the right axilla.  The left breast showed no suspicious masses, grouped microcalcifications or areas of unexplained architectural distortion.     On 2024 ultrasound-guided biopsy of the right breast mass with mammography was performed  without complication.  Final pathology revealed an invasive breast carcinoma no special type ductal of grade 2.  Ductal carcinoma in situ was present.  Estrogen receptor was negative, progesterone receptor was negative and HER2/nathanael was low at 2+ by IHC but subsequently nonamplified by FISH amplification..  Ductal carcinoma in situ.     The patient reports a medical oncology for therapeutic planning and review for possible neoadjuvant therapies        Pertinent History from December 2024     No new complaints              December 23, 2024 S 24 [id number] status post ultrasound-guided biopsy right breast lesion 10 o'clock position 6 cm from the nipple     Cancer Screening and Prevention  Mammography: 12/18/2024   GI/Colonoscopy: 03/02/2021   GYN: 01/12/2022   Bone Density: Not on file   Covid 19 Vaccination Status:      Oncology Therapeutic Summary:     February 26, 2025 pembrolizumab discontinued after second dose due to diffuse symptomatic maculopapular skin eruption.  Patient continued on chemotherapy alone     From February 3, 2025 cycle 1 week 1   initiation phase 1 keynote 522 approach with pembrolizumab 200 mg 5 pills IV every 3 weeks plus paclitaxel 80 mg/m² IV weekly x 12 and carboplatin AUC 1.5 weekly x 12     12/23/2024 S 24 [id number]  status post ultrasound-guided biopsy right breast     Oncology History   Cancer Staging   Invasive ductal carcinoma of breast, female, right (HCC)  Staging form: Breast, AJCC 8th Edition  - Clinical stage from 1/14/2025: Stage IIB (cT2, cN0, cM0, G2, ER-, WI-, HER2-) - Signed by Jordan Esparza MD on 1/27/2025  Histopathologic type: Infiltrating duct carcinoma, NOS  Stage prefix: Initial diagnosis  Method of lymph node assessment: Clinical  Histologic grading system: 3 grade system        Oncology History   Invasive ductal carcinoma of breast, female, right (HCC)   1/14/2025 -  Cancer Staged     Staging form: Breast, AJCC 8th Edition  - Clinical stage from 1/14/2025: Stage IIB  (cT2, cN0, cM0, G2, ER-, SD-, HER2-) - Signed by Jordan Esparza MD on 1/27/2025  Histopathologic type: Infiltrating duct carcinoma, NOS  Stage prefix: Initial diagnosis  Method of lymph node assessment: Clinical  Histologic grading system: 3 grade system         1/27/2025 Initial Diagnosis     Invasive ductal carcinoma of breast, female, right (HCC)      Malignant neoplasm of upper-outer quadrant of right breast in female, estrogen receptor negative (HCC)   1/27/2025 Initial Diagnosis     Malignant neoplasm of upper-outer quadrant of right breast in female, estrogen receptor negative (HCC)      1/28/2025 -  Chemotherapy     DOXOrubicin (ADRIAMYCIN), 60 mg/m2 = 129 mg, Intravenous, Once, 0 of 4 cycles  alteplase (CATHFLO), 2 mg, Intracatheter, Every 1 Minute as needed, 0 of 17 cycles  pegfilgrastim-apgf (Nyvepria), 6 mg, Subcutaneous, Once, 0 of 4 cycles  cyclophosphamide (CYTOXAN) IVPB, 600 mg/m2 = 1,290 mg, Intravenous, Once, 0 of 4 cycles  fosaprepitant (EMEND) IVPB, 150 mg, Intravenous, Once, 0 of 4 cycles  CARBOplatin (PARAPLATIN) IVPB (GOG AUC DOSING), 38.52 mg (100 % of original dose 38.52 mg), Intravenous, Once, 0 of 4 cycles  Dose modification:   (original dose 38.52 mg, Cycle 1),   (original dose 38.52 mg, Cycle 1, Reason: Other (Must fill in a comment), Comment: lab estimates),   (original dose 38.52 mg, Cycle 1),   (original dose 38.52 mg, Cycle 1, Reason: Other (Must fill in a comment))  PACLItaxel (TAXOL) chemo IVPB, 80 mg/m2 = 172.2 mg, Intravenous, Once, 0 of 4 cycles  pembrolizumab (KEYTRUDA) IVPB, 200 mg, Intravenous, Once, 0 of 17 cycles         Review of Systems   Constitutional: Negative.    All other systems reviewed and are negative.     Medical History Reviewed by provider this encounter:     .    Medications Ordered Prior to Encounter               Current Outpatient Medications on File Prior to Visit   Medication Sig Dispense Refill    atorvastatin (LIPITOR) 20 mg tablet TAKE ONE TABLET  "BY MOUTH EVERY DAY 90 tablet 1    doxycycline hyclate (VIBRAMYCIN) 100 mg capsule Take 100 mg by mouth daily as needed        hydroCHLOROthiazide 12.5 mg tablet TAKE ONE TABLET BY MOUTH EVERY DAY 30 tablet 5    Multiple Vitamin (MULTIVITAMINS PO) Take by mouth        nebivolol (BYSTOLIC) 20 MG tablet TAKE ONE TABLET BY MOUTH EVERY DAY 90 tablet 1      No current facility-administered medications on file prior to visit.         Social History                   Tobacco Use    Smoking status: Never    Smokeless tobacco: Never   Vaping Use    Vaping status: Never Used   Substance and Sexual Activity    Alcohol use: Yes    Drug use: No    Sexual activity: Yes       Partners: Male       Birth control/protection: Post-menopausal             Objective     /76 (BP Location: Left arm, Patient Position: Sitting, Cuff Size: Adult)   Pulse 79   Temp 97.8 °F (36.6 °C) (Temporal)   Resp 18   Ht 5' 6\" (1.676 m)   Wt 108 kg (239 lb)   LMP  (LMP Unknown)   SpO2 95%   BMI 38.58 kg/m²      Pain Screening:  Pain Score: 0-No pain  ECOG ECOG Performance Status: 0 - Fully active, able to carry on all pre-disease performance without restriction 0  Physical Exam  Vitals and nursing note reviewed. Exam conducted with a chaperone present.   Neck:      Thyroid: No thyroid mass, thyromegaly or thyroid tenderness.      Trachea: Trachea normal.   Cardiovascular:      Rate and Rhythm: Normal rate and regular rhythm.      Pulses: Normal pulses.      Heart sounds: Murmur heard.      Systolic murmur is present with a grade of 2/6.      No diastolic murmur is present.      No friction rub. No gallop. No S3 or S4 sounds.   Pulmonary:      Effort: Pulmonary effort is normal.      Breath sounds: Normal breath sounds. No stridor, decreased air movement or transmitted upper airway sounds. No decreased breath sounds, wheezing, rhonchi or rales.   Chest:      Chest wall: Mass present. No deformity, swelling, tenderness, crepitus or edema. There " is no dullness to percussion.   Breasts:     Right: Mass present. No nipple discharge, skin change or tenderness.      Left: Normal.      Comments: 1.55 1.7 cm mass in the right mid to upper outer breasts  Abdominal:      General: Abdomen is protuberant. Bowel sounds are normal.      Palpations: Abdomen is soft. There is no shifting dullness, hepatomegaly, splenomegaly, mass or pulsatile mass.      Tenderness: There is no abdominal tenderness. There is no right CVA tenderness or left CVA tenderness.   Musculoskeletal:      Cervical back: Full passive range of motion without pain and normal range of motion. No edema, rigidity, bony tenderness or crepitus. No pain with movement, spinous process tenderness or muscular tenderness.      Thoracic back: No bony tenderness.      Lumbar back: No bony tenderness.      Right lower leg: No edema.      Left lower leg: No edema.   Lymphadenopathy:      Cervical: No cervical adenopathy.      Right cervical: No superficial, deep or posterior cervical adenopathy.     Left cervical: No superficial, deep or posterior cervical adenopathy.      Upper Body:      Right upper body: No supraclavicular, axillary or pectoral adenopathy.      Left upper body: No supraclavicular, axillary or pectoral adenopathy.   Neurological:      Mental Status: She is alert.            Labs: I have reviewed the following labs:            Lab Results   Component Value Date/Time     WBC 8.89 09/13/2024 11:01 AM     RBC 4.36 09/13/2024 11:01 AM     Hemoglobin 13.8 09/13/2024 11:01 AM     Hematocrit 42.8 09/13/2024 11:01 AM     MCV 98 09/13/2024 11:01 AM     MCH 31.7 09/13/2024 11:01 AM     RDW 12.4 09/13/2024 11:01 AM     Platelets 181 09/13/2024 11:01 AM                Lab Results   Component Value Date/Time     Potassium 4.0 01/09/2025 09:38 AM     Chloride 103 01/09/2025 09:38 AM     CO2 24 01/09/2025 09:38 AM     BUN 20 01/09/2025 09:38 AM     Creatinine 0.68 01/09/2025 09:38 AM     Glucose, Fasting 113 (H)  01/09/2025 09:38 AM     Calcium 8.8 01/09/2025 09:38 AM     AST 15 01/09/2025 09:38 AM     ALT 11 01/09/2025 09:38 AM     Alkaline Phosphatase 101 01/09/2025 09:38 AM     Total Protein 7.1 01/09/2025 09:38 AM     Albumin 4.1 01/09/2025 09:38 AM     Total Bilirubin 0.60 01/09/2025 09:38 AM     eGFR 92 01/09/2025 09:38 AM            Pathology:      December 23, 2024 S 24 [id number] status post ultrasound-guided biopsy right breast lesion 10 o'clock position 6 cm from the nipple  Final Diagnosis   A. Breast, Right, us guided breast bx, 10 ocl, 6 cm fn 3 passes, 12 g:  - Invasive breast carcinoma of no special type (ductal NST/invasive ductal carcinoma).   * Wesley grade 2 of 3 (total score: 6 of 9)    -- tubule formation < 10%, score 3    -- nuclear grade 2 of 3, score 2    -- mitoses < 3/mm2, (</= 7 mitoses/10HPF), score 1.   * Confirmed by tumor cell immunophenotype:    -- positive: nuclear stain for GATA3 and membranous stain for E-cadherin and p120.    -- negative:  p63, calponin-B.     * Invasive carcinoma involves 3 of 3 submitted core biopsies, max. dimension = 16 millimeters.  - Ductal carcinoma in situ.   * Estrogen, progesterone & HER2 receptor studies pending, to be described in a separate receptor report.          Synoptic Checklist INVASIVE CARCINOMA OF THE BREAST: Biopsy (INVASIVE CARCINOMA OF THE BREAST: BIOPSY - All Specimens)Protocol posted: 3/22/2023 SPECIMEN Procedure Needle biopsy Specimen Laterality Right .      TUMOR Tumor Site Clock position 10 o'clock   Tumor Site Distance from nipple (Centimeters): 6 cm     Histologic Type Invasive carcinoma of no special type (ductal) Histologic Grade (Mina Histologic Score) Glandular (Acinar) / Tubular Differentiation Score 3 Nuclear Pleomorphism Score 2 Mitotic Rate [id number] Fátima Reyes [id number]   Overall Grade Grade 2 (scores of 6 or 7)      Largest Invasive Focus in this Limited Biopsy Sample 16 mm      Ductal Carcinoma In Situ (DCIS)  Present Architectural Patterns Solid Nuclear Grade Grade II (intermediate) Necrosis Not identified      Lymphatic and / or Vascular Invasion Not identified Microcalcifications Not identified .      Estrogen Receptor (ER) Status Negative (less than 1%)      Progesterone Receptor (PgR) Status Negative (less than 1%)      HER2 by Immunohistochemistry Equivocal (Score 2+)      Addendum  01/02/2025 [employer]  Fish  Non amplified  Negative        Imaging:   January 27, 2025 2D cardiac echo  Summary    Left Ventricle: Left ventricular cavity size is normal. Wall thickness is normal. The left ventricular ejection fraction is 60%. Systolic function is normal. Wall motion is normal. Diastolic function is mildly abnormal, consistent with grade I (abnormal) relaxation.    IVS: There is sigmoid appearance of the septum.    Right Ventricle: Right ventricular cavity size is normal. Systolic function is normal.    Left Atrium: The atrium is mildly dilated (35-41 mL/m2).    Right Atrium: The atrium is normal in size.                       January 20, 2025 CT scan of the chest abdomen and pelvis with contrast  IMPRESSION:     A 2.0 cm right breast mass with a biopsy clip in keeping with the patient's recently diagnosed breast cancer.     A 3 mm left upper lobe pulmonary nodule. Based on current Fleischner Society 2017 Guidelines on incidental pulmonary nodule, patients with a known malignancy are at increased risk of metastasis and should receive initial three-month follow-up chest CT.     No evidence of metastatic disease in the abdomen and pelvis.     The study was marked in EPIC for immediate notification.        January 17, 2025 nuclear medicine whole-body bone scan  IMPRESSION:     1. No scintigraphic evidence of osseous metastasis.        December 18, 2024 targeted right breast ultrasound with bilateral diagnostic mammogram     FINDINGS:   RIGHT  1) MASS [B]  Mammo diagnostic bilateral w 3d and cad: There is a 25 mm high  density, oval mass with indistinct margins seen in the upper outer quadrant of the right breast at 10 o'clock, 6 cm from the nipple. The mass correlates with the palpable finding noted during physical examination.   US breast right limited (diagnostic): There is a 19 mm x 17 mm x 22 mm oval, hypoechoic mass with indistinct margins seen in the upper outer quadrant of the right breast at 10 o'clock, 6 cm from the nipple. The mass correlates with the palpable finding noted during physical examination.  Sonographic exam of the right axilla shows no suspicious adenopathy.  Scattered benign-appearing calcifications are demonstrated in the right breast.  Right breast biopsy clip.     Left  Mammo diagnostic bilateral w 3d and cad  There are no suspicious masses, grouped microcalcifications or areas of unexplained architectural distortion. The skin and nipple areolar complex are unremarkable.   Results were discussed with the patient.  Ultrasound-guided biopsy was recommended.     IMPRESSION:  Solid mass in the upper outer right breast corresponding to the area of clinical concern and highly suspicious for malignancy.     ASSESSMENT/BI-RADS CATEGORY:  Left: 1 - Negative  Right: 5 - Highly Suggestive of Malignancy  Overall: 5 - Highly Suggestive of Malignancy     RECOMMENDATION:       - Ultrasound-guided breast biopsy for the right breast.       - Routine screening mammogram in 1 year for the left breast.           February 19, 2024 bilateral screening mammography     FINDINGS:   There are no suspicious masses, grouped microcalcifications or areas of architectural distortion. The skin and nipple areolar complex are unremarkable.     IMPRESSION:  No mammographic evidence of malignancy.           ASSESSMENT/BI-RADS CATEGORY:  Left: 1 - Negative  Right: 1 - Negative  Overall: 1 - Negative     RECOMMENDATION:       - Routine screening mammogram in 1 year for both breasts.                                               Instructions          Instructions     After Visit Sum

## 2025-04-08 ENCOUNTER — ANNUAL EXAM (OUTPATIENT)
Dept: OBGYN CLINIC | Facility: CLINIC | Age: 65
End: 2025-04-08
Payer: COMMERCIAL

## 2025-04-08 VITALS
WEIGHT: 229.4 LBS | SYSTOLIC BLOOD PRESSURE: 116 MMHG | HEIGHT: 67 IN | DIASTOLIC BLOOD PRESSURE: 64 MMHG | BODY MASS INDEX: 36 KG/M2

## 2025-04-08 DIAGNOSIS — Z12.31 ENCOUNTER FOR SCREENING MAMMOGRAM FOR BREAST CANCER: Primary | ICD-10-CM

## 2025-04-08 DIAGNOSIS — Z13.820 ENCOUNTER FOR SCREENING FOR OSTEOPOROSIS: ICD-10-CM

## 2025-04-08 PROCEDURE — S0612 ANNUAL GYNECOLOGICAL EXAMINA: HCPCS | Performed by: OBSTETRICS & GYNECOLOGY

## 2025-04-08 NOTE — PROGRESS NOTES
Fátima Reyes   1960    CC:  Yearly exam    A:  Yearly exam.     Problem List Items Addressed This Visit    None  Visit Diagnoses         Encounter for screening mammogram for breast cancer    -  Primary    Relevant Orders    Mammo diagnostic left w 3d and cad      Encounter for screening for osteoporosis        Relevant Orders    DXA bone density spine hip and pelvis            P:   Pap up to date. We reviewed ASCCP guidelines for Pap testing.   Mammo slip given for left breast for routine screening. Currently following with heme/onc for neoadjuvant chemotherapy, has follow up with breast surgery after chemotherapy.    Colonoscopy up to date   DEXA due, ordered   Diet and exercise reviewed    RTO one year for yearly exam or sooner as needed.      S:  65 y.o. female here for yearly exam.   Doing well since last annual.   She is postmenopausal and has had no vaginal bleeding.    She denies vaginal discharge, itching, odor or dryness.     Diagnosed with Triple negative invasive ductal carcinoma of right breast Dec 2024.   Currently receiving neoadjuvant chemotherapy, follow up with breast surgery planned sp chemotherapy.     Sexual activity: She is sexually active without pain, bleeding or dryness.     STD testing: She does not want STD testing today.     Menopause: 55     Last Pap: 22 NILM, hpv negative  Last Mammo: 24- BIRADS 5   Last Colonoscopy: 2021- 10 yr recall  Last DEXA: n/a    Non-smoker, denies alcohol and denies drug use  Exercises regularly, balanced diet     Family hx of breast cancer: mom, age 85  Family hx of ovarian cancer: denies  Family hx of colon cancer: denies      Current Outpatient Medications:     atorvastatin (LIPITOR) 20 mg tablet, TAKE ONE TABLET BY MOUTH EVERY DAY, Disp: 90 tablet, Rfl: 1    doxycycline hyclate (VIBRAMYCIN) 100 mg capsule, Take 100 mg by mouth daily as needed, Disp: , Rfl:     hydroCHLOROthiazide 12.5 mg tablet, TAKE ONE TABLET BY MOUTH  EVERY DAY, Disp: 30 tablet, Rfl: 5    Multiple Vitamin (MULTIVITAMINS PO), Take by mouth, Disp: , Rfl:     nebivolol (BYSTOLIC) 20 MG tablet, TAKE ONE TABLET BY MOUTH EVERY DAY, Disp: 90 tablet, Rfl: 1    ondansetron (ZOFRAN) 8 mg tablet, Take 1 tablet (8 mg total) by mouth every 8 (eight) hours as needed for nausea or vomiting, Disp: 20 tablet, Rfl: 0    triamcinolone (KENALOG) 0.1 % cream, APPLY TO AFFECTED AREA S) TWO TIMES A DAY TO RASH UP TO 2 WEEKS/ MONTH AS NEEDED, Disp: , Rfl:   Social History     Socioeconomic History    Marital status: /Civil Union     Spouse name: Not on file    Number of children: Not on file    Years of education: Not on file    Highest education level: Not on file   Occupational History    Not on file   Tobacco Use    Smoking status: Never    Smokeless tobacco: Never   Vaping Use    Vaping status: Never Used   Substance and Sexual Activity    Alcohol use: Yes    Drug use: No    Sexual activity: Yes     Partners: Male     Birth control/protection: Post-menopausal   Other Topics Concern    Not on file   Social History Narrative     since 1986.    Two children.  No grandchildren.    Housewife.   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    Enjoys reading, golfing, tennis.     Social Drivers of Health     Financial Resource Strain: Not on file   Food Insecurity: Not on file   Transportation Needs: Not on file   Physical Activity: Not on file   Stress: Not on file   Social Connections: Not on file   Intimate Partner Violence: Not on file   Housing Stability: Not on file     Family History   Problem Relation Age of Onset    Breast cancer Mother 85    Heart disease Father         Cardiac Disorder    Dementia Father     Hyperlipidemia Father     Pancreatic cancer Maternal Uncle     No Known Problems Brother     No Known Problems Daughter     Stroke Maternal Grandmother     Alcohol abuse Maternal Grandfather     No Known Problems Paternal Grandmother     No  "Known Problems Daughter     No Known Problems Maternal Aunt     No Known Problems Paternal Aunt      Past Medical History:   Diagnosis Date    Hyperlipidemia 9/18/2012    Hypertension     Hypothyroidism 10/23/2018    Lyme disease 07/21/2015    Rosacea     Skin cancer     H/O Basal Cell Cancer        Review of Systems   Respiratory: Negative.    Cardiovascular: Negative.    Gastrointestinal: Negative for constipation and diarrhea.   Genitourinary: Negative for difficulty urinating, pelvic pain, vaginal bleeding, vaginal discharge, itching or odor.    O:  Blood pressure 116/64, height 5' 6.5\" (1.689 m), weight 104 kg (229 lb 6.4 oz).    Exam was chaperoned.   Patient appears well and is not in distress  Neck is supple without masses  Breasts are symmetrical without tenderness, nipple discharge, skin changes or adenopathy.   Small right breast mass palpated consistent with known breast ca  Abdomen is soft and nontender without masses.   Vulva without lesions or rashes.  Urethral meatus and urethra are normal  Bladder is normal to palpation  Vagina is normal without discharge or bleeding.   Cervix is normal without discharge or lesion.   Uterus and adnexa are normal, mobile, nontender without palpable mass.  External rectal exam within normal limits  "

## 2025-04-09 ENCOUNTER — HOSPITAL ENCOUNTER (OUTPATIENT)
Dept: INFUSION CENTER | Facility: CLINIC | Age: 65
Discharge: HOME | End: 2025-04-09
Payer: COMMERCIAL

## 2025-04-09 VITALS
WEIGHT: 230 LBS | SYSTOLIC BLOOD PRESSURE: 105 MMHG | DIASTOLIC BLOOD PRESSURE: 71 MMHG | HEIGHT: 67 IN | BODY MASS INDEX: 36.1 KG/M2 | RESPIRATION RATE: 18 BRPM | HEART RATE: 75 BPM | TEMPERATURE: 97.3 F | OXYGEN SATURATION: 93 %

## 2025-04-09 DIAGNOSIS — E86.0 DEHYDRATION: ICD-10-CM

## 2025-04-09 DIAGNOSIS — C50.411 MALIGNANT NEOPLASM OF UPPER-OUTER QUADRANT OF RIGHT BREAST IN FEMALE, ESTROGEN RECEPTOR NEGATIVE (HCC): Primary | ICD-10-CM

## 2025-04-09 DIAGNOSIS — Z17.1 MALIGNANT NEOPLASM OF UPPER-OUTER QUADRANT OF RIGHT BREAST IN FEMALE, ESTROGEN RECEPTOR NEGATIVE (HCC): Primary | ICD-10-CM

## 2025-04-09 RX ORDER — SODIUM CHLORIDE 9 MG/ML
20 INJECTION, SOLUTION INTRAVENOUS ONCE
Status: COMPLETED | OUTPATIENT
Start: 2025-04-09 | End: 2025-04-09

## 2025-04-09 RX ADMIN — SODIUM CHLORIDE 20 ML/HR: 0.9 INJECTION, SOLUTION INTRAVENOUS at 08:45

## 2025-04-09 RX ADMIN — FAMOTIDINE 20 MG: 10 INJECTION INTRAVENOUS at 09:35

## 2025-04-09 RX ADMIN — DEXAMETHASONE SODIUM PHOSPHATE: 10 INJECTION, SOLUTION INTRAMUSCULAR; INTRAVENOUS at 08:50

## 2025-04-09 RX ADMIN — PACLITAXEL 172.2 MG: 6 INJECTION, SOLUTION INTRAVENOUS at 10:38

## 2025-04-09 RX ADMIN — CARBOPLATIN 187.05 MG: 10 INJECTION INTRAVENOUS at 11:47

## 2025-04-09 RX ADMIN — DIPHENHYDRAMINE HYDROCHLORIDE 25 MG: 50 INJECTION INTRAMUSCULAR; INTRAVENOUS at 09:14

## 2025-04-09 RX ADMIN — SODIUM CHLORIDE 500 ML: 0.9 INJECTION, SOLUTION INTRAVENOUS at 08:45

## 2025-04-09 NOTE — PROGRESS NOTES
Patient arrives for Taxol/Carbo tx, offers no complaints. Labs from 4/7 show elevated AST/ALT levels, Sarah Rosales RN made aware.

## 2025-04-13 NOTE — PROGRESS NOTES
Chief Complaint   Patient presents with    visit      :  Assessment & Plan  Malignant neoplasm of upper-outer quadrant of right breast in female, estrogen receptor negative (HCC)  Newly diagnosed palpable clinical T2 clinical N0 triple negative Invasive ductal breast cancer established by biopsy December 23, 2024.   CT scan of the chest abdomen pelvis and nuclear medicine whole-body bone scan from January 20, 2025 reveal no evidence of metastatic disease     The patient tolerated Week 9 of 12 exceedingly well. With virtually no ill effects. Having some mild constipation ( slowing). Advised stool softeners.     Patient denies numbness tingling or other signs or symptoms on paclitaxel plus carboplatin     Maculopapular rash is improving and has nearly resolved after discontinuation of pembrolizumab. Pembrolizumab was likely implicated.  Final dose of pembrolizumab was 2/26/2025     AST and ALT are mildly elevated.  The patient had a glass of wine over the weekend and for now we will follow closely     Orders:    CARBOplatin (PARAPLATIN) 38.52 mg in sodium chloride 0.9 % 250 mL IVPB    Oncology Provider Communication    Oncology Provider Communication    Oncology Provider Communication    Oncology Provider Communication    Oncology Provider Communication    Infusion Calculated Appointment Request; Future    CBC and differential; Future    Comprehensive metabolic panel; Future    TSH, 3rd generation with Free T4 reflex; Future    T3, free; Future    Infusion Calculated Appointment Request; Future    CBC and differential; Future    Comprehensive metabolic panel; Future    Infusion Calculated Appointment Request; Future    CBC and differential; Future    Comprehensive metabolic panel; Future     Class 2 obesity due to excess calories without serious comorbidity with body mass index (BMI) of 39.0 to 39.9 in adult        Family history of malignant neoplasm  Positive breast cancer in mother     Essential  hypertension        Hypothyroidism, unspecified type     Orders:    CARBOplatin (PARAPLATIN) 38.52 mg in sodium chloride 0.9 % 250 mL IVPB    Oncology Provider Communication    Oncology Provider Communication     Hyperlipidemia, unspecified hyperlipidemia type        Invasive ductal carcinoma of breast, female, right (HCC)     Dehydration           Solitary pulmonary nodule                 Clinical/Pathologic Stage IIB     Cancer Staging   No matching staging information was found for the patient.        Current Therapy W 8 of 12  Keynote 522 trial data set approach phase 1 with pembrolizumab every 3 weeks IV plus paclitaxel IV weekly x 12+ carboplatin IV weekly x 12     Discussion  This delightful postmenopausal woman presents following week 5 of 12 preoperative systemic therapy for newly diagnosed grade 2 invasive ductal carcinoma of the right breast.       She has been initiated on the keynote 522 regimen with chemo and immunotherapy.  Phase 1 will include intravenous pembrolizumab every 3 weeks along with low-dose weekly chemotherapy as paclitaxel weekly x 12+ carboplatin weekly x 12.       This session represents week 9 of 12     Maculopapular rashes tremendously better with discontinuation of pembrolizumab.  The patient has no other new adverse signs symptoms or complaints.  We will move forward with weekly paclitaxel plus carboplatin through week 12     The patient is tolerating well overall.          Background History:   The patient became self-aware of palpable right breast mass in the fall 2024 and presented for diagnostic reviews.  Her prior mammography on  February 19,2024 was BI-RADS 1 were negative.     The patient's risk factors for the development of breast cancer and solid tumor malignancy include age, postmenopausal status, gender and obesity. Her mother has a diagnosis of breast cancer at age 80 plus of unclear documentation and scope. Her mother is living and well.     The patient has no  clinical evidence of metastases.     This patient is approached with favor in a curative intent.  She clearly carries the expectation of an excellent opportunity for a robust tumor response and favorable outcomes over time.  I had a long discussion with the patient and her  regarding the use of chemoimmunotherapy in a neoadjuvant approach fashion and after the keynote 522 trial data set.  I discussed opportunities and the implication of pathologic complete remission and extension with favor of both event free survival and overall survival.  Please see [place] Journal of [employer] from September 2024 lead author Ambreen WOODWARD     The patient and her  were given handwritten sheet outlining all aspects of our discussion today and expressed understanding clarity and gratitude.  She is scheduled for Mediport catheter placement and we will move forward with phase 1 of care that includes the use of pembrolizumab every 3 weeks plus paclitaxel plus carboplatin weekly x 12.           Special Studies  December 27, 2024 germline genetic panel  No high risk germline mutations                 GENETIC ANALYSIS OVERALL INTERPRETATION   Date Value Ref Range Status   12/27/2024     Final     NEGATIVE: No Clinically Significant Variants Detected   12/27/2024 Variants of Unknown Significance Detected   Final                    INTERPRETATION   Date Value Ref Range Status   12/27/2024 See Notes   Final       Comment:       No pathogenic mutations, variants of unknown significance, or gross deletions or duplications were detected.  Risk Estimate: low likelihood of variants in the genes analyzed contributing to this individual's clinical history.  Genetic counseling is a recommended option for all individuals undergoing genetic testing.  Genes Analyzed (13 total): EVA, BARD1, BRCA1, BRCA2, CDH1, CHEK2, NF1, PALB2, PTEN, RAD51C, RAD51D, STK11 and TP53 (sequencing and deletion/duplication).   12/27/2024 See Notes   Final        Comment:       No known clinically actionable alterations were detected.  Four variants of unknown significance were detected: two in the MSH3 gene, one in the POLE gene, and one in the SDHA gene.  Risk Estimate: should be based on clinical and family history, as the clinical significance of this result is unknown.  Genetic counseling is a recommended option for all individuals undergoing genetic testing.  This individual is heterozygous for the p.N118I (c.353A>T) and p.N861H (c.2581A>C) variants of unknown significance in the MSH3 gene, heterozygous for the p.C5351O (c.4168C>T) variant of unknown significance in the POLE gene, and heterozygous for the p.K480E (c.1438A>G) variant of unknown significance in the SDHA gene, which may or may not contribute to this individual's clinical history. Familial testing would be necessary to determine if the alterations in MSH3 are on the same chromosome or on different chromosomes (in cis or trans). Refer to the supplementary pages for additional information on these variants. No additional pathogenic mutations, variants of unknown significance, or gross deletions or duplications were detected. Genes Analyzed (59 total): APC, EVA, BAP1, BARD1, BMPR1A, BRCA1, BRCA2, BRIP1, CDH1, CDK4, CDKN1B, CDKN2A, CHEK2, DICER1, FH, FLCN, KIF1B, MAX, MEN1, MET, MLH1, MSH2, MSH6, MUTYH, NF1, NTHL1, PALB2, PMS2, POT1, PTEN, RAD51C, RAD51D, RB1, RET, SDHA, SDHAF2, SDHB, SDHC, SDHD, SMAD4, SMARCA4, STK11, XLYH547, TP53, TSC1, TSC2 and VHL (sequencing and deletion/duplication); AXIN2, CTNNA1, EGLN1, HOXB13, KIT, MITF, MSH3, PDGFRA, POLD1 and POLE (sequencing only); EPCAM and GREM1 (deletion/duplication only). RNA data is routinely analyzed for use in variant interpretation for all genes.                  GENES NOT REPORTED   Date Value Ref Range Status   12/27/2024     Final     EVA,BARD1,CDH1,CHEK2,PALB2,PTEN,RAD51C,RAD51D,STK11,TP53,NF1,BRCA1,BRCA2   12/27/2024     Final      APC,EVA,AXIN2,BAP1,BARD1,BMPR1A,BRCA1,BRCA2,BRIP1,CDH1,CDK4,CDKN1B,CDKN2A,CHEK2,CTNNA1,DICER1,EGLN1,EPCAM,FH,FLCN,GREM1,HOXB13,KIF1B,KIT,MAX,MEN1,MET,MITF,MLH1,MSH2,MSH6,MUTYH,NF1,NTHL1,PALB2,PDGFRA,PMS2,POLD1,POT1,PTEN,RAD51C,RAD51D,RB1,RET,SDHAF2,SDHB,  SDHC,SDHD,SMAD4,SMARCA4,STK11,TZAS116,TP53,TSC1,TSC2,VHL            History of Present Illness        This delightful 65-year-old presents with her  to review a new diagnosis of invasive carcinoma of the right breast.  The patient is  2 para 2 and postmenopausal.  The patient's mother at age 80 was revealed to have a diagnosis of breast cancer status post excision only.  The mother is living and well     The patient presented in the 2024 near the [facility] holiday with self-awareness of her right breast mass.  Patient presented for diagnostic reviews through her gynecologist and primary care physicians.  Bilateral diagnostic mammogram with targeted right breast ultrasound was performed on 2024.  25 mm oval mass density with indistinct margins was seen in the upper outer quadrant of the right breast at the 10 o'clock position at 6 cm from the nipple.  This mass correlating with the palpable finding on examination.  Targeted ultrasound of the right breast showed a 19 mm x 17 mm x 22 mm oval hypoechoic mass with indistinct margins seen in the upper outer quadrant of the right breast at 10:00.  There were no suspicious adenopathy in the right axilla.  The left breast showed no suspicious masses, grouped microcalcifications or areas of unexplained architectural distortion.     On 2024 ultrasound-guided biopsy of the right breast mass with mammography was performed without complication.  Final pathology revealed an invasive breast carcinoma no special type ductal of grade 2.  Ductal carcinoma in situ was present.  Estrogen receptor was negative, progesterone receptor was negative and HER2/nathanael was low at 2+ by IHC but  subsequently nonamplified by FISH amplification..  Ductal carcinoma in situ.     The patient reports a medical oncology for therapeutic planning and review for possible neoadjuvant therapies        Pertinent History from December 2024     No new complaints              December 23, 2024 S 24 [id number] status post ultrasound-guided biopsy right breast lesion 10 o'clock position 6 cm from the nipple     Cancer Screening and Prevention  Mammography: 12/18/2024   GI/Colonoscopy: 03/02/2021   GYN: 01/12/2022   Bone Density: Not on file   Covid 19 Vaccination Status:      Oncology Therapeutic Summary:     February 26, 2025 pembrolizumab discontinued after second dose due to diffuse symptomatic maculopapular skin eruption.  Patient continued on chemotherapy alone     From February 3, 2025 cycle 1 week 1   initiation phase 1 keynote 522 approach with pembrolizumab 200 mg 5 pills IV every 3 weeks plus paclitaxel 80 mg/m² IV weekly x 12 and carboplatin AUC 1.5 weekly x 12     12/23/2024 S 24 [id number]  status post ultrasound-guided biopsy right breast     Oncology History   Cancer Staging   Invasive ductal carcinoma of breast, female, right (HCC)  Staging form: Breast, AJCC 8th Edition  - Clinical stage from 1/14/2025: Stage IIB (cT2, cN0, cM0, G2, ER-, MD-, HER2-) - Signed by Jordan Esparza MD on 1/27/2025  Histopathologic type: Infiltrating duct carcinoma, NOS  Stage prefix: Initial diagnosis  Method of lymph node assessment: Clinical  Histologic grading system: 3 grade system        Oncology History   Invasive ductal carcinoma of breast, female, right (HCC)   1/14/2025 -  Cancer Staged     Staging form: Breast, AJCC 8th Edition  - Clinical stage from 1/14/2025: Stage IIB (cT2, cN0, cM0, G2, ER-, MD-, HER2-) - Signed by Jordan Esparza MD on 1/27/2025  Histopathologic type: Infiltrating duct carcinoma, NOS  Stage prefix: Initial diagnosis  Method of lymph node assessment: Clinical  Histologic grading system: 3 grade system          1/27/2025 Initial Diagnosis     Invasive ductal carcinoma of breast, female, right (HCC)      Malignant neoplasm of upper-outer quadrant of right breast in female, estrogen receptor negative (HCC)   1/27/2025 Initial Diagnosis     Malignant neoplasm of upper-outer quadrant of right breast in female, estrogen receptor negative (HCC)      1/28/2025 -  Chemotherapy     DOXOrubicin (ADRIAMYCIN), 60 mg/m2 = 129 mg, Intravenous, Once, 0 of 4 cycles  alteplase (CATHFLO), 2 mg, Intracatheter, Every 1 Minute as needed, 0 of 17 cycles  pegfilgrastim-apgf (Nyvepria), 6 mg, Subcutaneous, Once, 0 of 4 cycles  cyclophosphamide (CYTOXAN) IVPB, 600 mg/m2 = 1,290 mg, Intravenous, Once, 0 of 4 cycles  fosaprepitant (EMEND) IVPB, 150 mg, Intravenous, Once, 0 of 4 cycles  CARBOplatin (PARAPLATIN) IVPB (GOG AUC DOSING), 38.52 mg (100 % of original dose 38.52 mg), Intravenous, Once, 0 of 4 cycles  Dose modification:   (original dose 38.52 mg, Cycle 1),   (original dose 38.52 mg, Cycle 1, Reason: Other (Must fill in a comment), Comment: lab estimates),   (original dose 38.52 mg, Cycle 1),   (original dose 38.52 mg, Cycle 1, Reason: Other (Must fill in a comment))  PACLItaxel (TAXOL) chemo IVPB, 80 mg/m2 = 172.2 mg, Intravenous, Once, 0 of 4 cycles  pembrolizumab (KEYTRUDA) IVPB, 200 mg, Intravenous, Once, 0 of 17 cycles         Review of Systems   Constitutional: Negative.    All other systems reviewed and are negative.     Medical History Reviewed by provider this encounter:     .    Medications Ordered Prior to Encounter                                 Current Outpatient Medications on File Prior to Visit     Medication     Sig     Dispense     Refill          atorvastatin (LIPITOR) 20 mg tablet     TAKE ONE TABLET BY MOUTH EVERY DAY     90 tablet     1          doxycycline hyclate (VIBRAMYCIN) 100 mg capsule     Take 100 mg by mouth daily as needed                      hydroCHLOROthiazide 12.5 mg tablet     TAKE ONE TABLET BY  "MOUTH EVERY DAY     30 tablet     5          Multiple Vitamin (MULTIVITAMINS PO)     Take by mouth                      nebivolol (BYSTOLIC) 20 MG tablet     TAKE ONE TABLET BY MOUTH EVERY DAY     90 tablet     1     No current facility-administered medications on file prior to visit.         Social History                                 Tobacco Use          Smoking status:     Never          Smokeless tobacco:     Never     Vaping Use          Vaping status:     Never Used     Substance and Sexual Activity          Alcohol use:     Yes          Drug use:     No          Sexual activity:     Yes                 Partners:     Male                 Birth control/protection:     Post-menopausal             Objective     /76 (BP Location: Left arm, Patient Position: Sitting, Cuff Size: Adult)   Pulse 79   Temp 97.8 °F (36.6 °C) (Temporal)   Resp 18   Ht 5' 6\" (1.676 m)   Wt 108 kg (239 lb)   LMP  (LMP Unknown)   SpO2 95%   BMI 38.58 kg/m²      Pain Screening:  Pain Score: 0-No pain  ECOG ECOG Performance Status: 0 - Fully active, able to carry on all pre-disease performance without restriction 0  Physical Exam  Vitals and nursing note reviewed. Exam conducted with a chaperone present.   Neck:      Thyroid: No thyroid mass, thyromegaly or thyroid tenderness.      Trachea: Trachea normal.   Cardiovascular:      Rate and Rhythm: Normal rate and regular rhythm.      Pulses: Normal pulses.      Heart sounds: Murmur heard.      Systolic murmur is present with a grade of 2/6.      No diastolic murmur is present.      No friction rub. No gallop. No S3 or S4 sounds.   Pulmonary:      Effort: Pulmonary effort is normal.      Breath sounds: Normal breath sounds. No stridor, decreased air movement or transmitted upper airway sounds. No decreased breath sounds, wheezing, rhonchi or rales.   Chest:      Chest wall: Mass present. No deformity, swelling, tenderness, crepitus or edema. There is no dullness to percussion. "   Breasts:     Right: Mass present. No nipple discharge, skin change or tenderness.      Left: Normal.      Comments: 1.55 1.7 cm mass in the right mid to upper outer breasts  Abdominal:      General: Abdomen is protuberant. Bowel sounds are normal.      Palpations: Abdomen is soft. There is no shifting dullness, hepatomegaly, splenomegaly, mass or pulsatile mass.      Tenderness: There is no abdominal tenderness. There is no right CVA tenderness or left CVA tenderness.   Musculoskeletal:      Cervical back: Full passive range of motion without pain and normal range of motion. No edema, rigidity, bony tenderness or crepitus. No pain with movement, spinous process tenderness or muscular tenderness.      Thoracic back: No bony tenderness.      Lumbar back: No bony tenderness.      Right lower leg: No edema.      Left lower leg: No edema.   Lymphadenopathy:      Cervical: No cervical adenopathy.      Right cervical: No superficial, deep or posterior cervical adenopathy.     Left cervical: No superficial, deep or posterior cervical adenopathy.      Upper Body:      Right upper body: No supraclavicular, axillary or pectoral adenopathy.      Left upper body: No supraclavicular, axillary or pectoral adenopathy.   Neurological:      Mental Status: She is alert.            Labs: I have reviewed the following labs:            Lab Results   Component Value Date/Time     WBC 8.89 09/13/2024 11:01 AM     RBC 4.36 09/13/2024 11:01 AM     Hemoglobin 13.8 09/13/2024 11:01 AM     Hematocrit 42.8 09/13/2024 11:01 AM     MCV 98 09/13/2024 11:01 AM     MCH 31.7 09/13/2024 11:01 AM     RDW 12.4 09/13/2024 11:01 AM     Platelets 181 09/13/2024 11:01 AM                Lab Results   Component Value Date/Time     Potassium 4.0 01/09/2025 09:38 AM     Chloride 103 01/09/2025 09:38 AM     CO2 24 01/09/2025 09:38 AM     BUN 20 01/09/2025 09:38 AM     Creatinine 0.68 01/09/2025 09:38 AM     Glucose, Fasting 113 (H) 01/09/2025 09:38 AM      Calcium 8.8 01/09/2025 09:38 AM     AST 15 01/09/2025 09:38 AM     ALT 11 01/09/2025 09:38 AM     Alkaline Phosphatase 101 01/09/2025 09:38 AM     Total Protein 7.1 01/09/2025 09:38 AM     Albumin 4.1 01/09/2025 09:38 AM     Total Bilirubin 0.60 01/09/2025 09:38 AM     eGFR 92 01/09/2025 09:38 AM            Pathology:      December 23, 2024 S 24 [id number] status post ultrasound-guided biopsy right breast lesion 10 o'clock position 6 cm from the nipple  Final Diagnosis   A. Breast, Right, us guided breast bx, 10 ocl, 6 cm fn 3 passes, 12 g:  - Invasive breast carcinoma of no special type (ductal NST/invasive ductal carcinoma).   * [place] grade 2 of 3 (total score: 6 of 9)    -- tubule formation < 10%, score 3    -- nuclear grade 2 of 3, score 2    -- mitoses < 3/mm2, (</= 7 mitoses/10HPF), score 1.   * Confirmed by tumor cell immunophenotype:    -- positive: nuclear stain for GATA3 and membranous stain for E-cadherin and p120.    -- negative:  p63, calponin-B.     * Invasive carcinoma involves 3 of 3 submitted core biopsies, max. dimension = 16 millimeters.  - Ductal carcinoma in situ.   * Estrogen, progesterone & HER2 receptor studies pending, to be described in a separate receptor report.          Synoptic Checklist INVASIVE CARCINOMA OF THE BREAST: Biopsy (INVASIVE CARCINOMA OF THE BREAST: BIOPSY - All Specimens)Protocol posted: 3/22/2023 SPECIMEN Procedure Needle biopsy Specimen Laterality Right .      TUMOR Tumor Site Clock position 10 o'clock   Tumor Site Distance from nipple (Centimeters): 6 cm     Histologic Type Invasive carcinoma of no special type (ductal) Histologic Grade (Mina Histologic Score) Glandular (Acinar) / Tubular Differentiation Score 3 Nuclear Pleomorphism Score 2 Mitotic Rate [id number] Fátima Reyes [id number]   Overall Grade Grade 2 (scores of 6 or 7)      Largest Invasive Focus in this Limited Biopsy Sample 16 mm      Ductal Carcinoma In Situ (DCIS) Present Architectural  Patterns Solid Nuclear Grade Grade II (intermediate) Necrosis Not identified      Lymphatic and / or Vascular Invasion Not identified Microcalcifications Not identified .      Estrogen Receptor (ER) Status Negative (less than 1%)      Progesterone Receptor (PgR) Status Negative (less than 1%)      HER2 by Immunohistochemistry Equivocal (Score 2+)      Addendum  01/02/2025 1Rebel  Fish  Non amplified  Negative        Imaging:   January 27, 2025 2D cardiac echo  Summary    Left Ventricle: Left ventricular cavity size is normal. Wall thickness is normal. The left ventricular ejection fraction is 60%. Systolic function is normal. Wall motion is normal. Diastolic function is mildly abnormal, consistent with grade I (abnormal) relaxation.    IVS: There is sigmoid appearance of the septum.    Right Ventricle: Right ventricular cavity size is normal. Systolic function is normal.    Left Atrium: The atrium is mildly dilated (35-41 mL/m2).    Right Atrium: The atrium is normal in size.                       January 20, 2025 CT scan of the chest abdomen and pelvis with contrast  IMPRESSION:     A 2.0 cm right breast mass with a biopsy clip in keeping with the patient's recently diagnosed breast cancer.     A 3 mm left upper lobe pulmonary nodule. Based on current Fleischner Society 2017 Guidelines on incidental pulmonary nodule, patients with a known malignancy are at increased risk of metastasis and should receive initial three-month follow-up chest CT.     No evidence of metastatic disease in the abdomen and pelvis.     The study was marked in EPIC for immediate notification.        January 17, 2025 nuclear medicine whole-body bone scan  IMPRESSION:     1. No scintigraphic evidence of osseous metastasis.        December 18, 2024 targeted right breast ultrasound with bilateral diagnostic mammogram     FINDINGS:   RIGHT  1) MASS [B]  Mammo diagnostic bilateral w 3d and cad: There is a 25 mm high density, oval mass with  indistinct margins seen in the upper outer quadrant of the right breast at 10 o'clock, 6 cm from the nipple. The mass correlates with the palpable finding noted during physical examination.   US breast right limited (diagnostic): There is a 19 mm x 17 mm x 22 mm oval, hypoechoic mass with indistinct margins seen in the upper outer quadrant of the right breast at 10 o'clock, 6 cm from the nipple. The mass correlates with the palpable finding noted during physical examination.  Sonographic exam of the right axilla shows no suspicious adenopathy.  Scattered benign-appearing calcifications are demonstrated in the right breast.  Right breast biopsy clip.     Left  Mammo diagnostic bilateral w 3d and cad  There are no suspicious masses, grouped microcalcifications or areas of unexplained architectural distortion. The skin and nipple areolar complex are unremarkable.   Results were discussed with the patient.  Ultrasound-guided biopsy was recommended.     IMPRESSION:  Solid mass in the upper outer right breast corresponding to the area of clinical concern and highly suspicious for malignancy.     ASSESSMENT/BI-RADS CATEGORY:  Left: 1 - Negative  Right: 5 - Highly Suggestive of Malignancy  Overall: 5 - Highly Suggestive of Malignancy     RECOMMENDATION:       - Ultrasound-guided breast biopsy for the right breast.       - Routine screening mammogram in 1 year for the left breast.           February 19, 2024 bilateral screening mammography     FINDINGS:   There are no suspicious masses, grouped microcalcifications or areas of architectural distortion. The skin and nipple areolar complex are unremarkable.     IMPRESSION:  No mammographic evidence of malignancy.           ASSESSMENT/BI-RADS CATEGORY:  Left: 1 - Negative  Right: 1 - Negative  Overall: 1 - Negative     RECOMMENDATION:       - Routine screening mammogram in 1 year for both breasts.                                               Instructions         Instructions      After Visit Sum                             Instructions

## 2025-04-14 ENCOUNTER — OFFICE VISIT (OUTPATIENT)
Dept: HEMATOLOGY ONCOLOGY | Facility: CLINIC | Age: 65
End: 2025-04-14
Payer: COMMERCIAL

## 2025-04-14 VITALS
HEART RATE: 84 BPM | BODY MASS INDEX: 37.2 KG/M2 | HEIGHT: 67 IN | SYSTOLIC BLOOD PRESSURE: 108 MMHG | OXYGEN SATURATION: 97 % | WEIGHT: 237 LBS | TEMPERATURE: 97 F | DIASTOLIC BLOOD PRESSURE: 66 MMHG | RESPIRATION RATE: 18 BRPM

## 2025-04-14 DIAGNOSIS — Z17.1 MALIGNANT NEOPLASM OF UPPER-OUTER QUADRANT OF RIGHT BREAST IN FEMALE, ESTROGEN RECEPTOR NEGATIVE (HCC): Primary | ICD-10-CM

## 2025-04-14 DIAGNOSIS — C50.411 MALIGNANT NEOPLASM OF UPPER-OUTER QUADRANT OF RIGHT BREAST IN FEMALE, ESTROGEN RECEPTOR NEGATIVE (HCC): Primary | ICD-10-CM

## 2025-04-14 PROCEDURE — 99214 OFFICE O/P EST MOD 30 MIN: CPT | Performed by: INTERNAL MEDICINE

## 2025-04-15 ENCOUNTER — APPOINTMENT (OUTPATIENT)
Dept: LAB | Facility: CLINIC | Age: 65
End: 2025-04-15
Payer: COMMERCIAL

## 2025-04-15 DIAGNOSIS — C50.411 MALIGNANT NEOPLASM OF UPPER-OUTER QUADRANT OF RIGHT BREAST IN FEMALE, ESTROGEN RECEPTOR NEGATIVE (HCC): ICD-10-CM

## 2025-04-15 DIAGNOSIS — E86.0 DEHYDRATION: ICD-10-CM

## 2025-04-15 DIAGNOSIS — Z17.1 MALIGNANT NEOPLASM OF UPPER-OUTER QUADRANT OF RIGHT BREAST IN FEMALE, ESTROGEN RECEPTOR NEGATIVE (HCC): ICD-10-CM

## 2025-04-15 LAB
ALBUMIN SERPL BCG-MCNC: 4 G/DL (ref 3.5–5)
ALP SERPL-CCNC: 88 U/L (ref 34–104)
ALT SERPL W P-5'-P-CCNC: 26 U/L (ref 7–52)
ANION GAP SERPL CALCULATED.3IONS-SCNC: 9 MMOL/L (ref 4–13)
AST SERPL W P-5'-P-CCNC: 20 U/L (ref 13–39)
BASOPHILS # BLD AUTO: 0.03 THOUSANDS/ÂΜL (ref 0–0.1)
BASOPHILS NFR BLD AUTO: 1 % (ref 0–1)
BILIRUB SERPL-MCNC: 0.57 MG/DL (ref 0.2–1)
BUN SERPL-MCNC: 12 MG/DL (ref 5–25)
CALCIUM SERPL-MCNC: 9.3 MG/DL (ref 8.4–10.2)
CHLORIDE SERPL-SCNC: 101 MMOL/L (ref 96–108)
CO2 SERPL-SCNC: 28 MMOL/L (ref 21–32)
CREAT SERPL-MCNC: 0.61 MG/DL (ref 0.6–1.3)
EOSINOPHIL # BLD AUTO: 0.1 THOUSAND/ÂΜL (ref 0–0.61)
EOSINOPHIL NFR BLD AUTO: 3 % (ref 0–6)
ERYTHROCYTE [DISTWIDTH] IN BLOOD BY AUTOMATED COUNT: 16 % (ref 11.6–15.1)
GFR SERPL CREATININE-BSD FRML MDRD: 95 ML/MIN/1.73SQ M
GLUCOSE P FAST SERPL-MCNC: 98 MG/DL (ref 65–99)
HCT VFR BLD AUTO: 33.2 % (ref 34.8–46.1)
HGB BLD-MCNC: 10.9 G/DL (ref 11.5–15.4)
IMM GRANULOCYTES # BLD AUTO: 0.03 THOUSAND/UL (ref 0–0.2)
IMM GRANULOCYTES NFR BLD AUTO: 1 % (ref 0–2)
LYMPHOCYTES # BLD AUTO: 1.2 THOUSANDS/ÂΜL (ref 0.6–4.47)
LYMPHOCYTES NFR BLD AUTO: 30 % (ref 14–44)
MCH RBC QN AUTO: 33.4 PG (ref 26.8–34.3)
MCHC RBC AUTO-ENTMCNC: 32.8 G/DL (ref 31.4–37.4)
MCV RBC AUTO: 102 FL (ref 82–98)
MONOCYTES # BLD AUTO: 0.29 THOUSAND/ÂΜL (ref 0.17–1.22)
MONOCYTES NFR BLD AUTO: 7 % (ref 4–12)
NEUTROPHILS # BLD AUTO: 2.36 THOUSANDS/ÂΜL (ref 1.85–7.62)
NEUTS SEG NFR BLD AUTO: 58 % (ref 43–75)
NRBC BLD AUTO-RTO: 0 /100 WBCS
PLATELET # BLD AUTO: 347 THOUSANDS/UL (ref 149–390)
PMV BLD AUTO: 9.8 FL (ref 8.9–12.7)
POTASSIUM SERPL-SCNC: 4.3 MMOL/L (ref 3.5–5.3)
PROT SERPL-MCNC: 7 G/DL (ref 6.4–8.4)
RBC # BLD AUTO: 3.26 MILLION/UL (ref 3.81–5.12)
SODIUM SERPL-SCNC: 138 MMOL/L (ref 135–147)
WBC # BLD AUTO: 4.01 THOUSAND/UL (ref 4.31–10.16)

## 2025-04-15 PROCEDURE — 80053 COMPREHEN METABOLIC PANEL: CPT

## 2025-04-15 PROCEDURE — 36415 COLL VENOUS BLD VENIPUNCTURE: CPT

## 2025-04-15 PROCEDURE — 85025 COMPLETE CBC W/AUTO DIFF WBC: CPT

## 2025-04-16 ENCOUNTER — HOSPITAL ENCOUNTER (OUTPATIENT)
Dept: INFUSION CENTER | Facility: CLINIC | Age: 65
Discharge: HOME | End: 2025-04-16
Attending: INTERNAL MEDICINE
Payer: COMMERCIAL

## 2025-04-16 ENCOUNTER — PATIENT OUTREACH (OUTPATIENT)
Dept: HEMATOLOGY ONCOLOGY | Facility: CLINIC | Age: 65
End: 2025-04-16

## 2025-04-16 ENCOUNTER — TELEPHONE (OUTPATIENT)
Age: 65
End: 2025-04-16

## 2025-04-16 VITALS
TEMPERATURE: 97 F | DIASTOLIC BLOOD PRESSURE: 76 MMHG | OXYGEN SATURATION: 98 % | HEART RATE: 77 BPM | HEIGHT: 67 IN | WEIGHT: 232 LBS | BODY MASS INDEX: 36.41 KG/M2 | SYSTOLIC BLOOD PRESSURE: 122 MMHG

## 2025-04-16 DIAGNOSIS — E86.0 DEHYDRATION: ICD-10-CM

## 2025-04-16 DIAGNOSIS — Z17.1 MALIGNANT NEOPLASM OF UPPER-OUTER QUADRANT OF RIGHT BREAST IN FEMALE, ESTROGEN RECEPTOR NEGATIVE (HCC): Primary | ICD-10-CM

## 2025-04-16 DIAGNOSIS — C50.411 MALIGNANT NEOPLASM OF UPPER-OUTER QUADRANT OF RIGHT BREAST IN FEMALE, ESTROGEN RECEPTOR NEGATIVE (HCC): Primary | ICD-10-CM

## 2025-04-16 PROCEDURE — 96367 TX/PROPH/DG ADDL SEQ IV INF: CPT

## 2025-04-16 PROCEDURE — 96417 CHEMO IV INFUS EACH ADDL SEQ: CPT

## 2025-04-16 PROCEDURE — 96413 CHEMO IV INFUSION 1 HR: CPT

## 2025-04-16 RX ORDER — SODIUM CHLORIDE 9 MG/ML
20 INJECTION, SOLUTION INTRAVENOUS ONCE
Status: COMPLETED | OUTPATIENT
Start: 2025-04-16 | End: 2025-04-16

## 2025-04-16 RX ADMIN — DEXAMETHASONE SODIUM PHOSPHATE: 10 INJECTION, SOLUTION INTRAMUSCULAR; INTRAVENOUS at 08:58

## 2025-04-16 RX ADMIN — CARBOPLATIN 187.05 MG: 10 INJECTION INTRAVENOUS at 11:24

## 2025-04-16 RX ADMIN — PACLITAXEL 172.2 MG: 6 INJECTION, SOLUTION INTRAVENOUS at 10:19

## 2025-04-16 RX ADMIN — SODIUM CHLORIDE 20 ML/HR: 0.9 INJECTION, SOLUTION INTRAVENOUS at 08:53

## 2025-04-16 RX ADMIN — SODIUM CHLORIDE 500 ML: 0.9 INJECTION, SOLUTION INTRAVENOUS at 08:54

## 2025-04-16 RX ADMIN — FAMOTIDINE 20 MG: 10 INJECTION INTRAVENOUS at 09:43

## 2025-04-16 RX ADMIN — DIPHENHYDRAMINE HYDROCHLORIDE 25 MG: 50 INJECTION INTRAMUSCULAR; INTRAVENOUS at 09:20

## 2025-04-16 NOTE — PROGRESS NOTES
[facility] for the treatment of Taxol, Carboplatin, and hydration. Labs from 04/15/2025 reviewed and within parameters for treatment. Patient offers no concerns at this time. Port accessed with good blood return. Patient is resting comfortably in the chair, call bell within reach.

## 2025-04-16 NOTE — PROGRESS NOTES
Patient tolerated treatment well with no adverse reactions. Declined AVS. Confirmed next appointment at the [facility] for 04/23/2025 @ [id number]. Patient ambulatory at discharge.

## 2025-04-16 NOTE — PLAN OF CARE
Problem: Knowledge Deficit  Goal: Patient/family/caregiver demonstrates understanding of disease process, treatment plan, medications, and discharge instructions  Description: Complete learning assessment and assess knowledge base.Interventions:- Provide teaching at level of understanding- Provide teaching via preferred learning methods  Outcome: Progressing     
Nicole Diallo  Pediatric Gastroenterology  [address] [facility], [place] [address]  Phone: [phone]  Fax: [phone]  Follow Up Time:

## 2025-04-16 NOTE — PROGRESS NOTES
I reached out and spoke with Fátima to follow up and to review for any new changes in barriers to care and offer supportive services as needed.     Barriers noted previously and outcome of interventions;  Previously no barriers and currently no new barriers.  Patient mentioned that all is good currently, she is not having any pain.  Fátima did not have any additional questions or concerns at this time.  Fátima was thankful for my check in call.    Reviewed for any new barriers as noted below.    Are you having any side effects from your treatment?No    Are you eating and drinking normally? yes    Have you been experiencing any uncontrolled pain related to your cancer diagnosis? No    If not already established, have needs changed for a palliative care referral? no    Do you have a good support system? Yes     Are you interested in any support groups? Not at this time.    How are you doing with transportation to your appointments? When asking patient about transportation and if she drives or has someone who takes her patient just mentioned that all is well as far as transportation goes. Fátima has no issues with transportation at this time.    Do you have any questions or concerns regarding your treatment plan? No    Any new financial concerns for your household or medical bills? No    Do you know when your upcoming appointments are? Yes, patient keeps track of her appointments via my chart.  Future Appointments   Date Time Provider Department Center   4/21/2025 10:00 AM AN CT 2 AN CT AN [facility]   4/22/2025  8:40 AM Kosta Machado MD [facility]   4/23/2025  8:30 AM AN INF CHAIR 25 AN Infusion [facility]   4/29/2025  8:40 AM Kosta Machado MD [facility]   4/30/2025  8:30 AM AN INF CHAIR 8 AN Infusion [facility]   5/5/2025  8:40 AM Kosta Machado MD [facility]   5/7/2025  8:30 AM AN INF CHAIR 12 AN Infusion [facility]   5/8/2025 11:30 AM AN INF FAST TRACK 2 AN  [facility]fusion [facility]   5/28/2025  8:30 AM AN INF CHAIR 12 AN Infusion [facility]   5/29/2025 11:30 AM AN INF FAST TRACK 1 AN Infusion [facility]   6/12/2025  8:30 AM BE DEXA SHA SLN 1 BE SLN Dexa [facility]   4/14/2026 11:00 AM DO [facility]          Based on individual needs I will follow up with them in 4/5 weeks. I have provided my direct contact information and welcome them to contact me if their needs as discussed above change. They were appreciative for the call.

## 2025-04-16 NOTE — TELEPHONE ENCOUNTER
Meera Pizano RN Care Manager [employer] would like to leave her phone number in case she can be of any assistance regarding the pt. She provided a phone number of [phone].

## 2025-04-21 ENCOUNTER — APPOINTMENT (OUTPATIENT)
Dept: LAB | Facility: CLINIC | Age: 65
End: 2025-04-21
Payer: COMMERCIAL

## 2025-04-21 ENCOUNTER — HOSPITAL ENCOUNTER (OUTPATIENT)
Dept: CT IMAGING | Facility: HOSPITAL | Age: 65
Discharge: HOME | End: 2025-04-21
Payer: COMMERCIAL

## 2025-04-21 DIAGNOSIS — E86.0 DEHYDRATION: ICD-10-CM

## 2025-04-21 DIAGNOSIS — Z17.1 MALIGNANT NEOPLASM OF UPPER-OUTER QUADRANT OF RIGHT BREAST IN FEMALE, ESTROGEN RECEPTOR NEGATIVE (HCC): ICD-10-CM

## 2025-04-21 DIAGNOSIS — C50.919 TRIPLE NEGATIVE MALIGNANT NEOPLASM OF BREAST (HCC): ICD-10-CM

## 2025-04-21 DIAGNOSIS — R91.1 LUNG NODULE: ICD-10-CM

## 2025-04-21 DIAGNOSIS — C50.411 MALIGNANT NEOPLASM OF UPPER-OUTER QUADRANT OF RIGHT BREAST IN FEMALE, ESTROGEN RECEPTOR NEGATIVE (HCC): ICD-10-CM

## 2025-04-21 DIAGNOSIS — Z17.421 TRIPLE NEGATIVE MALIGNANT NEOPLASM OF BREAST (HCC): ICD-10-CM

## 2025-04-21 DIAGNOSIS — C50.911 INVASIVE DUCTAL CARCINOMA OF BREAST, FEMALE, RIGHT (HCC): ICD-10-CM

## 2025-04-21 LAB
ALBUMIN SERPL BCG-MCNC: 4.1 G/DL (ref 3.5–5)
ALP SERPL-CCNC: 82 U/L (ref 34–104)
ALT SERPL W P-5'-P-CCNC: 20 U/L (ref 7–52)
ANION GAP SERPL CALCULATED.3IONS-SCNC: 12 MMOL/L (ref 4–13)
AST SERPL W P-5'-P-CCNC: 21 U/L (ref 13–39)
BASOPHILS # BLD AUTO: 0.03 THOUSANDS/ÂΜL (ref 0–0.1)
BASOPHILS NFR BLD AUTO: 1 % (ref 0–1)
BILIRUB SERPL-MCNC: 0.63 MG/DL (ref 0.2–1)
BUN SERPL-MCNC: 18 MG/DL (ref 5–25)
CALCIUM SERPL-MCNC: 9.7 MG/DL (ref 8.4–10.2)
CHLORIDE SERPL-SCNC: 98 MMOL/L (ref 96–108)
CO2 SERPL-SCNC: 28 MMOL/L (ref 21–32)
CREAT SERPL-MCNC: 0.7 MG/DL (ref 0.6–1.3)
EOSINOPHIL # BLD AUTO: 0.08 THOUSAND/ÂΜL (ref 0–0.61)
EOSINOPHIL NFR BLD AUTO: 2 % (ref 0–6)
ERYTHROCYTE [DISTWIDTH] IN BLOOD BY AUTOMATED COUNT: 15.9 % (ref 11.6–15.1)
GFR SERPL CREATININE-BSD FRML MDRD: 91 ML/MIN/1.73SQ M
GLUCOSE P FAST SERPL-MCNC: 110 MG/DL (ref 65–99)
HCT VFR BLD AUTO: 34.5 % (ref 34.8–46.1)
HGB BLD-MCNC: 11.1 G/DL (ref 11.5–15.4)
IMM GRANULOCYTES # BLD AUTO: 0.02 THOUSAND/UL (ref 0–0.2)
IMM GRANULOCYTES NFR BLD AUTO: 1 % (ref 0–2)
LYMPHOCYTES # BLD AUTO: 1.24 THOUSANDS/ÂΜL (ref 0.6–4.47)
LYMPHOCYTES NFR BLD AUTO: 31 % (ref 14–44)
MCH RBC QN AUTO: 32.9 PG (ref 26.8–34.3)
MCHC RBC AUTO-ENTMCNC: 32.2 G/DL (ref 31.4–37.4)
MCV RBC AUTO: 102 FL (ref 82–98)
MONOCYTES # BLD AUTO: 0.23 THOUSAND/ÂΜL (ref 0.17–1.22)
MONOCYTES NFR BLD AUTO: 6 % (ref 4–12)
NEUTROPHILS # BLD AUTO: 2.43 THOUSANDS/ÂΜL (ref 1.85–7.62)
NEUTS SEG NFR BLD AUTO: 59 % (ref 43–75)
NRBC BLD AUTO-RTO: 0 /100 WBCS
PLATELET # BLD AUTO: 352 THOUSANDS/UL (ref 149–390)
PMV BLD AUTO: 10.2 FL (ref 8.9–12.7)
POTASSIUM SERPL-SCNC: 4.4 MMOL/L (ref 3.5–5.3)
PROT SERPL-MCNC: 7.6 G/DL (ref 6.4–8.4)
RBC # BLD AUTO: 3.37 MILLION/UL (ref 3.81–5.12)
SODIUM SERPL-SCNC: 138 MMOL/L (ref 135–147)
WBC # BLD AUTO: 4.03 THOUSAND/UL (ref 4.31–10.16)

## 2025-04-21 PROCEDURE — 80053 COMPREHEN METABOLIC PANEL: CPT

## 2025-04-21 PROCEDURE — 71250 CT THORAX DX C-: CPT

## 2025-04-21 PROCEDURE — 36415 COLL VENOUS BLD VENIPUNCTURE: CPT

## 2025-04-21 PROCEDURE — 85025 COMPLETE CBC W/AUTO DIFF WBC: CPT

## 2025-04-21 NOTE — PROGRESS NOTES
Chief Complaint   Patient presents with    visit      :  Assessment & Plan  Malignant neoplasm of upper-outer quadrant of right breast in female, estrogen receptor negative (HCC)  Newly diagnosed palpable clinical T2 clinical N0 triple negative Invasive ductal breast cancer established by biopsy December 23, 2024.   CT scan of the chest abdomen pelvis and nuclear medicine whole-body bone scan from January 20, 2025 reveal no evidence of metastatic disease     The patient tolerated Week 10 of 12 exceedingly well. With virtually no ill effects. Having some mild constipation ( slowing). Advised stool softeners.     Patient denies numbness tingling or other signs or symptoms on paclitaxel plus carboplatin     Maculopapular rash is improving and has nearly resolved after discontinuation of pembrolizumab. Pembrolizumab was likely implicated.  Final dose of pembrolizumab was 2/26/2025     AST and ALT are mildly elevated.  The patient had a glass of wine over the weekend and for now we will follow closely     Orders:    CARBOplatin (PARAPLATIN) 38.52 mg in sodium chloride 0.9 % 250 mL IVPB    Oncology Provider Communication    Oncology Provider Communication    Oncology Provider Communication    Oncology Provider Communication    Oncology Provider Communication    Infusion Calculated Appointment Request; Future    CBC and differential; Future    Comprehensive metabolic panel; Future    TSH, 3rd generation with Free T4 reflex; Future    T3, free; Future    Infusion Calculated Appointment Request; Future    CBC and differential; Future    Comprehensive metabolic panel; Future    Infusion Calculated Appointment Request; Future    CBC and differential; Future    Comprehensive metabolic panel; Future     Class 2 obesity due to excess calories without serious comorbidity with body mass index (BMI) of 39.0 to 39.9 in adult        Family history of malignant neoplasm  Positive breast cancer in mother     Essential hypertension         Hypothyroidism, unspecified type     Orders:    CARBOplatin (PARAPLATIN) 38.52 mg in sodium chloride 0.9 % 250 mL IVPB    Oncology Provider Communication    Oncology Provider Communication     Hyperlipidemia, unspecified hyperlipidemia type        Invasive ductal carcinoma of breast, female, right (HCC)     Dehydration           Solitary pulmonary nodule                 Clinical/Pathologic Stage IIB     Cancer Staging   No matching staging information was found for the patient.        Current Therapy W 11 of 12  Keynote 522 trial data set approach phase 1 with pembrolizumab every 3 weeks IV plus paclitaxel IV weekly x 12+ carboplatin IV weekly x 12     Discussion  This delightful postmenopausal woman presents following week 10 of 12 preoperative systemic therapy for newly diagnosed grade 2 invasive ductal carcinoma of the right breast.       She continues on the keynote 522 regimen with chemo and immunotherapy.  Phase 1 will include intravenous pembrolizumab every 3 weeks along with low-dose weekly chemotherapy as paclitaxel weekly x 12+ carboplatin weekly x 12.       This session represents week 11 of 12        The patient is tolerating well overall.      The patient prepares to move into phase 2 with doxorubicin plus cyclophosphamide every 2 weeks x 4 cycles        Background History:   The patient became self-aware of palpable right breast mass in the fall 2024 and presented for diagnostic reviews.  Her prior mammography on  February 19,2024 was BI-RADS 1 were negative.     The patient's risk factors for the development of breast cancer and solid tumor malignancy include age, postmenopausal status, gender and obesity. Her mother has a diagnosis of breast cancer at age 80 plus of unclear documentation and scope. Her mother is living and well.     The patient has no clinical evidence of metastases.     This patient is approached with favor in a curative intent.  She clearly carries the expectation of an excellent  opportunity for a robust tumor response and favorable outcomes over time.  I had a long discussion with the patient and her  regarding the use of chemoimmunotherapy in a neoadjuvant approach fashion and after the keynote 522 trial data set.  I discussed opportunities and the implication of pathologic complete remission and extension with favor of both event free survival and overall survival.  Please see [place] Journal of [employer] from September 2024 lead author Ambreen WOODWARD     The patient and her  were given handwritten sheet outlining all aspects of our discussion today and expressed understanding clarity and gratitude.  She is scheduled for Mediport catheter placement and we will move forward with phase 1 of care that includes the use of pembrolizumab every 3 weeks plus paclitaxel plus carboplatin weekly x 12.           Special Studies  December 27, 2024 germline genetic panel  No high risk germline mutations                 GENETIC ANALYSIS OVERALL INTERPRETATION   Date Value Ref Range Status   12/27/2024     Final     NEGATIVE: No Clinically Significant Variants Detected   12/27/2024 Variants of Unknown Significance Detected   Final                    INTERPRETATION   Date Value Ref Range Status   12/27/2024 See Notes   Final       Comment:       No pathogenic mutations, variants of unknown significance, or gross deletions or duplications were detected.  Risk Estimate: low likelihood of variants in the genes analyzed contributing to this individual's clinical history.  Genetic counseling is a recommended option for all individuals undergoing genetic testing.  Genes Analyzed (13 total): EVA, BARD1, BRCA1, BRCA2, CDH1, CHEK2, NF1, PALB2, PTEN, RAD51C, RAD51D, STK11 and TP53 (sequencing and deletion/duplication).   12/27/2024 See Notes   Final       Comment:       No known clinically actionable alterations were detected.  Four variants of unknown significance were detected: two in the MSH3 gene,  one in the POLE gene, and one in the SDHA gene.  Risk Estimate: should be based on clinical and family history, as the clinical significance of this result is unknown.  Genetic counseling is a recommended option for all individuals undergoing genetic testing.  This individual is heterozygous for the p.N118I (c.353A>T) and p.N861H (c.2581A>C) variants of unknown significance in the MSH3 gene, heterozygous for the p.C5070I (c.4168C>T) variant of unknown significance in the POLE gene, and heterozygous for the p.K480E (c.1438A>G) variant of unknown significance in the SDHA gene, which may or may not contribute to this individual's clinical history. Familial testing would be necessary to determine if the alterations in MSH3 are on the same chromosome or on different chromosomes (in cis or trans). Refer to the supplementary pages for additional information on these variants. No additional pathogenic mutations, variants of unknown significance, or gross deletions or duplications were detected. Genes Analyzed (59 total): APC, EVA, BAP1, BARD1, BMPR1A, BRCA1, BRCA2, BRIP1, CDH1, CDK4, CDKN1B, CDKN2A, CHEK2, DICER1, FH, FLCN, KIF1B, MAX, MEN1, MET, MLH1, MSH2, MSH6, MUTYH, NF1, NTHL1, PALB2, PMS2, POT1, PTEN, RAD51C, RAD51D, RB1, RET, SDHA, SDHAF2, SDHB, SDHC, SDHD, SMAD4, SMARCA4, STK11, LYNR819, TP53, TSC1, TSC2 and VHL (sequencing and deletion/duplication); AXIN2, CTNNA1, EGLN1, HOXB13, KIT, MITF, MSH3, PDGFRA, POLD1 and POLE (sequencing only); EPCAM and GREM1 (deletion/duplication only). RNA data is routinely analyzed for use in variant interpretation for all genes.                  GENES NOT REPORTED   Date Value Ref Range Status   12/27/2024     Final     EVA,BARD1,CDH1,CHEK2,PALB2,PTEN,RAD51C,RAD51D,STK11,TP53,NF1,BRCA1,BRCA2   12/27/2024     Final      APC,EVA,AXIN2,BAP1,BARD1,BMPR1A,BRCA1,BRCA2,BRIP1,CDH1,CDK4,CDKN1B,CDKN2A,CHEK2,CTNNA1,DICER1,EGLN1,EPCAM,FH,FLCN,GREM1,HOXB13,KIF1B,KIT,MAX,MEN1,MET,MITF,MLH1,MSH2,MSH6,MUTYH,NF1,NTHL1,PALB2,PDGFRA,PMS2,POLD1,POT1,PTEN,RAD51C,RAD51D,RB1,RET,SDHAF2,SDHB,  SDHC,SDHD,SMAD4,SMARCA4,STK11,MIEY264,TP53,TSC1,TSC2,VHL            History of Present Illness        This delightful 65-year-old presents with her  to review a new diagnosis of invasive carcinoma of the right breast.  The patient is  2 para 2 and postmenopausal.  The patient's mother at age 80 was revealed to have a diagnosis of breast cancer status post excision only.  The mother is living and well     The patient presented in the 2024 [place] [facility] [place] with self-awareness of her right breast mass.  Patient presented for diagnostic reviews through her gynecologist and primary care physicians.  Bilateral diagnostic mammogram with targeted right breast ultrasound was performed on 2024.  25 mm oval mass density with indistinct margins was seen in the upper outer quadrant of the right breast at the 10 o'clock position at 6 cm from the nipple.  This mass correlating with the palpable finding on examination.  Targeted ultrasound of the right breast showed a 19 mm x 17 mm x 22 mm oval hypoechoic mass with indistinct margins seen in the upper outer quadrant of the right breast at 10:00.  There were no suspicious adenopathy in the right axilla.  The left breast showed no suspicious masses, grouped microcalcifications or areas of unexplained architectural distortion.     On 2024 ultrasound-guided biopsy of the right breast mass with mammography was performed without complication.  Final pathology revealed an invasive breast carcinoma no special type ductal of grade 2.  Ductal carcinoma in situ was present.  Estrogen receptor was negative, progesterone receptor was negative and HER2/nathanael was low at 2+ by IHC but  subsequently nonamplified by FISH amplification..  Ductal carcinoma in situ.     The patient reports a medical oncology for therapeutic planning and review for possible neoadjuvant therapies        Pertinent History from December 2024     No new complaints              December 23, 2024 S 24 [id number] status post ultrasound-guided biopsy right breast lesion 10 o'clock position 6 cm from the nipple     Cancer Screening and Prevention  Mammography: 12/18/2024   GI/Colonoscopy: 03/02/2021   GYN: 01/12/2022   Bone Density: Not on file   Covid 19 Vaccination Status:      Oncology Therapeutic Summary:     February 26, 2025 pembrolizumab discontinued after second dose due to diffuse symptomatic maculopapular skin eruption.  Patient continued on chemotherapy alone     From February 3, 2025 cycle 1 week 1   initiation phase 1 keynote 522 approach with pembrolizumab 200 mg 5 pills IV every 3 weeks plus paclitaxel 80 mg/m² IV weekly x 12 and carboplatin AUC 1.5 weekly x 12     12/23/2024 S 24 [id number]  status post ultrasound-guided biopsy right breast     Oncology History   Cancer Staging   Invasive ductal carcinoma of breast, female, right (HCC)  Staging form: Breast, AJCC 8th Edition  - Clinical stage from 1/14/2025: Stage IIB (cT2, cN0, cM0, G2, ER-, SD-, HER2-) - Signed by Jordan Esparza MD on 1/27/2025  Histopathologic type: Infiltrating duct carcinoma, NOS  Stage prefix: Initial diagnosis  Method of lymph node assessment: Clinical  Histologic grading system: 3 grade system        Oncology History   Invasive ductal carcinoma of breast, female, right (HCC)   1/14/2025 -  Cancer Staged     Staging form: Breast, AJCC 8th Edition  - Clinical stage from 1/14/2025: Stage IIB (cT2, cN0, cM0, G2, ER-, SD-, HER2-) - Signed by Jordan Esparza MD on 1/27/2025  Histopathologic type: Infiltrating duct carcinoma, NOS  Stage prefix: Initial diagnosis  Method of lymph node assessment: Clinical  Histologic grading system: 3 grade system          1/27/2025 Initial Diagnosis     Invasive ductal carcinoma of breast, female, right (HCC)      Malignant neoplasm of upper-outer quadrant of right breast in female, estrogen receptor negative (HCC)   1/27/2025 Initial Diagnosis     Malignant neoplasm of upper-outer quadrant of right breast in female, estrogen receptor negative (HCC)      1/28/2025 -  Chemotherapy     DOXOrubicin (ADRIAMYCIN), 60 mg/m2 = 129 mg, Intravenous, Once, 0 of 4 cycles  alteplase (CATHFLO), 2 mg, Intracatheter, Every 1 Minute as needed, 0 of 17 cycles  pegfilgrastim-apgf (Nyvepria), 6 mg, Subcutaneous, Once, 0 of 4 cycles  cyclophosphamide (CYTOXAN) IVPB, 600 mg/m2 = 1,290 mg, Intravenous, Once, 0 of 4 cycles  fosaprepitant (EMEND) IVPB, 150 mg, Intravenous, Once, 0 of 4 cycles  CARBOplatin (PARAPLATIN) IVPB (GOG AUC DOSING), 38.52 mg (100 % of original dose 38.52 mg), Intravenous, Once, 0 of 4 cycles  Dose modification:   (original dose 38.52 mg, Cycle 1),   (original dose 38.52 mg, Cycle 1, Reason: Other (Must fill in a comment), Comment: lab estimates),   (original dose 38.52 mg, Cycle 1),   (original dose 38.52 mg, Cycle 1, Reason: Other (Must fill in a comment))  PACLItaxel (TAXOL) chemo IVPB, 80 mg/m2 = 172.2 mg, Intravenous, Once, 0 of 4 cycles  pembrolizumab (KEYTRUDA) IVPB, 200 mg, Intravenous, Once, 0 of 17 cycles         Review of Systems   Constitutional: Negative.    All other systems reviewed and are negative.     Medical History Reviewed by provider this encounter:     .    Medications Ordered Prior to Encounter                                 Current Outpatient Medications on File Prior to Visit     Medication     Sig     Dispense     Refill          atorvastatin (LIPITOR) 20 mg tablet     TAKE ONE TABLET BY MOUTH EVERY DAY     90 tablet     1          doxycycline hyclate (VIBRAMYCIN) 100 mg capsule     Take 100 mg by mouth daily as needed                      hydroCHLOROthiazide 12.5 mg tablet     TAKE ONE TABLET BY  "MOUTH EVERY DAY     30 tablet     5          Multiple Vitamin (MULTIVITAMINS PO)     Take by mouth                      nebivolol (BYSTOLIC) 20 MG tablet     TAKE ONE TABLET BY MOUTH EVERY DAY     90 tablet     1     No current facility-administered medications on file prior to visit.         Social History                                 Tobacco Use          Smoking status:     Never          Smokeless tobacco:     Never     Vaping Use          Vaping status:     Never Used     Substance and Sexual Activity          Alcohol use:     Yes          Drug use:     No          Sexual activity:     Yes                 Partners:     Male                 Birth control/protection:     Post-menopausal             Objective     /76 (BP Location: Left arm, Patient Position: Sitting, Cuff Size: Adult)   Pulse 79   Temp 97.8 °F (36.6 °C) (Temporal)   Resp 18   Ht 5' 6\" (1.676 m)   Wt 108 kg (239 lb)   LMP  (LMP Unknown)   SpO2 95%   BMI 38.58 kg/m²      Pain Screening:  Pain Score: 0-No pain  ECOG ECOG Performance Status: 0 - Fully active, able to carry on all pre-disease performance without restriction 0  Physical Exam  Vitals and nursing note reviewed. Exam conducted with a chaperone present.   Neck:      Thyroid: No thyroid mass, thyromegaly or thyroid tenderness.      Trachea: Trachea normal.   Cardiovascular:      Rate and Rhythm: Normal rate and regular rhythm.      Pulses: Normal pulses.      Heart sounds: Murmur heard.      Systolic murmur is present with a grade of 2/6.      No diastolic murmur is present.      No friction rub. No gallop. No S3 or S4 sounds.   Pulmonary:      Effort: Pulmonary effort is normal.      Breath sounds: Normal breath sounds. No stridor, decreased air movement or transmitted upper airway sounds. No decreased breath sounds, wheezing, rhonchi or rales.   Chest:      Chest wall: Mass present. No deformity, swelling, tenderness, crepitus or edema. There is no dullness to percussion. "   Breasts:     Right: Mass present. No nipple discharge, skin change or tenderness.      Left: Normal.      Comments: 1.55 1.7 cm mass in the right mid to upper outer breasts  Abdominal:      General: Abdomen is protuberant. Bowel sounds are normal.      Palpations: Abdomen is soft. There is no shifting dullness, hepatomegaly, splenomegaly, mass or pulsatile mass.      Tenderness: There is no abdominal tenderness. There is no right CVA tenderness or left CVA tenderness.   Musculoskeletal:      Cervical back: Full passive range of motion without pain and normal range of motion. No edema, rigidity, bony tenderness or crepitus. No pain with movement, spinous process tenderness or muscular tenderness.      Thoracic back: No bony tenderness.      Lumbar back: No bony tenderness.      Right lower leg: No edema.      Left lower leg: No edema.   Lymphadenopathy:      Cervical: No cervical adenopathy.      Right cervical: No superficial, deep or posterior cervical adenopathy.     Left cervical: No superficial, deep or posterior cervical adenopathy.      Upper Body:      Right upper body: No supraclavicular, axillary or pectoral adenopathy.      Left upper body: No supraclavicular, axillary or pectoral adenopathy.   Neurological:      Mental Status: She is alert.            Labs: I have reviewed the following labs:            Lab Results   Component Value Date/Time     WBC 8.89 09/13/2024 11:01 AM     RBC 4.36 09/13/2024 11:01 AM     Hemoglobin 13.8 09/13/2024 11:01 AM     Hematocrit 42.8 09/13/2024 11:01 AM     MCV 98 09/13/2024 11:01 AM     MCH 31.7 09/13/2024 11:01 AM     RDW 12.4 09/13/2024 11:01 AM     Platelets 181 09/13/2024 11:01 AM                Lab Results   Component Value Date/Time     Potassium 4.0 01/09/2025 09:38 AM     Chloride 103 01/09/2025 09:38 AM     CO2 24 01/09/2025 09:38 AM     BUN 20 01/09/2025 09:38 AM     Creatinine 0.68 01/09/2025 09:38 AM     Glucose, Fasting 113 (H) 01/09/2025 09:38 AM      Calcium 8.8 01/09/2025 09:38 AM     AST 15 01/09/2025 09:38 AM     ALT 11 01/09/2025 09:38 AM     Alkaline Phosphatase 101 01/09/2025 09:38 AM     Total Protein 7.1 01/09/2025 09:38 AM     Albumin 4.1 01/09/2025 09:38 AM     Total Bilirubin 0.60 01/09/2025 09:38 AM     eGFR 92 01/09/2025 09:38 AM            Pathology:      December 23, 2024 S 24 [id number] status post ultrasound-guided biopsy right breast lesion 10 o'clock position 6 cm from the nipple  Final Diagnosis   A. Breast, Right, us guided breast bx, 10 ocl, 6 cm fn 3 passes, 12 g:  - Invasive breast carcinoma of no special type (ductal NST/invasive ductal carcinoma).   * Beardsley grade 2 of 3 (total score: 6 of 9)    -- tubule formation < 10%, score 3    -- nuclear grade 2 of 3, score 2    -- mitoses < 3/mm2, (</= 7 mitoses/10HPF), score 1.   * Confirmed by tumor cell immunophenotype:    -- positive: nuclear stain for GATA3 and membranous stain for E-cadherin and p120.    -- negative:  p63, calponin-B.     * Invasive carcinoma involves 3 of 3 submitted core biopsies, max. dimension = 16 millimeters.  - Ductal carcinoma in situ.   * Estrogen, progesterone & HER2 receptor studies pending, to be described in a separate receptor report.          Synoptic Checklist INVASIVE CARCINOMA OF THE BREAST: Biopsy (INVASIVE CARCINOMA OF THE BREAST: BIOPSY - All Specimens)Protocol posted: 3/22/2023 SPECIMEN Procedure Needle biopsy Specimen Laterality Right .      TUMOR Tumor Site Clock position 10 o'clock   Tumor Site Distance from nipple (Centimeters): 6 cm     Histologic Type Invasive carcinoma of no special type (ductal) Histologic Grade (Mina Histologic Score) Glandular (Acinar) / Tubular Differentiation Score 3 Nuclear Pleomorphism Score 2 Mitotic Rate [id number] Fátima Reyes [id number]   Overall Grade Grade 2 (scores of 6 or 7)      Largest Invasive Focus in this Limited Biopsy Sample 16 mm      Ductal Carcinoma In Situ (DCIS) Present Architectural  Patterns Solid Nuclear Grade Grade II (intermediate) Necrosis Not identified      Lymphatic and / or Vascular Invasion Not identified Microcalcifications Not identified .      Estrogen Receptor (ER) Status Negative (less than 1%)      Progesterone Receptor (PgR) Status Negative (less than 1%)      HER2 by Immunohistochemistry Equivocal (Score 2+)      Addendum  01/02/2025 [employer]  Fish  Non amplified  Negative        Imaging:     April 21, 2025 CT chest without contrast        January 27, 2025 2D cardiac echo  Summary    Left Ventricle: Left ventricular cavity size is normal. Wall thickness is normal. The left ventricular ejection fraction is 60%. Systolic function is normal. Wall motion is normal. Diastolic function is mildly abnormal, consistent with grade I (abnormal) relaxation.    IVS: There is sigmoid appearance of the septum.    Right Ventricle: Right ventricular cavity size is normal. Systolic function is normal.    Left Atrium: The atrium is mildly dilated (35-41 mL/m2).    Right Atrium: The atrium is normal in size.                       January 20, 2025 CT scan of the chest abdomen and pelvis with contrast  IMPRESSION:     A 2.0 cm right breast mass with a biopsy clip in keeping with the patient's recently diagnosed breast cancer.     A 3 mm left upper lobe pulmonary nodule. Based on current Fleischner Society 2017 Guidelines on incidental pulmonary nodule, patients with a known malignancy are at increased risk of metastasis and should receive initial three-month follow-up chest CT.     No evidence of metastatic disease in the abdomen and pelvis.     The study was marked in EPIC for immediate notification.        January 17, 2025 nuclear medicine whole-body bone scan  IMPRESSION:     1. No scintigraphic evidence of osseous metastasis.        December 18, 2024 targeted right breast ultrasound with bilateral diagnostic mammogram     FINDINGS:   RIGHT  1) MASS [B]  Mammo diagnostic bilateral w 3d and  cad: There is a 25 mm high density, oval mass with indistinct margins seen in the upper outer quadrant of the right breast at 10 o'clock, 6 cm from the nipple. The mass correlates with the palpable finding noted during physical examination.   US breast right limited (diagnostic): There is a 19 mm x 17 mm x 22 mm oval, hypoechoic mass with indistinct margins seen in the upper outer quadrant of the right breast at 10 o'clock, 6 cm from the nipple. The mass correlates with the palpable finding noted during physical examination.  Sonographic exam of the right axilla shows no suspicious adenopathy.  Scattered benign-appearing calcifications are demonstrated in the right breast.  Right breast biopsy clip.     Left  Mammo diagnostic bilateral w 3d and cad  There are no suspicious masses, grouped microcalcifications or areas of unexplained architectural distortion. The skin and nipple areolar complex are unremarkable.   Results were discussed with the patient.  Ultrasound-guided biopsy was recommended.     IMPRESSION:  Solid mass in the upper outer right breast corresponding to the area of clinical concern and highly suspicious for malignancy.     ASSESSMENT/BI-RADS CATEGORY:  Left: 1 - Negative  Right: 5 - Highly Suggestive of Malignancy  Overall: 5 - Highly Suggestive of Malignancy     RECOMMENDATION:       - Ultrasound-guided breast biopsy for the right breast.       - Routine screening mammogram in 1 year for the left breast.           February 19, 2024 bilateral screening mammography     FINDINGS:   There are no suspicious masses, grouped microcalcifications or areas of architectural distortion. The skin and nipple areolar complex are unremarkable.     IMPRESSION:  No mammographic evidence of malignancy.           ASSESSMENT/BI-RADS CATEGORY:  Left: 1 - Negative  Right: 1 - Negative  Overall: 1 - Negative     RECOMMENDATION:       - Routine screening mammogram in 1 year for both breasts.                                                Instructions

## 2025-04-22 ENCOUNTER — OFFICE VISIT (OUTPATIENT)
Dept: HEMATOLOGY ONCOLOGY | Facility: CLINIC | Age: 65
End: 2025-04-22
Payer: COMMERCIAL

## 2025-04-22 VITALS
TEMPERATURE: 97.7 F | OXYGEN SATURATION: 97 % | SYSTOLIC BLOOD PRESSURE: 102 MMHG | RESPIRATION RATE: 17 BRPM | DIASTOLIC BLOOD PRESSURE: 68 MMHG | HEIGHT: 67 IN | WEIGHT: 232 LBS | BODY MASS INDEX: 36.41 KG/M2 | HEART RATE: 79 BPM

## 2025-04-22 DIAGNOSIS — Z17.1 MALIGNANT NEOPLASM OF UPPER-OUTER QUADRANT OF RIGHT BREAST IN FEMALE, ESTROGEN RECEPTOR NEGATIVE (HCC): Primary | ICD-10-CM

## 2025-04-22 DIAGNOSIS — C50.411 MALIGNANT NEOPLASM OF UPPER-OUTER QUADRANT OF RIGHT BREAST IN FEMALE, ESTROGEN RECEPTOR NEGATIVE (HCC): Primary | ICD-10-CM

## 2025-04-22 PROCEDURE — 99215 OFFICE O/P EST HI 40 MIN: CPT | Performed by: INTERNAL MEDICINE

## 2025-04-23 ENCOUNTER — HOSPITAL ENCOUNTER (OUTPATIENT)
Dept: INFUSION CENTER | Facility: CLINIC | Age: 65
Discharge: HOME | End: 2025-04-23
Payer: COMMERCIAL

## 2025-04-23 ENCOUNTER — TELEPHONE (OUTPATIENT)
Dept: INFUSION CENTER | Facility: CLINIC | Age: 65
End: 2025-04-23

## 2025-04-23 VITALS
BODY MASS INDEX: 35.79 KG/M2 | RESPIRATION RATE: 16 BRPM | WEIGHT: 228 LBS | DIASTOLIC BLOOD PRESSURE: 66 MMHG | TEMPERATURE: 97.1 F | HEART RATE: 78 BPM | HEIGHT: 67 IN | OXYGEN SATURATION: 95 % | SYSTOLIC BLOOD PRESSURE: 99 MMHG

## 2025-04-23 DIAGNOSIS — E86.0 DEHYDRATION: ICD-10-CM

## 2025-04-23 DIAGNOSIS — C50.411 MALIGNANT NEOPLASM OF UPPER-OUTER QUADRANT OF RIGHT BREAST IN FEMALE, ESTROGEN RECEPTOR NEGATIVE (HCC): Primary | ICD-10-CM

## 2025-04-23 DIAGNOSIS — Z17.1 MALIGNANT NEOPLASM OF UPPER-OUTER QUADRANT OF RIGHT BREAST IN FEMALE, ESTROGEN RECEPTOR NEGATIVE (HCC): Primary | ICD-10-CM

## 2025-04-23 PROCEDURE — 96413 CHEMO IV INFUSION 1 HR: CPT

## 2025-04-23 PROCEDURE — 96417 CHEMO IV INFUS EACH ADDL SEQ: CPT

## 2025-04-23 PROCEDURE — 96367 TX/PROPH/DG ADDL SEQ IV INF: CPT

## 2025-04-23 RX ORDER — SODIUM CHLORIDE 9 MG/ML
20 INJECTION, SOLUTION INTRAVENOUS ONCE
Status: COMPLETED | OUTPATIENT
Start: 2025-04-23 | End: 2025-04-23

## 2025-04-23 RX ADMIN — DIPHENHYDRAMINE HYDROCHLORIDE 25 MG: 50 INJECTION, SOLUTION INTRAMUSCULAR; INTRAVENOUS at 09:26

## 2025-04-23 RX ADMIN — SODIUM CHLORIDE 20 ML/HR: 0.9 INJECTION, SOLUTION INTRAVENOUS at 09:01

## 2025-04-23 RX ADMIN — PACLITAXEL 172.2 MG: 6 INJECTION, SOLUTION INTRAVENOUS at 10:25

## 2025-04-23 RX ADMIN — DEXAMETHASONE SODIUM PHOSPHATE: 10 INJECTION, SOLUTION INTRAMUSCULAR; INTRAVENOUS at 09:04

## 2025-04-23 RX ADMIN — CARBOPLATIN 187.05 MG: 10 INJECTION INTRAVENOUS at 11:41

## 2025-04-23 RX ADMIN — FAMOTIDINE 20 MG: 10 INJECTION INTRAVENOUS at 09:47

## 2025-04-23 RX ADMIN — SODIUM CHLORIDE 500 ML: 0.9 INJECTION, SOLUTION INTRAVENOUS at 09:02

## 2025-04-23 NOTE — PROGRESS NOTES
Patient tolerated treatment well with no adverse reactions. Port de-accessed without incident. Declined AVS. Confirmed next appointment at the [facility] for 04/30/2025 @1173. Patient ambulatory at discharge.

## 2025-04-23 NOTE — PROGRESS NOTES
[facility] for the treatment of Taxol, Carboplatin, and hydration. Labs from 04/21/2025 reviewed and within parameters for treatment. She offers no concerns at this time.Port accessed with good blood return. Patient is resting comfortably in the chair, call bell within reach.

## 2025-04-25 ENCOUNTER — RESULTS FOLLOW-UP (OUTPATIENT)
Dept: SURGERY | Facility: CLINIC | Age: 65
End: 2025-04-25

## 2025-04-25 DIAGNOSIS — Z17.1 MALIGNANT NEOPLASM OF UPPER-OUTER QUADRANT OF RIGHT BREAST IN FEMALE, ESTROGEN RECEPTOR NEGATIVE (HCC): ICD-10-CM

## 2025-04-25 DIAGNOSIS — C50.911 INVASIVE DUCTAL CARCINOMA OF BREAST, FEMALE, RIGHT (HCC): ICD-10-CM

## 2025-04-25 DIAGNOSIS — R59.0 HILAR LYMPHADENOPATHY: ICD-10-CM

## 2025-04-25 DIAGNOSIS — R91.8 MULTIPLE LUNG NODULES: Primary | ICD-10-CM

## 2025-04-25 DIAGNOSIS — C50.411 MALIGNANT NEOPLASM OF UPPER-OUTER QUADRANT OF RIGHT BREAST IN FEMALE, ESTROGEN RECEPTOR NEGATIVE (HCC): ICD-10-CM

## 2025-04-25 NOTE — RESULT ENCOUNTER NOTE
Reviewed updated CT chest. The breast tumor has decreased in size. There are multiple sites in the bilateral lungs concerning for possible infectious process versus inflammatory. There is a mildly enlarged lymph node as well near the lung hilum. There are multiple lung nodules. All of this is recommended to be followed closely. I will order a 2 month non contrast chest CT if we can help her to schedule for late June. Copying Dr. Machado here who will see her next week in person. Some of these findings may be related to the immunotherapy she received although I know she did not tolerate it well or stay on it for an extended course as originally planned.

## 2025-04-28 ENCOUNTER — APPOINTMENT (OUTPATIENT)
Dept: LAB | Facility: CLINIC | Age: 65
End: 2025-04-28
Payer: COMMERCIAL

## 2025-04-28 DIAGNOSIS — Z17.1 MALIGNANT NEOPLASM OF UPPER-OUTER QUADRANT OF RIGHT BREAST IN FEMALE, ESTROGEN RECEPTOR NEGATIVE (HCC): ICD-10-CM

## 2025-04-28 DIAGNOSIS — E86.0 DEHYDRATION: ICD-10-CM

## 2025-04-28 DIAGNOSIS — C50.411 MALIGNANT NEOPLASM OF UPPER-OUTER QUADRANT OF RIGHT BREAST IN FEMALE, ESTROGEN RECEPTOR NEGATIVE (HCC): ICD-10-CM

## 2025-04-28 LAB
BASOPHILS # BLD AUTO: 0.04 THOUSANDS/ÂΜL (ref 0–0.1)
BASOPHILS NFR BLD AUTO: 1 % (ref 0–1)
EOSINOPHIL # BLD AUTO: 0.05 THOUSAND/ÂΜL (ref 0–0.61)
EOSINOPHIL NFR BLD AUTO: 1 % (ref 0–6)
ERYTHROCYTE [DISTWIDTH] IN BLOOD BY AUTOMATED COUNT: 16.2 % (ref 11.6–15.1)
HCT VFR BLD AUTO: 32.9 % (ref 34.8–46.1)
HGB BLD-MCNC: 10.7 G/DL (ref 11.5–15.4)
IMM GRANULOCYTES # BLD AUTO: 0.03 THOUSAND/UL (ref 0–0.2)
IMM GRANULOCYTES NFR BLD AUTO: 1 % (ref 0–2)
LYMPHOCYTES # BLD AUTO: 0.9 THOUSANDS/ÂΜL (ref 0.6–4.47)
LYMPHOCYTES NFR BLD AUTO: 25 % (ref 14–44)
MCH RBC QN AUTO: 33.3 PG (ref 26.8–34.3)
MCHC RBC AUTO-ENTMCNC: 32.5 G/DL (ref 31.4–37.4)
MCV RBC AUTO: 103 FL (ref 82–98)
MONOCYTES # BLD AUTO: 0.24 THOUSAND/ÂΜL (ref 0.17–1.22)
MONOCYTES NFR BLD AUTO: 7 % (ref 4–12)
NEUTROPHILS # BLD AUTO: 2.37 THOUSANDS/ÂΜL (ref 1.85–7.62)
NEUTS SEG NFR BLD AUTO: 65 % (ref 43–75)
NRBC BLD AUTO-RTO: 0 /100 WBCS
PLATELET # BLD AUTO: 300 THOUSANDS/UL (ref 149–390)
PMV BLD AUTO: 10.5 FL (ref 8.9–12.7)
RBC # BLD AUTO: 3.21 MILLION/UL (ref 3.81–5.12)
WBC # BLD AUTO: 3.63 THOUSAND/UL (ref 4.31–10.16)

## 2025-04-28 PROCEDURE — 36415 COLL VENOUS BLD VENIPUNCTURE: CPT

## 2025-04-28 PROCEDURE — 80053 COMPREHEN METABOLIC PANEL: CPT

## 2025-04-28 PROCEDURE — 84443 ASSAY THYROID STIM HORMONE: CPT

## 2025-04-28 PROCEDURE — 84481 FREE ASSAY (FT-3): CPT

## 2025-04-28 PROCEDURE — 85025 COMPLETE CBC W/AUTO DIFF WBC: CPT

## 2025-04-29 ENCOUNTER — TELEPHONE (OUTPATIENT)
Age: 65
End: 2025-04-29

## 2025-04-29 ENCOUNTER — TELEPHONE (OUTPATIENT)
Dept: HEMATOLOGY ONCOLOGY | Facility: CLINIC | Age: 65
End: 2025-04-29

## 2025-04-29 ENCOUNTER — OFFICE VISIT (OUTPATIENT)
Dept: HEMATOLOGY ONCOLOGY | Facility: CLINIC | Age: 65
End: 2025-04-29
Payer: COMMERCIAL

## 2025-04-29 VITALS
TEMPERATURE: 97.4 F | SYSTOLIC BLOOD PRESSURE: 108 MMHG | HEART RATE: 75 BPM | BODY MASS INDEX: 35.94 KG/M2 | WEIGHT: 229 LBS | OXYGEN SATURATION: 98 % | HEIGHT: 67 IN | DIASTOLIC BLOOD PRESSURE: 66 MMHG | RESPIRATION RATE: 17 BRPM

## 2025-04-29 DIAGNOSIS — Z79.899 ENCOUNTER FOR MEDICATION MANAGEMENT: Primary | ICD-10-CM

## 2025-04-29 DIAGNOSIS — E86.0 DEHYDRATION: Primary | ICD-10-CM

## 2025-04-29 DIAGNOSIS — Z17.1 MALIGNANT NEOPLASM OF UPPER-OUTER QUADRANT OF RIGHT BREAST IN FEMALE, ESTROGEN RECEPTOR NEGATIVE (HCC): ICD-10-CM

## 2025-04-29 DIAGNOSIS — R91.8 MULTIPLE LUNG NODULES ON CT: Primary | ICD-10-CM

## 2025-04-29 DIAGNOSIS — C50.411 MALIGNANT NEOPLASM OF UPPER-OUTER QUADRANT OF RIGHT BREAST IN FEMALE, ESTROGEN RECEPTOR NEGATIVE (HCC): ICD-10-CM

## 2025-04-29 LAB
ALBUMIN SERPL BCG-MCNC: 4.1 G/DL (ref 3.5–5)
ALP SERPL-CCNC: 82 U/L (ref 34–104)
ALT SERPL W P-5'-P-CCNC: 24 U/L (ref 7–52)
ANION GAP SERPL CALCULATED.3IONS-SCNC: 9 MMOL/L (ref 4–13)
AST SERPL W P-5'-P-CCNC: 26 U/L (ref 13–39)
BILIRUB SERPL-MCNC: 0.74 MG/DL (ref 0.2–1)
BUN SERPL-MCNC: 19 MG/DL (ref 5–25)
CALCIUM SERPL-MCNC: 9.4 MG/DL (ref 8.4–10.2)
CHLORIDE SERPL-SCNC: 103 MMOL/L (ref 96–108)
CO2 SERPL-SCNC: 27 MMOL/L (ref 21–32)
CREAT SERPL-MCNC: 0.65 MG/DL (ref 0.6–1.3)
GFR SERPL CREATININE-BSD FRML MDRD: 93 ML/MIN/1.73SQ M
GLUCOSE P FAST SERPL-MCNC: 116 MG/DL (ref 65–99)
POTASSIUM SERPL-SCNC: 4.1 MMOL/L (ref 3.5–5.3)
PROT SERPL-MCNC: 6.6 G/DL (ref 6.4–8.4)
SODIUM SERPL-SCNC: 139 MMOL/L (ref 135–147)
T3FREE SERPL-MCNC: 3.44 PG/ML (ref 2.5–3.9)
TSH SERPL DL<=0.05 MIU/L-ACNC: 2.7 UIU/ML (ref 0.45–4.5)

## 2025-04-29 PROCEDURE — 99215 OFFICE O/P EST HI 40 MIN: CPT | Performed by: INTERNAL MEDICINE

## 2025-04-29 NOTE — PROGRESS NOTES
Chief Complaint   Patient presents with    visit      :  Assessment & Plan  Malignant neoplasm of upper-outer quadrant of right breast in female, estrogen receptor negative (HCC)  Newly diagnosed palpable clinical T2 clinical N0 triple negative Invasive ductal breast cancer established by biopsy December 23, 2024.     The patient is asymptomatic from pulmonary standpoint     Orders:    CARBOplatin (PARAPLATIN) 38.52 mg in sodium chloride 0.9 % 250 mL IVPB    Oncology Provider Communication    Oncology Provider Communication    Oncology Provider Communication    Oncology Provider Communication    Oncology Provider Communication    Infusion Calculated Appointment Request; Future    CBC and differential; Future    Comprehensive metabolic panel; Future    TSH, 3rd generation with Free T4 reflex; Future    T3, free; Future    Infusion Calculated Appointment Request; Future    CBC and differential; Future    Comprehensive metabolic panel; Future    Infusion Calculated Appointment Request; Future    CBC and differential; Future    Comprehensive metabolic panel; Future     Class 2 obesity due to excess calories without serious comorbidity with body mass index (BMI) of 39.0 to 39.9 in adult        Family history of malignant neoplasm  Positive breast cancer in mother     Essential hypertension        Hypothyroidism, unspecified type     Orders:    CARBOplatin (PARAPLATIN) 38.52 mg in sodium chloride 0.9 % 250 mL IVPB    Oncology Provider Communication    Oncology Provider Communication     Hyperlipidemia, unspecified hyperlipidemia type        Invasive ductal carcinoma of breast, female, right (HCC)     Dehydration           Solitary pulmonary nodule                 Clinical/Pathologic Stage IIB     Cancer Staging   No matching staging information was found for the patient.        Current Therapy W 11 of 12  Keynote 522 trial data set approach phase 1 with pembrolizumab every 3 weeks IV plus paclitaxel IV weekly x 12+  carboplatin IV weekly x 12     Discussion  This delightful postmenopausal woman presents following week 11 of 12 preoperative systemic therapy for newly diagnosed grade 2 invasive ductal carcinoma of the right breast.       She continues on the keynote 522 regimen with chemo therapy alone.  This session represents week 12 of 12    CT of the chest is distinctly abnormal however the patient has virtually no pulmonary symptoms or signs.  Her last dose of pembrolizumab was given on February 26, 2025 as dose #2.    I will invite pulmonary medicine to review abnormal chest CT scan for diagnostic and therapeutic input.        TThe patient prepares to move into phase 2 with doxorubicin plus cyclophosphamide every 2 weeks x 4 cycles        Background History:   The patient became self-aware of palpable right breast mass in the fall 2024 and presented for diagnostic reviews.  Her prior mammography on  February 19,2024 was BI-RADS 1 were negative.     The patient's risk factors for the development of breast cancer and solid tumor malignancy include age, postmenopausal status, gender and obesity. Her mother has a diagnosis of breast cancer at age 80 plus of unclear documentation and scope. Her mother is living and well.     The patient has no clinical evidence of metastases.     This patient is approached with favor in a curative intent.  She clearly carries the expectation of an excellent opportunity for a robust tumor response and favorable outcomes over time.  I had a long discussion with the patient and her  regarding the use of chemoimmunotherapy in a neoadjuvant approach fashion and after the keynote 522 trial data set.  I discussed opportunities and the implication of pathologic complete remission and extension with favor of both event free survival and overall survival.  Please see [place] Journal [employer] from September 2024 lead author Ambreen WOODWARD     The patient and her  were given handwritten sheet  outlining all aspects of our discussion today and expressed understanding clarity and gratitude.  She is scheduled for Mediport catheter placement and we will move forward with phase 1 of care that includes the use of pembrolizumab every 3 weeks plus paclitaxel plus carboplatin weekly x 12.           Special Studies  December 27, 2024 germline genetic panel  No high risk germline mutations                 GENETIC ANALYSIS OVERALL INTERPRETATION   Date Value Ref Range Status   12/27/2024     Final     NEGATIVE: No Clinically Significant Variants Detected   12/27/2024 Variants of Unknown Significance Detected   Final                    INTERPRETATION   Date Value Ref Range Status   12/27/2024 See Notes   Final       Comment:       No pathogenic mutations, variants of unknown significance, or gross deletions or duplications were detected.  Risk Estimate: low likelihood of variants in the genes analyzed contributing to this individual's clinical history.  Genetic counseling is a recommended option for all individuals undergoing genetic testing.  Genes Analyzed (13 total): EVA, BARD1, BRCA1, BRCA2, CDH1, CHEK2, NF1, PALB2, PTEN, RAD51C, RAD51D, STK11 and TP53 (sequencing and deletion/duplication).   12/27/2024 See Notes   Final       Comment:       No known clinically actionable alterations were detected.  Four variants of unknown significance were detected: two in the MSH3 gene, one in the POLE gene, and one in the SDHA gene.  Risk Estimate: should be based on clinical and family history, as the clinical significance of this result is unknown.  Genetic counseling is a recommended option for all individuals undergoing genetic testing.  This individual is heterozygous for the p.N118I (c.353A>T) and p.N861H (c.2581A>C) variants of unknown significance in the MSH3 gene, heterozygous for the p.O9423K (c.4168C>T) variant of unknown significance in the POLE gene, and heterozygous for the p.K480E (c.1438A>G) variant of unknown  significance in the SDHA gene, which may or may not contribute to this individual's clinical history. Familial testing would be necessary to determine if the alterations in MSH3 are on the same chromosome or on different chromosomes (in cis or trans). Refer to the supplementary pages for additional information on these variants. No additional pathogenic mutations, variants of unknown significance, or gross deletions or duplications were detected. Genes Analyzed (59 total): APC, EVA, BAP1, BARD1, BMPR1A, BRCA1, BRCA2, BRIP1, CDH1, CDK4, CDKN1B, CDKN2A, CHEK2, DICER1, FH, FLCN, KIF1B, MAX, MEN1, MET, MLH1, MSH2, MSH6, MUTYH, NF1, NTHL1, PALB2, PMS2, POT1, PTEN, RAD51C, RAD51D, RB1, RET, SDHA, SDHAF2, SDHB, SDHC, SDHD, SMAD4, SMARCA4, STK11, NMND124, TP53, TSC1, TSC2 and VHL (sequencing and deletion/duplication); AXIN2, CTNNA1, EGLN1, HOXB13, KIT, MITF, MSH3, PDGFRA, POLD1 and POLE (sequencing only); EPCAM and GREM1 (deletion/duplication only). RNA data is routinely analyzed for use in variant interpretation for all genes.                  GENES NOT REPORTED   Date Value Ref Range Status   2024     Final     EVA,BARD1,CDH1,CHEK2,PALB2,PTEN,RAD51C,RAD51D,STK11,TP53,NF1,BRCA1,BRCA2   2024     Final     APC,EVA,AXIN2,BAP1,BARD1,BMPR1A,BRCA1,BRCA2,BRIP1,CDH1,CDK4,CDKN1B,CDKN2A,CHEK2,CTNNA1,DICER1,EGLN1,EPCAM,FH,FLCN,GREM1,HOXB13,KIF1B,KIT,MAX,MEN1,MET,MITF,MLH1,MSH2,MSH6,MUTYH,NF1,NTHL1,PALB2,PDGFRA,PMS2,POLD1,POT1,PTEN,RAD51C,RAD51D,RB1,RET,SDHAF2,SDHB,  SDHC,SDHD,SMAD4,SMARCA4,STK11,TPDM152,TP53,TSC1,TSC2,VHL            History of Present Illness        This delightful 65-year-old presents with her  to review a new diagnosis of invasive carcinoma of the right breast.  The patient is  2 para 2 and postmenopausal.  The patient's mother at age 80 was revealed to have a diagnosis of breast cancer status post excision only.  The mother is living and well     The patient presented in the 2024  near the Halloween holiday with self-awareness of her right breast mass.  Patient presented for diagnostic reviews through her gynecologist and primary care physicians.  Bilateral diagnostic mammogram with targeted right breast ultrasound was performed on December 18, 2024.  25 mm oval mass density with indistinct margins was seen in the upper outer quadrant of the right breast at the 10 o'clock position at 6 cm from the nipple.  This mass correlating with the palpable finding on examination.  Targeted ultrasound of the right breast showed a 19 mm x 17 mm x 22 mm oval hypoechoic mass with indistinct margins seen in the upper outer quadrant of the right breast at 10:00.  There were no suspicious adenopathy in the right axilla.  The left breast showed no suspicious masses, grouped microcalcifications or areas of unexplained architectural distortion.     On December 23, 2024 ultrasound-guided biopsy of the right breast mass with mammography was performed without complication.  Final pathology revealed an invasive breast carcinoma no special type ductal of grade 2.  Ductal carcinoma in situ was present.  Estrogen receptor was negative, progesterone receptor was negative and HER2/nathanael was low at 2+ by IHC but subsequently nonamplified by FISH amplification..  Ductal carcinoma in situ.     The patient reports a medical oncology for therapeutic planning and review for possible neoadjuvant therapies        Pertinent History from December 2024     No new complaints              December 23, 2024 S 24 [id number] status post ultrasound-guided biopsy right breast lesion 10 o'clock position 6 cm from the nipple     Cancer Screening and Prevention  Mammography: 12/18/2024   GI/Colonoscopy: 03/02/2021   GYN: 01/12/2022   Bone Density: Not on file   Covid 19 Vaccination Status:      Oncology Therapeutic Summary:     February 26, 2025 pembrolizumab discontinued after second dose due to diffuse symptomatic maculopapular skin eruption.   Patient continued on chemotherapy alone     From February 3, 2025 cycle 1 week 1   initiation phase 1 keynote 522 approach with pembrolizumab 200 mg 5 pills IV every 3 weeks plus paclitaxel 80 mg/m² IV weekly x 12 and carboplatin AUC 1.5 weekly x 12     12/23/2024 S 24 [id number]  status post ultrasound-guided biopsy right breast     Oncology History   Cancer Staging   Invasive ductal carcinoma of breast, female, right (HCC)  Staging form: Breast, AJCC 8th Edition  - Clinical stage from 1/14/2025: Stage IIB (cT2, cN0, cM0, G2, ER-, AL-, HER2-) - Signed by Jordan Esparza MD on 1/27/2025  Histopathologic type: Infiltrating duct carcinoma, NOS  Stage prefix: Initial diagnosis  Method of lymph node assessment: Clinical  Histologic grading system: 3 grade system        Oncology History   Invasive ductal carcinoma of breast, female, right (HCC)   1/14/2025 -  Cancer Staged     Staging form: Breast, AJCC 8th Edition  - Clinical stage from 1/14/2025: Stage IIB (cT2, cN0, cM0, G2, ER-, AL-, HER2-) - Signed by Jordan Esparza MD on 1/27/2025  Histopathologic type: Infiltrating duct carcinoma, NOS  Stage prefix: Initial diagnosis  Method of lymph node assessment: Clinical  Histologic grading system: 3 grade system         1/27/2025 Initial Diagnosis     Invasive ductal carcinoma of breast, female, right (HCC)      Malignant neoplasm of upper-outer quadrant of right breast in female, estrogen receptor negative (HCC)   1/27/2025 Initial Diagnosis     Malignant neoplasm of upper-outer quadrant of right breast in female, estrogen receptor negative (HCC)      1/28/2025 -  Chemotherapy     DOXOrubicin (ADRIAMYCIN), 60 mg/m2 = 129 mg, Intravenous, Once, 0 of 4 cycles  alteplase (CATHFLO), 2 mg, Intracatheter, Every 1 Minute as needed, 0 of 17 cycles  pegfilgrastim-apgf (Nyvepria), 6 mg, Subcutaneous, Once, 0 of 4 cycles  cyclophosphamide (CYTOXAN) IVPB, 600 mg/m2 = 1,290 mg, Intravenous, Once, 0 of 4 cycles  fosaprepitant (EMEND)  IVPB, 150 mg, Intravenous, Once, 0 of 4 cycles  CARBOplatin (PARAPLATIN) IVPB (GOG AUC DOSING), 38.52 mg (100 % of original dose 38.52 mg), Intravenous, Once, 0 of 4 cycles  Dose modification:   (original dose 38.52 mg, Cycle 1),   (original dose 38.52 mg, Cycle 1, Reason: Other (Must fill in a comment), Comment: lab estimates),   (original dose 38.52 mg, Cycle 1),   (original dose 38.52 mg, Cycle 1, Reason: Other (Must fill in a comment))  PACLItaxel (TAXOL) chemo IVPB, 80 mg/m2 = 172.2 mg, Intravenous, Once, 0 of 4 cycles  pembrolizumab (KEYTRUDA) IVPB, 200 mg, Intravenous, Once, 0 of 17 cycles         Review of Systems   Constitutional: Negative.    All other systems reviewed and are negative.     Medical History Reviewed by provider this encounter:     .    Medications Ordered Prior to Encounter                                 Current Outpatient Medications on File Prior to Visit     Medication     Sig     Dispense     Refill          atorvastatin (LIPITOR) 20 mg tablet     TAKE ONE TABLET BY MOUTH EVERY DAY     90 tablet     1          doxycycline hyclate (VIBRAMYCIN) 100 mg capsule     Take 100 mg by mouth daily as needed                      hydroCHLOROthiazide 12.5 mg tablet     TAKE ONE TABLET BY MOUTH EVERY DAY     30 tablet     5          Multiple Vitamin (MULTIVITAMINS PO)     Take by mouth                      nebivolol (BYSTOLIC) 20 MG tablet     TAKE ONE TABLET BY MOUTH EVERY DAY     90 tablet     1     No current facility-administered medications on file prior to visit.         Social History                                 Tobacco Use          Smoking status:     Never          Smokeless tobacco:     Never     Vaping Use          Vaping status:     Never Used     Substance and Sexual Activity          Alcohol use:     Yes          Drug use:     No          Sexual activity:     Yes                 Partners:     Male                 Birth control/protection:     Post-menopausal             Objective    "  /76 (BP Location: Left arm, Patient Position: Sitting, Cuff Size: Adult)   Pulse 79   Temp 97.8 °F (36.6 °C) (Temporal)   Resp 18   Ht 5' 6\" (1.676 m)   Wt 108 kg (239 lb)   LMP  (LMP Unknown)   SpO2 95%   BMI 38.58 kg/m²      Pain Screening:  Pain Score: 0-No pain  ECOG ECOG Performance Status: 0 - Fully active, able to carry on all pre-disease performance without restriction 0  Physical Exam  Vitals and nursing note reviewed. Exam conducted with a chaperone present.   Neck:      Thyroid: No thyroid mass, thyromegaly or thyroid tenderness.      Trachea: Trachea normal.   Cardiovascular:      Rate and Rhythm: Normal rate and regular rhythm.      Pulses: Normal pulses.      Heart sounds: Murmur heard.      Systolic murmur is present with a grade of 2/6.      No diastolic murmur is present.      No friction rub. No gallop. No S3 or S4 sounds.   Pulmonary:      Effort: Pulmonary effort is normal.      Breath sounds: Normal breath sounds. No stridor, decreased air movement or transmitted upper airway sounds. No decreased breath sounds, wheezing, rhonchi or rales.   Chest:      Chest wall: Mass present. No deformity, swelling, tenderness, crepitus or edema. There is no dullness to percussion.   Breasts:     Right: Mass present. No nipple discharge, skin change or tenderness.      Left: Normal.      Comments: 1.55 1.7 cm mass in the right mid to upper outer breasts  Abdominal:      General: Abdomen is protuberant. Bowel sounds are normal.      Palpations: Abdomen is soft. There is no shifting dullness, hepatomegaly, splenomegaly, mass or pulsatile mass.      Tenderness: There is no abdominal tenderness. There is no right CVA tenderness or left CVA tenderness.   Musculoskeletal:      Cervical back: Full passive range of motion without pain and normal range of motion. No edema, rigidity, bony tenderness or crepitus. No pain with movement, spinous process tenderness or muscular tenderness.      Thoracic back: " No bony tenderness.      Lumbar back: No bony tenderness.      Right lower leg: No edema.      Left lower leg: No edema.   Lymphadenopathy:      Cervical: No cervical adenopathy.      Right cervical: No superficial, deep or posterior cervical adenopathy.     Left cervical: No superficial, deep or posterior cervical adenopathy.      Upper Body:      Right upper body: No supraclavicular, axillary or pectoral adenopathy.      Left upper body: No supraclavicular, axillary or pectoral adenopathy.   Neurological:      Mental Status: She is alert.            Labs: I have reviewed the following labs:            Lab Results   Component Value Date/Time     WBC 8.89 09/13/2024 11:01 AM     RBC 4.36 09/13/2024 11:01 AM     Hemoglobin 13.8 09/13/2024 11:01 AM     Hematocrit 42.8 09/13/2024 11:01 AM     MCV 98 09/13/2024 11:01 AM     MCH 31.7 09/13/2024 11:01 AM     RDW 12.4 09/13/2024 11:01 AM     Platelets 181 09/13/2024 11:01 AM                Lab Results   Component Value Date/Time     Potassium 4.0 01/09/2025 09:38 AM     Chloride 103 01/09/2025 09:38 AM     CO2 24 01/09/2025 09:38 AM     BUN 20 01/09/2025 09:38 AM     Creatinine 0.68 01/09/2025 09:38 AM     Glucose, Fasting 113 (H) 01/09/2025 09:38 AM     Calcium 8.8 01/09/2025 09:38 AM     AST 15 01/09/2025 09:38 AM     ALT 11 01/09/2025 09:38 AM     Alkaline Phosphatase 101 01/09/2025 09:38 AM     Total Protein 7.1 01/09/2025 09:38 AM     Albumin 4.1 01/09/2025 09:38 AM     Total Bilirubin 0.60 01/09/2025 09:38 AM     eGFR 92 01/09/2025 09:38 AM            Pathology:      December 23, 2024 S 24 [id number] status post ultrasound-guided biopsy right breast lesion 10 o'clock position 6 cm from the nipple  Final Diagnosis   A. Breast, Right, us guided breast bx, 10 ocl, 6 cm fn 3 passes, 12 g:  - Invasive breast carcinoma of no special type (ductal NST/invasive ductal carcinoma).   * [place] grade 2 of 3 (total score: 6 of 9)    -- tubule formation < 10%, score 3    -- nuclear  grade 2 of 3, score 2    -- mitoses < 3/mm2, (</= 7 mitoses/10HPF), score 1.   * Confirmed by tumor cell immunophenotype:    -- positive: nuclear stain for GATA3 and membranous stain for E-cadherin and p120.    -- negative:  p63, calponin-B.     * Invasive carcinoma involves 3 of 3 submitted core biopsies, max. dimension = 16 millimeters.  - Ductal carcinoma in situ.   * Estrogen, progesterone & HER2 receptor studies pending, to be described in a separate receptor report.          Synoptic Checklist INVASIVE CARCINOMA OF THE BREAST: Biopsy (INVASIVE CARCINOMA OF THE BREAST: BIOPSY - All Specimens)Protocol posted: 3/22/2023 SPECIMEN Procedure Needle biopsy Specimen Laterality Right .      TUMOR Tumor Site Clock position 10 o'clock   Tumor Site Distance from nipple (Centimeters): 6 cm     Histologic Type Invasive carcinoma of no special type (ductal) Histologic Grade (Mina Histologic Score) Glandular (Acinar) / Tubular Differentiation Score 3 Nuclear Pleomorphism Score 2 Mitotic Rate [id number] Fátima Reyes [id number]   Overall Grade Grade 2 (scores of 6 or 7)      Largest Invasive Focus in this Limited Biopsy Sample 16 mm      Ductal Carcinoma In Situ (DCIS) Present Architectural Patterns Solid Nuclear Grade Grade II (intermediate) Necrosis Not identified      Lymphatic and / or Vascular Invasion Not identified Microcalcifications Not identified .      Estrogen Receptor (ER) Status Negative (less than 1%)      Progesterone Receptor (PgR) Status Negative (less than 1%)      HER2 by Immunohistochemistry Equivocal (Score 2+)      Addendum  01/02/2025 [employer]  Fish  Non amplified  Negative        Imaging:      April 21, 2025 CT chest without contrast           January 27, 2025 2D cardiac echo  Summary    Left Ventricle: Left ventricular cavity size is normal. Wall thickness is normal. The left ventricular ejection fraction is 60%. Systolic function is normal. Wall motion is normal. Diastolic function is  mildly abnormal, consistent with grade I (abnormal) relaxation.    IVS: There is sigmoid appearance of the septum.    Right Ventricle: Right ventricular cavity size is normal. Systolic function is normal.    Left Atrium: The atrium is mildly dilated (35-41 mL/m2).    Right Atrium: The atrium is normal in size.                       January 20, 2025 CT scan of the chest abdomen and pelvis with contrast  IMPRESSION:     A 2.0 cm right breast mass with a biopsy clip in keeping with the patient's recently diagnosed breast cancer.     A 3 mm left upper lobe pulmonary nodule. Based on current Fleischner Society 2017 Guidelines on incidental pulmonary nodule, patients with a known malignancy are at increased risk of metastasis and should receive initial three-month follow-up chest CT.     No evidence of metastatic disease in the abdomen and pelvis.     The study was marked in EPIC for immediate notification.        January 17, 2025 nuclear medicine whole-body bone scan  IMPRESSION:     1. No scintigraphic evidence of osseous metastasis.        December 18, 2024 targeted right breast ultrasound with bilateral diagnostic mammogram     FINDINGS:   RIGHT  1) MASS [B]  Mammo diagnostic bilateral w 3d and cad: There is a 25 mm high density, oval mass with indistinct margins seen in the upper outer quadrant of the right breast at 10 o'clock, 6 cm from the nipple. The mass correlates with the palpable finding noted during physical examination.   US breast right limited (diagnostic): There is a 19 mm x 17 mm x 22 mm oval, hypoechoic mass with indistinct margins seen in the upper outer quadrant of the right breast at 10 o'clock, 6 cm from the nipple. The mass correlates with the palpable finding noted during physical examination.  Sonographic exam of the right axilla shows no suspicious adenopathy.  Scattered benign-appearing calcifications are demonstrated in the right breast.  Right breast biopsy clip.     Left  Mammo diagnostic  bilateral w 3d and cad  There are no suspicious masses, grouped microcalcifications or areas of unexplained architectural distortion. The skin and nipple areolar complex are unremarkable.   Results were discussed with the patient.  Ultrasound-guided biopsy was recommended.     IMPRESSION:  Solid mass in the upper outer right breast corresponding to the area of clinical concern and highly suspicious for malignancy.     ASSESSMENT/BI-RADS CATEGORY:  Left: 1 - Negative  Right: 5 - Highly Suggestive of Malignancy  Overall: 5 - Highly Suggestive of Malignancy     RECOMMENDATION:       - Ultrasound-guided breast biopsy for the right breast.       - Routine screening mammogram in 1 year for the left breast.           February 19, 2024 bilateral screening mammography     FINDINGS:   There are no suspicious masses, grouped microcalcifications or areas of architectural distortion. The skin and nipple areolar complex are unremarkable.     IMPRESSION:  No mammographic evidence of malignancy.           ASSESSMENT/BI-RADS CATEGORY:  Left: 1 - Negative  Right: 1 - Negative  Overall: 1 - Negative     RECOMMENDATION:       - Routine screening mammogram in 1 year for both breasts.                                               Instructions                   Electronically signed by Kosta Machado MD at 4/22/2025  9:05 AM         Office Visit on 4/22/2025

## 2025-04-29 NOTE — TELEPHONE ENCOUNTER
Spoke with Junaid from Hematology Oncology. She called in to have patient scheduled per referral for multiple nodules. I offered patient on appt for today at [place] but she had a meeting to go to for work. Patient is currently scheduled for 5/15 and added on the wait list per patients request. Is there a sooner appt we can get patient at the [place] office? Please advise.

## 2025-04-30 ENCOUNTER — HOSPITAL ENCOUNTER (OUTPATIENT)
Dept: INFUSION CENTER | Facility: CLINIC | Age: 65
Discharge: HOME | End: 2025-04-30
Payer: COMMERCIAL

## 2025-04-30 VITALS
DIASTOLIC BLOOD PRESSURE: 71 MMHG | RESPIRATION RATE: 18 BRPM | TEMPERATURE: 96.6 F | HEIGHT: 67 IN | BODY MASS INDEX: 35.71 KG/M2 | OXYGEN SATURATION: 95 % | SYSTOLIC BLOOD PRESSURE: 108 MMHG | HEART RATE: 78 BPM | WEIGHT: 227.5 LBS

## 2025-04-30 DIAGNOSIS — C50.411 MALIGNANT NEOPLASM OF UPPER-OUTER QUADRANT OF RIGHT BREAST IN FEMALE, ESTROGEN RECEPTOR NEGATIVE (HCC): Primary | ICD-10-CM

## 2025-04-30 DIAGNOSIS — C50.411 MALIGNANT NEOPLASM OF UPPER-OUTER QUADRANT OF RIGHT BREAST IN FEMALE, ESTROGEN RECEPTOR NEGATIVE (HCC): ICD-10-CM

## 2025-04-30 DIAGNOSIS — E86.0 DEHYDRATION: ICD-10-CM

## 2025-04-30 DIAGNOSIS — Z17.1 MALIGNANT NEOPLASM OF UPPER-OUTER QUADRANT OF RIGHT BREAST IN FEMALE, ESTROGEN RECEPTOR NEGATIVE (HCC): ICD-10-CM

## 2025-04-30 DIAGNOSIS — Z17.1 MALIGNANT NEOPLASM OF UPPER-OUTER QUADRANT OF RIGHT BREAST IN FEMALE, ESTROGEN RECEPTOR NEGATIVE (HCC): Primary | ICD-10-CM

## 2025-04-30 DIAGNOSIS — E86.0 DEHYDRATION: Primary | ICD-10-CM

## 2025-04-30 PROCEDURE — 96367 TX/PROPH/DG ADDL SEQ IV INF: CPT

## 2025-04-30 PROCEDURE — 96413 CHEMO IV INFUSION 1 HR: CPT

## 2025-04-30 PROCEDURE — 96417 CHEMO IV INFUS EACH ADDL SEQ: CPT

## 2025-04-30 RX ORDER — SODIUM CHLORIDE 9 MG/ML
20 INJECTION, SOLUTION INTRAVENOUS ONCE
Status: COMPLETED | OUTPATIENT
Start: 2025-04-30 | End: 2025-04-30

## 2025-04-30 RX ADMIN — PACLITAXEL 172.2 MG: 6 INJECTION, SOLUTION INTRAVENOUS at 10:00

## 2025-04-30 RX ADMIN — SODIUM CHLORIDE 500 ML: 0.9 INJECTION, SOLUTION INTRAVENOUS at 08:45

## 2025-04-30 RX ADMIN — DIPHENHYDRAMINE HYDROCHLORIDE 25 MG: 50 INJECTION, SOLUTION INTRAMUSCULAR; INTRAVENOUS at 09:08

## 2025-04-30 RX ADMIN — DEXAMETHASONE SODIUM PHOSPHATE: 10 INJECTION, SOLUTION INTRAMUSCULAR; INTRAVENOUS at 08:46

## 2025-04-30 RX ADMIN — FAMOTIDINE 20 MG: 10 INJECTION INTRAVENOUS at 09:28

## 2025-04-30 RX ADMIN — SODIUM CHLORIDE 20 ML/HR: 0.9 INJECTION, SOLUTION INTRAVENOUS at 08:48

## 2025-04-30 RX ADMIN — CARBOPLATIN 187.05 MG: 10 INJECTION, SOLUTION INTRAVENOUS at 11:03

## 2025-04-30 NOTE — TELEPHONE ENCOUNTER
Called and spoke with pt and scheduled her out. I let her know that we can provide an updated calendar at her next appt. In addition, she stated that she can reference [facility].

## 2025-04-30 NOTE — PROGRESS NOTES
Patient tolerated treatment well, confirmed next appt for 5/7 @ 830. Appointment calendar provided.

## 2025-05-03 NOTE — PROGRESS NOTES
Chief Complaint   Patient presents with    visit      :  Assessment & Plan  Malignant neoplasm of upper-outer quadrant of right breast in female, estrogen receptor negative (HCC)  Newly diagnosed palpable clinical T2 clinical N0 triple negative Invasive ductal breast cancer established by biopsy December 23, 2024.      The patient is asymptomatic from pulmonary standpoint yet with my gratitude will see pulmonary medicine on tomorrow.         Orders:    CARBOplatin (PARAPLATIN) 38.52 mg in sodium chloride 0.9 % 250 mL IVPB    Oncology Provider Communication    Oncology Provider Communication    Oncology Provider Communication    Oncology Provider Communication    Oncology Provider Communication    Infusion Calculated Appointment Request; Future    CBC and differential; Future    Comprehensive metabolic panel; Future    TSH, 3rd generation with Free T4 reflex; Future    T3, free; Future    Infusion Calculated Appointment Request; Future    CBC and differential; Future    Comprehensive metabolic panel; Future    Infusion Calculated Appointment Request; Future    CBC and differential; Future    Comprehensive metabolic panel; Future     Class 2 obesity due to excess calories without serious comorbidity with body mass index (BMI) of 39.0 to 39.9 in adult        Family history of malignant neoplasm  Positive breast cancer in mother     Essential hypertension        Hypothyroidism, unspecified type     Orders:    CARBOplatin (PARAPLATIN) 38.52 mg in sodium chloride 0.9 % 250 mL IVPB    Oncology Provider Communication    Oncology Provider Communication     Hyperlipidemia, unspecified hyperlipidemia type        Invasive ductal carcinoma of breast, female, right (HCC)     Dehydration           Solitary pulmonary nodule                 Clinical/Pathologic Stage IIB     Cancer Staging   No matching staging information was found for the patient.        Current Therapy W 11 of 12  Keynote 522 trial data set approach phase 1 with  pembrolizumab every 3 weeks IV plus paclitaxel IV weekly x 12+ carboplatin IV weekly x 12     Discussion  This delightful postmenopausal woman presents following week 11 of 12 preoperative systemic therapy for newly diagnosed grade 2 invasive ductal carcinoma of the right breast.       She continues on the keynote 522 regimen with chemo therapy alone.  The patient has completed week 12 of 12 and moves forward with phase 2 consisting of doxorubicin plus cyclophosphamide every 2 weeks x 4 cycles.      CT scan of the chest demonstrates diminishment in the size of her primary tumor mass.       CT of the chest is distinctly abnormal however the patient has virtually no pulmonary symptoms or signs.  Her last dose of pembrolizumab was given on February 26, 2025 as dose #2.     The patient will be seen on tomorrow by pulmonary medicine to review abnormal chest CT scan for diagnostic and therapeutic input.  I invite their input with gratitude and humility.                Background History:   The patient became self-aware of palpable right breast mass in the fall 2024 and presented for diagnostic reviews.  Her prior mammography on  February 19,2024 was BI-RADS 1 were negative.     The patient's risk factors for the development of breast cancer and solid tumor malignancy include age, postmenopausal status, gender and obesity. Her mother has a diagnosis of breast cancer at age 80 plus of unclear documentation and scope. Her mother is living and well.     The patient has no clinical evidence of metastases.     This patient is approached with favor in a curative intent.  She clearly carries the expectation of an excellent opportunity for a robust tumor response and favorable outcomes over time.  I had a long discussion with the patient and her  regarding the use of chemoimmunotherapy in a neoadjuvant approach fashion and after the keynote 522 trial data set.  I discussed opportunities and the implication of pathologic  complete remission and extension with favor of both event free survival and overall survival.  Please see [place] Journal of [employer] from September 2024 lead author Ambreen WOODWARD     The patient and her  were given handwritten sheet outlining all aspects of our discussion today and expressed understanding clarity and gratitude.  She is scheduled for Mediport catheter placement and we will move forward with phase 1 of care that includes the use of pembrolizumab every 3 weeks plus paclitaxel plus carboplatin weekly x 12.           Special Studies  December 27, 2024 germline genetic panel  No high risk germline mutations                 GENETIC ANALYSIS OVERALL INTERPRETATION   Date Value Ref Range Status   12/27/2024     Final     NEGATIVE: No Clinically Significant Variants Detected   12/27/2024 Variants of Unknown Significance Detected   Final                    INTERPRETATION   Date Value Ref Range Status   12/27/2024 See Notes   Final       Comment:       No pathogenic mutations, variants of unknown significance, or gross deletions or duplications were detected.  Risk Estimate: low likelihood of variants in the genes analyzed contributing to this individual's clinical history.  Genetic counseling is a recommended option for all individuals undergoing genetic testing.  Genes Analyzed (13 total): EVA, BARD1, BRCA1, BRCA2, CDH1, CHEK2, NF1, PALB2, PTEN, RAD51C, RAD51D, STK11 and TP53 (sequencing and deletion/duplication).   12/27/2024 See Notes   Final       Comment:       No known clinically actionable alterations were detected.  Four variants of unknown significance were detected: two in the MSH3 gene, one in the POLE gene, and one in the SDHA gene.  Risk Estimate: should be based on clinical and family history, as the clinical significance of this result is unknown.  Genetic counseling is a recommended option for all individuals undergoing genetic testing.  This individual is heterozygous for the p.N118I  (c.353A>T) and p.N861H (c.2581A>C) variants of unknown significance in the MSH3 gene, heterozygous for the p.O3093C (c.4168C>T) variant of unknown significance in the POLE gene, and heterozygous for the p.K480E (c.1438A>G) variant of unknown significance in the SDHA gene, which may or may not contribute to this individual's clinical history. Familial testing would be necessary to determine if the alterations in MSH3 are on the same chromosome or on different chromosomes (in cis or trans). Refer to the supplementary pages for additional information on these variants. No additional pathogenic mutations, variants of unknown significance, or gross deletions or duplications were detected. Genes Analyzed (59 total): APC, EVA, BAP1, BARD1, BMPR1A, BRCA1, BRCA2, BRIP1, CDH1, CDK4, CDKN1B, CDKN2A, CHEK2, DICER1, FH, FLCN, KIF1B, MAX, MEN1, MET, MLH1, MSH2, MSH6, MUTYH, NF1, NTHL1, PALB2, PMS2, POT1, PTEN, RAD51C, RAD51D, RB1, RET, SDHA, SDHAF2, SDHB, SDHC, SDHD, SMAD4, SMARCA4, STK11, GEYC799, TP53, TSC1, TSC2 and VHL (sequencing and deletion/duplication); AXIN2, CTNNA1, EGLN1, HOXB13, KIT, MITF, MSH3, PDGFRA, POLD1 and POLE (sequencing only); EPCAM and GREM1 (deletion/duplication only). RNA data is routinely analyzed for use in variant interpretation for all genes.                  GENES NOT REPORTED   Date Value Ref Range Status   12/27/2024     Final     EVA,BARD1,CDH1,CHEK2,PALB2,PTEN,RAD51C,RAD51D,STK11,TP53,NF1,BRCA1,BRCA2   12/27/2024     Final     APC,EVA,AXIN2,BAP1,BARD1,BMPR1A,BRCA1,BRCA2,BRIP1,CDH1,CDK4,CDKN1B,CDKN2A,CHEK2,CTNNA1,DICER1,EGLN1,EPCAM,FH,FLCN,GREM1,HOXB13,KIF1B,KIT,MAX,MEN1,MET,MITF,MLH1,MSH2,MSH6,MUTYH,NF1,NTHL1,PALB2,PDGFRA,PMS2,POLD1,POT1,PTEN,RAD51C,RAD51D,RB1,RET,SDHAF2,SDHB,  SDHC,SDHD,SMAD4,SMARCA4,STK11,OZCY974,TP53,TSC1,TSC2,VHL            History of Present Illness        This delightful 65-year-old presents with her  to review a new diagnosis of invasive carcinoma of the right  breast.  The patient is  2 para 2 and postmenopausal.  The patient's mother at age 80 was revealed to have a diagnosis of breast cancer status post excision only.  The mother is living and well     The patient presented in the 2024 near the  holiday with self-awareness of her right breast mass.  Patient presented for diagnostic reviews through her gynecologist and primary care physicians.  Bilateral diagnostic mammogram with targeted right breast ultrasound was performed on 2024.  25 mm oval mass density with indistinct margins was seen in the upper outer quadrant of the right breast at the 10 o'clock position at 6 cm from the nipple.  This mass correlating with the palpable finding on examination.  Targeted ultrasound of the right breast showed a 19 mm x 17 mm x 22 mm oval hypoechoic mass with indistinct margins seen in the upper outer quadrant of the right breast at 10:00.  There were no suspicious adenopathy in the right axilla.  The left breast showed no suspicious masses, grouped microcalcifications or areas of unexplained architectural distortion.     On 2024 ultrasound-guided biopsy of the right breast mass with mammography was performed without complication.  Final pathology revealed an invasive breast carcinoma no special type ductal of grade 2.  Ductal carcinoma in situ was present.  Estrogen receptor was negative, progesterone receptor was negative and HER2/nathanael was low at 2+ by IHC but subsequently nonamplified by FISH amplification..  Ductal carcinoma in situ.     The patient reports a medical oncology for therapeutic planning and review for possible neoadjuvant therapies        Pertinent History from 2024     No new complaints              2024 S 24 [id number] status post ultrasound-guided biopsy right breast lesion 10 o'clock position 6 cm from the nipple     Cancer Screening and Prevention  Mammography: 2024   GI/Colonoscopy:  03/02/2021   GYN: 01/12/2022   Bone Density: Not on file   Covid 19 Vaccination Status:      Oncology Therapeutic Summary:     February 26, 2025 pembrolizumab discontinued after second dose due to diffuse symptomatic maculopapular skin eruption.  Patient continued on chemotherapy alone     From February 3, 2025 cycle 1 week 1   initiation phase 1 keynote 522 approach with pembrolizumab 200 mg 5 pills IV every 3 weeks plus paclitaxel 80 mg/m² IV weekly x 12 and carboplatin AUC 1.5 weekly x 12     12/23/2024 S 24 [id number]  status post ultrasound-guided biopsy right breast     Oncology History   Cancer Staging   Invasive ductal carcinoma of breast, female, right (HCC)  Staging form: Breast, AJCC 8th Edition  - Clinical stage from 1/14/2025: Stage IIB (cT2, cN0, cM0, G2, ER-, AK-, HER2-) - Signed by Jordan Esparza MD on 1/27/2025  Histopathologic type: Infiltrating duct carcinoma, NOS  Stage prefix: Initial diagnosis  Method of lymph node assessment: Clinical  Histologic grading system: 3 grade system        Oncology History   Invasive ductal carcinoma of breast, female, right (HCC)   1/14/2025 -  Cancer Staged     Staging form: Breast, AJCC 8th Edition  - Clinical stage from 1/14/2025: Stage IIB (cT2, cN0, cM0, G2, ER-, AK-, HER2-) - Signed by Jordan Esparza MD on 1/27/2025  Histopathologic type: Infiltrating duct carcinoma, NOS  Stage prefix: Initial diagnosis  Method of lymph node assessment: Clinical  Histologic grading system: 3 grade system         1/27/2025 Initial Diagnosis     Invasive ductal carcinoma of breast, female, right (HCC)      Malignant neoplasm of upper-outer quadrant of right breast in female, estrogen receptor negative (HCC)   1/27/2025 Initial Diagnosis     Malignant neoplasm of upper-outer quadrant of right breast in female, estrogen receptor negative (HCC)      1/28/2025 -  Chemotherapy     DOXOrubicin (ADRIAMYCIN), 60 mg/m2 = 129 mg, Intravenous, Once, 0 of 4 cycles  alteplase (CATHFLO), 2  mg, Intracatheter, Every 1 Minute as needed, 0 of 17 cycles  pegfilgrastim-apgf (Nyvepria), 6 mg, Subcutaneous, Once, 0 of 4 cycles  cyclophosphamide (CYTOXAN) IVPB, 600 mg/m2 = 1,290 mg, Intravenous, Once, 0 of 4 cycles  fosaprepitant (EMEND) IVPB, 150 mg, Intravenous, Once, 0 of 4 cycles  CARBOplatin (PARAPLATIN) IVPB (GOG AUC DOSING), 38.52 mg (100 % of original dose 38.52 mg), Intravenous, Once, 0 of 4 cycles  Dose modification:   (original dose 38.52 mg, Cycle 1),   (original dose 38.52 mg, Cycle 1, Reason: Other (Must fill in a comment), Comment: lab estimates),   (original dose 38.52 mg, Cycle 1),   (original dose 38.52 mg, Cycle 1, Reason: Other (Must fill in a comment))  PACLItaxel (TAXOL) chemo IVPB, 80 mg/m2 = 172.2 mg, Intravenous, Once, 0 of 4 cycles  pembrolizumab (KEYTRUDA) IVPB, 200 mg, Intravenous, Once, 0 of 17 cycles         Review of Systems   Constitutional: Negative.    All other systems reviewed and are negative.     Medical History Reviewed by provider this encounter:     .    Medications Ordered Prior to Encounter                                 Current Outpatient Medications on File Prior to Visit     Medication     Sig     Dispense     Refill          atorvastatin (LIPITOR) 20 mg tablet     TAKE ONE TABLET BY MOUTH EVERY DAY     90 tablet     1          doxycycline hyclate (VIBRAMYCIN) 100 mg capsule     Take 100 mg by mouth daily as needed                      hydroCHLOROthiazide 12.5 mg tablet     TAKE ONE TABLET BY MOUTH EVERY DAY     30 tablet     5          Multiple Vitamin (MULTIVITAMINS PO)     Take by mouth                      nebivolol (BYSTOLIC) 20 MG tablet     TAKE ONE TABLET BY MOUTH EVERY DAY     90 tablet     1     No current facility-administered medications on file prior to visit.         Social History                                 Tobacco Use          Smoking status:     Never          Smokeless tobacco:     Never     Vaping Use          Vaping status:     Never  "Used     Substance and Sexual Activity          Alcohol use:     Yes          Drug use:     No          Sexual activity:     Yes                 Partners:     Male                 Birth control/protection:     Post-menopausal             Objective     /76 (BP Location: Left arm, Patient Position: Sitting, Cuff Size: Adult)   Pulse 79   Temp 97.8 °F (36.6 °C) (Temporal)   Resp 18   Ht 5' 6\" (1.676 m)   Wt 108 kg (239 lb)   LMP  (LMP Unknown)   SpO2 95%   BMI 38.58 kg/m²      Pain Screening:  Pain Score: 0-No pain  ECOG ECOG Performance Status: 0 - Fully active, able to carry on all pre-disease performance without restriction 0  Physical Exam  Vitals and nursing note reviewed. Exam conducted with a chaperone present.   Neck:      Thyroid: No thyroid mass, thyromegaly or thyroid tenderness.      Trachea: Trachea normal.   Cardiovascular:      Rate and Rhythm: Normal rate and regular rhythm.      Pulses: Normal pulses.      Heart sounds: Murmur heard.      Systolic murmur is present with a grade of 2/6.      No diastolic murmur is present.      No friction rub. No gallop. No S3 or S4 sounds.   Pulmonary:      Effort: Pulmonary effort is normal.      Breath sounds: Normal breath sounds. No stridor, decreased air movement or transmitted upper airway sounds. No decreased breath sounds, wheezing, rhonchi or rales.   Chest:      Chest wall: Mass present. No deformity, swelling, tenderness, crepitus or edema. There is no dullness to percussion.   Breasts:     Right: Mass present. No nipple discharge, skin change or tenderness.      Left: Normal.      Comments: 1.55 1.7 cm mass in the right mid to upper outer breasts  Abdominal:      General: Abdomen is protuberant. Bowel sounds are normal.      Palpations: Abdomen is soft. There is no shifting dullness, hepatomegaly, splenomegaly, mass or pulsatile mass.      Tenderness: There is no abdominal tenderness. There is no right CVA tenderness or left CVA tenderness. "   Musculoskeletal:      Cervical back: Full passive range of motion without pain and normal range of motion. No edema, rigidity, bony tenderness or crepitus. No pain with movement, spinous process tenderness or muscular tenderness.      Thoracic back: No bony tenderness.      Lumbar back: No bony tenderness.      Right lower leg: No edema.      Left lower leg: No edema.   Lymphadenopathy:      Cervical: No cervical adenopathy.      Right cervical: No superficial, deep or posterior cervical adenopathy.     Left cervical: No superficial, deep or posterior cervical adenopathy.      Upper Body:      Right upper body: No supraclavicular, axillary or pectoral adenopathy.      Left upper body: No supraclavicular, axillary or pectoral adenopathy.   Neurological:      Mental Status: She is alert.            Labs: I have reviewed the following labs:            Lab Results   Component Value Date/Time     WBC 8.89 09/13/2024 11:01 AM     RBC 4.36 09/13/2024 11:01 AM     Hemoglobin 13.8 09/13/2024 11:01 AM     Hematocrit 42.8 09/13/2024 11:01 AM     MCV 98 09/13/2024 11:01 AM     MCH 31.7 09/13/2024 11:01 AM     RDW 12.4 09/13/2024 11:01 AM     Platelets 181 09/13/2024 11:01 AM                Lab Results   Component Value Date/Time     Potassium 4.0 01/09/2025 09:38 AM     Chloride 103 01/09/2025 09:38 AM     CO2 24 01/09/2025 09:38 AM     BUN 20 01/09/2025 09:38 AM     Creatinine 0.68 01/09/2025 09:38 AM     Glucose, Fasting 113 (H) 01/09/2025 09:38 AM     Calcium 8.8 01/09/2025 09:38 AM     AST 15 01/09/2025 09:38 AM     ALT 11 01/09/2025 09:38 AM     Alkaline Phosphatase 101 01/09/2025 09:38 AM     Total Protein 7.1 01/09/2025 09:38 AM     Albumin 4.1 01/09/2025 09:38 AM     Total Bilirubin 0.60 01/09/2025 09:38 AM     eGFR 92 01/09/2025 09:38 AM            Pathology:      December 23, 2024 S 24 [id number] status post ultrasound-guided biopsy right breast lesion 10 o'clock position 6 cm from the nipple  Final Diagnosis   A.  Breast, Right, us guided breast bx, 10 ocl, 6 cm fn 3 passes, 12 g:  - Invasive breast carcinoma of no special type (ductal NST/invasive ductal carcinoma).   * Mina grade 2 of 3 (total score: 6 of 9)    -- tubule formation < 10%, score 3    -- nuclear grade 2 of 3, score 2    -- mitoses < 3/mm2, (</= 7 mitoses/10HPF), score 1.   * Confirmed by tumor cell immunophenotype:    -- positive: nuclear stain for GATA3 and membranous stain for E-cadherin and p120.    -- negative:  p63, calponin-B.     * Invasive carcinoma involves 3 of 3 submitted core biopsies, max. dimension = 16 millimeters.  - Ductal carcinoma in situ.   * Estrogen, progesterone & HER2 receptor studies pending, to be described in a separate receptor report.          Synoptic Checklist INVASIVE CARCINOMA OF THE BREAST: Biopsy (INVASIVE CARCINOMA OF THE BREAST: BIOPSY - All Specimens)Protocol posted: 3/22/2023 SPECIMEN Procedure Needle biopsy Specimen Laterality Right .      TUMOR Tumor Site Clock position 10 o'clock   Tumor Site Distance from nipple (Centimeters): 6 cm     Histologic Type Invasive carcinoma of no special type (ductal) Histologic Grade (Irving Histologic Score) Glandular (Acinar) / Tubular Differentiation Score 3 Nuclear Pleomorphism Score 2 Mitotic Rate [id number] Fátima Reyes [id number]   Overall Grade Grade 2 (scores of 6 or 7)      Largest Invasive Focus in this Limited Biopsy Sample 16 mm      Ductal Carcinoma In Situ (DCIS) Present Architectural Patterns Solid Nuclear Grade Grade II (intermediate) Necrosis Not identified      Lymphatic and / or Vascular Invasion Not identified Microcalcifications Not identified .      Estrogen Receptor (ER) Status Negative (less than 1%)      Progesterone Receptor (PgR) Status Negative (less than 1%)      HER2 by Immunohistochemistry Equivocal (Score 2+)      Addendum  01/02/2025 IMRSV  Fish  Non amplified  Negative        Imaging:      April 21, 2025 CT chest without contrast    FINDINGS:     LUNGS: There are multiple new nodular patchy opacities in the left lower lobe, the largest measuring 1.6 x 1.4 cm (3/184). It replaces the previously seen 3 mm left lower lobe pulmonary nodule. A few nodular opacities are also seen in the right lower   lobe measuring up to 3 mm (3/195) and dependent aspect of the right middle lobe measuring 1.1 cm (5/80).     Pulmonary nodules are seen as below.  *  Stable posterior subpleural right lower lobe 4 mm pulmonary nodule (5/42)  *  Stable 3 mm subpleural lingular pulmonary nodule (3/140)  *  A 2 mm right upper lobe pulmonary nodule (5/ 29), previously 1 mm on 3/11/2025.     there is no tracheal or endobronchial lesion.     PLEURA: Unremarkable.     HEART/GREAT VESSELS: Heart is unremarkable for patient's age. No thoracic aortic aneurysm.     MEDIASTINUM AND CONCEPCIÓN: Mildly enlarged right hilar lymph node measuring 1.5 cm (2/52), previously 0.8 cm on 3/11/2025     Small hiatal hernia     CHEST WALL AND LOWER NECK: Left-sided Mediport. Interval decrease in the size of right lateral breast mass measuring 1.3 x 1.1 cm (2/73), previously 2.0 x 1.8 cm on CT 1/20/2025.     VISUALIZED STRUCTURES IN THE UPPER ABDOMEN: Unremarkable.     OSSEOUS STRUCTURES: No acute fracture or destructive osseous lesion.     IMPRESSION:     A few new nodular patchy opacities in the left lower lobe, the largest measuring 1.6 cm, replacing the previously seen 3 mm left lower lobe pulmonary nodule. A few smaller nodular opacities are also seen in the right lower lobe and right middle lobe.   Findings likely represent multifocal infectious/aspiration pneumonitis, limiting assessment of underlying pulmonary nodules. Follow-up CT chest in 2 months is recommended to assess for resolution of consolidation and for better assessment of pulmonary   nodules.     A mildly enlarged right hilar lymph node measuring 1.5 cm, reactive or neoplastic. Attention on follow-up CT chest is recommended to assess  for resolution/persistence     Interval decrease in the size of right lateral breast mass since January 2025.     A 2 mm right upper lobe pulmonary nodule, mildly increased in size since 3/11/2025. Additional small stable pulmonary nodules. Attention on follow-up CT chest is recommended to assess for stability.     The study was marked in EPIC for immediate notification.                 January 27, 2025 2D cardiac echo  Summary    Left Ventricle: Left ventricular cavity size is normal. Wall thickness is normal. The left ventricular ejection fraction is 60%. Systolic function is normal. Wall motion is normal. Diastolic function is mildly abnormal, consistent with grade I (abnormal) relaxation.    IVS: There is sigmoid appearance of the septum.    Right Ventricle: Right ventricular cavity size is normal. Systolic function is normal.    Left Atrium: The atrium is mildly dilated (35-41 mL/m2).    Right Atrium: The atrium is normal in size.                       January 20, 2025 CT scan of the chest abdomen and pelvis with contrast  IMPRESSION:     A 2.0 cm right breast mass with a biopsy clip in keeping with the patient's recently diagnosed breast cancer.     A 3 mm left upper lobe pulmonary nodule. Based on current Fleischner Society 2017 Guidelines on incidental pulmonary nodule, patients with a known malignancy are at increased risk of metastasis and should receive initial three-month follow-up chest CT.     No evidence of metastatic disease in the abdomen and pelvis.     The study was marked in EPIC for immediate notification.        January 17, 2025 nuclear medicine whole-body bone scan  IMPRESSION:     1. No scintigraphic evidence of osseous metastasis.        December 18, 2024 targeted right breast ultrasound with bilateral diagnostic mammogram     FINDINGS:   RIGHT  1) MASS [B]  Mammo diagnostic bilateral w 3d and cad: There is a 25 mm high density, oval mass with indistinct margins seen in the upper outer  quadrant of the right breast at 10 o'clock, 6 cm from the nipple. The mass correlates with the palpable finding noted during physical examination.   US breast right limited (diagnostic): There is a 19 mm x 17 mm x 22 mm oval, hypoechoic mass with indistinct margins seen in the upper outer quadrant of the right breast at 10 o'clock, 6 cm from the nipple. The mass correlates with the palpable finding noted during physical examination.  Sonographic exam of the right axilla shows no suspicious adenopathy.  Scattered benign-appearing calcifications are demonstrated in the right breast.  Right breast biopsy clip.     Left  Mammo diagnostic bilateral w 3d and cad  There are no suspicious masses, grouped microcalcifications or areas of unexplained architectural distortion. The skin and nipple areolar complex are unremarkable.   Results were discussed with the patient.  Ultrasound-guided biopsy was recommended.     IMPRESSION:  Solid mass in the upper outer right breast corresponding to the area of clinical concern and highly suspicious for malignancy.     ASSESSMENT/BI-RADS CATEGORY:  Left: 1 - Negative  Right: 5 - Highly Suggestive of Malignancy  Overall: 5 - Highly Suggestive of Malignancy     RECOMMENDATION:       - Ultrasound-guided breast biopsy for the right breast.       - Routine screening mammogram in 1 year for the left breast.           February 19, 2024 bilateral screening mammography     FINDINGS:   There are no suspicious masses, grouped microcalcifications or areas of architectural distortion. The skin and nipple areolar complex are unremarkable.     IMPRESSION:  No mammographic evidence of malignancy.           ASSESSMENT/BI-RADS CATEGORY:  Left: 1 - Negative  Right: 1 - Negative  Overall: 1 - Negative     RECOMMENDATION:       - Routine screening mammogram in 1 year for both breasts.                                               Instructions                   Electronically signed by Kosta Machado MD at  4/22/2025  9:05 AM            Office Visit on 4/22/2025                   Instructions

## 2025-05-05 ENCOUNTER — TELEPHONE (OUTPATIENT)
Age: 65
End: 2025-05-05

## 2025-05-05 ENCOUNTER — OFFICE VISIT (OUTPATIENT)
Dept: HEMATOLOGY ONCOLOGY | Facility: CLINIC | Age: 65
End: 2025-05-05
Payer: COMMERCIAL

## 2025-05-05 ENCOUNTER — APPOINTMENT (OUTPATIENT)
Dept: LAB | Facility: CLINIC | Age: 65
End: 2025-05-05
Payer: COMMERCIAL

## 2025-05-05 VITALS
RESPIRATION RATE: 18 BRPM | HEART RATE: 88 BPM | HEIGHT: 67 IN | SYSTOLIC BLOOD PRESSURE: 106 MMHG | OXYGEN SATURATION: 98 % | WEIGHT: 229 LBS | TEMPERATURE: 96.4 F | BODY MASS INDEX: 35.94 KG/M2 | DIASTOLIC BLOOD PRESSURE: 70 MMHG

## 2025-05-05 DIAGNOSIS — C50.411 MALIGNANT NEOPLASM OF UPPER-OUTER QUADRANT OF RIGHT BREAST IN FEMALE, ESTROGEN RECEPTOR NEGATIVE (HCC): ICD-10-CM

## 2025-05-05 DIAGNOSIS — Z17.1 MALIGNANT NEOPLASM OF UPPER-OUTER QUADRANT OF RIGHT BREAST IN FEMALE, ESTROGEN RECEPTOR NEGATIVE (HCC): Primary | ICD-10-CM

## 2025-05-05 DIAGNOSIS — Z17.1 MALIGNANT NEOPLASM OF UPPER-OUTER QUADRANT OF RIGHT BREAST IN FEMALE, ESTROGEN RECEPTOR NEGATIVE (HCC): ICD-10-CM

## 2025-05-05 DIAGNOSIS — C50.411 MALIGNANT NEOPLASM OF UPPER-OUTER QUADRANT OF RIGHT BREAST IN FEMALE, ESTROGEN RECEPTOR NEGATIVE (HCC): Primary | ICD-10-CM

## 2025-05-05 LAB
ALBUMIN SERPL BCG-MCNC: 4.1 G/DL (ref 3.5–5)
ALP SERPL-CCNC: 79 U/L (ref 34–104)
ALT SERPL W P-5'-P-CCNC: 38 U/L (ref 7–52)
ANION GAP SERPL CALCULATED.3IONS-SCNC: 10 MMOL/L (ref 4–13)
AST SERPL W P-5'-P-CCNC: 36 U/L (ref 13–39)
BASOPHILS # BLD AUTO: 0.04 THOUSANDS/ÂΜL (ref 0–0.1)
BASOPHILS NFR BLD AUTO: 1 % (ref 0–1)
BILIRUB SERPL-MCNC: 0.69 MG/DL (ref 0.2–1)
BUN SERPL-MCNC: 17 MG/DL (ref 5–25)
CALCIUM SERPL-MCNC: 9.5 MG/DL (ref 8.4–10.2)
CHLORIDE SERPL-SCNC: 103 MMOL/L (ref 96–108)
CO2 SERPL-SCNC: 28 MMOL/L (ref 21–32)
CREAT SERPL-MCNC: 0.65 MG/DL (ref 0.6–1.3)
EOSINOPHIL # BLD AUTO: 0.07 THOUSAND/ÂΜL (ref 0–0.61)
EOSINOPHIL NFR BLD AUTO: 2 % (ref 0–6)
ERYTHROCYTE [DISTWIDTH] IN BLOOD BY AUTOMATED COUNT: 15.9 % (ref 11.6–15.1)
GFR SERPL CREATININE-BSD FRML MDRD: 93 ML/MIN/1.73SQ M
GLUCOSE SERPL-MCNC: 88 MG/DL (ref 65–140)
HCT VFR BLD AUTO: 34.7 % (ref 34.8–46.1)
HGB BLD-MCNC: 11.2 G/DL (ref 11.5–15.4)
IMM GRANULOCYTES # BLD AUTO: 0.02 THOUSAND/UL (ref 0–0.2)
IMM GRANULOCYTES NFR BLD AUTO: 1 % (ref 0–2)
LYMPHOCYTES # BLD AUTO: 1.07 THOUSANDS/ÂΜL (ref 0.6–4.47)
LYMPHOCYTES NFR BLD AUTO: 29 % (ref 14–44)
MCH RBC QN AUTO: 33.6 PG (ref 26.8–34.3)
MCHC RBC AUTO-ENTMCNC: 32.3 G/DL (ref 31.4–37.4)
MCV RBC AUTO: 104 FL (ref 82–98)
MONOCYTES # BLD AUTO: 0.19 THOUSAND/ÂΜL (ref 0.17–1.22)
MONOCYTES NFR BLD AUTO: 5 % (ref 4–12)
NEUTROPHILS # BLD AUTO: 2.3 THOUSANDS/ÂΜL (ref 1.85–7.62)
NEUTS SEG NFR BLD AUTO: 62 % (ref 43–75)
NRBC BLD AUTO-RTO: 0 /100 WBCS
PLATELET # BLD AUTO: 266 THOUSANDS/UL (ref 149–390)
PMV BLD AUTO: 10.5 FL (ref 8.9–12.7)
POTASSIUM SERPL-SCNC: 4.3 MMOL/L (ref 3.5–5.3)
PROT SERPL-MCNC: 7.3 G/DL (ref 6.4–8.4)
RBC # BLD AUTO: 3.33 MILLION/UL (ref 3.81–5.12)
SODIUM SERPL-SCNC: 141 MMOL/L (ref 135–147)
T3FREE SERPL-MCNC: 3.14 PG/ML (ref 2.5–3.9)
TSH SERPL DL<=0.05 MIU/L-ACNC: 2.77 UIU/ML (ref 0.45–4.5)
WBC # BLD AUTO: 3.69 THOUSAND/UL (ref 4.31–10.16)

## 2025-05-05 PROCEDURE — 80053 COMPREHEN METABOLIC PANEL: CPT

## 2025-05-05 PROCEDURE — 85025 COMPLETE CBC W/AUTO DIFF WBC: CPT

## 2025-05-05 PROCEDURE — 84443 ASSAY THYROID STIM HORMONE: CPT

## 2025-05-05 PROCEDURE — 99214 OFFICE O/P EST MOD 30 MIN: CPT | Performed by: INTERNAL MEDICINE

## 2025-05-05 PROCEDURE — 36415 COLL VENOUS BLD VENIPUNCTURE: CPT

## 2025-05-05 PROCEDURE — 84481 FREE ASSAY (FT-3): CPT

## 2025-05-05 NOTE — TELEPHONE ENCOUNTER
Fátima bruce from [facility] reporting patient is currently there to get lab work and asking for lab orders. There are no lab order that can be drawn.  New orders placed for CMP, CBC, TSH and T3.  She thanked me.

## 2025-05-06 ENCOUNTER — DOCUMENTATION (OUTPATIENT)
Dept: HEMATOLOGY ONCOLOGY | Facility: CLINIC | Age: 65
End: 2025-05-06

## 2025-05-06 ENCOUNTER — OFFICE VISIT (OUTPATIENT)
Dept: PULMONOLOGY | Facility: CLINIC | Age: 65
End: 2025-05-06
Attending: INTERNAL MEDICINE
Payer: COMMERCIAL

## 2025-05-06 VITALS
OXYGEN SATURATION: 97 % | SYSTOLIC BLOOD PRESSURE: 116 MMHG | BODY MASS INDEX: 37.14 KG/M2 | DIASTOLIC BLOOD PRESSURE: 62 MMHG | HEART RATE: 70 BPM | TEMPERATURE: 97.6 F | WEIGHT: 231.1 LBS | HEIGHT: 66 IN

## 2025-05-06 DIAGNOSIS — C50.911 INVASIVE DUCTAL CARCINOMA OF BREAST, FEMALE, RIGHT (HCC): ICD-10-CM

## 2025-05-06 DIAGNOSIS — R93.89 ABNORMAL CT OF THE CHEST: Primary | ICD-10-CM

## 2025-05-06 DIAGNOSIS — R91.8 MULTIPLE LUNG NODULES ON CT: ICD-10-CM

## 2025-05-06 PROCEDURE — 99244 OFF/OP CNSLTJ NEW/EST MOD 40: CPT | Performed by: INTERNAL MEDICINE

## 2025-05-06 NOTE — LETTER
May 6, 2025     Wagner Gillis MD  [address] [facility] [address]    Patient: Fátima Reyes   YOB: 1960   Date of Visit: 2025       Dear MD Kosta Borrego MD Ali Linsk Butash, MD:    Thank you for referring Fátima Reyes to me for evaluation. Below are my notes for this consultation.    If you have questions, please do not hesitate to call me. I look forward to following your patient along with you.         Sincerely,        Jacqueline Dillard MD        CC: MD Jordan Ware MD Arielle Aiken, MD  2025 12:07 PM  Attested  Consultation - Pulmonary Medicine   Name: Fátima Reyes      : 1960      MRN: [id number]  Encounter Provider: Jacqueline Dillard MD  Encounter Date: 2025   Encounter department: [facility]    Requesting Provider:   Kosta Rivera Md  [address] [place] [id number]     Reason for Consult: Abnormal CT imaging:  Assessment & Plan  Abnormal CT of the chest  65 y.o. female former smoker with approximate 5 PY smoking with PMH of Stage 2B invasive ductal ca of Rt breast   current therapy pembrolizumab every 3 weeks IV plus paclitaxel IV weekly x 12+ carboplatin IV weekly x 12. Her last dose of Pembrolizumab was on  (had diffuse rash after administration) and her last chemotherapy session was 1 week prior. She will be started on phase 2 with Doxorubicin and Cyclophosphamide.     CT chest imaging obtained on  which demonstrates multiple new nodular patchy opacities within the LLL largest measuring 1.6 x 1.4 (previously 3mm). A few nodular opacities also noted within the RLL measuiring 3mm and dependent aspect of RML measuring 1.1  Stable subpleural RLL nodule measuring 4mm noted and stable 3mm subpleural linguilar nodule also noted.   Rt Hilar adenopathy noted measuring 1.5 cm (previously 0.8).    CT 2025: New 3 mm left lower lobe nodule.     Patient denies  current cough, CP, sob, fever, chills.   Denies environmental exposures    Images shown to patient in detail. All questions answered.     Given imaging findings, the highest concern is that this may be metastatic spread verses primary lung ca. Patient would benefit from tissue sampling with IR lung biopsy vs EBUS w/ TBNA lymph node sampling.  We will discuss her case at Tumor Boards on 5/12 to determine the best plan going forward.   Will call patient to discuss the plan.       Multiple lung nodules on CT  As above  Orders:  •  Ambulatory Referral to Pulmonology    Invasive ductal carcinoma of breast, female, right (HCC)  As above         History of Present Illness  HPI:  Fátima Reyes is a 65 y.o. female former smoker with approximate 5 PY smoking with PMH of Stage 2B invasive ductal ca of Rt breast on systemic therapy who presents for new visit for abnormal CT imaging.     Patient's current therapy is pembrolizumab every 3 weeks IV plus paclitaxel IV weekly x 12+ carboplatin IV weekly x 12. Her last dose of Pembrolizumab was on February 26 (had systemic rash after administration) and her last chemotherapy session was 1 week prior. She will be started on phase 2 with Doxorubicin and Cyclophosphamide.     Patients reports feeling well today. She denies cough, sob, wheezing, chest tightness, hemoptysis, chest pain. She denies fever, chills, recent travel or ill contacts. She denies significant weight loss, loss of appetite. Denies family h/o lung ca.     CT chest imaging obtained on 4/21 revealed multiple new nodular patchy opacities within the LLL largest measuring 1.6 x 1.4 (previously 3mm). A few nodular opacities also noted within the RLL measuiring 3mm and dependent aspect of RML measuring 1.1  Stable subpleural RLL nodule measuring 4mm noted and stable 3mm subpleural linguilar nodule also noted. Rt Hilar adenopathy noted measuring 1.5 cm (previously 0.8).     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  Tobacco use as above. Denies Vaping/Marijuna.   Pets/exotic animals, birds/pigeons - cats non allergic  Mold/Dust/hazardous chemical exposure - vernell  Down pillows/comforters- denies   Family Hx: Lung Ca/COPD/Asthma - denies    Review of systems:  12 point review of systems was completed and was otherwise negative except as listed in HPI.    Historical Information  Past Medical History:   Diagnosis Date   • Hyperlipidemia 9/18/2012   • Hypertension    • Hypothyroidism 10/23/2018   • Lyme disease 07/21/2015   • Rosacea    • Skin cancer     H/O Basal Cell Cancer     Past Surgical History:   Procedure Laterality Date   • BREAST BIOPSY Right 08/05/2005    benign aprocrine metaplasia   • FRACTURE SURGERY  5/1/2019   • IR PORT PLACEMENT  01/29/2025   • ORBITAL FRACTURE SURGERY Left 05/01/2019    Procedure: OPEN REDUCTION W/ INTERNAL FIXATION (ORIF) ORBITAL;  Surgeon: Quirino Silverio MD;  Location: [facility];  Service: Plastics   • US GUIDED BREAST BIOPSY RIGHT COMPLETE Right 12/23/2024   • WISDOM TOOTH EXTRACTION       Family History   Problem Relation Age of Onset   • Breast cancer Mother 85   • Heart disease Father         Cardiac Disorder   • Dementia Father    • Hyperlipidemia Father    • Pancreatic cancer Maternal Uncle    • No Known Problems Brother    • No Known Problems Daughter    • Stroke Maternal Grandmother    • Alcohol abuse Maternal Grandfather    • No Known Problems Paternal Grandmother    • No Known Problems Daughter    • No Known Problems Maternal Aunt    • No Known Problems Paternal Aunt      Occupational History: As above     Social History:  As above  Social History     Tobacco Use   Smoking Status Never   Smokeless Tobacco Never       Meds/Allergies    Current Outpatient Medications:   •  atorvastatin (LIPITOR) 20 mg tablet, TAKE ONE TABLET BY MOUTH EVERY DAY, Disp: 90 tablet, Rfl: 1  •  doxycycline hyclate (VIBRAMYCIN) 100 mg capsule, Take 100 mg by mouth  "daily as needed, Disp: , Rfl:   •  hydroCHLOROthiazide 12.5 mg tablet, TAKE ONE TABLET BY MOUTH EVERY DAY, Disp: 30 tablet, Rfl: 5  •  Multiple Vitamin (MULTIVITAMINS PO), Take by mouth, Disp: , Rfl:   •  nebivolol (BYSTOLIC) 20 MG tablet, TAKE ONE TABLET BY MOUTH EVERY DAY, Disp: 90 tablet, Rfl: 1  •  ondansetron (ZOFRAN) 8 mg tablet, Take 1 tablet (8 mg total) by mouth every 8 (eight) hours as needed for nausea or vomiting, Disp: 20 tablet, Rfl: 0  •  triamcinolone (KENALOG) 0.1 % cream, APPLY TO AFFECTED AREA S) TWO TIMES A DAY TO RASH UP TO 2 WEEKS/ MONTH AS NEEDED, Disp: , Rfl:   No Known Allergies    Vitals: There were no vitals taken for this visit., There is no height or weight on file to calculate BMI.      Physical Exam  General: No acute distress  HENT: Normocephalic, atraumatic.  PERRL, EOMI, Moist mucous membranes.  Neck: Supple  Lungs: Clear to auscultation bilaterally, no wheezes, rales, rhonchi.  On room air  Heart: Regular rate and rhythm, S1-S2 present, no murmurs rubs or gallops  Abdomen: Soft, nontender, nondistended, no organomegaly  Extremities: Warm and well perfused, no cyanosis, clubbing, or edema  Pulses: 2+ and symmetric  Skin: Warm, dry, no rashes or lesions  Neurologic: No focal neurologic deficits    Labs: I have personally reviewed pertinent lab results.  Lab Results   Component Value Date    WBC 3.69 (L) 05/05/2025    HGB 11.2 (L) 05/05/2025    HCT 34.7 (L) 05/05/2025     (H) 05/05/2025     05/05/2025     Lab Results   Component Value Date    GLUCOSE 98 11/23/2015    CALCIUM 9.5 05/05/2025     11/23/2015    K 4.3 05/05/2025    CO2 28 05/05/2025     05/05/2025    BUN 17 05/05/2025    CREATININE 0.65 05/05/2025     No results found for: \"IGE\"  Lab Results   Component Value Date    ALT 38 05/05/2025    AST 36 05/05/2025    ALKPHOS 79 05/05/2025    BILITOT 0.57 11/23/2015     Preventive   Influenza vaccine:2021  COVID-19 vaccination: 2024   Pneumonia vaccine: " "N/A  Lung Cancer screening: N/A      Imaging and other studies: Results Review Statement: I personally reviewed the following image studies/reports in PACS and discussed pertinent findings with Radiology: CT chest. My interpretation of the radiology images/reports is:  .  CT chest imaging obtained on 4/21 which revealed multiple new nodular patchy opacities within the LLL largest measuring 1.6 x 1.4 (previously 3mm). A few nodular opacities also noted within the RLL measuiring 3mm and dependent aspect of RML measuring 1.1  Stable subpleural RLL nodule measuring 4mm noted and stable 3mm subpleural linguilar nodule also noted. Rt Hilar adenopathy noted measuring 1.5 cm (previously 0.8).     Pulmonary function testing:  No results found for: \"FEV1\", \"FVC\", \"EWP0SUD\", \"TLC\", \"DLCO\"      EKG, Pathology, and Other Studies: Results Review Statement: No pertinent imaging studies reviewed.      Disclaimer: Portions of the record may have been created with voice recognition software. Occasional wrong word or \"sound a like\" substitutions may have occurred due to the inherent limitations of voice recognition software. Careful consideration should be taken to recognize, using context, where substitutions have occurred.    Jacqueline Dillard MD  Pulmonary & Critical Care Medicine Fellow PGY-IV  [facility]  Attestation signed by Maria De Jesus Mayorga DO at 5/6/2025 12:33 PM:  Attending Attestation:    I reviewed the care furnished by the Resident/Fellow during the visit on 5/6/2025.  I was present in the office and personally saw and examined the patient     CT chest reviewed and noted for new left lower lobe nodules, right sided nodules and a mildly enlarged right hilar LN.  These findings may be inflammatory/reactive as they are new since January.  However, given the pattern and known breast cancer, also need to consider malignancy.    Discussed at length with pt - role of biopsy which could be " percutaneous by IR vs EBUS TBNA vs role of watchful waiting. Risk of waiting reviewed - if this is malignancy, it could worsen in the interim.    Ultimately, through shared decision making, we agreed to present the pt at the thoracic conference MDD on Monday. Will get group opinion on options for biopsy. Dr. Dillard will call her with group recommendation.    Discussed with pt and her daughter. Concerns addressed.

## 2025-05-06 NOTE — PROGRESS NOTES
Consultation - Pulmonary Medicine   Name: Fátima Reyes      : 1960      MRN: [id number]  Encounter Provider: Jacqueline Dillard MD  Encounter Date: 2025   Encounter department: [facility]    Requesting Provider:   Kosta Rivera Md  [address] [place] [address]     Reason for Consult: Abnormal CT imaging:  Assessment & Plan  Abnormal CT of the chest  65 y.o. female former smoker with approximate 5 PY smoking with PMH of Stage 2B invasive ductal ca of Rt breast   current therapy pembrolizumab every 3 weeks IV plus paclitaxel IV weekly x 12+ carboplatin IV weekly x 12. Her last dose of Pembrolizumab was on  (had diffuse rash after administration) and her last chemotherapy session was 1 week prior. She will be started on phase 2 with Doxorubicin and Cyclophosphamide.     CT chest imaging obtained on  which demonstrates multiple new nodular patchy opacities within the LLL largest measuring 1.6 x 1.4 (previously 3mm). A few nodular opacities also noted within the RLL measuiring 3mm and dependent aspect of RML measuring 1.1  Stable subpleural RLL nodule measuring 4mm noted and stable 3mm subpleural linguilar nodule also noted.   Rt Hilar adenopathy noted measuring 1.5 cm (previously 0.8).    CT 2025: New 3 mm left lower lobe nodule.     Patient denies current cough, CP, sob, fever, chills.   Denies environmental exposures    Images shown to patient in detail. All questions answered.     Given imaging findings, the highest concern is that this may be metastatic spread verses primary lung ca. Patient would benefit from tissue sampling with IR lung biopsy vs EBUS w/ TBNA lymph node sampling.  We will discuss her case at Tumor Boards on  to determine the best plan going forward.   Will call patient to discuss the plan.       Multiple lung nodules on CT  As above  Orders:    Ambulatory Referral to Pulmonology    Invasive ductal carcinoma of  breast, female, right (HCC)  As above         History of Present Illness   HPI:  Fátima Reyes is a 65 y.o. female former smoker with approximate 5 PY smoking with PMH of Stage 2B invasive ductal ca of Rt breast on systemic therapy who presents for new visit for abnormal CT imaging.     Patient's current therapy is pembrolizumab every 3 weeks IV plus paclitaxel IV weekly x 12+ carboplatin IV weekly x 12. Her last dose of Pembrolizumab was on February 26 (had systemic rash after administration) and her last chemotherapy session was 1 week prior. She will be started on phase 2 with Doxorubicin and Cyclophosphamide.     Patients reports feeling well today. She denies cough, sob, wheezing, chest tightness, hemoptysis, chest pain. She denies fever, chills, recent travel or ill contacts. She denies significant weight loss, loss of appetite. Denies family h/o lung ca.     CT chest imaging obtained on 4/21 revealed multiple new nodular patchy opacities within the LLL largest measuring 1.6 x 1.4 (previously 3mm). A few nodular opacities also noted within the RLL measuiring 3mm and dependent aspect of RML measuring 1.1  Stable subpleural RLL nodule measuring 4mm noted and stable 3mm subpleural linguilar nodule also noted. Rt Hilar adenopathy noted measuring 1.5 cm (previously 0.8).     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  Tobacco use as above. Denies Vaping/Marijuna.   Pets/exotic animals, birds/pigeons - cats non allergic  Mold/Dust/hazardous chemical exposure - vernell  Down pillows/comforters- denies   Family Hx: Lung Ca/COPD/Asthma - denies    Review of systems:  12 point review of systems was completed and was otherwise negative except as listed in HPI.    Historical Information   Past Medical History:   Diagnosis Date    Hyperlipidemia 9/18/2012    Hypertension     Hypothyroidism 10/23/2018    Lyme disease 07/21/2015    Rosacea     Skin cancer     H/O Basal Cell  Cancer     Past Surgical History:   Procedure Laterality Date    BREAST BIOPSY Right 08/05/2005    benign aprocrine metaplasia    FRACTURE SURGERY  5/1/2019    IR PORT PLACEMENT  01/29/2025    ORBITAL FRACTURE SURGERY Left 05/01/2019    Procedure: OPEN REDUCTION W/ INTERNAL FIXATION (ORIF) ORBITAL;  Surgeon: Quirino Silverio MD;  Location: [facility];  Service: Plastics    US GUIDED BREAST BIOPSY RIGHT COMPLETE Right 12/23/2024    WISDOM TOOTH EXTRACTION       Family History   Problem Relation Age of Onset    Breast cancer Mother 85    Heart disease Father         Cardiac Disorder    Dementia Father     Hyperlipidemia Father     Pancreatic cancer Maternal Uncle     No Known Problems Brother     No Known Problems Daughter     Stroke Maternal Grandmother     Alcohol abuse Maternal Grandfather     No Known Problems Paternal Grandmother     No Known Problems Daughter     No Known Problems Maternal Aunt     No Known Problems Paternal Aunt      Occupational History: As above     Social History:  As above  Social History     Tobacco Use   Smoking Status Never   Smokeless Tobacco Never       Meds/Allergies     Current Outpatient Medications:     atorvastatin (LIPITOR) 20 mg tablet, TAKE ONE TABLET BY MOUTH EVERY DAY, Disp: 90 tablet, Rfl: 1    doxycycline hyclate (VIBRAMYCIN) 100 mg capsule, Take 100 mg by mouth daily as needed, Disp: , Rfl:     hydroCHLOROthiazide 12.5 mg tablet, TAKE ONE TABLET BY MOUTH EVERY DAY, Disp: 30 tablet, Rfl: 5    Multiple Vitamin (MULTIVITAMINS PO), Take by mouth, Disp: , Rfl:     nebivolol (BYSTOLIC) 20 MG tablet, TAKE ONE TABLET BY MOUTH EVERY DAY, Disp: 90 tablet, Rfl: 1    ondansetron (ZOFRAN) 8 mg tablet, Take 1 tablet (8 mg total) by mouth every 8 (eight) hours as needed for nausea or vomiting, Disp: 20 tablet, Rfl: 0    triamcinolone (KENALOG) 0.1 % cream, APPLY TO AFFECTED AREA S) TWO TIMES A DAY TO RASH UP TO 2 WEEKS/ MONTH AS NEEDED, Disp: , Rfl:   No Known Allergies    Vitals: There were  "no vitals taken for this visit., There is no height or weight on file to calculate BMI.      Physical Exam  General: No acute distress  HENT: Normocephalic, atraumatic.  PERRL, EOMI, Moist mucous membranes.  Neck: Supple  Lungs: Clear to auscultation bilaterally, no wheezes, rales, rhonchi.  On room air  Heart: Regular rate and rhythm, S1-S2 present, no murmurs rubs or gallops  Abdomen: Soft, nontender, nondistended, no organomegaly  Extremities: Warm and well perfused, no cyanosis, clubbing, or edema  Pulses: 2+ and symmetric  Skin: Warm, dry, no rashes or lesions  Neurologic: No focal neurologic deficits    Labs: I have personally reviewed pertinent lab results.  Lab Results   Component Value Date    WBC 3.69 (L) 05/05/2025    HGB 11.2 (L) 05/05/2025    HCT 34.7 (L) 05/05/2025     (H) 05/05/2025     05/05/2025     Lab Results   Component Value Date    GLUCOSE 98 11/23/2015    CALCIUM 9.5 05/05/2025     11/23/2015    K 4.3 05/05/2025    CO2 28 05/05/2025     05/05/2025    BUN 17 05/05/2025    CREATININE 0.65 05/05/2025     No results found for: \"IGE\"  Lab Results   Component Value Date    ALT 38 05/05/2025    AST 36 05/05/2025    ALKPHOS 79 05/05/2025    BILITOT 0.57 11/23/2015     Preventive   Influenza vaccine:2021  COVID-19 vaccination: 2024   Pneumonia vaccine: N/A  Lung Cancer screening: N/A      Imaging and other studies: Results Review Statement: I personally reviewed the following image studies/reports in PACS and discussed pertinent findings with Radiology: CT chest. My interpretation of the radiology images/reports is:  .  CT chest imaging obtained on 4/21 which revealed multiple new nodular patchy opacities within the LLL largest measuring 1.6 x 1.4 (previously 3mm). A few nodular opacities also noted within the RLL measuiring 3mm and dependent aspect of RML measuring 1.1  Stable subpleural RLL nodule measuring 4mm noted and stable 3mm subpleural linguilar nodule also noted. Rt " "Hilar adenopathy noted measuring 1.5 cm (previously 0.8).     Pulmonary function testing:  No results found for: \"FEV1\", \"FVC\", \"JQP3DMK\", \"TLC\", \"DLCO\"      EKG, Pathology, and Other Studies: Results Review Statement: No pertinent imaging studies reviewed.      Disclaimer: Portions of the record may have been created with voice recognition software. Occasional wrong word or \"sound a like\" substitutions may have occurred due to the inherent limitations of voice recognition software. Careful consideration should be taken to recognize, using context, where substitutions have occurred.    Jacqueline Dillard MD  Pulmonary & Critical Care Medicine Fellow PGY-IV  [facility]  "

## 2025-05-06 NOTE — PROGRESS NOTES
In-basket message received from Dr. Dillard to add patient to the thoracic MDCC on 5/12/2025. Chart reviewed and prep completed.

## 2025-05-07 ENCOUNTER — HOSPITAL ENCOUNTER (OUTPATIENT)
Dept: INFUSION CENTER | Facility: CLINIC | Age: 65
Discharge: HOME | End: 2025-05-07
Attending: INTERNAL MEDICINE
Payer: COMMERCIAL

## 2025-05-07 VITALS
OXYGEN SATURATION: 96 % | BODY MASS INDEX: 36.64 KG/M2 | HEART RATE: 72 BPM | TEMPERATURE: 97.4 F | WEIGHT: 228 LBS | HEIGHT: 66 IN | DIASTOLIC BLOOD PRESSURE: 68 MMHG | SYSTOLIC BLOOD PRESSURE: 106 MMHG

## 2025-05-07 DIAGNOSIS — C50.411 MALIGNANT NEOPLASM OF UPPER-OUTER QUADRANT OF RIGHT BREAST IN FEMALE, ESTROGEN RECEPTOR NEGATIVE (HCC): Primary | ICD-10-CM

## 2025-05-07 DIAGNOSIS — Z17.1 MALIGNANT NEOPLASM OF UPPER-OUTER QUADRANT OF RIGHT BREAST IN FEMALE, ESTROGEN RECEPTOR NEGATIVE (HCC): Primary | ICD-10-CM

## 2025-05-07 DIAGNOSIS — E86.0 DEHYDRATION: ICD-10-CM

## 2025-05-07 PROCEDURE — 96411 CHEMO IV PUSH ADDL DRUG: CPT

## 2025-05-07 PROCEDURE — 96367 TX/PROPH/DG ADDL SEQ IV INF: CPT

## 2025-05-07 PROCEDURE — 96413 CHEMO IV INFUSION 1 HR: CPT

## 2025-05-07 RX ORDER — SODIUM CHLORIDE 9 MG/ML
20 INJECTION, SOLUTION INTRAVENOUS ONCE
Status: COMPLETED | OUTPATIENT
Start: 2025-05-07 | End: 2025-05-07

## 2025-05-07 RX ORDER — DOXORUBICIN HYDROCHLORIDE 2 MG/ML
60 INJECTION, SOLUTION INTRAVENOUS ONCE
Status: COMPLETED | OUTPATIENT
Start: 2025-05-07 | End: 2025-05-07

## 2025-05-07 RX ADMIN — DOXORUBICIN HYDROCHLORIDE 129 MG: 2 INJECTION, SOLUTION INTRAVENOUS at 10:35

## 2025-05-07 RX ADMIN — FOSAPREPITANT 150 MG: 150 INJECTION, POWDER, LYOPHILIZED, FOR SOLUTION INTRAVENOUS at 09:18

## 2025-05-07 RX ADMIN — DEXAMETHASONE SODIUM PHOSPHATE: 10 INJECTION, SOLUTION INTRAMUSCULAR; INTRAVENOUS at 08:53

## 2025-05-07 RX ADMIN — SODIUM CHLORIDE 500 ML: 0.9 INJECTION, SOLUTION INTRAVENOUS at 08:52

## 2025-05-07 RX ADMIN — SODIUM CHLORIDE 20 ML/HR: 0.9 INJECTION, SOLUTION INTRAVENOUS at 08:51

## 2025-05-07 RX ADMIN — CYCLOPHOSPHAMIDE 1290 MG: 500 INJECTION, POWDER, LYOPHILIZED, FOR SOLUTION INTRAVENOUS at 10:01

## 2025-05-07 NOTE — PROGRESS NOTES
Patient tolerated treatment without incident. Next appt confirmed with patient for tomorrow, 5/8 at 1pm at [facility]. Calendar print out of appts provided to patient.

## 2025-05-07 NOTE — PROGRESS NOTES
Patient to Infusion Center to begin new next phase of treatment of Cytoxan and Adriamycin. Patient offers no complaints at this time. Labs reviewed from 5/5- within parameters for treatment today. Port accessed with brisk blood return. All questions answered to patient's satisfaction.

## 2025-05-08 ENCOUNTER — HOSPITAL ENCOUNTER (OUTPATIENT)
Dept: INFUSION CENTER | Facility: CLINIC | Age: 65
Discharge: HOME | End: 2025-05-08
Attending: INTERNAL MEDICINE
Payer: COMMERCIAL

## 2025-05-08 VITALS
SYSTOLIC BLOOD PRESSURE: 108 MMHG | TEMPERATURE: 96.8 F | RESPIRATION RATE: 18 BRPM | HEART RATE: 67 BPM | DIASTOLIC BLOOD PRESSURE: 66 MMHG | OXYGEN SATURATION: 98 %

## 2025-05-08 DIAGNOSIS — Z17.1 MALIGNANT NEOPLASM OF UPPER-OUTER QUADRANT OF RIGHT BREAST IN FEMALE, ESTROGEN RECEPTOR NEGATIVE (HCC): ICD-10-CM

## 2025-05-08 DIAGNOSIS — E86.0 DEHYDRATION: ICD-10-CM

## 2025-05-08 DIAGNOSIS — C50.411 MALIGNANT NEOPLASM OF UPPER-OUTER QUADRANT OF RIGHT BREAST IN FEMALE, ESTROGEN RECEPTOR NEGATIVE (HCC): ICD-10-CM

## 2025-05-08 DIAGNOSIS — Z76.89 PREVENTION OF CHEMOTHERAPY-INDUCED NEUTROPENIA: Primary | ICD-10-CM

## 2025-05-08 PROCEDURE — 96365 THER/PROPH/DIAG IV INF INIT: CPT

## 2025-05-08 PROCEDURE — 96361 HYDRATE IV INFUSION ADD-ON: CPT

## 2025-05-08 PROCEDURE — 96372 THER/PROPH/DIAG INJ SC/IM: CPT

## 2025-05-08 RX ADMIN — PEGFILGRASTIM 3 MG: 6 INJECTION SUBCUTANEOUS at 14:08

## 2025-05-08 RX ADMIN — ONDANSETRON: 2 INJECTION INTRAMUSCULAR; INTRAVENOUS at 13:06

## 2025-05-08 RX ADMIN — SODIUM CHLORIDE 500 ML: 0.9 INJECTION, SOLUTION INTRAVENOUS at 13:03

## 2025-05-08 NOTE — PROGRESS NOTES
Received for hydration/zofran and neulasta.No complaints offered. LPAC accessed without issue. Brisk blood return noted and flushes without issue. Fluids/zofran infusing without issue.

## 2025-05-08 NOTE — PROGRESS NOTES
Tolerated infusion without issue. Neulasta given in right arm, tolerated well. Pt to return 5/28 at 830am. AVS declined.

## 2025-05-12 ENCOUNTER — DOCUMENTATION (OUTPATIENT)
Dept: HEMATOLOGY ONCOLOGY | Facility: CLINIC | Age: 65
End: 2025-05-12

## 2025-05-12 NOTE — PROGRESS NOTES
THORACIC ONCOLOGY MULTIDISCIPLINARY CASE REVIEW    DATE:  5/12/2025    PRESENTING DOCTOR:  Dr. Dillard    DIAGNOSIS:  Hx of Invasive Ductal Carcinoma Right Breast with lung nodules  STAGING: Hx of Stage IIB    Fátima Reyes is a 65 y.o. female who was presented at the Thoracic Oncology Multidisciplinary Tumor Conference today. She has a past medical history of Stage 2B triple negative invasive ductal carcinoma of right breast.  Current therapy pembrolizumab every 3 weeks IV plus paclitaxel IV weekly x 12+ carboplatin IV weekly x 12. Her last dose of Pembrolizumab was on February 26 (had diffuse rash after administration) and her last chemotherapy session was 1 week prior. She will be started on phase 2 with Doxorubicin and Cyclophosphamide. She is being presented today to review CT chest on 04/21/25 which demonstrates multiple new new nodular patchy opacities within the LLL largest measuring 1.6 x 1.4 (previously 3mm).     PHYSICIAN RECOMMENDED PLAN:  - Repeat CT chest in 6 weeks from prior chest CT.       Imaging reviewed:   04/21/25 CT chest wo contrast   03/11/25 CT chest wo contrast    Pathology reviewed: No    PFT's reviewed: No    Future imaging: CT chest wo contrast     Referrals: No    Clinical Trials Reviewed:  No  Clinical Trial Eligibility:     Team agreed to plan.  NCCN guidelines were readily available for review at this discussion    The final treatment plan will be left to the discretion of the patient and the treating physician.     DISCLAIMERS:  TO THE TREATING PHYSICIAN:  This conference is a meeting of clinicians from various specialty areas who evaluate and discuss patients for whom a multidisciplinary treatment approach is being considered. Please note that the above opinion was a consensus of the conference attendees and is intended only to assist in quality care of the discussed patient.  The responsibility for follow up on the input given during the conference, along with any final  decisions regarding plan of care, is that of the patient and the patient's provider. Accordingly, appointments have only been recommended based on this information and have NOT been scheduled unless otherwise noted.      TO THE PATIENT:  This summary is a brief record of major aspects of your cancer treatment. You may choose to share a copy with any of your doctors or nurses. However, this is not a detailed or comprehensive record of your care.

## 2025-05-12 NOTE — Clinical Note
FYI - tumor board felt changes are inflammatory. Recommend repeat CT in 2 months. Scheduled for 6/15.

## 2025-05-12 NOTE — PROGRESS NOTES
Patient discussed in Tumor Boards. Recommendation is to repeat CT imaging in 2 months. Plan discussed with patient. All questions answered.     Jacqueline Dillard MD  Pulmonary & Critical Care Fellow PGY IV  [facility]

## 2025-05-14 ENCOUNTER — TELEPHONE (OUTPATIENT)
Dept: CARDIAC SURGERY | Facility: HOSPITAL | Age: 65
End: 2025-05-14

## 2025-05-14 ENCOUNTER — TELEPHONE (OUTPATIENT)
Age: 65
End: 2025-05-14

## 2025-05-14 NOTE — TELEPHONE ENCOUNTER
Left message on patient's phone indicating to have labs drawn prior to appointment.  Indicated that the scripts are in the system, the tests are non-fasting and that patient can go to any [facility] facility to have the labs [email or web address]  Provided callback number [phone].

## 2025-05-16 ENCOUNTER — APPOINTMENT (OUTPATIENT)
Dept: LAB | Facility: CLINIC | Age: 65
End: 2025-05-16
Payer: COMMERCIAL

## 2025-05-16 DIAGNOSIS — Z17.1 MALIGNANT NEOPLASM OF UPPER-OUTER QUADRANT OF RIGHT BREAST IN FEMALE, ESTROGEN RECEPTOR NEGATIVE (HCC): ICD-10-CM

## 2025-05-16 DIAGNOSIS — E86.0 DEHYDRATION: ICD-10-CM

## 2025-05-16 DIAGNOSIS — C50.411 MALIGNANT NEOPLASM OF UPPER-OUTER QUADRANT OF RIGHT BREAST IN FEMALE, ESTROGEN RECEPTOR NEGATIVE (HCC): ICD-10-CM

## 2025-05-16 LAB
ALBUMIN SERPL BCG-MCNC: 4 G/DL (ref 3.5–5)
ALP SERPL-CCNC: 93 U/L (ref 34–104)
ALT SERPL W P-5'-P-CCNC: 13 U/L (ref 7–52)
ANION GAP SERPL CALCULATED.3IONS-SCNC: 9 MMOL/L (ref 4–13)
AST SERPL W P-5'-P-CCNC: 14 U/L (ref 13–39)
BILIRUB SERPL-MCNC: 0.51 MG/DL (ref 0.2–1)
BUN SERPL-MCNC: 13 MG/DL (ref 5–25)
CALCIUM SERPL-MCNC: 9.2 MG/DL (ref 8.4–10.2)
CHLORIDE SERPL-SCNC: 102 MMOL/L (ref 96–108)
CO2 SERPL-SCNC: 29 MMOL/L (ref 21–32)
CREAT SERPL-MCNC: 0.65 MG/DL (ref 0.6–1.3)
ERYTHROCYTE [DISTWIDTH] IN BLOOD BY AUTOMATED COUNT: 14.9 % (ref 11.6–15.1)
GFR SERPL CREATININE-BSD FRML MDRD: 93 ML/MIN/1.73SQ M
GLUCOSE SERPL-MCNC: 91 MG/DL (ref 65–140)
HCT VFR BLD AUTO: 30 % (ref 34.8–46.1)
HGB BLD-MCNC: 9.6 G/DL (ref 11.5–15.4)
MCH RBC QN AUTO: 33.2 PG (ref 26.8–34.3)
MCHC RBC AUTO-ENTMCNC: 32 G/DL (ref 31.4–37.4)
MCV RBC AUTO: 104 FL (ref 82–98)
PLATELET # BLD AUTO: 150 THOUSANDS/UL (ref 149–390)
PMV BLD AUTO: 10.5 FL (ref 8.9–12.7)
POTASSIUM SERPL-SCNC: 4.2 MMOL/L (ref 3.5–5.3)
PROT SERPL-MCNC: 6.6 G/DL (ref 6.4–8.4)
RBC # BLD AUTO: 2.89 MILLION/UL (ref 3.81–5.12)
SODIUM SERPL-SCNC: 140 MMOL/L (ref 135–147)
T3FREE SERPL-MCNC: 3.47 PG/ML (ref 2.5–3.9)
TSH SERPL DL<=0.05 MIU/L-ACNC: 1.87 UIU/ML (ref 0.45–4.5)
WBC # BLD AUTO: 1.2 THOUSAND/UL (ref 4.31–10.16)

## 2025-05-16 PROCEDURE — 85007 BL SMEAR W/DIFF WBC COUNT: CPT

## 2025-05-16 PROCEDURE — 80053 COMPREHEN METABOLIC PANEL: CPT

## 2025-05-16 PROCEDURE — 36415 COLL VENOUS BLD VENIPUNCTURE: CPT

## 2025-05-16 PROCEDURE — 84443 ASSAY THYROID STIM HORMONE: CPT

## 2025-05-16 PROCEDURE — 85027 COMPLETE CBC AUTOMATED: CPT

## 2025-05-16 PROCEDURE — 84481 FREE ASSAY (FT-3): CPT

## 2025-05-16 NOTE — PROGRESS NOTES
Chief Complaint   Patient presents with    visit      :  Assessment & Plan  Malignant neoplasm of upper-outer quadrant of right breast in female, estrogen receptor negative (HCC)  Newly diagnosed palpable clinical T2 clinical N0 triple negative Invasive ductal breast cancer established by biopsy December 23, 2024.      The patient is asymptomatic from pulmonary standpoint yet with my gratitude will see pulmonary medicine on tomorrow.           Orders:    CARBOplatin (PARAPLATIN) 38.52 mg in sodium chloride 0.9 % 250 mL IVPB    Oncology Provider Communication    Oncology Provider Communication    Oncology Provider Communication    Oncology Provider Communication    Oncology Provider Communication    Infusion Calculated Appointment Request; Future    CBC and differential; Future    Comprehensive metabolic panel; Future    TSH, 3rd generation with Free T4 reflex; Future    T3, free; Future    Infusion Calculated Appointment Request; Future    CBC and differential; Future    Comprehensive metabolic panel; Future    Infusion Calculated Appointment Request; Future    CBC and differential; Future    Comprehensive metabolic panel; Future     Class 2 obesity due to excess calories without serious comorbidity with body mass index (BMI) of 39.0 to 39.9 in adult        Family history of malignant neoplasm  Positive breast cancer in mother     Essential hypertension        Hypothyroidism, unspecified type     Orders:    CARBOplatin (PARAPLATIN) 38.52 mg in sodium chloride 0.9 % 250 mL IVPB    Oncology Provider Communication    Oncology Provider Communication     Hyperlipidemia, unspecified hyperlipidemia type        Invasive ductal carcinoma of breast, female, right (HCC)     Dehydration           Solitary pulmonary nodule                 Clinical/Pathologic Stage IIB     Cancer Staging   No matching staging information was found for the patient.        Current Therapy   Keynote 522 trial data set approach phase 1 with  pembrolizumab every 3 weeks IV plus paclitaxel IV weekly x 12+ carboplatin IV weekly x 12     Discussion  This delightful postmenopausal woman presents following week 11 of 12 preoperative systemic therapy for newly diagnosed grade 2 invasive ductal carcinoma of the right breast.       She continues on the keynote 522 regimen with chemo therapy alone.  The patient has completed week 12 of 12 and moves forward with phase 2 consisting of doxorubicin plus cyclophosphamide every 2 weeks x 4 cycles.       CT scan of the chest demonstrates diminishment in the size of her primary tumor mass.        CT of the chest is distinctly abnormal however the patient has virtually no pulmonary symptoms or signs.  Her last dose of pembrolizumab was given on February 26, 2025 as dose #2.     The patient has been seen by Pulmonary Medicine and consensus understanding is that she is stable to move forward with therapy.    No changes for now.    The patient is moving towards cycle 2 of 4 doxorubicin plus cyclophosphamide without adverse                 Background History:   The patient became self-aware of palpable right breast mass in the fall 2024 and presented for diagnostic reviews.  Her prior mammography on  February 19,2024 was BI-RADS 1 were negative.     The patient's risk factors for the development of breast cancer and solid tumor malignancy include age, postmenopausal status, gender and obesity. Her mother has a diagnosis of breast cancer at age 80 plus of unclear documentation and scope. Her mother is living and well.     The patient has no clinical evidence of metastases.     This patient is approached with favor in a curative intent.  She clearly carries the expectation of an excellent opportunity for a robust tumor response and favorable outcomes over time.  I had a long discussion with the patient and her  regarding the use of chemoimmunotherapy in a neoadjuvant approach fashion and after the keynote 522 trial data  set.  I discussed opportunities and the implication of pathologic complete remission and extension with favor of both event free survival and overall survival.  Please see [place] Journal of [facility] from September 2024 lead author Ambreen WOODWARD     The patient and her  were given handwritten sheet outlining all aspects of our discussion today and expressed understanding clarity and gratitude.  She is scheduled for Mediport catheter placement and we will move forward with phase 1 of care that includes the use of pembrolizumab every 3 weeks plus paclitaxel plus carboplatin weekly x 12.           Special Studies  December 27, 2024 germline genetic panel  No high risk germline mutations                 GENETIC ANALYSIS OVERALL INTERPRETATION   Date Value Ref Range Status   12/27/2024     Final     NEGATIVE: No Clinically Significant Variants Detected   12/27/2024 Variants of Unknown Significance Detected   Final                    INTERPRETATION   Date Value Ref Range Status   12/27/2024 See Notes   Final       Comment:       No pathogenic mutations, variants of unknown significance, or gross deletions or duplications were detected.  Risk Estimate: low likelihood of variants in the genes analyzed contributing to this individual's clinical history.  Genetic counseling is a recommended option for all individuals undergoing genetic testing.  Genes Analyzed (13 total): EVA, BARD1, BRCA1, BRCA2, CDH1, CHEK2, NF1, PALB2, PTEN, RAD51C, RAD51D, STK11 and TP53 (sequencing and deletion/duplication).   12/27/2024 See Notes   Final       Comment:       No known clinically actionable alterations were detected.  Four variants of unknown significance were detected: two in the MSH3 gene, one in the POLE gene, and one in the SDHA gene.  Risk Estimate: should be based on clinical and family history, as the clinical significance of this result is unknown.  Genetic counseling is a recommended option for all individuals undergoing  genetic testing.  This individual is heterozygous for the p.N118I (c.353A>T) and p.N861H (c.2581A>C) variants of unknown significance in the MSH3 gene, heterozygous for the p.Q0356K (c.4168C>T) variant of unknown significance in the POLE gene, and heterozygous for the p.K480E (c.1438A>G) variant of unknown significance in the SDHA gene, which may or may not contribute to this individual's clinical history. Familial testing would be necessary to determine if the alterations in MSH3 are on the same chromosome or on different chromosomes (in cis or trans). Refer to the supplementary pages for additional information on these variants. No additional pathogenic mutations, variants of unknown significance, or gross deletions or duplications were detected. Genes Analyzed (59 total): APC, EVA, BAP1, BARD1, BMPR1A, BRCA1, BRCA2, BRIP1, CDH1, CDK4, CDKN1B, CDKN2A, CHEK2, DICER1, FH, FLCN, KIF1B, MAX, MEN1, MET, MLH1, MSH2, MSH6, MUTYH, NF1, NTHL1, PALB2, PMS2, POT1, PTEN, RAD51C, RAD51D, RB1, RET, SDHA, SDHAF2, SDHB, SDHC, SDHD, SMAD4, SMARCA4, STK11, WQEC625, TP53, TSC1, TSC2 and VHL (sequencing and deletion/duplication); AXIN2, CTNNA1, EGLN1, HOXB13, KIT, MITF, MSH3, PDGFRA, POLD1 and POLE (sequencing only); EPCAM and GREM1 (deletion/duplication only). RNA data is routinely analyzed for use in variant interpretation for all genes.                  GENES NOT REPORTED   Date Value Ref Range Status   12/27/2024     Final     EVA,BARD1,CDH1,CHEK2,PALB2,PTEN,RAD51C,RAD51D,STK11,TP53,NF1,BRCA1,BRCA2   12/27/2024     Final     APC,EVA,AXIN2,BAP1,BARD1,BMPR1A,BRCA1,BRCA2,BRIP1,CDH1,CDK4,CDKN1B,CDKN2A,CHEK2,CTNNA1,DICER1,EGLN1,EPCAM,FH,FLCN,GREM1,HOXB13,KIF1B,KIT,MAX,MEN1,MET,MITF,MLH1,MSH2,MSH6,MUTYH,NF1,NTHL1,PALB2,PDGFRA,PMS2,POLD1,POT1,PTEN,RAD51C,RAD51D,RB1,RET,SDHAF2,SDHB,  SDHC,SDHD,SMAD4,SMARCA4,STK11,AOKQ250,TP53,TSC1,TSC2,VHL            History of Present Illness        This delightful 65-year-old presents with her   to review a new diagnosis of invasive carcinoma of the right breast.  The patient is  2 para 2 and postmenopausal.  The patient's mother at age 80 was revealed to have a diagnosis of breast cancer status post excision only.  The mother is living and well     The patient presented in the 2024 near the  holiday with self-awareness of her right breast mass.  Patient presented for diagnostic reviews through her gynecologist and primary care physicians.  Bilateral diagnostic mammogram with targeted right breast ultrasound was performed on 2024.  25 mm oval mass density with indistinct margins was seen in the upper outer quadrant of the right breast at the 10 o'clock position at 6 cm from the nipple.  This mass correlating with the palpable finding on examination.  Targeted ultrasound of the right breast showed a 19 mm x 17 mm x 22 mm oval hypoechoic mass with indistinct margins seen in the upper outer quadrant of the right breast at 10:00.  There were no suspicious adenopathy in the right axilla.  The left breast showed no suspicious masses, grouped microcalcifications or areas of unexplained architectural distortion.     On 2024 ultrasound-guided biopsy of the right breast mass with mammography was performed without complication.  Final pathology revealed an invasive breast carcinoma no special type ductal of grade 2.  Ductal carcinoma in situ was present.  Estrogen receptor was negative, progesterone receptor was negative and HER2/nathanael was low at 2+ by IHC but subsequently nonamplified by FISH amplification..  Ductal carcinoma in situ.     The patient reports a medical oncology for therapeutic planning and review for possible neoadjuvant therapies        Pertinent History from 2024     No new complaints              2024 S 24 [id number] status post ultrasound-guided biopsy right breast lesion 10 o'clock position 6 cm from the nipple     Cancer Screening  and Prevention  Mammography: 12/18/2024   GI/Colonoscopy: 03/02/2021   GYN: 01/12/2022   Bone Density: Not on file   Covid 19 Vaccination Status:      Oncology Therapeutic Summary:     February 26, 2025 pembrolizumab discontinued after second dose due to diffuse symptomatic maculopapular skin eruption.  Patient continued on chemotherapy alone     From February 3, 2025 cycle 1 week 1   initiation phase 1 keynote 522 approach with pembrolizumab 200 mg 5 pills IV every 3 weeks plus paclitaxel 80 mg/m² IV weekly x 12 and carboplatin AUC 1.5 weekly x 12     12/23/2024 S 24 [id number]  status post ultrasound-guided biopsy right breast     Oncology History   Cancer Staging   Invasive ductal carcinoma of breast, female, right (HCC)  Staging form: Breast, AJCC 8th Edition  - Clinical stage from 1/14/2025: Stage IIB (cT2, cN0, cM0, G2, ER-, MD-, HER2-) - Signed by Jordan Esparza MD on 1/27/2025  Histopathologic type: Infiltrating duct carcinoma, NOS  Stage prefix: Initial diagnosis  Method of lymph node assessment: Clinical  Histologic grading system: 3 grade system        Oncology History   Invasive ductal carcinoma of breast, female, right (HCC)   1/14/2025 -  Cancer Staged     Staging form: Breast, AJCC 8th Edition  - Clinical stage from 1/14/2025: Stage IIB (cT2, cN0, cM0, G2, ER-, MD-, HER2-) - Signed by Jordan Esparza MD on 1/27/2025  Histopathologic type: Infiltrating duct carcinoma, NOS  Stage prefix: Initial diagnosis  Method of lymph node assessment: Clinical  Histologic grading system: 3 grade system         1/27/2025 Initial Diagnosis     Invasive ductal carcinoma of breast, female, right (HCC)      Malignant neoplasm of upper-outer quadrant of right breast in female, estrogen receptor negative (HCC)   1/27/2025 Initial Diagnosis     Malignant neoplasm of upper-outer quadrant of right breast in female, estrogen receptor negative (HCC)      1/28/2025 -  Chemotherapy     DOXOrubicin (ADRIAMYCIN), 60 mg/m2 = 129  mg, Intravenous, Once, 0 of 4 cycles  alteplase (CATHFLO), 2 mg, Intracatheter, Every 1 Minute as needed, 0 of 17 cycles  pegfilgrastim-apgf (Nyvepria), 6 mg, Subcutaneous, Once, 0 of 4 cycles  cyclophosphamide (CYTOXAN) IVPB, 600 mg/m2 = 1,290 mg, Intravenous, Once, 0 of 4 cycles  fosaprepitant (EMEND) IVPB, 150 mg, Intravenous, Once, 0 of 4 cycles  CARBOplatin (PARAPLATIN) IVPB (GOG AUC DOSING), 38.52 mg (100 % of original dose 38.52 mg), Intravenous, Once, 0 of 4 cycles  Dose modification:   (original dose 38.52 mg, Cycle 1),   (original dose 38.52 mg, Cycle 1, Reason: Other (Must fill in a comment), Comment: lab estimates),   (original dose 38.52 mg, Cycle 1),   (original dose 38.52 mg, Cycle 1, Reason: Other (Must fill in a comment))  PACLItaxel (TAXOL) chemo IVPB, 80 mg/m2 = 172.2 mg, Intravenous, Once, 0 of 4 cycles  pembrolizumab (KEYTRUDA) IVPB, 200 mg, Intravenous, Once, 0 of 17 cycles         Review of Systems   Constitutional: Negative.    All other systems reviewed and are negative.     Medical History Reviewed by provider this encounter:     .    Medications Ordered Prior to Encounter                                 Current Outpatient Medications on File Prior to Visit     Medication     Sig     Dispense     Refill          atorvastatin (LIPITOR) 20 mg tablet     TAKE ONE TABLET BY MOUTH EVERY DAY     90 tablet     1          doxycycline hyclate (VIBRAMYCIN) 100 mg capsule     Take 100 mg by mouth daily as needed                      hydroCHLOROthiazide 12.5 mg tablet     TAKE ONE TABLET BY MOUTH EVERY DAY     30 tablet     5          Multiple Vitamin (MULTIVITAMINS PO)     Take by mouth                      nebivolol (BYSTOLIC) 20 MG tablet     TAKE ONE TABLET BY MOUTH EVERY DAY     90 tablet     1     No current facility-administered medications on file prior to visit.         Social History                                 Tobacco Use          Smoking status:     Never          Smokeless tobacco:    "  Never     Vaping Use          Vaping status:     Never Used     Substance and Sexual Activity          Alcohol use:     Yes          Drug use:     No          Sexual activity:     Yes                 Partners:     Male                 Birth control/protection:     Post-menopausal             Objective     /76 (BP Location: Left arm, Patient Position: Sitting, Cuff Size: Adult)   Pulse 79   Temp 97.8 °F (36.6 °C) (Temporal)   Resp 18   Ht 5' 6\" (1.676 m)   Wt 108 kg (239 lb)   LMP  (LMP Unknown)   SpO2 95%   BMI 38.58 kg/m²      Pain Screening:  Pain Score: 0-No pain  ECOG ECOG Performance Status: 0 - Fully active, able to carry on all pre-disease performance without restriction 0  Physical Exam  Vitals and nursing note reviewed. Exam conducted with a chaperone present.   Neck:      Thyroid: No thyroid mass, thyromegaly or thyroid tenderness.      Trachea: Trachea normal.   Cardiovascular:      Rate and Rhythm: Normal rate and regular rhythm.      Pulses: Normal pulses.      Heart sounds: Murmur heard.      Systolic murmur is present with a grade of 2/6.      No diastolic murmur is present.      No friction rub. No gallop. No S3 or S4 sounds.   Pulmonary:      Effort: Pulmonary effort is normal.      Breath sounds: Normal breath sounds. No stridor, decreased air movement or transmitted upper airway sounds. No decreased breath sounds, wheezing, rhonchi or rales.   Chest:      Chest wall: Mass present. No deformity, swelling, tenderness, crepitus or edema. There is no dullness to percussion.   Breasts:     Right: Mass present. No nipple discharge, skin change or tenderness.      Left: Normal.      Comments: 1.55 1.7 cm mass in the right mid to upper outer breasts  Abdominal:      General: Abdomen is protuberant. Bowel sounds are normal.      Palpations: Abdomen is soft. There is no shifting dullness, hepatomegaly, splenomegaly, mass or pulsatile mass.      Tenderness: There is no abdominal tenderness. " There is no right CVA tenderness or left CVA tenderness.   Musculoskeletal:      Cervical back: Full passive range of motion without pain and normal range of motion. No edema, rigidity, bony tenderness or crepitus. No pain with movement, spinous process tenderness or muscular tenderness.      Thoracic back: No bony tenderness.      Lumbar back: No bony tenderness.      Right lower leg: No edema.      Left lower leg: No edema.   Lymphadenopathy:      Cervical: No cervical adenopathy.      Right cervical: No superficial, deep or posterior cervical adenopathy.     Left cervical: No superficial, deep or posterior cervical adenopathy.      Upper Body:      Right upper body: No supraclavicular, axillary or pectoral adenopathy.      Left upper body: No supraclavicular, axillary or pectoral adenopathy.   Neurological:      Mental Status: She is alert.            Labs: I have reviewed the following labs:            Lab Results   Component Value Date/Time     WBC 8.89 09/13/2024 11:01 AM     RBC 4.36 09/13/2024 11:01 AM     Hemoglobin 13.8 09/13/2024 11:01 AM     Hematocrit 42.8 09/13/2024 11:01 AM     MCV 98 09/13/2024 11:01 AM     MCH 31.7 09/13/2024 11:01 AM     RDW 12.4 09/13/2024 11:01 AM     Platelets 181 09/13/2024 11:01 AM                Lab Results   Component Value Date/Time     Potassium 4.0 01/09/2025 09:38 AM     Chloride 103 01/09/2025 09:38 AM     CO2 24 01/09/2025 09:38 AM     BUN 20 01/09/2025 09:38 AM     Creatinine 0.68 01/09/2025 09:38 AM     Glucose, Fasting 113 (H) 01/09/2025 09:38 AM     Calcium 8.8 01/09/2025 09:38 AM     AST 15 01/09/2025 09:38 AM     ALT 11 01/09/2025 09:38 AM     Alkaline Phosphatase 101 01/09/2025 09:38 AM     Total Protein 7.1 01/09/2025 09:38 AM     Albumin 4.1 01/09/2025 09:38 AM     Total Bilirubin 0.60 01/09/2025 09:38 AM     eGFR 92 01/09/2025 09:38 AM            Pathology:      December 23, 2024 S 24 [id number] status post ultrasound-guided biopsy right breast lesion 10 o'clock  position 6 cm from the nipple  Final Diagnosis   A. Breast, Right, us guided breast bx, 10 ocl, 6 cm fn 3 passes, 12 g:  - Invasive breast carcinoma of no special type (ductal NST/invasive ductal carcinoma).   * [place] grade 2 of 3 (total score: 6 of 9)    -- tubule formation < 10%, score 3    -- nuclear grade 2 of 3, score 2    -- mitoses < 3/mm2, (</= 7 mitoses/10HPF), score 1.   * Confirmed by tumor cell immunophenotype:    -- positive: nuclear stain for GATA3 and membranous stain for E-cadherin and p120.    -- negative:  p63, calponin-B.     * Invasive carcinoma involves 3 of 3 submitted core biopsies, max. dimension = 16 millimeters.  - Ductal carcinoma in situ.   * Estrogen, progesterone & HER2 receptor studies pending, to be described in a separate receptor report.          Synoptic Checklist INVASIVE CARCINOMA OF THE BREAST: Biopsy (INVASIVE CARCINOMA OF THE BREAST: BIOPSY - All Specimens)Protocol posted: 3/22/2023 SPECIMEN Procedure Needle biopsy Specimen Laterality Right .      TUMOR Tumor Site Clock position 10 o'clock   Tumor Site Distance from nipple (Centimeters): 6 cm     Histologic Type Invasive carcinoma of no special type (ductal) Histologic Grade ([place] Histologic Score) Glandular (Acinar) / Tubular Differentiation Score 3 Nuclear Pleomorphism Score 2 Mitotic Rate [id number] Fátima Reyes [id number]   Overall Grade Grade 2 (scores of 6 or 7)      Largest Invasive Focus in this Limited Biopsy Sample 16 mm      Ductal Carcinoma In Situ (DCIS) Present Architectural Patterns Solid Nuclear Grade Grade II (intermediate) Necrosis Not identified      Lymphatic and / or Vascular Invasion Not identified Microcalcifications Not identified .      Estrogen Receptor (ER) Status Negative (less than 1%)      Progesterone Receptor (PgR) Status Negative (less than 1%)      HER2 by Immunohistochemistry Equivocal (Score 2+)      Addendum  01/02/2025 [employer]  Fish  Non amplified  Negative         Imaging:      April 21, 2025 CT chest without contrast   FINDINGS:     LUNGS: There are multiple new nodular patchy opacities in the left lower lobe, the largest measuring 1.6 x 1.4 cm (3/184). It replaces the previously seen 3 mm left lower lobe pulmonary nodule. A few nodular opacities are also seen in the right lower   lobe measuring up to 3 mm (3/195) and dependent aspect of the right middle lobe measuring 1.1 cm (5/80).     Pulmonary nodules are seen as below.  *  Stable posterior subpleural right lower lobe 4 mm pulmonary nodule (5/42)  *  Stable 3 mm subpleural lingular pulmonary nodule (3/140)  *  A 2 mm right upper lobe pulmonary nodule (5/ 29), previously 1 mm on 3/11/2025.     there is no tracheal or endobronchial lesion.     PLEURA: Unremarkable.     HEART/GREAT VESSELS: Heart is unremarkable for patient's age. No thoracic aortic aneurysm.     MEDIASTINUM AND CONCEPCIÓN: Mildly enlarged right hilar lymph node measuring 1.5 cm (2/52), previously 0.8 cm on 3/11/2025     Small hiatal hernia     CHEST WALL AND LOWER NECK: Left-sided Mediport. Interval decrease in the size of right lateral breast mass measuring 1.3 x 1.1 cm (2/73), previously 2.0 x 1.8 cm on CT 1/20/2025.     VISUALIZED STRUCTURES IN THE UPPER ABDOMEN: Unremarkable.     OSSEOUS STRUCTURES: No acute fracture or destructive osseous lesion.     IMPRESSION:     A few new nodular patchy opacities in the left lower lobe, the largest measuring 1.6 cm, replacing the previously seen 3 mm left lower lobe pulmonary nodule. A few smaller nodular opacities are also seen in the right lower lobe and right middle lobe.   Findings likely represent multifocal infectious/aspiration pneumonitis, limiting assessment of underlying pulmonary nodules. Follow-up CT chest in 2 months is recommended to assess for resolution of consolidation and for better assessment of pulmonary   nodules.     A mildly enlarged right hilar lymph node measuring 1.5 cm, reactive or  neoplastic. Attention on follow-up CT chest is recommended to assess for resolution/persistence     Interval decrease in the size of right lateral breast mass since January 2025.     A 2 mm right upper lobe pulmonary nodule, mildly increased in size since 3/11/2025. Additional small stable pulmonary nodules. Attention on follow-up CT chest is recommended to assess for stability.     The study was marked in EPIC for immediate notification.                 January 27, 2025 2D cardiac echo  Summary    Left Ventricle: Left ventricular cavity size is normal. Wall thickness is normal. The left ventricular ejection fraction is 60%. Systolic function is normal. Wall motion is normal. Diastolic function is mildly abnormal, consistent with grade I (abnormal) relaxation.    IVS: There is sigmoid appearance of the septum.    Right Ventricle: Right ventricular cavity size is normal. Systolic function is normal.    Left Atrium: The atrium is mildly dilated (35-41 mL/m2).    Right Atrium: The atrium is normal in size.                       January 20, 2025 CT scan of the chest abdomen and pelvis with contrast  IMPRESSION:     A 2.0 cm right breast mass with a biopsy clip in keeping with the patient's recently diagnosed breast cancer.     A 3 mm left upper lobe pulmonary nodule. Based on current Fleischner Society 2017 Guidelines on incidental pulmonary nodule, patients with a known malignancy are at increased risk of metastasis and should receive initial three-month follow-up chest CT.     No evidence of metastatic disease in the abdomen and pelvis.     The study was marked in EPIC for immediate notification.        January 17, 2025 nuclear medicine whole-body bone scan  IMPRESSION:     1. No scintigraphic evidence of osseous metastasis.        December 18, 2024 targeted right breast ultrasound with bilateral diagnostic mammogram     FINDINGS:   RIGHT  1) MASS [B]  Mammo diagnostic bilateral w 3d and cad: There is a 25 mm high  density, oval mass with indistinct margins seen in the upper outer quadrant of the right breast at 10 o'clock, 6 cm from the nipple. The mass correlates with the palpable finding noted during physical examination.   US breast right limited (diagnostic): There is a 19 mm x 17 mm x 22 mm oval, hypoechoic mass with indistinct margins seen in the upper outer quadrant of the right breast at 10 o'clock, 6 cm from the nipple. The mass correlates with the palpable finding noted during physical examination.  Sonographic exam of the right axilla shows no suspicious adenopathy.  Scattered benign-appearing calcifications are demonstrated in the right breast.  Right breast biopsy clip.     Left  Mammo diagnostic bilateral w 3d and cad  There are no suspicious masses, grouped microcalcifications or areas of unexplained architectural distortion. The skin and nipple areolar complex are unremarkable.   Results were discussed with the patient.  Ultrasound-guided biopsy was recommended.     IMPRESSION:  Solid mass in the upper outer right breast corresponding to the area of clinical concern and highly suspicious for malignancy.     ASSESSMENT/BI-RADS CATEGORY:  Left: 1 - Negative  Right: 5 - Highly Suggestive of Malignancy  Overall: 5 - Highly Suggestive of Malignancy     RECOMMENDATION:       - Ultrasound-guided breast biopsy for the right breast.       - Routine screening mammogram in 1 year for the left breast.           February 19, 2024 bilateral screening mammography     FINDINGS:   There are no suspicious masses, grouped microcalcifications or areas of architectural distortion. The skin and nipple areolar complex are unremarkable.     IMPRESSION:  No mammographic evidence of malignancy.           ASSESSMENT/BI-RADS CATEGORY:  Left: 1 - Negative  Right: 1 - Negative  Overall: 1 - Negative     RECOMMENDATION:       - Routine screening mammogram in 1 year for both breasts.                                               Instructions                    Electronically signed by Kosta Machado MD at 4/22/2025  9:05 AM               Office Visit on 4/22/2025                     Instructions        Instructions

## 2025-05-19 ENCOUNTER — OFFICE VISIT (OUTPATIENT)
Dept: HEMATOLOGY ONCOLOGY | Facility: CLINIC | Age: 65
End: 2025-05-19
Payer: COMMERCIAL

## 2025-05-19 VITALS
WEIGHT: 230 LBS | OXYGEN SATURATION: 98 % | TEMPERATURE: 97.3 F | HEIGHT: 66 IN | RESPIRATION RATE: 18 BRPM | BODY MASS INDEX: 36.96 KG/M2 | HEART RATE: 72 BPM | DIASTOLIC BLOOD PRESSURE: 80 MMHG | SYSTOLIC BLOOD PRESSURE: 116 MMHG

## 2025-05-19 DIAGNOSIS — Z17.1 MALIGNANT NEOPLASM OF UPPER-OUTER QUADRANT OF RIGHT BREAST IN FEMALE, ESTROGEN RECEPTOR NEGATIVE (HCC): Primary | ICD-10-CM

## 2025-05-19 DIAGNOSIS — C50.411 MALIGNANT NEOPLASM OF UPPER-OUTER QUADRANT OF RIGHT BREAST IN FEMALE, ESTROGEN RECEPTOR NEGATIVE (HCC): Primary | ICD-10-CM

## 2025-05-19 LAB
ANISOCYTOSIS BLD QL SMEAR: PRESENT
BASOPHILS # BLD MANUAL: 0.1 THOUSAND/UL (ref 0–0.1)
BASOPHILS NFR MAR MANUAL: 8 % (ref 0–1)
DIFFERENTIAL COMMENT: ABNORMAL
EOSINOPHIL # BLD MANUAL: 0.07 THOUSAND/UL (ref 0–0.4)
EOSINOPHIL NFR BLD MANUAL: 6 % (ref 0–6)
ERYTHROCYTE [DISTWIDTH] IN BLOOD BY AUTOMATED COUNT: 14.9 % (ref 11.6–15.1)
HCT VFR BLD AUTO: 30 % (ref 34.8–46.1)
HGB BLD-MCNC: 9.6 G/DL (ref 11.5–15.4)
LYMPHOCYTES # BLD AUTO: 0.59 THOUSAND/UL (ref 0.6–4.47)
LYMPHOCYTES # BLD AUTO: 46 % (ref 14–44)
MCH RBC QN AUTO: 33.2 PG (ref 26.8–34.3)
MCHC RBC AUTO-ENTMCNC: 32 G/DL (ref 31.4–37.4)
MCV RBC AUTO: 104 FL (ref 82–98)
MONOCYTES # BLD AUTO: 0.13 THOUSAND/UL (ref 0–1.22)
MONOCYTES NFR BLD: 11 % (ref 4–12)
NEUTROPHILS # BLD MANUAL: 0.31 THOUSAND/UL (ref 1.85–7.62)
NEUTS SEG NFR BLD AUTO: 26 % (ref 43–75)
PATHOLOGY REVIEW: YES
PLATELET # BLD AUTO: 150 THOUSANDS/UL (ref 149–390)
PLATELET BLD QL SMEAR: ADEQUATE
PMV BLD AUTO: 10.5 FL (ref 8.9–12.7)
RBC # BLD AUTO: 2.89 MILLION/UL (ref 3.81–5.12)
RBC MORPH BLD: PRESENT
VARIANT LYMPHS # BLD AUTO: 3 %
WBC # BLD AUTO: 1.2 THOUSAND/UL (ref 4.31–10.16)

## 2025-05-19 PROCEDURE — 85060 BLOOD SMEAR INTERPRETATION: CPT | Performed by: PATHOLOGY

## 2025-05-19 PROCEDURE — 99214 OFFICE O/P EST MOD 30 MIN: CPT | Performed by: INTERNAL MEDICINE

## 2025-05-21 ENCOUNTER — PATIENT OUTREACH (OUTPATIENT)
Dept: HEMATOLOGY ONCOLOGY | Facility: CLINIC | Age: 65
End: 2025-05-21

## 2025-05-21 DIAGNOSIS — E78.5 HYPERLIPIDEMIA, UNSPECIFIED HYPERLIPIDEMIA TYPE: ICD-10-CM

## 2025-05-21 NOTE — PROGRESS NOTES
"Sent patient a my chart message for follow up stating the following:    \"Bandar Shine,     Just wanting to check in with you, I hope all is well.  Should you have any questions, concerns or just unsure of something please feel free to reach out to me directly. If I don't know the answer I will find you the answer or at least point you in the right direction.     Just know I'm here for added support and should you run into any issues you can always reach out and I will assist you in anyway I can.      Hope you have a great rest of your day!     THANK YOU!        Amara Ivey MA, PN  Breast Patient Navigator  Oncology Care Coordination   [address] [facility]  [place] [address]  P:[phone]  F:[phone]  [email or web address]\"  "

## 2025-05-22 RX ORDER — ATORVASTATIN CALCIUM 20 MG/1
20 TABLET, FILM COATED ORAL DAILY
Qty: 90 TABLET | Refills: 1 | Status: SHIPPED | OUTPATIENT
Start: 2025-05-22

## 2025-05-27 ENCOUNTER — APPOINTMENT (OUTPATIENT)
Dept: LAB | Facility: CLINIC | Age: 65
End: 2025-05-27
Payer: COMMERCIAL

## 2025-05-27 DIAGNOSIS — Z17.1 MALIGNANT NEOPLASM OF UPPER-OUTER QUADRANT OF RIGHT BREAST IN FEMALE, ESTROGEN RECEPTOR NEGATIVE (HCC): ICD-10-CM

## 2025-05-27 DIAGNOSIS — E86.0 DEHYDRATION: ICD-10-CM

## 2025-05-27 DIAGNOSIS — C50.411 MALIGNANT NEOPLASM OF UPPER-OUTER QUADRANT OF RIGHT BREAST IN FEMALE, ESTROGEN RECEPTOR NEGATIVE (HCC): ICD-10-CM

## 2025-05-27 LAB
ALBUMIN SERPL BCG-MCNC: 3.9 G/DL (ref 3.5–5)
ALP SERPL-CCNC: 96 U/L (ref 34–104)
ALT SERPL W P-5'-P-CCNC: 14 U/L (ref 7–52)
ANION GAP SERPL CALCULATED.3IONS-SCNC: 10 MMOL/L (ref 4–13)
AST SERPL W P-5'-P-CCNC: 17 U/L (ref 13–39)
BASOPHILS # BLD AUTO: 0.03 THOUSANDS/ÂΜL (ref 0–0.1)
BASOPHILS NFR BLD AUTO: 0 % (ref 0–1)
BILIRUB SERPL-MCNC: 0.37 MG/DL (ref 0.2–1)
BUN SERPL-MCNC: 17 MG/DL (ref 5–25)
CALCIUM SERPL-MCNC: 9.2 MG/DL (ref 8.4–10.2)
CHLORIDE SERPL-SCNC: 105 MMOL/L (ref 96–108)
CO2 SERPL-SCNC: 28 MMOL/L (ref 21–32)
CREAT SERPL-MCNC: 0.62 MG/DL (ref 0.6–1.3)
EOSINOPHIL # BLD AUTO: 0.09 THOUSAND/ÂΜL (ref 0–0.61)
EOSINOPHIL NFR BLD AUTO: 1 % (ref 0–6)
ERYTHROCYTE [DISTWIDTH] IN BLOOD BY AUTOMATED COUNT: 15.7 % (ref 11.6–15.1)
GFR SERPL CREATININE-BSD FRML MDRD: 94 ML/MIN/1.73SQ M
GLUCOSE P FAST SERPL-MCNC: 116 MG/DL (ref 65–99)
HCT VFR BLD AUTO: 31.5 % (ref 34.8–46.1)
HGB BLD-MCNC: 10.2 G/DL (ref 11.5–15.4)
IMM GRANULOCYTES # BLD AUTO: 0.06 THOUSAND/UL (ref 0–0.2)
IMM GRANULOCYTES NFR BLD AUTO: 1 % (ref 0–2)
LYMPHOCYTES # BLD AUTO: 0.81 THOUSANDS/ÂΜL (ref 0.6–4.47)
LYMPHOCYTES NFR BLD AUTO: 11 % (ref 14–44)
MCH RBC QN AUTO: 34 PG (ref 26.8–34.3)
MCHC RBC AUTO-ENTMCNC: 32.4 G/DL (ref 31.4–37.4)
MCV RBC AUTO: 105 FL (ref 82–98)
MONOCYTES # BLD AUTO: 1.14 THOUSAND/ÂΜL (ref 0.17–1.22)
MONOCYTES NFR BLD AUTO: 15 % (ref 4–12)
NEUTROPHILS # BLD AUTO: 5.28 THOUSANDS/ÂΜL (ref 1.85–7.62)
NEUTS SEG NFR BLD AUTO: 72 % (ref 43–75)
NRBC BLD AUTO-RTO: 0 /100 WBCS
PLATELET # BLD AUTO: 417 THOUSANDS/UL (ref 149–390)
PMV BLD AUTO: 9.6 FL (ref 8.9–12.7)
POTASSIUM SERPL-SCNC: 4.4 MMOL/L (ref 3.5–5.3)
PROT SERPL-MCNC: 6.5 G/DL (ref 6.4–8.4)
RBC # BLD AUTO: 3 MILLION/UL (ref 3.81–5.12)
SODIUM SERPL-SCNC: 143 MMOL/L (ref 135–147)
T3FREE SERPL-MCNC: 4.38 PG/ML (ref 2.5–3.9)
TSH SERPL DL<=0.05 MIU/L-ACNC: 1.89 UIU/ML (ref 0.45–4.5)
WBC # BLD AUTO: 7.41 THOUSAND/UL (ref 4.31–10.16)

## 2025-05-27 PROCEDURE — 36415 COLL VENOUS BLD VENIPUNCTURE: CPT

## 2025-05-27 PROCEDURE — 80053 COMPREHEN METABOLIC PANEL: CPT

## 2025-05-27 PROCEDURE — 84481 FREE ASSAY (FT-3): CPT

## 2025-05-27 PROCEDURE — 84443 ASSAY THYROID STIM HORMONE: CPT

## 2025-05-27 PROCEDURE — 85025 COMPLETE CBC W/AUTO DIFF WBC: CPT

## 2025-05-28 ENCOUNTER — HOSPITAL ENCOUNTER (OUTPATIENT)
Dept: INFUSION CENTER | Facility: CLINIC | Age: 65
Discharge: HOME | End: 2025-05-28
Payer: COMMERCIAL

## 2025-05-28 VITALS
HEART RATE: 77 BPM | TEMPERATURE: 97.5 F | OXYGEN SATURATION: 95 % | BODY MASS INDEX: 36.8 KG/M2 | SYSTOLIC BLOOD PRESSURE: 108 MMHG | RESPIRATION RATE: 18 BRPM | HEIGHT: 66 IN | DIASTOLIC BLOOD PRESSURE: 69 MMHG | WEIGHT: 229 LBS

## 2025-05-28 DIAGNOSIS — E86.0 DEHYDRATION: ICD-10-CM

## 2025-05-28 DIAGNOSIS — C50.411 MALIGNANT NEOPLASM OF UPPER-OUTER QUADRANT OF RIGHT BREAST IN FEMALE, ESTROGEN RECEPTOR NEGATIVE (HCC): Primary | ICD-10-CM

## 2025-05-28 DIAGNOSIS — Z17.1 MALIGNANT NEOPLASM OF UPPER-OUTER QUADRANT OF RIGHT BREAST IN FEMALE, ESTROGEN RECEPTOR NEGATIVE (HCC): Primary | ICD-10-CM

## 2025-05-28 PROCEDURE — 96367 TX/PROPH/DG ADDL SEQ IV INF: CPT

## 2025-05-28 PROCEDURE — 96411 CHEMO IV PUSH ADDL DRUG: CPT

## 2025-05-28 PROCEDURE — 96413 CHEMO IV INFUSION 1 HR: CPT

## 2025-05-28 RX ORDER — SODIUM CHLORIDE 9 MG/ML
20 INJECTION, SOLUTION INTRAVENOUS ONCE
Status: COMPLETED | OUTPATIENT
Start: 2025-05-28 | End: 2025-05-28

## 2025-05-28 RX ORDER — DOXORUBICIN HYDROCHLORIDE 2 MG/ML
60 INJECTION, SOLUTION INTRAVENOUS ONCE
Status: COMPLETED | OUTPATIENT
Start: 2025-05-28 | End: 2025-05-28

## 2025-05-28 RX ADMIN — FOSAPREPITANT 150 MG: 150 INJECTION, POWDER, LYOPHILIZED, FOR SOLUTION INTRAVENOUS at 08:59

## 2025-05-28 RX ADMIN — DOXORUBICIN HYDROCHLORIDE 129 MG: 2 INJECTION, SOLUTION INTRAVENOUS at 10:22

## 2025-05-28 RX ADMIN — CYCLOPHOSPHAMIDE 1290 MG: 1 INJECTION, POWDER, FOR SOLUTION INTRAVENOUS; ORAL at 09:49

## 2025-05-28 RX ADMIN — SODIUM CHLORIDE 20 ML/HR: 0.9 INJECTION, SOLUTION INTRAVENOUS at 08:33

## 2025-05-28 RX ADMIN — DEXAMETHASONE SODIUM PHOSPHATE: 10 INJECTION, SOLUTION INTRAMUSCULAR; INTRAVENOUS at 08:36

## 2025-05-28 RX ADMIN — SODIUM CHLORIDE 500 ML: 0.9 INJECTION, SOLUTION INTRAVENOUS at 08:33

## 2025-05-28 NOTE — PROGRESS NOTES
Pt tolerated tx without issue. PAC with brisk blood return noted, flushed, and deaccessed. Pt confirmed next appointment on 5/29 @1130 [facility]. AVS declined.

## 2025-05-28 NOTE — PROGRESS NOTES
Pt arrives to infusion center for Cytoxan and Adriamycon. Offers no complaints. Labs from 5/27 are within tx parameters. PAC accessed without issue, brisk blood return, flushed, and infusing. Pt sitting comfortably in chair, wheels locked, call bell within reach.

## 2025-05-29 ENCOUNTER — HOSPITAL ENCOUNTER (OUTPATIENT)
Dept: INFUSION CENTER | Facility: CLINIC | Age: 65
Discharge: HOME | End: 2025-05-29
Attending: INTERNAL MEDICINE
Payer: COMMERCIAL

## 2025-05-29 VITALS
DIASTOLIC BLOOD PRESSURE: 69 MMHG | TEMPERATURE: 97.9 F | OXYGEN SATURATION: 98 % | SYSTOLIC BLOOD PRESSURE: 107 MMHG | RESPIRATION RATE: 16 BRPM | HEART RATE: 69 BPM

## 2025-05-29 DIAGNOSIS — C50.411 MALIGNANT NEOPLASM OF UPPER-OUTER QUADRANT OF RIGHT BREAST IN FEMALE, ESTROGEN RECEPTOR NEGATIVE (HCC): ICD-10-CM

## 2025-05-29 DIAGNOSIS — Z79.899 ENCOUNTER FOR MEDICATION MANAGEMENT: ICD-10-CM

## 2025-05-29 DIAGNOSIS — Z17.1 MALIGNANT NEOPLASM OF UPPER-OUTER QUADRANT OF RIGHT BREAST IN FEMALE, ESTROGEN RECEPTOR NEGATIVE (HCC): ICD-10-CM

## 2025-05-29 DIAGNOSIS — Z76.89 PREVENTION OF CHEMOTHERAPY-INDUCED NEUTROPENIA: Primary | ICD-10-CM

## 2025-05-29 DIAGNOSIS — Z17.1 MALIGNANT NEOPLASM OF UPPER-OUTER QUADRANT OF RIGHT BREAST IN FEMALE, ESTROGEN RECEPTOR NEGATIVE (HCC): Primary | ICD-10-CM

## 2025-05-29 DIAGNOSIS — E86.0 DEHYDRATION: ICD-10-CM

## 2025-05-29 DIAGNOSIS — C50.411 MALIGNANT NEOPLASM OF UPPER-OUTER QUADRANT OF RIGHT BREAST IN FEMALE, ESTROGEN RECEPTOR NEGATIVE (HCC): Primary | ICD-10-CM

## 2025-05-29 DIAGNOSIS — E03.9 HYPOTHYROIDISM, UNSPECIFIED TYPE: ICD-10-CM

## 2025-05-29 PROCEDURE — 96372 THER/PROPH/DIAG INJ SC/IM: CPT

## 2025-05-29 PROCEDURE — 96365 THER/PROPH/DIAG IV INF INIT: CPT

## 2025-05-29 PROCEDURE — 96361 HYDRATE IV INFUSION ADD-ON: CPT

## 2025-05-29 RX ADMIN — DEXAMETHASONE SODIUM PHOSPHATE: 10 INJECTION, SOLUTION INTRAMUSCULAR; INTRAVENOUS at 11:50

## 2025-05-29 RX ADMIN — SODIUM CHLORIDE 500 ML: 0.9 INJECTION, SOLUTION INTRAVENOUS at 11:50

## 2025-05-29 RX ADMIN — PEGFILGRASTIM 3 MG: 6 INJECTION SUBCUTANEOUS at 13:06

## 2025-05-29 NOTE — PROGRESS NOTES
Patient tolerated treatment today without issue. L PAC remains with brisk blood return, flushed well, deaccessed, bandaid in place. Neulasta injection to L arm, bandaid in place. Patient aware of next appt at [facility] infusion 6/18 at 1130, declines AVS.

## 2025-05-29 NOTE — PROGRESS NOTES
Patient arrives for hydration, antiemetic therapy and dose reduced Neulasta. L PAC accessed without issue, brisk blood return noted, flushed well without resistance, gauze dressing in place. Patient resting on recliner chair, call bell within reach.

## 2025-06-04 ENCOUNTER — TELEPHONE (OUTPATIENT)
Age: 65
End: 2025-06-04

## 2025-06-04 NOTE — TELEPHONE ENCOUNTER
PROVIDER: Dr Machado    Pt calling was told by Dr Machado to call in with any lung changes.    Pt stated that when walking last evening she noted some very mild SOB. The AM with her ADL's seemed a little bit winded and when taking a deep breath feels mildly heavy.    Pt was talking with this cinversation no issues noted no SOB during conversation and sounded very upbeat.  She stated Sat Sun Mon was fine.  She just wants  to be aware.    She is in her house windows closed air conditioner on and doing well. Pt stated no need to go to the ER.    Did advise pt should her symptoms worsen and has diff breathing/chest pains to go to the ER for further evaluation,pt understood.  Pt has OV 6/9.

## 2025-06-08 DIAGNOSIS — C50.411 MALIGNANT NEOPLASM OF UPPER-OUTER QUADRANT OF RIGHT BREAST IN FEMALE, ESTROGEN RECEPTOR NEGATIVE (HCC): Primary | ICD-10-CM

## 2025-06-08 DIAGNOSIS — Z17.1 MALIGNANT NEOPLASM OF UPPER-OUTER QUADRANT OF RIGHT BREAST IN FEMALE, ESTROGEN RECEPTOR NEGATIVE (HCC): Primary | ICD-10-CM

## 2025-06-08 NOTE — PROGRESS NOTES
Assessment & Plan  Malignant neoplasm of upper-outer quadrant of right breast in female, estrogen receptor negative (HCC)  Newly diagnosed palpable clinical T2 clinical N0 triple negative Invasive ductal breast cancer established by biopsy December 23, 2024.      The patient is asymptomatic from pulmonary standpoint yet with my gratitude will see pulmonary medicine on tomorrow.                Class 2 obesity due to excess calories without serious comorbidity with body mass index (BMI) of 39.0 to 39.9 in adult        Family history of malignant neoplasm  Positive breast cancer in mother     Essential hypertension        Hypothyroidism, unspecified type     Orders:    CARBOplatin (PARAPLATIN) 38.52 mg in sodium chloride 0.9 % 250 mL IVPB    Oncology Provider Communication    Oncology Provider Communication     Hyperlipidemia, unspecified hyperlipidemia type        Invasive ductal carcinoma of breast, female, right (HCC)     Dehydration           Solitary pulmonary nodule                 Clinical/Pathologic Stage IIB     Cancer Staging   No matching staging information was found for the patient.        Current Therapy   Keynote 522 trial data set approach phase 1 with pembrolizumab every 3 weeks IV plus paclitaxel IV weekly x 12+ carboplatin IV weekly x 12     Discussion  This delightful postmenopausal woman presents following week 11 of 12 preoperative systemic therapy for newly diagnosed grade 2 invasive ductal carcinoma of the right breast.       She continues on the keynote 522 regimen with chemo therapy alone.  The patient has completed week 12 of 12 and moves forward with phase 2 consisting of doxorubicin plus cyclophosphamide every 2 weeks x 4 cycles.       CT scan of the chest demonstrates diminishment in the size of her primary tumor mass from April 2025.    The patient has tolerated chemotherapy quite well.  She has had cycle 3 of 4 doxorubicin plus cyclophosphamide alone without pembrolizumab.  Recall  pleased that pembrolizumab has been discontinued due to potential immune related pneumonitis.    The patient and I agree that her breast mass has continued to involute.    I will update her breast ultrasound just prior to session #4 of 4  On the week of June 23, 2025.  I will reach out to Dr. Esparza to arrange follow-up and surgical planning        CT of the chest is distinctly abnormal however the patient has virtually no pulmonary symptoms or signs.  Her last dose of pembrolizumab was given on February 26, 2025 as dose #2.     The patient has been seen by Pulmonary Medicine and consensus understanding is that she is stable to move forward with therapy.                 Background History:   The patient became self-aware of palpable right breast mass in the fall 2024 and presented for diagnostic reviews.  Her prior mammography on  February 19,2024 was BI-RADS 1 were negative.     The patient's risk factors for the development of breast cancer and solid tumor malignancy include age, postmenopausal status, gender and obesity. Her mother has a diagnosis of breast cancer at age 80 plus of unclear documentation and scope. Her mother is living and well.     The patient has no clinical evidence of metastases.     This patient is approached with favor in a curative intent.  She clearly carries the expectation of an excellent opportunity for a robust tumor response and favorable outcomes over time.  I had a long discussion with the patient and her  regarding the use of chemoimmunotherapy in a neoadjuvant approach fashion and after the keynote 522 trial data set.  I discussed opportunities and the implication of pathologic complete remission and extension with favor of both event free survival and overall survival.  Please see [place] Journal of [facility] from September 2024 lead author Ambreen WOODWARD     The patient and her  were given handwritten sheet outlining all aspects of our discussion today and expressed  understanding clarity and gratitude.  She is scheduled for Mediport catheter placement and we will move forward with phase 1 of care that includes the use of pembrolizumab every 3 weeks plus paclitaxel plus carboplatin weekly x 12.           Special Studies  December 27, 2024 germline genetic panel  No high risk germline mutations                 GENETIC ANALYSIS OVERALL INTERPRETATION   Date Value Ref Range Status   12/27/2024     Final     NEGATIVE: No Clinically Significant Variants Detected   12/27/2024 Variants of Unknown Significance Detected   Final                    INTERPRETATION   Date Value Ref Range Status   12/27/2024 See Notes   Final       Comment:       No pathogenic mutations, variants of unknown significance, or gross deletions or duplications were detected.  Risk Estimate: low likelihood of variants in the genes analyzed contributing to this individual's clinical history.  Genetic counseling is a recommended option for all individuals undergoing genetic testing.  Genes Analyzed (13 total): EVA, BARD1, BRCA1, BRCA2, CDH1, CHEK2, NF1, PALB2, PTEN, RAD51C, RAD51D, STK11 and TP53 (sequencing and deletion/duplication).   12/27/2024 See Notes   Final       Comment:       No known clinically actionable alterations were detected.  Four variants of unknown significance were detected: two in the MSH3 gene, one in the POLE gene, and one in the SDHA gene.  Risk Estimate: should be based on clinical and family history, as the clinical significance of this result is unknown.  Genetic counseling is a recommended option for all individuals undergoing genetic testing.  This individual is heterozygous for the p.N118I (c.353A>T) and p.N861H (c.2581A>C) variants of unknown significance in the MSH3 gene, heterozygous for the p.S8943X (c.4168C>T) variant of unknown significance in the POLE gene, and heterozygous for the p.K480E (c.1438A>G) variant of unknown significance in the SDHA gene, which may or may not  contribute to this individual's clinical history. Familial testing would be necessary to determine if the alterations in MSH3 are on the same chromosome or on different chromosomes (in cis or trans). Refer to the supplementary pages for additional information on these variants. No additional pathogenic mutations, variants of unknown significance, or gross deletions or duplications were detected. Genes Analyzed (59 total): APC, EVA, BAP1, BARD1, BMPR1A, BRCA1, BRCA2, BRIP1, CDH1, CDK4, CDKN1B, CDKN2A, CHEK2, DICER1, FH, FLCN, KIF1B, MAX, MEN1, MET, MLH1, MSH2, MSH6, MUTYH, NF1, NTHL1, PALB2, PMS2, POT1, PTEN, RAD51C, RAD51D, RB1, RET, SDHA, SDHAF2, SDHB, SDHC, SDHD, SMAD4, SMARCA4, STK11, OUNQ858, TP53, TSC1, TSC2 and VHL (sequencing and deletion/duplication); AXIN2, CTNNA1, EGLN1, HOXB13, KIT, MITF, MSH3, PDGFRA, POLD1 and POLE (sequencing only); EPCAM and GREM1 (deletion/duplication only). RNA data is routinely analyzed for use in variant interpretation for all genes.                  GENES NOT REPORTED   Date Value Ref Range Status   2024     Final     EVA,BARD1,CDH1,CHEK2,PALB2,PTEN,RAD51C,RAD51D,STK11,TP53,NF1,BRCA1,BRCA2   2024     Final     APC,EVA,AXIN2,BAP1,BARD1,BMPR1A,BRCA1,BRCA2,BRIP1,CDH1,CDK4,CDKN1B,CDKN2A,CHEK2,CTNNA1,DICER1,EGLN1,EPCAM,FH,FLCN,GREM1,HOXB13,KIF1B,KIT,MAX,MEN1,MET,MITF,MLH1,MSH2,MSH6,MUTYH,NF1,NTHL1,PALB2,PDGFRA,PMS2,POLD1,POT1,PTEN,RAD51C,RAD51D,RB1,RET,SDHAF2,SDHB,  SDHC,SDHD,SMAD4,SMARCA4,STK11,ITMT992,TP53,TSC1,TSC2,VHL            History of Present Illness        This delightful 65-year-old presents with her  to review a new diagnosis of invasive carcinoma of the right breast.  The patient is  2 para 2 and postmenopausal.  The patient's mother at age 80 was revealed to have a diagnosis of breast cancer status post excision only.  The mother is living and well     The patient presented in the 2024 near the  holiday with self-awareness of her  right breast mass.  Patient presented for diagnostic reviews through her gynecologist and primary care physicians.  Bilateral diagnostic mammogram with targeted right breast ultrasound was performed on December 18, 2024.  25 mm oval mass density with indistinct margins was seen in the upper outer quadrant of the right breast at the 10 o'clock position at 6 cm from the nipple.  This mass correlating with the palpable finding on examination.  Targeted ultrasound of the right breast showed a 19 mm x 17 mm x 22 mm oval hypoechoic mass with indistinct margins seen in the upper outer quadrant of the right breast at 10:00.  There were no suspicious adenopathy in the right axilla.  The left breast showed no suspicious masses, grouped microcalcifications or areas of unexplained architectural distortion.     On December 23, 2024 ultrasound-guided biopsy of the right breast mass with mammography was performed without complication.  Final pathology revealed an invasive breast carcinoma no special type ductal of grade 2.  Ductal carcinoma in situ was present.  Estrogen receptor was negative, progesterone receptor was negative and HER2/nathanael was low at 2+ by IHC but subsequently nonamplified by FISH amplification..  Ductal carcinoma in situ.     The patient reports a medical oncology for therapeutic planning and review for possible neoadjuvant therapies        Pertinent History from December 2024     No new complaints              December 23, 2024 S 24 [id number] status post ultrasound-guided biopsy right breast lesion 10 o'clock position 6 cm from the nipple     Cancer Screening and Prevention  Mammography: 12/18/2024   GI/Colonoscopy: 03/02/2021   GYN: 01/12/2022   Bone Density: Not on file   Covid 19 Vaccination Status:      Oncology Therapeutic Summary:     February 26, 2025 pembrolizumab discontinued after second dose due to diffuse symptomatic maculopapular skin eruption.  Patient continued on chemotherapy alone     From  February 3, 2025 cycle 1 week 1   initiation phase 1 keynote 522 approach with pembrolizumab 200 mg 5 pills IV every 3 weeks plus paclitaxel 80 mg/m² IV weekly x 12 and carboplatin AUC 1.5 weekly x 12     12/23/2024 S 24 [id number]  status post ultrasound-guided biopsy right breast     Oncology History   Cancer Staging   Invasive ductal carcinoma of breast, female, right (HCC)  Staging form: Breast, AJCC 8th Edition  - Clinical stage from 1/14/2025: Stage IIB (cT2, cN0, cM0, G2, ER-, SD-, HER2-) - Signed by Jordan Esparza MD on 1/27/2025  Histopathologic type: Infiltrating duct carcinoma, NOS  Stage prefix: Initial diagnosis  Method of lymph node assessment: Clinical  Histologic grading system: 3 grade system        Oncology History   Invasive ductal carcinoma of breast, female, right (HCC)   1/14/2025 -  Cancer Staged     Staging form: Breast, AJCC 8th Edition  - Clinical stage from 1/14/2025: Stage IIB (cT2, cN0, cM0, G2, ER-, SD-, HER2-) - Signed by Jordan Esparza MD on 1/27/2025  Histopathologic type: Infiltrating duct carcinoma, NOS  Stage prefix: Initial diagnosis  Method of lymph node assessment: Clinical  Histologic grading system: 3 grade system         1/27/2025 Initial Diagnosis     Invasive ductal carcinoma of breast, female, right (HCC)      Malignant neoplasm of upper-outer quadrant of right breast in female, estrogen receptor negative (HCC)   1/27/2025 Initial Diagnosis     Malignant neoplasm of upper-outer quadrant of right breast in female, estrogen receptor negative (HCC)      1/28/2025 -  Chemotherapy     DOXOrubicin (ADRIAMYCIN), 60 mg/m2 = 129 mg, Intravenous, Once, 0 of 4 cycles  alteplase (CATHFLO), 2 mg, Intracatheter, Every 1 Minute as needed, 0 of 17 cycles  pegfilgrastim-apgf (N[employer]ve[employer]a), 6 mg, Subcutaneous, Once, 0 of 4 cycles  cyclophosphamide (CYTOXAN) IVPB, 600 mg/m2 = 1,290 mg, Intravenous, Once, 0 of 4 cycles  fosaprepitant (EMEND) IVPB, 150 mg, Intravenous, Once, 0 of 4  cycles  CARBOplatin (PARAPLATIN) IVPB (GOG AUC DOSING), 38.52 mg (100 % of original dose 38.52 mg), Intravenous, Once, 0 of 4 cycles  Dose modification:   (original dose 38.52 mg, Cycle 1),   (original dose 38.52 mg, Cycle 1, Reason: Other (Must fill in a comment), Comment: lab estimates),   (original dose 38.52 mg, Cycle 1),   (original dose 38.52 mg, Cycle 1, Reason: Other (Must fill in a comment))  PACLItaxel (TAXOL) chemo IVPB, 80 mg/m2 = 172.2 mg, Intravenous, Once, 0 of 4 cycles  pembrolizumab (KEYTRUDA) IVPB, 200 mg, Intravenous, Once, 0 of 17 cycles         Review of Systems   Constitutional: Negative.    All other systems reviewed and are negative.     Medical History Reviewed by provider this encounter:     .    Medications Ordered Prior to Encounter                                 Current Outpatient Medications on File Prior to Visit     Medication     Sig     Dispense     Refill          atorvastatin (LIPITOR) 20 mg tablet     TAKE ONE TABLET BY MOUTH EVERY DAY     90 tablet     1          doxycycline hyclate (VIBRAMYCIN) 100 mg capsule     Take 100 mg by mouth daily as needed                      hydroCHLOROthiazide 12.5 mg tablet     TAKE ONE TABLET BY MOUTH EVERY DAY     30 tablet     5          Multiple Vitamin (MULTIVITAMINS PO)     Take by mouth                      nebivolol (BYSTOLIC) 20 MG tablet     TAKE ONE TABLET BY MOUTH EVERY DAY     90 tablet     1     No current facility-administered medications on file prior to visit.         Social History                                 Tobacco Use          Smoking status:     Never          Smokeless tobacco:     Never     Vaping Use          Vaping status:     Never Used     Substance and Sexual Activity          Alcohol use:     Yes          Drug use:     No          Sexual activity:     Yes                 Partners:     Male                 Birth control/protection:     Post-menopausal             Objective     /76 (BP Location: Left arm,  "Patient Position: Sitting, Cuff Size: Adult)   Pulse 79   Temp 97.8 °F (36.6 °C) (Temporal)   Resp 18   Ht 5' 6\" (1.676 m)   Wt 108 kg (239 lb)   LMP  (LMP Unknown)   SpO2 95%   BMI 38.58 kg/m²      Pain Screening:  Pain Score: 0-No pain  ECOG ECOG Performance Status: 0 - Fully active, able to carry on all pre-disease performance without restriction 0  Physical Exam  Vitals and nursing note reviewed. Exam conducted with a chaperone present.   Neck:      Thyroid: No thyroid mass, thyromegaly or thyroid tenderness.      Trachea: Trachea normal.   Cardiovascular:      Rate and Rhythm: Normal rate and regular rhythm.      Pulses: Normal pulses.      Heart sounds: Murmur heard.      Systolic murmur is present with a grade of 2/6.      No diastolic murmur is present.      No friction rub. No gallop. No S3 or S4 sounds.   Pulmonary:      Effort: Pulmonary effort is normal.      Breath sounds: Normal breath sounds. No stridor, decreased air movement or transmitted upper airway sounds. No decreased breath sounds, wheezing, rhonchi or rales.   Chest:      Chest wall: Mass present. No deformity, swelling, tenderness, crepitus or edema. There is no dullness to percussion.   Breasts:     Right: Mass present. No nipple discharge, skin change or tenderness.      Left: Normal.      Comments: 1.55 1.7 cm mass in the right mid to upper outer breasts  Abdominal:      General: Abdomen is protuberant. Bowel sounds are normal.      Palpations: Abdomen is soft. There is no shifting dullness, hepatomegaly, splenomegaly, mass or pulsatile mass.      Tenderness: There is no abdominal tenderness. There is no right CVA tenderness or left CVA tenderness.   Musculoskeletal:      Cervical back: Full passive range of motion without pain and normal range of motion. No edema, rigidity, bony tenderness or crepitus. No pain with movement, spinous process tenderness or muscular tenderness.      Thoracic back: No bony tenderness.      Lumbar " back: No bony tenderness.      Right lower leg: No edema.      Left lower leg: No edema.   Lymphadenopathy:      Cervical: No cervical adenopathy.      Right cervical: No superficial, deep or posterior cervical adenopathy.     Left cervical: No superficial, deep or posterior cervical adenopathy.      Upper Body:      Right upper body: No supraclavicular, axillary or pectoral adenopathy.      Left upper body: No supraclavicular, axillary or pectoral adenopathy.   Neurological:      Mental Status: She is alert.            Labs: I have reviewed the following labs:            Lab Results   Component Value Date/Time     WBC 8.89 09/13/2024 11:01 AM     RBC 4.36 09/13/2024 11:01 AM     Hemoglobin 13.8 09/13/2024 11:01 AM     Hematocrit 42.8 09/13/2024 11:01 AM     MCV 98 09/13/2024 11:01 AM     MCH 31.7 09/13/2024 11:01 AM     RDW 12.4 09/13/2024 11:01 AM     Platelets 181 09/13/2024 11:01 AM                Lab Results   Component Value Date/Time     Potassium 4.0 01/09/2025 09:38 AM     Chloride 103 01/09/2025 09:38 AM     CO2 24 01/09/2025 09:38 AM     BUN 20 01/09/2025 09:38 AM     Creatinine 0.68 01/09/2025 09:38 AM     Glucose, Fasting 113 (H) 01/09/2025 09:38 AM     Calcium 8.8 01/09/2025 09:38 AM     AST 15 01/09/2025 09:38 AM     ALT 11 01/09/2025 09:38 AM     Alkaline Phosphatase 101 01/09/2025 09:38 AM     Total Protein 7.1 01/09/2025 09:38 AM     Albumin 4.1 01/09/2025 09:38 AM     Total Bilirubin 0.60 01/09/2025 09:38 AM     eGFR 92 01/09/2025 09:38 AM            Pathology:      December 23, 2024 S 24 [id number] status post ultrasound-guided biopsy right breast lesion 10 o'clock position 6 cm from the nipple  Final Diagnosis   A. Breast, Right, us guided breast bx, 10 ocl, 6 cm fn 3 passes, 12 g:  - Invasive breast carcinoma of no special type (ductal NST/invasive ductal carcinoma).   * [place] grade 2 of 3 (total score: 6 of 9)    -- tubule formation < 10%, score 3    -- nuclear grade 2 of 3, score 2    --  mitoses < 3/mm2, (</= 7 mitoses/10HPF), score 1.   * Confirmed by tumor cell immunophenotype:    -- positive: nuclear stain for GATA3 and membranous stain for E-cadherin and p120.    -- negative:  p63, calponin-B.     * Invasive carcinoma involves 3 of 3 submitted core biopsies, max. dimension = 16 millimeters.  - Ductal carcinoma in situ.   * Estrogen, progesterone & HER2 receptor studies pending, to be described in a separate receptor report.          Synoptic Checklist INVASIVE CARCINOMA OF THE BREAST: Biopsy (INVASIVE CARCINOMA OF THE BREAST: BIOPSY - All Specimens)Protocol posted: 3/22/2023 SPECIMEN Procedure Needle biopsy Specimen Laterality Right .      TUMOR Tumor Site Clock position 10 o'clock   Tumor Site Distance from nipple (Centimeters): 6 cm     Histologic Type Invasive carcinoma of no special type (ductal) Histologic Grade ([place] Histologic Score) Glandular (Acinar) / Tubular Differentiation Score 3 Nuclear Pleomorphism Score 2 Mitotic Rate [id number] EvadialloFátima [id number]   Overall Grade Grade 2 (scores of 6 or 7)      Largest Invasive Focus in this Limited Biopsy Sample 16 mm      Ductal Carcinoma In Situ (DCIS) Present Architectural Patterns Solid Nuclear Grade Grade II (intermediate) Necrosis Not identified      Lymphatic and / or Vascular Invasion Not identified Microcalcifications Not identified .      Estrogen Receptor (ER) Status Negative (less than 1%)      Progesterone Receptor (PgR) Status Negative (less than 1%)      HER2 by Immunohistochemistry Equivocal (Score 2+)      Addendum  01/02/2025 [place] Temperature Management  Fish  Non amplified  Negative        Imaging:      April 21, 2025 CT chest without contrast   FINDINGS:     LUNGS: There are multiple new nodular patchy opacities in the left lower lobe, the largest measuring 1.6 x 1.4 cm (3/184). It replaces the previously seen 3 mm left lower lobe pulmonary nodule. A few nodular opacities are also seen in the right lower   lobe measuring up  to 3 mm (3/195) and dependent aspect of the right middle lobe measuring 1.1 cm (5/80).     Pulmonary nodules are seen as below.  *  Stable posterior subpleural right lower lobe 4 mm pulmonary nodule (5/42)  *  Stable 3 mm subpleural lingular pulmonary nodule (3/140)  *  A 2 mm right upper lobe pulmonary nodule (5/ 29), previously 1 mm on 3/11/2025.     there is no tracheal or endobronchial lesion.     PLEURA: Unremarkable.     HEART/GREAT VESSELS: Heart is unremarkable for patient's age. No thoracic aortic aneurysm.     MEDIASTINUM AND CONCEPCIÓN: Mildly enlarged right hilar lymph node measuring 1.5 cm (2/52), previously 0.8 cm on 3/11/2025     Small hiatal hernia     CHEST WALL AND LOWER NECK: Left-sided Mediport. Interval decrease in the size of right lateral breast mass measuring 1.3 x 1.1 cm (2/73), previously 2.0 x 1.8 cm on CT 1/20/2025.     VISUALIZED STRUCTURES IN THE UPPER ABDOMEN: Unremarkable.     OSSEOUS STRUCTURES: No acute fracture or destructive osseous lesion.     IMPRESSION:     A few new nodular patchy opacities in the left lower lobe, the largest measuring 1.6 cm, replacing the previously seen 3 mm left lower lobe pulmonary nodule. A few smaller nodular opacities are also seen in the right lower lobe and right middle lobe.   Findings likely represent multifocal infectious/aspiration pneumonitis, limiting assessment of underlying pulmonary nodules. Follow-up CT chest in 2 months is recommended to assess for resolution of consolidation and for better assessment of pulmonary   nodules.     A mildly enlarged right hilar lymph node measuring 1.5 cm, reactive or neoplastic. Attention on follow-up CT chest is recommended to assess for resolution/persistence     Interval decrease in the size of right lateral breast mass since January 2025.     A 2 mm right upper lobe pulmonary nodule, mildly increased in size since 3/11/2025. Additional small stable pulmonary nodules. Attention on follow-up CT chest is  recommended to assess for stability.     The study was marked in EPIC for immediate notification.                 January 27, 2025 2D cardiac echo  Summary    Left Ventricle: Left ventricular cavity size is normal. Wall thickness is normal. The left ventricular ejection fraction is 60%. Systolic function is normal. Wall motion is normal. Diastolic function is mildly abnormal, consistent with grade I (abnormal) relaxation.    IVS: There is sigmoid appearance of the septum.    Right Ventricle: Right ventricular cavity size is normal. Systolic function is normal.    Left Atrium: The atrium is mildly dilated (35-41 mL/m2).    Right Atrium: The atrium is normal in size.                       January 20, 2025 CT scan of the chest abdomen and pelvis with contrast  IMPRESSION:     A 2.0 cm right breast mass with a biopsy clip in keeping with the patient's recently diagnosed breast cancer.     A 3 mm left upper lobe pulmonary nodule. Based on current Fleischner Society 2017 Guidelines on incidental pulmonary nodule, patients with a known malignancy are at increased risk of metastasis and should receive initial three-month follow-up chest CT.     No evidence of metastatic disease in the abdomen and pelvis.     The study was marked in EPIC for immediate notification.        January 17, 2025 nuclear medicine whole-body bone scan  IMPRESSION:     1. No scintigraphic evidence of osseous metastasis.        December 18, 2024 targeted right breast ultrasound with bilateral diagnostic mammogram     FINDINGS:   RIGHT  1) MASS [B]  Mammo diagnostic bilateral w 3d and cad: There is a 25 mm high density, oval mass with indistinct margins seen in the upper outer quadrant of the right breast at 10 o'clock, 6 cm from the nipple. The mass correlates with the palpable finding noted during physical examination.   US breast right limited (diagnostic): There is a 19 mm x 17 mm x 22 mm oval, hypoechoic mass with indistinct margins seen in the  upper outer quadrant of the right breast at 10 o'clock, 6 cm from the nipple. The mass correlates with the palpable finding noted during physical examination.  Sonographic exam of the right axilla shows no suspicious adenopathy.  Scattered benign-appearing calcifications are demonstrated in the right breast.  Right breast biopsy clip.     Left  Mammo diagnostic bilateral w 3d and cad  There are no suspicious masses, grouped microcalcifications or areas of unexplained architectural distortion. The skin and nipple areolar complex are unremarkable.   Results were discussed with the patient.  Ultrasound-guided biopsy was recommended.     IMPRESSION:  Solid mass in the upper outer right breast corresponding to the area of clinical concern and highly suspicious for malignancy.     ASSESSMENT/BI-RADS CATEGORY:  Left: 1 - Negative  Right: 5 - Highly Suggestive of Malignancy  Overall: 5 - Highly Suggestive of Malignancy     RECOMMENDATION:       - Ultrasound-guided breast biopsy for the right breast.       - Routine screening mammogram in 1 year for the left breast.           February 19, 2024 bilateral screening mammography     FINDINGS:   There are no suspicious masses, grouped microcalcifications or areas of architectural distortion. The skin and nipple areolar complex are unremarkable.     IMPRESSION:  No mammographic evidence of malignancy.           ASSESSMENT/BI-RADS CATEGORY:  Left: 1 - Negative  Right: 1 - Negative  Overall: 1 - Negative     RECOMMENDATION:       - Routine screening mammogram in 1 year for both breasts.                                               Instructions

## 2025-06-09 ENCOUNTER — OFFICE VISIT (OUTPATIENT)
Dept: HEMATOLOGY ONCOLOGY | Facility: CLINIC | Age: 65
End: 2025-06-09
Payer: COMMERCIAL

## 2025-06-09 VITALS
BODY MASS INDEX: 36.64 KG/M2 | HEIGHT: 66 IN | OXYGEN SATURATION: 96 % | HEART RATE: 80 BPM | SYSTOLIC BLOOD PRESSURE: 114 MMHG | WEIGHT: 228 LBS | DIASTOLIC BLOOD PRESSURE: 80 MMHG | TEMPERATURE: 97.7 F

## 2025-06-09 DIAGNOSIS — C50.411 MALIGNANT NEOPLASM OF UPPER-OUTER QUADRANT OF RIGHT BREAST IN FEMALE, ESTROGEN RECEPTOR NEGATIVE (HCC): Primary | ICD-10-CM

## 2025-06-09 DIAGNOSIS — R06.09 DYSPNEA ON EXERTION: ICD-10-CM

## 2025-06-09 DIAGNOSIS — Z17.1 MALIGNANT NEOPLASM OF UPPER-OUTER QUADRANT OF RIGHT BREAST IN FEMALE, ESTROGEN RECEPTOR NEGATIVE (HCC): Primary | ICD-10-CM

## 2025-06-09 PROCEDURE — 99215 OFFICE O/P EST HI 40 MIN: CPT | Performed by: INTERNAL MEDICINE

## 2025-06-09 RX ORDER — SODIUM CHLORIDE 9 MG/ML
20 INJECTION, SOLUTION INTRAVENOUS ONCE
OUTPATIENT
Start: 2025-07-09

## 2025-06-09 RX ORDER — SODIUM CHLORIDE 9 MG/ML
20 INJECTION, SOLUTION INTRAVENOUS ONCE
Status: CANCELLED | OUTPATIENT
Start: 2025-06-18

## 2025-06-09 RX ORDER — DOXORUBICIN HYDROCHLORIDE 2 MG/ML
60 INJECTION, SOLUTION INTRAVENOUS ONCE
Status: CANCELLED | OUTPATIENT
Start: 2025-06-18

## 2025-06-09 RX ORDER — DOXORUBICIN HYDROCHLORIDE 2 MG/ML
60 INJECTION, SOLUTION INTRAVENOUS ONCE
OUTPATIENT
Start: 2025-07-09

## 2025-06-12 ENCOUNTER — HOSPITAL ENCOUNTER (OUTPATIENT)
Dept: RADIOLOGY | Age: 65
Discharge: HOME | End: 2025-06-12
Payer: COMMERCIAL

## 2025-06-12 VITALS — WEIGHT: 224 LBS | BODY MASS INDEX: 35.16 KG/M2 | HEIGHT: 67 IN

## 2025-06-12 DIAGNOSIS — Z13.820 ENCOUNTER FOR SCREENING FOR OSTEOPOROSIS: ICD-10-CM

## 2025-06-12 PROCEDURE — 77080 DXA BONE DENSITY AXIAL: CPT

## 2025-06-16 ENCOUNTER — TELEPHONE (OUTPATIENT)
Dept: SURGERY | Facility: CLINIC | Age: 65
End: 2025-06-16

## 2025-06-16 ENCOUNTER — APPOINTMENT (OUTPATIENT)
Dept: LAB | Facility: CLINIC | Age: 65
End: 2025-06-16
Payer: COMMERCIAL

## 2025-06-16 LAB
ALBUMIN SERPL BCG-MCNC: 4.1 G/DL (ref 3.5–5)
ALP SERPL-CCNC: 107 U/L (ref 34–104)
ALT SERPL W P-5'-P-CCNC: 17 U/L (ref 7–52)
ANION GAP SERPL CALCULATED.3IONS-SCNC: 11 MMOL/L (ref 4–13)
AST SERPL W P-5'-P-CCNC: 21 U/L (ref 13–39)
BASOPHILS # BLD AUTO: 0.03 THOUSANDS/ÂΜL (ref 0–0.1)
BASOPHILS NFR BLD AUTO: 0 % (ref 0–1)
BILIRUB SERPL-MCNC: 0.33 MG/DL (ref 0.2–1)
BUN SERPL-MCNC: 15 MG/DL (ref 5–25)
CALCIUM SERPL-MCNC: 9.2 MG/DL (ref 8.4–10.2)
CHLORIDE SERPL-SCNC: 102 MMOL/L (ref 96–108)
CO2 SERPL-SCNC: 28 MMOL/L (ref 21–32)
CREAT SERPL-MCNC: 0.61 MG/DL (ref 0.6–1.3)
EOSINOPHIL # BLD AUTO: 0.01 THOUSAND/ÂΜL (ref 0–0.61)
EOSINOPHIL NFR BLD AUTO: 0 % (ref 0–6)
ERYTHROCYTE [DISTWIDTH] IN BLOOD BY AUTOMATED COUNT: 15.3 % (ref 11.6–15.1)
GFR SERPL CREATININE-BSD FRML MDRD: 95 ML/MIN/1.73SQ M
GLUCOSE P FAST SERPL-MCNC: 121 MG/DL (ref 65–99)
HCT VFR BLD AUTO: 32.4 % (ref 34.8–46.1)
HGB BLD-MCNC: 10.6 G/DL (ref 11.5–15.4)
IMM GRANULOCYTES # BLD AUTO: 0.08 THOUSAND/UL (ref 0–0.2)
IMM GRANULOCYTES NFR BLD AUTO: 1 % (ref 0–2)
LYMPHOCYTES # BLD AUTO: 0.74 THOUSANDS/ÂΜL (ref 0.6–4.47)
LYMPHOCYTES NFR BLD AUTO: 9 % (ref 14–44)
MCH RBC QN AUTO: 34.5 PG (ref 26.8–34.3)
MCHC RBC AUTO-ENTMCNC: 32.7 G/DL (ref 31.4–37.4)
MCV RBC AUTO: 106 FL (ref 82–98)
MONOCYTES # BLD AUTO: 1.16 THOUSAND/ÂΜL (ref 0.17–1.22)
MONOCYTES NFR BLD AUTO: 14 % (ref 4–12)
NEUTROPHILS # BLD AUTO: 6.08 THOUSANDS/ÂΜL (ref 1.85–7.62)
NEUTS SEG NFR BLD AUTO: 76 % (ref 43–75)
NRBC BLD AUTO-RTO: 0 /100 WBCS
PLATELET # BLD AUTO: 389 THOUSANDS/UL (ref 149–390)
PMV BLD AUTO: 10.3 FL (ref 8.9–12.7)
POTASSIUM SERPL-SCNC: 4 MMOL/L (ref 3.5–5.3)
PROT SERPL-MCNC: 6.7 G/DL (ref 6.4–8.4)
RBC # BLD AUTO: 3.07 MILLION/UL (ref 3.81–5.12)
SODIUM SERPL-SCNC: 141 MMOL/L (ref 135–147)
T3FREE SERPL-MCNC: 4.11 PG/ML (ref 2.5–3.9)
TSH SERPL DL<=0.05 MIU/L-ACNC: 1.97 UIU/ML (ref 0.45–4.5)
WBC # BLD AUTO: 8.1 THOUSAND/UL (ref 4.31–10.16)

## 2025-06-16 NOTE — TELEPHONE ENCOUNTER
"Placed call to patient and left a message to return our call.    \"MD Sophy Omer, SHAHIDAN  Thanks would put her in for a 30 min preop visit sometime in early July, if needed I can help you find a spot\"  "

## 2025-06-16 NOTE — TELEPHONE ENCOUNTER
Spoke with patient.    What do you think if I slide people up at the end of the morning on 7/2 and put her at 10:45/11?  She is aware this would be in [place]. If not possible, can look at other dates.    You have a surgery scheduled for 2:20 that day.

## 2025-06-18 ENCOUNTER — HOSPITAL ENCOUNTER (OUTPATIENT)
Dept: INFUSION CENTER | Facility: CLINIC | Age: 65
Discharge: HOME | End: 2025-06-18
Attending: INTERNAL MEDICINE
Payer: COMMERCIAL

## 2025-06-18 VITALS
DIASTOLIC BLOOD PRESSURE: 65 MMHG | OXYGEN SATURATION: 95 % | HEART RATE: 69 BPM | RESPIRATION RATE: 17 BRPM | SYSTOLIC BLOOD PRESSURE: 97 MMHG | WEIGHT: 224.5 LBS | BODY MASS INDEX: 36.08 KG/M2 | TEMPERATURE: 97.5 F | HEIGHT: 66 IN

## 2025-06-18 DIAGNOSIS — E86.0 DEHYDRATION: ICD-10-CM

## 2025-06-18 DIAGNOSIS — C50.411 MALIGNANT NEOPLASM OF UPPER-OUTER QUADRANT OF RIGHT BREAST IN FEMALE, ESTROGEN RECEPTOR NEGATIVE (HCC): Primary | ICD-10-CM

## 2025-06-18 DIAGNOSIS — Z17.1 MALIGNANT NEOPLASM OF UPPER-OUTER QUADRANT OF RIGHT BREAST IN FEMALE, ESTROGEN RECEPTOR NEGATIVE (HCC): Primary | ICD-10-CM

## 2025-06-18 PROCEDURE — 96413 CHEMO IV INFUSION 1 HR: CPT

## 2025-06-18 PROCEDURE — 96367 TX/PROPH/DG ADDL SEQ IV INF: CPT

## 2025-06-18 PROCEDURE — 96411 CHEMO IV PUSH ADDL DRUG: CPT

## 2025-06-18 RX ORDER — DOXORUBICIN HYDROCHLORIDE 2 MG/ML
60 INJECTION, SOLUTION INTRAVENOUS ONCE
Status: COMPLETED | OUTPATIENT
Start: 2025-06-18 | End: 2025-06-18

## 2025-06-18 RX ORDER — SODIUM CHLORIDE 9 MG/ML
20 INJECTION, SOLUTION INTRAVENOUS ONCE
Status: COMPLETED | OUTPATIENT
Start: 2025-06-18 | End: 2025-06-18

## 2025-06-18 RX ADMIN — DEXAMETHASONE SODIUM PHOSPHATE: 10 INJECTION, SOLUTION INTRAMUSCULAR; INTRAVENOUS at 12:07

## 2025-06-18 RX ADMIN — SODIUM CHLORIDE 20 ML/HR: 0.9 INJECTION, SOLUTION INTRAVENOUS at 12:06

## 2025-06-18 RX ADMIN — FOSAPREPITANT 150 MG: 150 INJECTION, POWDER, LYOPHILIZED, FOR SOLUTION INTRAVENOUS at 12:30

## 2025-06-18 RX ADMIN — SODIUM CHLORIDE 500 ML: 0.9 INJECTION, SOLUTION INTRAVENOUS at 12:11

## 2025-06-18 RX ADMIN — CYCLOPHOSPHAMIDE 1290 MG: 1 INJECTION, POWDER, FOR SOLUTION INTRAVENOUS; ORAL at 13:19

## 2025-06-18 RX ADMIN — DOXORUBICIN HYDROCHLORIDE 129 MG: 2 INJECTION, SOLUTION INTRAVENOUS at 13:56

## 2025-06-18 NOTE — PROGRESS NOTES
[facility] for the treatment of Adriamycin /cytoxan. She offers no concerns at this time. Labs from 06/16/2025 reviewed and within parameters for treatment. Port  accessed with good blood return. Patient is resting comfortably in the chair, call bell within reach.

## 2025-06-18 NOTE — PROGRESS NOTES
Patient tolerated treatment well with no adverse reactions. Port de-accessed without difficulty. Declined AVS. Confirmed next appointment at the [facility] for 06/19/2025 @ 2634. Patient ambulatory at discharge.

## 2025-06-19 ENCOUNTER — TELEPHONE (OUTPATIENT)
Dept: HEMATOLOGY ONCOLOGY | Facility: CLINIC | Age: 65
End: 2025-06-19

## 2025-06-19 ENCOUNTER — RESULTS FOLLOW-UP (OUTPATIENT)
Age: 65
End: 2025-06-19

## 2025-06-19 ENCOUNTER — HOSPITAL ENCOUNTER (OUTPATIENT)
Dept: INFUSION CENTER | Facility: CLINIC | Age: 65
Discharge: HOME | End: 2025-06-19
Attending: INTERNAL MEDICINE
Payer: COMMERCIAL

## 2025-06-19 VITALS
DIASTOLIC BLOOD PRESSURE: 68 MMHG | OXYGEN SATURATION: 98 % | TEMPERATURE: 96.6 F | SYSTOLIC BLOOD PRESSURE: 99 MMHG | HEART RATE: 78 BPM | RESPIRATION RATE: 16 BRPM

## 2025-06-19 DIAGNOSIS — Z17.1 MALIGNANT NEOPLASM OF UPPER-OUTER QUADRANT OF RIGHT BREAST IN FEMALE, ESTROGEN RECEPTOR NEGATIVE (HCC): Primary | ICD-10-CM

## 2025-06-19 DIAGNOSIS — E86.0 DEHYDRATION: ICD-10-CM

## 2025-06-19 DIAGNOSIS — C50.411 MALIGNANT NEOPLASM OF UPPER-OUTER QUADRANT OF RIGHT BREAST IN FEMALE, ESTROGEN RECEPTOR NEGATIVE (HCC): Primary | ICD-10-CM

## 2025-06-19 PROCEDURE — 96361 HYDRATE IV INFUSION ADD-ON: CPT

## 2025-06-19 PROCEDURE — 96365 THER/PROPH/DIAG IV INF INIT: CPT

## 2025-06-19 PROCEDURE — 96372 THER/PROPH/DIAG INJ SC/IM: CPT

## 2025-06-19 RX ADMIN — DEXAMETHASONE SODIUM PHOSPHATE: 10 INJECTION, SOLUTION INTRAMUSCULAR; INTRAVENOUS at 15:00

## 2025-06-19 RX ADMIN — PEGFILGRASTIM 3 MG: 6 INJECTION SUBCUTANEOUS at 15:10

## 2025-06-19 RX ADMIN — SODIUM CHLORIDE 500 ML: 0.9 INJECTION, SOLUTION INTRAVENOUS at 15:28

## 2025-06-19 NOTE — TELEPHONE ENCOUNTER
Switched appointment to Wednesday at 8:00am 6/25 since US is day prior on 6/24. New appointment time confirmed

## 2025-06-19 NOTE — PROGRESS NOTES
Patient to [facility]fusion [facility] for Neulasta and hydration. No complaints offered at this time. Treatment tolerated without incident. Next appt confirmed with patient for 7/9 at 11:00 at [place]. Printed calendar of appts provided to patient.

## 2025-06-23 ENCOUNTER — PATIENT OUTREACH (OUTPATIENT)
Dept: HEMATOLOGY ONCOLOGY | Facility: CLINIC | Age: 65
End: 2025-06-23

## 2025-06-23 NOTE — PROGRESS NOTES
Called and LVM for follow up with patient. Mentioned that I am her patient navigator and I was calling to check in with patient wanting to address any questions,concerns or any barriers to care at this time. Mentioned that should she have any questions or concerns to please feel free to reach me directly as I was calling from my direct line #[phone].

## 2025-06-24 ENCOUNTER — HOSPITAL ENCOUNTER (OUTPATIENT)
Dept: ULTRASOUND IMAGING | Facility: CLINIC | Age: 65
Discharge: HOME | End: 2025-06-24
Attending: INTERNAL MEDICINE
Payer: COMMERCIAL

## 2025-06-24 DIAGNOSIS — Z17.1 MALIGNANT NEOPLASM OF UPPER-OUTER QUADRANT OF RIGHT BREAST IN FEMALE, ESTROGEN RECEPTOR NEGATIVE (HCC): ICD-10-CM

## 2025-06-24 DIAGNOSIS — C50.411 MALIGNANT NEOPLASM OF UPPER-OUTER QUADRANT OF RIGHT BREAST IN FEMALE, ESTROGEN RECEPTOR NEGATIVE (HCC): ICD-10-CM

## 2025-06-24 PROCEDURE — 76642 ULTRASOUND BREAST LIMITED: CPT

## 2025-06-24 NOTE — PROGRESS NOTES
Assessment & Plan  Malignant neoplasm of upper-outer quadrant of right breast in female, estrogen receptor negative (HCC)  Diagnosis of clinical T2 clinical N0 triple negative Invasive ductal breast cancer established by biopsy from December 23, 2024.      July 9, 2025 from February 5, 2025 status post neoadjuvant systemic therapy per keynote 522 trial data set approach with paclitaxel plus carboplatin weekly x 12 and pembrolizumab every 3 weeks followed by doxorubicin plus cyclophosphamide every 2 weeks x 4.    Pembrolizumab discontinued due to concerns about pulmonary toxicity after 2 dosages    Final cycle of preoperative neoadjuvant systemic therapy with session #4 of 4 doxorubicin plus cyclophosphamide is set for July 9, 2025        Class 2 obesity due to excess calories without serious comorbidity with body mass index (BMI) of 39.0 to 39.9 in adult        Family history of malignant neoplasm  Positive breast cancer in mother     Essential hypertension        Hypothyroidism, unspecified type     Orders:    CARBOplatin (PARAPLATIN) 38.52 mg in sodium chloride 0.9 % 250 mL IVPB    Oncology Provider Communication    Oncology Provider Communication     Hyperlipidemia, unspecified hyperlipidemia type        Invasive ductal carcinoma of breast, female, right (HCC)     Dehydration           Solitary pulmonary nodule                 Clinical/Pathologic Stage IIB     Cancer Staging   No matching staging information was found for the patient.        Current Therapy   Keynote 522 trial data set approach phase 1 with pembrolizumab every 3 weeks IV plus paclitaxel IV weekly x 12+ carboplatin IV weekly x 12     Discussion  This delightful postmenopausal woman continues on a modified  keynote 522 regimen with neoadjuvant chemo therapy alone.  The patient has completed week 12 of 12 of Paclitaxel plus carbo platinum and moves forward with phase 2 consisting of doxorubicin plus cyclophosphamide every 2 weeks x 4 cycles.  Cycle  4 for doxorubicin plus cyclophosphamide will be delivered on July 9, 2025.    Please note that pembrolizumab was discontinued after 2 dosages due to diagnostic concerns around potential immune related pneumonitis..CT of the chest remains distinctly abnormal however the patient has virtually no pulmonary symptoms or signs.  Her last dose of pembrolizumab was given on February 26, 2025 as dose #2.     The patient has been seen by Pulmonary Medicine and consensus understanding is that she is stable to move forward with systemic chemotherapy alone.        CT scan of the chest has demonstrated diminution in the size of her primary tumor mass from April 2025.     The patient has tolerated chemotherapy quite well.  She has had cycle 3 of 4 doxorubicin plus cyclophosphamide alone without pembrolizumab.       The patient and I agree that her breast mass has continued to involute.     I will update her breast ultrasound just prior to session #4 of 4  On the week of June 23, 2025.  I will reach out to Dr. Esparza to arrange follow-up and surgical planning                    Background History:   The patient became self-aware of palpable right breast mass in the fall 2024 and presented for diagnostic reviews.  Her prior mammography on  February 19,2024 was BI-RADS 1 were negative.     The patient's risk factors for the development of breast cancer and solid tumor malignancy include age, postmenopausal status, gender and obesity. Her mother has a diagnosis of breast cancer at age 80 plus of unclear documentation and scope. Her mother is living and well.     The patient has no clinical evidence of metastases.     This patient is approached with favor in a curative intent.  She clearly carries the expectation of an excellent opportunity for a robust tumor response and favorable outcomes over time.  I had a long discussion with the patient and her  regarding the use of chemoimmunotherapy in a neoadjuvant approach fashion and  after the keynote 522 trial data set.  I discussed opportunities and the implication of pathologic complete remission and extension with favor of both event free survival and overall survival.  Please see [place] Journal of [employer] from September 2024 lead author Ambreen WOODWARD     The patient and her  were given handwritten sheet outlining all aspects of our discussion today and expressed understanding clarity and gratitude.  She is scheduled for Mediport catheter placement and we will move forward with phase 1 of care that includes the use of pembrolizumab every 3 weeks plus paclitaxel plus carboplatin weekly x 12.           Special Studies  December 27, 2024 germline genetic panel  No high risk germline mutations                 GENETIC ANALYSIS OVERALL INTERPRETATION   Date Value Ref Range Status   12/27/2024     Final     NEGATIVE: No Clinically Significant Variants Detected   12/27/2024 Variants of Unknown Significance Detected   Final                    INTERPRETATION   Date Value Ref Range Status   12/27/2024 See Notes   Final       Comment:       No pathogenic mutations, variants of unknown significance, or gross deletions or duplications were detected.  Risk Estimate: low likelihood of variants in the genes analyzed contributing to this individual's clinical history.  Genetic counseling is a recommended option for all individuals undergoing genetic testing.  Genes Analyzed (13 total): EVA, BARD1, BRCA1, BRCA2, CDH1, CHEK2, NF1, PALB2, PTEN, RAD51C, RAD51D, STK11 and TP53 (sequencing and deletion/duplication).   12/27/2024 See Notes   Final       Comment:       No known clinically actionable alterations were detected.  Four variants of unknown significance were detected: two in the MSH3 gene, one in the POLE gene, and one in the SDHA gene.  Risk Estimate: should be based on clinical and family history, as the clinical significance of this result is unknown.  Genetic counseling is a recommended option  for all individuals undergoing genetic testing.  This individual is heterozygous for the p.N118I (c.353A>T) and p.N861H (c.2581A>C) variants of unknown significance in the MSH3 gene, heterozygous for the p.M9907I (c.4168C>T) variant of unknown significance in the POLE gene, and heterozygous for the p.K480E (c.1438A>G) variant of unknown significance in the SDHA gene, which may or may not contribute to this individual's clinical history. Familial testing would be necessary to determine if the alterations in MSH3 are on the same chromosome or on different chromosomes (in cis or trans). Refer to the supplementary pages for additional information on these variants. No additional pathogenic mutations, variants of unknown significance, or gross deletions or duplications were detected. Genes Analyzed (59 total): APC, EVA, BAP1, BARD1, BMPR1A, BRCA1, BRCA2, BRIP1, CDH1, CDK4, CDKN1B, CDKN2A, CHEK2, DICER1, FH, FLCN, KIF1B, MAX, MEN1, MET, MLH1, MSH2, MSH6, MUTYH, NF1, NTHL1, PALB2, PMS2, POT1, PTEN, RAD51C, RAD51D, RB1, RET, SDHA, SDHAF2, SDHB, SDHC, SDHD, SMAD4, SMARCA4, STK11, HEYC238, TP53, TSC1, TSC2 and VHL (sequencing and deletion/duplication); AXIN2, CTNNA1, EGLN1, HOXB13, KIT, MITF, MSH3, PDGFRA, POLD1 and POLE (sequencing only); EPCAM and GREM1 (deletion/duplication only). RNA data is routinely analyzed for use in variant interpretation for all genes.                  GENES NOT REPORTED   Date Value Ref Range Status   12/27/2024     Final     EVA,BARD1,CDH1,CHEK2,PALB2,PTEN,RAD51C,RAD51D,STK11,TP53,NF1,BRCA1,BRCA2   12/27/2024     Final     APC,EVA,AXIN2,BAP1,BARD1,BMPR1A,BRCA1,BRCA2,BRIP1,CDH1,CDK4,CDKN1B,CDKN2A,CHEK2,CTNNA1,DICER1,EGLN1,EPCAM,FH,FLCN,GREM1,HOXB13,KIF1B,KIT,MAX,MEN1,MET,MITF,MLH1,MSH2,MSH6,MUTYH,NF1,NTHL1,PALB2,PDGFRA,PMS2,POLD1,POT1,PTEN,RAD51C,RAD51D,RB1,RET,SDHAF2,SDHB,  SDHC,SDHD,SMAD4,SMARCA4,STK11,LKVJ209,TP53,TSC1,TSC2,VHL            History of Present Illness        This delightful  65-year-old presents with her  to review a new diagnosis of invasive carcinoma of the right breast.  The patient is  2 para 2 and postmenopausal.  The patient's mother at age 80 was revealed to have a diagnosis of breast cancer status post excision only.  The mother is living and well     The patient presented in the 2024 near the  holiday with self-awareness of her right breast mass.  Patient presented for diagnostic reviews through her gynecologist and primary care physicians.  Bilateral diagnostic mammogram with targeted right breast ultrasound was performed on 2024.  25 mm oval mass density with indistinct margins was seen in the upper outer quadrant of the right breast at the 10 o'clock position at 6 cm from the nipple.  This mass correlating with the palpable finding on examination.  Targeted ultrasound of the right breast showed a 19 mm x 17 mm x 22 mm oval hypoechoic mass with indistinct margins seen in the upper outer quadrant of the right breast at 10:00.  There were no suspicious adenopathy in the right axilla.  The left breast showed no suspicious masses, grouped microcalcifications or areas of unexplained architectural distortion.     On 2024 ultrasound-guided biopsy of the right breast mass with mammography was performed without complication.  Final pathology revealed an invasive breast carcinoma no special type ductal of grade 2.  Ductal carcinoma in situ was present.  Estrogen receptor was negative, progesterone receptor was negative and HER2/nathanael was low at 2+ by IHC but subsequently nonamplified by FISH amplification..  Ductal carcinoma in situ.     The patient reports a medical oncology for therapeutic planning and review for possible neoadjuvant therapies        Pertinent History from 2024     No new complaints              2024 S 24 [id number] status post ultrasound-guided biopsy right breast lesion 10 o'clock position 6 cm  from the [facility]     Cancer Screening and Prevention  Mammography: 12/18/2024   GI/Colonoscopy: 03/02/2021   GYN: 01/12/2022   Bone Density: Not on file   Covid 19 Vaccination Status:      Oncology Therapeutic Summary:     February 26, 2025 pembrolizumab discontinued after second dose due to diffuse symptomatic maculopapular skin eruption.  Patient continued on chemotherapy alone     From February 3, 2025 cycle 1 week 1   initiation phase 1 keynote 522 approach with pembrolizumab 200 mg 5 pills IV every 3 weeks plus paclitaxel 80 mg/m² IV weekly x 12 and carboplatin AUC 1.5 weekly x 12     12/23/2024 S 24 [id number]  status post ultrasound-guided biopsy right breast     Oncology History   Cancer Staging   Invasive ductal carcinoma of breast, female, right (HCC)  Staging form: Breast, AJCC 8th Edition  - Clinical stage from 1/14/2025: Stage IIB (cT2, cN0, cM0, G2, ER-, CT-, HER2-) - Signed by Jordan Esparza MD on 1/27/2025  Histopathologic type: Infiltrating duct carcinoma, NOS  Stage prefix: Initial diagnosis  Method of lymph node assessment: Clinical  Histologic grading system: 3 grade system        Oncology History   Invasive ductal carcinoma of breast, female, right (HCC)   1/14/2025 -  Cancer Staged     Staging form: Breast, AJCC 8th Edition  - Clinical stage from 1/14/2025: Stage IIB (cT2, cN0, cM0, G2, ER-, CT-, HER2-) - Signed by Jordan Esparza MD on 1/27/2025  Histopathologic type: Infiltrating duct carcinoma, NOS  Stage prefix: Initial diagnosis  Method of lymph node assessment: Clinical  Histologic grading system: 3 grade system         1/27/2025 Initial Diagnosis     Invasive ductal carcinoma of breast, female, right (HCC)      Malignant neoplasm of upper-outer quadrant of right breast in female, estrogen receptor negative (HCC)   1/27/2025 Initial Diagnosis     Malignant neoplasm of upper-outer quadrant of right breast in female, estrogen receptor negative (HCC)      1/28/2025 -  Chemotherapy      DOXOrubicin (ADRIAMYCIN), 60 mg/m2 = 129 mg, Intravenous, Once, 0 of 4 cycles  alteplase (CATHFLO), 2 mg, Intracatheter, Every 1 Minute as needed, 0 of 17 cycles  pegfilgrastim-apgf (N[employer]ve[employer]a), 6 mg, Subcutaneous, Once, 0 of 4 cycles  cyclophosphamide (CYTOXAN) IVPB, 600 mg/m2 = 1,290 mg, Intravenous, Once, 0 of 4 cycles  fosaprepitant (EMEND) IVPB, 150 mg, Intravenous, Once, 0 of 4 cycles  CARBOplatin (PARAPLATIN) IVPB (GO AUC DOSING), 38.52 mg (100 % of original dose 38.52 mg), Intravenous, Once, 0 of 4 cycles  Dose modification:   (original dose 38.52 mg, Cycle 1),   (original dose 38.52 mg, Cycle 1, Reason: Other (Must fill in a comment), Comment: lab estimates),   (original dose 38.52 mg, Cycle 1),   (original dose 38.52 mg, Cycle 1, Reason: Other (Must fill in a comment))  PACLItaxel (TAXOL) chemo IVPB, 80 mg/m2 = 172.2 mg, Intravenous, Once, 0 of 4 cycles  pembrolizumab (KEYTRUDA) IVPB, 200 mg, Intravenous, Once, 0 of 17 cycles         Review of Systems   Constitutional: Negative.    All other systems reviewed and are negative.     Medical History Reviewed by provider this encounter:     .    Medications Ordered Prior to Encounter                                 Current Outpatient Medications on File Prior to Visit     Medication     Sig     Dispense     Refill          atorvastatin (LIPITOR) 20 mg tablet     TAKE ONE TABLET BY MOUTH EVERY DAY     90 tablet     1          doxycycline hyclate (VIBRAMYCIN) 100 mg capsule     Take 100 mg by mouth daily as needed                      hydroCHLOROthiazide 12.5 mg tablet     TAKE ONE TABLET BY MOUTH EVERY DAY     30 tablet     5          Multiple Vitamin (MULTIVITAMINS PO)     Take by mouth                      nebivolol (BYSTOLIC) 20 MG tablet     TAKE ONE TABLET BY MOUTH EVERY DAY     90 tablet     1     No current facility-administered medications on file prior to visit.         Social History                                 Tobacco Use          Smoking  "status:     Never          Smokeless tobacco:     Never     Vaping Use          Vaping status:     Never Used     Substance and Sexual Activity          Alcohol use:     Yes          Drug use:     No          Sexual activity:     Yes                 Partners:     Male                 Birth control/protection:     Post-menopausal             Objective     /76 (BP Location: Left arm, Patient Position: Sitting, Cuff Size: Adult)   Pulse 79   Temp 97.8 °F (36.6 °C) (Temporal)   Resp 18   Ht 5' 6\" (1.676 m)   Wt 108 kg (239 lb)   LMP  (LMP Unknown)   SpO2 95%   BMI 38.58 kg/m²      Pain Screening:  Pain Score: 0-No pain  ECOG ECOG Performance Status: 0 - Fully active, able to carry on all pre-disease performance without restriction 0  Physical Exam  Vitals and nursing note reviewed. Exam conducted with a chaperone present.   Neck:      Thyroid: No thyroid mass, thyromegaly or thyroid tenderness.      Trachea: Trachea normal.   Cardiovascular:      Rate and Rhythm: Normal rate and regular rhythm.      Pulses: Normal pulses.      Heart sounds: Murmur heard.      Systolic murmur is present with a grade of 2/6.      No diastolic murmur is present.      No friction rub. No gallop. No S3 or S4 sounds.   Pulmonary:      Effort: Pulmonary effort is normal.      Breath sounds: Normal breath sounds. No stridor, decreased air movement or transmitted upper airway sounds. No decreased breath sounds, wheezing, rhonchi or rales.   Chest:      Chest wall: Mass present. No deformity, swelling, tenderness, crepitus or edema. There is no dullness to percussion.   Breasts:     Right: Mass present. No nipple discharge, skin change or tenderness.      Left: Normal.      Comments: 1.55 1.7 cm mass in the right mid to upper outer breasts  Abdominal:      General: Abdomen is protuberant. Bowel sounds are normal.      Palpations: Abdomen is soft. There is no shifting dullness, hepatomegaly, splenomegaly, mass or pulsatile mass.      " Tenderness: There is no abdominal tenderness. There is no right CVA tenderness or left CVA tenderness.   Musculoskeletal:      Cervical back: Full passive range of motion without pain and normal range of motion. No edema, rigidity, bony tenderness or crepitus. No pain with movement, spinous process tenderness or muscular tenderness.      Thoracic back: No bony tenderness.      Lumbar back: No bony tenderness.      Right lower leg: No edema.      Left lower leg: No edema.   Lymphadenopathy:      Cervical: No cervical adenopathy.      Right cervical: No superficial, deep or posterior cervical adenopathy.     Left cervical: No superficial, deep or posterior cervical adenopathy.      Upper Body:      Right upper body: No supraclavicular, axillary or pectoral adenopathy.      Left upper body: No supraclavicular, axillary or pectoral adenopathy.   Neurological:      Mental Status: She is alert.            Labs: I have reviewed the following labs:            Lab Results   Component Value Date/Time     WBC 8.89 09/13/2024 11:01 AM     RBC 4.36 09/13/2024 11:01 AM     Hemoglobin 13.8 09/13/2024 11:01 AM     Hematocrit 42.8 09/13/2024 11:01 AM     MCV 98 09/13/2024 11:01 AM     MCH 31.7 09/13/2024 11:01 AM     RDW 12.4 09/13/2024 11:01 AM     Platelets 181 09/13/2024 11:01 AM                Lab Results   Component Value Date/Time     Potassium 4.0 01/09/2025 09:38 AM     Chloride 103 01/09/2025 09:38 AM     CO2 24 01/09/2025 09:38 AM     BUN 20 01/09/2025 09:38 AM     Creatinine 0.68 01/09/2025 09:38 AM     Glucose, Fasting 113 (H) 01/09/2025 09:38 AM     Calcium 8.8 01/09/2025 09:38 AM     AST 15 01/09/2025 09:38 AM     ALT 11 01/09/2025 09:38 AM     Alkaline Phosphatase 101 01/09/2025 09:38 AM     Total Protein 7.1 01/09/2025 09:38 AM     Albumin 4.1 01/09/2025 09:38 AM     Total Bilirubin 0.60 01/09/2025 09:38 AM     eGFR 92 01/09/2025 09:38 AM            Pathology:      December 23, 2024 S 24 [id number] status post  ultrasound-guided biopsy right breast lesion 10 o'clock position 6 cm from the nipple  Final Diagnosis   A. Breast, Right, us guided breast bx, 10 ocl, 6 cm fn 3 passes, 12 g:  - Invasive breast carcinoma of no special type (ductal NST/invasive ductal carcinoma).   * Mina grade 2 of 3 (total score: 6 of 9)    -- tubule formation < 10%, score 3    -- nuclear grade 2 of 3, score 2    -- mitoses < 3/mm2, (</= 7 mitoses/10HPF), score 1.   * Confirmed by tumor cell immunophenotype:    -- positive: nuclear stain for GATA3 and membranous stain for E-cadherin and p120.    -- negative:  p63, calponin-B.     * Invasive carcinoma involves 3 of 3 submitted core biopsies, max. dimension = 16 millimeters.  - Ductal carcinoma in situ.   * Estrogen, progesterone & HER2 receptor studies pending, to be described in a separate receptor report.          Synoptic Checklist INVASIVE CARCINOMA OF THE BREAST: Biopsy (INVASIVE CARCINOMA OF THE BREAST: BIOPSY - All Specimens)Protocol posted: 3/22/2023 SPECIMEN Procedure Needle biopsy Specimen Laterality Right .      TUMOR Tumor Site Clock position 10 o'clock   Tumor Site Distance from nipple (Centimeters): 6 cm     Histologic Type Invasive carcinoma of no special type (ductal) Histologic Grade ([place] Histologic Score) Glandular (Acinar) / Tubular Differentiation Score 3 Nuclear Pleomorphism Score 2 Mitotic Rate [id number] Fátima Reyes [id number]   Overall Grade Grade 2 (scores of 6 or 7)      Largest Invasive Focus in this Limited Biopsy Sample 16 mm      Ductal Carcinoma In Situ (DCIS) Present Architectural Patterns Solid Nuclear Grade Grade II (intermediate) Necrosis Not identified      Lymphatic and / or Vascular Invasion Not identified Microcalcifications Not identified .      Estrogen Receptor (ER) Status Negative (less than 1%)      Progesterone Receptor (PgR) Status Negative (less than 1%)      HER2 by Immunohistochemistry Equivocal (Score 2+)       Addendum  01/02/2025 [employer]  Fish  Non amplified  Negative        Imaging:     June 26, 2025 CT scan chest  with contrast    June 24, 2025 targeted diagnostic right breast ultrasound     FINDINGS:   RIGHT  1) MASS [B]: There was a 17 mm x 22 mm mass seen in the upper outer quadrant of the right breast at 10 o'clock, 6 cm from the nipple.    Currently the mass measures 13 x 4 x 13 mm and contains a biopsy clip.     IMPRESSION:   Right breast neoplasm is smaller s/p neoadjuvant therapy.           ASSESSMENT/BI-RADS CATEGORY:  Right: 6 - Known Biopsy-Proven Malignancy  Overall: 6 - Known Biopsy-Proven Malignancy     RECOMMENDATION:       - Surgical consultation for the right breast.         June 12, 2025 bone density DEXA scan      IMPRESSION:     1. Normal bone density.     2.  The 10 year risk of hip fracture is 0.1% and the 10 year risk of major osteoporotic fracture is 4.9% as calculated by the [employer] fracture risk assessment tool (FRAX, which is based on data generated by the WHO Collaborating [place]   for Metabolic Bone Diseases).     3.  The current Bone Health and Osteoporosis Foundation (BHOF) guidelines recommend treating patients with a T-score of -2.5 or less in the lumbar spine or hips, or in post-menopausal women and men over the age of 50 with low bone mass (osteopenia;   T-score between -1.0 and -2.5) and a FRAX 10 year risk score of >   3% for hip fracture and/or >   20% for major osteoporosis-related fracture (clinical vertebral, hip, forearm, or proximal humerus).     April 21, 2025 CT chest without contrast   FINDINGS:     LUNGS: There are multiple new nodular patchy opacities in the left lower lobe, the largest measuring 1.6 x 1.4 cm (3/184). It replaces the previously seen 3 mm left lower lobe pulmonary nodule. A few nodular opacities are also seen in the right lower   lobe measuring up to 3 mm (3/195) and dependent aspect of the right middle lobe measuring 1.1 cm (5/80).      Pulmonary nodules are seen as below.  *  Stable posterior subpleural right lower lobe 4 mm pulmonary nodule (5/42)  *  Stable 3 mm subpleural lingular pulmonary nodule (3/140)  *  A 2 mm right upper lobe pulmonary nodule (5/ 29), previously 1 mm on 3/11/2025.     there is no tracheal or endobronchial lesion.     PLEURA: Unremarkable.     HEART/GREAT VESSELS: Heart is unremarkable for patient's age. No thoracic aortic aneurysm.     MEDIASTINUM AND CONCEPCIÓN: Mildly enlarged right hilar lymph node measuring 1.5 cm (2/52), previously 0.8 cm on 3/11/2025     Small hiatal hernia     CHEST WALL AND LOWER NECK: Left-sided Mediport. Interval decrease in the size of right lateral breast mass measuring 1.3 x 1.1 cm (2/73), previously 2.0 x 1.8 cm on CT 1/20/2025.     VISUALIZED STRUCTURES IN THE UPPER ABDOMEN: Unremarkable.     OSSEOUS STRUCTURES: No acute fracture or destructive osseous lesion.     IMPRESSION:     A few new nodular patchy opacities in the left lower lobe, the largest measuring 1.6 cm, replacing the previously seen 3 mm left lower lobe pulmonary nodule. A few smaller nodular opacities are also seen in the right lower lobe and right middle lobe.   Findings likely represent multifocal infectious/aspiration pneumonitis, limiting assessment of underlying pulmonary nodules. Follow-up CT chest in 2 months is recommended to assess for resolution of consolidation and for better assessment of pulmonary   nodules.     A mildly enlarged right hilar lymph node measuring 1.5 cm, reactive or neoplastic. Attention on follow-up CT chest is recommended to assess for resolution/persistence     Interval decrease in the size of right lateral breast mass since January 2025.     A 2 mm right upper lobe pulmonary nodule, mildly increased in size since 3/11/2025. Additional small stable pulmonary nodules. Attention on follow-up CT chest is recommended to assess for stability.     The study was marked in EPIC for immediate  notification.                 January 27, 2025 2D cardiac echo  Summary    Left Ventricle: Left ventricular cavity size is normal. Wall thickness is normal. The left ventricular ejection fraction is 60%. Systolic function is normal. Wall motion is normal. Diastolic function is mildly abnormal, consistent with grade I (abnormal) relaxation.    IVS: There is sigmoid appearance of the septum.    Right Ventricle: Right ventricular cavity size is normal. Systolic function is normal.    Left Atrium: The atrium is mildly dilated (35-41 mL/m2).    Right Atrium: The atrium is normal in size.                       January 20, 2025 CT scan of the chest abdomen and pelvis with contrast  IMPRESSION:     A 2.0 cm right breast mass with a biopsy clip in keeping with the patient's recently diagnosed breast cancer.     A 3 mm left upper lobe pulmonary nodule. Based on current Fleischner Society 2017 Guidelines on incidental pulmonary nodule, patients with a known malignancy are at increased risk of metastasis and should receive initial three-month follow-up chest CT.     No evidence of metastatic disease in the abdomen and pelvis.     The study was marked in EPIC for immediate notification.        January 17, 2025 nuclear medicine whole-body bone scan  IMPRESSION:     1. No scintigraphic evidence of osseous metastasis.        December 18, 2024 targeted right breast ultrasound with bilateral diagnostic mammogram     FINDINGS:   RIGHT  1) MASS [B]  Mammo diagnostic bilateral w 3d and cad: There is a 25 mm high density, oval mass with indistinct margins seen in the upper outer quadrant of the right breast at 10 o'clock, 6 cm from the nipple. The mass correlates with the palpable finding noted during physical examination.   US breast right limited (diagnostic): There is a 19 mm x 17 mm x 22 mm oval, hypoechoic mass with indistinct margins seen in the upper outer quadrant of the right breast at 10 o'clock, 6 cm from the nipple. The mass  correlates with the palpable finding noted during physical examination.  Sonographic exam of the right axilla shows no suspicious adenopathy.  Scattered benign-appearing calcifications are demonstrated in the right breast.  Right breast biopsy clip.     Left  Mammo diagnostic bilateral w 3d and cad  There are no suspicious masses, grouped microcalcifications or areas of unexplained architectural distortion. The skin and nipple areolar complex are unremarkable.   Results were discussed with the patient.  Ultrasound-guided biopsy was recommended.     IMPRESSION:  Solid mass in the upper outer right breast corresponding to the area of clinical concern and highly suspicious for malignancy.     ASSESSMENT/BI-RADS CATEGORY:  Left: 1 - Negative  Right: 5 - Highly Suggestive of Malignancy  Overall: 5 - Highly Suggestive of Malignancy     RECOMMENDATION:       - Ultrasound-guided breast biopsy for the right breast.       - Routine screening mammogram in 1 year for the left breast.           February 19, 2024 bilateral screening mammography     FINDINGS:   There are no suspicious masses, grouped microcalcifications or areas of architectural distortion. The skin and nipple areolar complex are unremarkable.     IMPRESSION:  No mammographic evidence of malignancy.           ASSESSMENT/BI-RADS CATEGORY:  Left: 1 - Negative  Right: 1 - Negative  Overall: 1 - Negative     RECOMMENDATION:       - Routine screening mammogram in 1 year for both breasts.

## 2025-06-25 ENCOUNTER — OFFICE VISIT (OUTPATIENT)
Dept: HEMATOLOGY ONCOLOGY | Facility: CLINIC | Age: 65
End: 2025-06-25
Payer: COMMERCIAL

## 2025-06-25 VITALS
SYSTOLIC BLOOD PRESSURE: 98 MMHG | TEMPERATURE: 97.7 F | DIASTOLIC BLOOD PRESSURE: 60 MMHG | HEART RATE: 77 BPM | WEIGHT: 223 LBS | RESPIRATION RATE: 16 BRPM | BODY MASS INDEX: 35.84 KG/M2 | OXYGEN SATURATION: 97 % | HEIGHT: 66 IN

## 2025-06-25 DIAGNOSIS — C50.411 MALIGNANT NEOPLASM OF UPPER-OUTER QUADRANT OF RIGHT BREAST IN FEMALE, ESTROGEN RECEPTOR NEGATIVE (HCC): Primary | ICD-10-CM

## 2025-06-25 DIAGNOSIS — Z17.1 MALIGNANT NEOPLASM OF UPPER-OUTER QUADRANT OF RIGHT BREAST IN FEMALE, ESTROGEN RECEPTOR NEGATIVE (HCC): Primary | ICD-10-CM

## 2025-06-25 PROCEDURE — 99215 OFFICE O/P EST HI 40 MIN: CPT | Performed by: INTERNAL MEDICINE

## 2025-06-25 NOTE — TELEPHONE ENCOUNTER
Patient is checking to see if she should still come in on 7/2. She had a visit with Oncology today and they were asking her when is she seeing .  Please follow up with patient.

## 2025-06-26 ENCOUNTER — HOSPITAL ENCOUNTER (OUTPATIENT)
Dept: CT IMAGING | Facility: HOSPITAL | Age: 65
Discharge: HOME | End: 2025-06-26
Payer: COMMERCIAL

## 2025-06-26 ENCOUNTER — HOSPITAL ENCOUNTER (OUTPATIENT)
Dept: NON INVASIVE DIAGNOSTICS | Facility: HOSPITAL | Age: 65
Discharge: HOME | End: 2025-06-26
Attending: INTERNAL MEDICINE
Payer: COMMERCIAL

## 2025-06-26 VITALS
HEIGHT: 66 IN | WEIGHT: 223 LBS | SYSTOLIC BLOOD PRESSURE: 98 MMHG | DIASTOLIC BLOOD PRESSURE: 60 MMHG | BODY MASS INDEX: 35.84 KG/M2 | HEART RATE: 68 BPM

## 2025-06-26 DIAGNOSIS — C50.911 INVASIVE DUCTAL CARCINOMA OF BREAST, FEMALE, RIGHT (HCC): ICD-10-CM

## 2025-06-26 DIAGNOSIS — Z17.1 MALIGNANT NEOPLASM OF UPPER-OUTER QUADRANT OF RIGHT BREAST IN FEMALE, ESTROGEN RECEPTOR NEGATIVE (HCC): ICD-10-CM

## 2025-06-26 DIAGNOSIS — R06.09 DYSPNEA ON EXERTION: ICD-10-CM

## 2025-06-26 DIAGNOSIS — C50.411 MALIGNANT NEOPLASM OF UPPER-OUTER QUADRANT OF RIGHT BREAST IN FEMALE, ESTROGEN RECEPTOR NEGATIVE (HCC): ICD-10-CM

## 2025-06-26 DIAGNOSIS — R59.0 HILAR LYMPHADENOPATHY: ICD-10-CM

## 2025-06-26 DIAGNOSIS — R91.8 MULTIPLE LUNG NODULES: ICD-10-CM

## 2025-06-26 LAB
AORTIC ROOT: 3 CM
AORTIC VALVE MEAN VELOCITY: 10.1 M/S
ASCENDING AORTA: 3.9 CM
AV LVOT MEAN GRADIENT: 3 MMHG
AV LVOT PEAK GRADIENT: 5 MMHG
AV MEAN PRESS GRAD SYS DOP V1V2: 5 MMHG
AV PEAK GRADIENT: 9 MMHG
AV VELOCITY RATIO: 0.81
AV VMAX SYS DOP: 1.48 M/S
BSA FOR ECHO PROCEDURE: 2.09 M2
DOP CALC AO VTI: 29.48 CM
DOP CALC LVOT PEAK VEL VTI: 23.99 CM
DOP CALC LVOT PEAK VEL: 1.07 M/S
DOP CALC MV VTI: 22.21 CM
E WAVE DECELERATION TIME: 169 MS
E/A RATIO: 0.75
FRACTIONAL SHORTENING: 35 (ref 28–44)
INTERVENTRICULAR SEPTUM IN DIASTOLE (PARASTERNAL SHORT AXIS VIEW): 1.3 CM
INTERVENTRICULAR SEPTUM: 1.3 CM (ref 0.6–1.1)
LAAS-AP2: 21.4 CM2
LAAS-AP4: 25.4 CM2
LEFT ATRIUM SIZE: 5 CM
LEFT ATRIUM VOLUME (MOD BIPLANE): 82 ML
LEFT ATRIUM VOLUME INDEX (MOD BIPLANE): 39.2 ML/M2
LEFT INTERNAL DIMENSION IN SYSTOLE: 2.6 CM (ref 2.1–4)
LEFT VENTRICULAR INTERNAL DIMENSION IN DIASTOLE: 4 CM (ref 3.5–6)
LEFT VENTRICULAR POSTERIOR WALL IN END DIASTOLE: 1.1 CM
LEFT VENTRICULAR STROKE VOLUME: 46 ML
LV EF US.2D.A4C+ESTIMATED: 66 %
LVSV (TEICH): 46 ML
MV E'TISSUE VEL-LAT: 5 CM/S
MV E'TISSUE VEL-SEP: 6 CM/S
MV MEAN GRADIENT: 1 MMHG
MV PEAK A VEL: 1.1 M/S
MV PEAK E VEL: 82 CM/S
MV PEAK GRADIENT: 4 MMHG
MV STENOSIS PRESSURE HALF TIME: 49 MS
MV VALVE AREA P 1/2 METHOD: 4.49
RIGHT ATRIAL 2D VOLUME: 38 ML
RIGHT ATRIUM AREA SYSTOLE A4C: 15.4 CM2
RIGHT VENTRICLE ID DIMENSION: 4.4 CM
SL CV LEFT ATRIUM LENGTH A2C: 5.4 CM
SL CV LV EF: 60
SL CV PED ECHO LEFT VENTRICLE DIASTOLIC VOLUME (MOD BIPLANE) 2D: 70 ML
SL CV PED ECHO LEFT VENTRICLE SYSTOLIC VOLUME (MOD BIPLANE) 2D: 24 ML
TR MAX PG: 25 MMHG
TR PEAK VELOCITY: 2.5 M/S
TRICUSPID ANNULAR PLANE SYSTOLIC EXCURSION: 2.5 CM
TRICUSPID VALVE PEAK REGURGITATION VELOCITY: 2.51 M/S

## 2025-06-26 PROCEDURE — 93306 TTE W/DOPPLER COMPLETE: CPT | Performed by: INTERNAL MEDICINE

## 2025-06-26 PROCEDURE — 93306 TTE W/DOPPLER COMPLETE: CPT

## 2025-06-26 PROCEDURE — 71250 CT THORAX DX C-: CPT

## 2025-06-26 NOTE — TELEPHONE ENCOUNTER
Patient calling back and stating that 7/2 @ 9:30 in the [place] office works good for her and she will be there

## 2025-06-26 NOTE — TELEPHONE ENCOUNTER
Placed call to patient and left a message to return our call.    Want to inquire if the patient can come at 9:30 am on 7/2.

## 2025-06-26 NOTE — TELEPHONE ENCOUNTER
Ok to keep as scheduled or move a bit, patient earlier that morning needs to be moved until additional testing completed (SS) so can move things a bit with that as needed. Please close the loop with Fátima that we want to do the visit as a check in surgical planning visit as she finishes her treatment.

## 2025-07-02 ENCOUNTER — OFFICE VISIT (OUTPATIENT)
Dept: SURGERY | Facility: CLINIC | Age: 65
End: 2025-07-02
Payer: COMMERCIAL

## 2025-07-02 VITALS
HEART RATE: 69 BPM | BODY MASS INDEX: 36.32 KG/M2 | HEIGHT: 66 IN | WEIGHT: 226 LBS | OXYGEN SATURATION: 96 % | SYSTOLIC BLOOD PRESSURE: 122 MMHG | RESPIRATION RATE: 16 BRPM | DIASTOLIC BLOOD PRESSURE: 62 MMHG | TEMPERATURE: 97.6 F

## 2025-07-02 DIAGNOSIS — C50.911 INVASIVE DUCTAL CARCINOMA OF BREAST, FEMALE, RIGHT (HCC): Primary | ICD-10-CM

## 2025-07-02 PROCEDURE — 99215 OFFICE O/P EST HI 40 MIN: CPT | Performed by: SURGERY

## 2025-07-02 RX ORDER — SODIUM CHLORIDE, SODIUM LACTATE, POTASSIUM CHLORIDE, CALCIUM CHLORIDE 600; 310; 30; 20 MG/100ML; MG/100ML; MG/100ML; MG/100ML
125 INJECTION, SOLUTION INTRAVENOUS CONTINUOUS
OUTPATIENT
Start: 2025-07-02

## 2025-07-07 ENCOUNTER — APPOINTMENT (OUTPATIENT)
Dept: LAB | Facility: CLINIC | Age: 65
End: 2025-07-07
Payer: COMMERCIAL

## 2025-07-07 DIAGNOSIS — E86.0 DEHYDRATION: Primary | ICD-10-CM

## 2025-07-07 DIAGNOSIS — Z17.1 MALIGNANT NEOPLASM OF UPPER-OUTER QUADRANT OF RIGHT BREAST IN FEMALE, ESTROGEN RECEPTOR NEGATIVE (HCC): ICD-10-CM

## 2025-07-07 DIAGNOSIS — Z17.1 MALIGNANT NEOPLASM OF UPPER-OUTER QUADRANT OF RIGHT BREAST IN FEMALE, ESTROGEN RECEPTOR NEGATIVE (HCC): Primary | ICD-10-CM

## 2025-07-07 DIAGNOSIS — E86.0 DEHYDRATION: ICD-10-CM

## 2025-07-07 DIAGNOSIS — C50.411 MALIGNANT NEOPLASM OF UPPER-OUTER QUADRANT OF RIGHT BREAST IN FEMALE, ESTROGEN RECEPTOR NEGATIVE (HCC): ICD-10-CM

## 2025-07-07 DIAGNOSIS — R93.89 ABNORMAL CT OF THE CHEST: ICD-10-CM

## 2025-07-07 DIAGNOSIS — C50.411 MALIGNANT NEOPLASM OF UPPER-OUTER QUADRANT OF RIGHT BREAST IN FEMALE, ESTROGEN RECEPTOR NEGATIVE (HCC): Primary | ICD-10-CM

## 2025-07-07 DIAGNOSIS — C50.911 INVASIVE DUCTAL CARCINOMA OF BREAST, FEMALE, RIGHT (HCC): ICD-10-CM

## 2025-07-07 LAB
ALBUMIN SERPL BCG-MCNC: 3.9 G/DL (ref 3.5–5)
ALP SERPL-CCNC: 92 U/L (ref 34–104)
ALT SERPL W P-5'-P-CCNC: 17 U/L (ref 7–52)
ANION GAP SERPL CALCULATED.3IONS-SCNC: 9 MMOL/L (ref 4–13)
AST SERPL W P-5'-P-CCNC: 26 U/L (ref 13–39)
BASOPHILS # BLD AUTO: 0.04 THOUSANDS/ÂΜL (ref 0–0.1)
BASOPHILS NFR BLD AUTO: 1 % (ref 0–1)
BILIRUB SERPL-MCNC: 0.34 MG/DL (ref 0.2–1)
BUN SERPL-MCNC: 15 MG/DL (ref 5–25)
CALCIUM SERPL-MCNC: 9.2 MG/DL (ref 8.4–10.2)
CHLORIDE SERPL-SCNC: 103 MMOL/L (ref 96–108)
CO2 SERPL-SCNC: 28 MMOL/L (ref 21–32)
CREAT SERPL-MCNC: 0.6 MG/DL (ref 0.6–1.3)
EOSINOPHIL # BLD AUTO: 0.15 THOUSAND/ÂΜL (ref 0–0.61)
EOSINOPHIL NFR BLD AUTO: 2 % (ref 0–6)
ERYTHROCYTE [DISTWIDTH] IN BLOOD BY AUTOMATED COUNT: 14.8 % (ref 11.6–15.1)
GFR SERPL CREATININE-BSD FRML MDRD: 95 ML/MIN/1.73SQ M
GLUCOSE SERPL-MCNC: 94 MG/DL (ref 65–140)
HCT VFR BLD AUTO: 31.6 % (ref 34.8–46.1)
HGB BLD-MCNC: 9.8 G/DL (ref 11.5–15.4)
IMM GRANULOCYTES # BLD AUTO: 0.05 THOUSAND/UL (ref 0–0.2)
IMM GRANULOCYTES NFR BLD AUTO: 1 % (ref 0–2)
LYMPHOCYTES # BLD AUTO: 0.74 THOUSANDS/ÂΜL (ref 0.6–4.47)
LYMPHOCYTES NFR BLD AUTO: 10 % (ref 14–44)
MCH RBC QN AUTO: 33.4 PG (ref 26.8–34.3)
MCHC RBC AUTO-ENTMCNC: 31 G/DL (ref 31.4–37.4)
MCV RBC AUTO: 108 FL (ref 82–98)
MONOCYTES # BLD AUTO: 1.26 THOUSAND/ÂΜL (ref 0.17–1.22)
MONOCYTES NFR BLD AUTO: 16 % (ref 4–12)
NEUTROPHILS # BLD AUTO: 5.51 THOUSANDS/ÂΜL (ref 1.85–7.62)
NEUTS SEG NFR BLD AUTO: 70 % (ref 43–75)
NRBC BLD AUTO-RTO: 0 /100 WBCS
PLATELET # BLD AUTO: 366 THOUSANDS/UL (ref 149–390)
PMV BLD AUTO: 10.3 FL (ref 8.9–12.7)
POTASSIUM SERPL-SCNC: 4.1 MMOL/L (ref 3.5–5.3)
PROT SERPL-MCNC: 6.4 G/DL (ref 6.4–8.4)
RBC # BLD AUTO: 2.93 MILLION/UL (ref 3.81–5.12)
SODIUM SERPL-SCNC: 140 MMOL/L (ref 135–147)
T3FREE SERPL-MCNC: 3.27 PG/ML (ref 2.5–3.9)
TSH SERPL DL<=0.05 MIU/L-ACNC: 3.95 UIU/ML (ref 0.45–4.5)
WBC # BLD AUTO: 7.75 THOUSAND/UL (ref 4.31–10.16)

## 2025-07-07 PROCEDURE — 84481 FREE ASSAY (FT-3): CPT

## 2025-07-07 PROCEDURE — 84443 ASSAY THYROID STIM HORMONE: CPT

## 2025-07-07 PROCEDURE — 85025 COMPLETE CBC W/AUTO DIFF WBC: CPT

## 2025-07-07 PROCEDURE — 36415 COLL VENOUS BLD VENIPUNCTURE: CPT

## 2025-07-07 PROCEDURE — 80053 COMPREHEN METABOLIC PANEL: CPT

## 2025-07-09 ENCOUNTER — HOSPITAL ENCOUNTER (OUTPATIENT)
Dept: INFUSION CENTER | Facility: CLINIC | Age: 65
Discharge: HOME | End: 2025-07-09
Attending: INTERNAL MEDICINE
Payer: COMMERCIAL

## 2025-07-09 VITALS
HEART RATE: 65 BPM | TEMPERATURE: 96.9 F | OXYGEN SATURATION: 96 % | HEIGHT: 66 IN | DIASTOLIC BLOOD PRESSURE: 63 MMHG | RESPIRATION RATE: 18 BRPM | WEIGHT: 222 LBS | SYSTOLIC BLOOD PRESSURE: 94 MMHG | BODY MASS INDEX: 35.68 KG/M2

## 2025-07-09 DIAGNOSIS — E86.0 DEHYDRATION: ICD-10-CM

## 2025-07-09 DIAGNOSIS — C50.411 MALIGNANT NEOPLASM OF UPPER-OUTER QUADRANT OF RIGHT BREAST IN FEMALE, ESTROGEN RECEPTOR NEGATIVE (HCC): Primary | ICD-10-CM

## 2025-07-09 DIAGNOSIS — Z17.1 MALIGNANT NEOPLASM OF UPPER-OUTER QUADRANT OF RIGHT BREAST IN FEMALE, ESTROGEN RECEPTOR NEGATIVE (HCC): Primary | ICD-10-CM

## 2025-07-09 PROCEDURE — 96413 CHEMO IV INFUSION 1 HR: CPT

## 2025-07-09 PROCEDURE — 96411 CHEMO IV PUSH ADDL DRUG: CPT

## 2025-07-09 PROCEDURE — 96367 TX/PROPH/DG ADDL SEQ IV INF: CPT

## 2025-07-09 RX ORDER — SODIUM CHLORIDE 9 MG/ML
20 INJECTION, SOLUTION INTRAVENOUS ONCE
Status: COMPLETED | OUTPATIENT
Start: 2025-07-09 | End: 2025-07-09

## 2025-07-09 RX ORDER — DOXORUBICIN HYDROCHLORIDE 2 MG/ML
60 INJECTION, SOLUTION INTRAVENOUS ONCE
Status: COMPLETED | OUTPATIENT
Start: 2025-07-09 | End: 2025-07-09

## 2025-07-09 RX ADMIN — FOSAPREPITANT 150 MG: 150 INJECTION, POWDER, LYOPHILIZED, FOR SOLUTION INTRAVENOUS at 11:27

## 2025-07-09 RX ADMIN — DEXAMETHASONE SODIUM PHOSPHATE: 10 INJECTION, SOLUTION INTRAMUSCULAR; INTRAVENOUS at 11:05

## 2025-07-09 RX ADMIN — CYCLOPHOSPHAMIDE 1290 MG: 1 INJECTION, POWDER, FOR SOLUTION INTRAVENOUS; ORAL at 12:13

## 2025-07-09 RX ADMIN — DOXORUBICIN HYDROCHLORIDE 129 MG: 2 INJECTION, SOLUTION INTRAVENOUS at 12:46

## 2025-07-09 RX ADMIN — SODIUM CHLORIDE 500 ML: 9 INJECTION, SOLUTION INTRAVENOUS at 11:05

## 2025-07-09 RX ADMIN — SODIUM CHLORIDE 20 ML/HR: 9 INJECTION, SOLUTION INTRAVENOUS at 11:05

## 2025-07-09 NOTE — PROGRESS NOTES
Pt here for AC, tolerated tx well with no complaints. Next appt 7/10 at 1400 at [facility]. Declined AVS.

## 2025-07-10 ENCOUNTER — HOSPITAL ENCOUNTER (OUTPATIENT)
Dept: INFUSION CENTER | Facility: CLINIC | Age: 65
Discharge: HOME | End: 2025-07-10
Attending: INTERNAL MEDICINE
Payer: COMMERCIAL

## 2025-07-10 DIAGNOSIS — C50.411 MALIGNANT NEOPLASM OF UPPER-OUTER QUADRANT OF RIGHT BREAST IN FEMALE, ESTROGEN RECEPTOR NEGATIVE (HCC): Primary | ICD-10-CM

## 2025-07-10 DIAGNOSIS — Z17.1 MALIGNANT NEOPLASM OF UPPER-OUTER QUADRANT OF RIGHT BREAST IN FEMALE, ESTROGEN RECEPTOR NEGATIVE (HCC): Primary | ICD-10-CM

## 2025-07-10 DIAGNOSIS — E86.0 DEHYDRATION: ICD-10-CM

## 2025-07-10 PROCEDURE — 96361 HYDRATE IV INFUSION ADD-ON: CPT

## 2025-07-10 PROCEDURE — 96365 THER/PROPH/DIAG IV INF INIT: CPT

## 2025-07-10 PROCEDURE — 96372 THER/PROPH/DIAG INJ SC/IM: CPT

## 2025-07-10 RX ADMIN — PEGFILGRASTIM 3 MG: 6 INJECTION SUBCUTANEOUS at 15:13

## 2025-07-10 RX ADMIN — DEXAMETHASONE SODIUM PHOSPHATE: 10 INJECTION, SOLUTION INTRAMUSCULAR; INTRAVENOUS at 14:12

## 2025-07-10 RX ADMIN — SODIUM CHLORIDE 500 ML: 0.9 INJECTION, SOLUTION INTRAVENOUS at 14:11

## 2025-07-10 NOTE — PROGRESS NOTES
Patient tolerated treatment without issue. LPAC with brisk blood return prior to de accessing. Neulasta injection given in R arm. Next appt confirmed for 7/30 @ 1200. Declined AVS

## 2025-07-15 NOTE — PROGRESS NOTES
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      February 5, 2025 initiation preoperative neoadjuvant therapy per keynote 522 with pembrolizumab, paclitaxel plus carboplatin     March 2025 pembrolizumab discontinued due to symptomatic pulmonary issues potentially related     May 7, 2025 initiation phase 2 preoperative dose dense therapy with doxorubicin plus cyclophosphamide cycle 1    July 9, 2025 status post cycle 4 of 4 preoperative doxorubicin plus cyclophosphamide      December 23, 2024 S 24 [id number] status post ultrasound-guided biopsy right breast lesion 10 o'clock position   Final  Invasive ductal breast cancer grade 2 associated with ductal carcinoma in situ  Estrogen receptor negative, progesterone receptor negative and HER2/nathanael low 2+ by IHC yet nonamplified by FISH established by biopsy December 23, 2024.        February 5, 2025 initiation preoperative neoadjuvant therapy per keynote 522 with pembrolizumab, paclitaxel plus carboplatin     March 2025 pembrolizumab discontinued due to symptomatic pulmonary issues potentially related     May 7, 2025 initiation phase 2 preoperative dose dense therapy with doxorubicin plus cyclophosphamide cycle 1    July 9, 2025 status post cycle 4 of 4 preoperative doxorubicin plus cyclophosphamide      Class 2 obesity due to excess calories without serious comorbidity with body mass index (BMI) of 39.0 to 39.9 in adult     Reviewed importance of weight loss in pursuit of 10% - 20% loss over time     Family history of malignant neoplasm  Positive  breast cancer in mother    December 27, 2024 germline genetic panel negative for high risk germline mutations     Essential hypertension        Hypothyroidism, unspecified type     Orders:    CARBOplatin (PARAPLATIN) 38.52 mg in sodium chloride 0.9 % 250 mL IVPB    Oncology Provider Communication    Oncology Provider Communication     Hyperlipidemia, unspecified hyperlipidemia type        Invasive ductal carcinoma of breast, female, right (HCC)     Dehydration           Solitary pulmonary nodule                 Clinical/Pathologic Stage IIB     Cancer Staging   No matching staging information was found for the patient.        Current Therapy s/p pre op chemotherapy     Discussion  This delightful postmenopausal woman presents having completed preoperative neoadjuvant systemic chemotherapy effective July 09, 2025.  She completed week 12 of 12 with pembrolizumab plus paclitaxel and carboplatin followed by 4 cycles of doxorubicin plus cyclophosphamide.    The reader should note that pembrolizumab was discontinued early in phase 1 due to potential immune related pneumonitis.  Her last dose of pembrolizumab was given on February 26, 2025 as merely her second dosage. I note that the patient has a distinctly abnormal CT scan of the chest and that she had COVID infection as a confounding consideration her early in 2025.  Symptomatically the patient is well in spite of an abnormal scan which is being followed closely with pulmonary medicine, primary care medicine and medical oncology.     The patient is scheduled for partial mastectomy surgery on August 22,2025.    I again reviewed goals of care, the need for postoperative radiation therapy and postoperative adjuvant care decision making.    The patient expresses understanding and clarity.    [place]           Background History:   The patient became self-aware of palpable right breast mass in the fall 2024 and presented for diagnostic reviews.  Her prior mammography on   February 19,2024 was BI-RADS 1 negative.     The patient's risk factors for the development of breast cancer and solid tumor malignancy include age, postmenopausal status, gender and obesity. Her mother has a diagnosis of breast cancer at age 80 plus of unclear documentation and scope. Her mother is living and well.     The patient has no clinical evidence of metastases.     This patient is approached with favor in a curative intent.  She clearly carries the expectation of an excellent opportunity for a robust tumor response and favorable outcomes over time.  I had a long discussion with the patient and her  regarding the use of chemoimmunotherapy in a neoadjuvant approach fashion and after the keynote 522 trial data set.  I discussed opportunities and the implication of pathologic complete remission and extension with favor of both event free survival and overall survival.  Please see [place] Journal of Medicine from September 2024 lead author Ambreen WOODWARD     The patient and her  were given handwritten sheet outlining all aspects of our discussion today and expressed understanding clarity and gratitude.  She is scheduled for Mediport catheter placement and we will move forward with phase 1 of care that includes the use of pembrolizumab every 3 weeks plus paclitaxel plus carboplatin weekly x 12.           Special Studies  December 27, 2024 germline genetic panel  No high risk germline mutations                 GENETIC ANALYSIS OVERALL INTERPRETATION   Date Value Ref Range Status   12/27/2024     Final     NEGATIVE: No Clinically Significant Variants Detected   12/27/2024 Variants of Unknown Significance Detected   Final                    INTERPRETATION   Date Value Ref Range Status   12/27/2024 See Notes   Final       Comment:       No pathogenic mutations, variants of unknown significance, or gross deletions or duplications were detected.  Risk Estimate: low likelihood of variants in the genes  analyzed contributing to this individual's clinical history.  Genetic counseling is a recommended option for all individuals undergoing genetic testing.  Genes Analyzed (13 total): EVA, BARD1, BRCA1, BRCA2, CDH1, CHEK2, NF1, PALB2, PTEN, RAD51C, RAD51D, STK11 and TP53 (sequencing and deletion/duplication).   12/27/2024 See Notes   Final       Comment:       No known clinically actionable alterations were detected.  Four variants of unknown significance were detected: two in the MSH3 gene, one in the POLE gene, and one in the SDHA gene.  Risk Estimate: should be based on clinical and family history, as the clinical significance of this result is unknown.  Genetic counseling is a recommended option for all individuals undergoing genetic testing.  This individual is heterozygous for the p.N118I (c.353A>T) and p.N861H (c.2581A>C) variants of unknown significance in the MSH3 gene, heterozygous for the p.O3179T (c.4168C>T) variant of unknown significance in the POLE gene, and heterozygous for the p.K480E (c.1438A>G) variant of unknown significance in the SDHA gene, which may or may not contribute to this individual's clinical history. Familial testing would be necessary to determine if the alterations in MSH3 are on the same chromosome or on different chromosomes (in cis or trans). Refer to the supplementary pages for additional information on these variants. No additional pathogenic mutations, variants of unknown significance, or gross deletions or duplications were detected. Genes Analyzed (59 total): APC, EVA, BAP1, BARD1, BMPR1A, BRCA1, BRCA2, BRIP1, CDH1, CDK4, CDKN1B, CDKN2A, CHEK2, DICER1, FH, FLCN, KIF1B, MAX, MEN1, MET, MLH1, MSH2, MSH6, MUTYH, NF1, NTHL1, PALB2, PMS2, POT1, PTEN, RAD51C, RAD51D, RB1, RET, SDHA, SDHAF2, SDHB, SDHC, SDHD, SMAD4, SMARCA4, STK11, DJWJ587, TP53, TSC1, TSC2 and VHL (sequencing and deletion/duplication); AXIN2, CTNNA1, EGLN1, HOXB13, KIT, MITF, MSH3, PDGFRA, POLD1 and POLE (sequencing  only); EPCAM and GREM1 (deletion/duplication only). RNA data is routinely analyzed for use in variant interpretation for all genes.                  GENES NOT REPORTED   Date Value Ref Range Status   2024     Final     EVA,BARD1,CDH1,CHEK2,PALB2,PTEN,RAD51C,RAD51D,STK11,TP53,NF1,BRCA1,BRCA2   2024     Final     APC,EVA,AXIN2,BAP1,BARD1,BMPR1A,BRCA1,BRCA2,BRIP1,CDH1,CDK4,CDKN1B,CDKN2A,CHEK2,CTNNA1,DICER1,EGLN1,EPCAM,FH,FLCN,GREM1,HOXB13,KIF1B,KIT,MAX,MEN1,MET,MITF,MLH1,MSH2,MSH6,MUTYH,NF1,NTHL1,PALB2,PDGFRA,PMS2,POLD1,POT1,PTEN,RAD51C,RAD51D,RB1,RET,SDHAF2,SDHB,  SDHC,SDHD,SMAD4,SMARCA4,STK11,UKCM642,TP53,TSC1,TSC2,VHL            History of Present Illness        This delightful 65-year-old presents with her  to review a new diagnosis of invasive carcinoma of the right breast.  The patient is  2 para 2 and postmenopausal.  The patient's mother at age 80 was revealed to have a diagnosis of breast cancer status post excision only.  The mother is living and well     The patient presented in the 2024 near the  holiday with self-awareness of her right breast mass.  Patient presented for diagnostic reviews through her gynecologist and primary care physicians.  Bilateral diagnostic mammogram with targeted right breast ultrasound was performed on 2024.  25 mm oval mass density with indistinct margins was seen in the upper outer quadrant of the right breast at the 10 o'clock position at 6 cm from the nipple.  This mass correlating with the palpable finding on examination.  Targeted ultrasound of the right breast showed a 19 mm x 17 mm x 22 mm oval hypoechoic mass with indistinct margins seen in the upper outer quadrant of the right breast at 10:00.  There were no suspicious adenopathy in the right axilla.  The left breast showed no suspicious masses, grouped microcalcifications or areas of unexplained architectural distortion.     On 2024 ultrasound-guided  biopsy of the right breast mass with mammography was performed without complication.  Final pathology revealed an invasive breast carcinoma no special type ductal of grade 2.  Ductal carcinoma in situ was present.  Estrogen receptor was negative, progesterone receptor was negative and HER2/nathanael was low at 2+ by IHC but subsequently nonamplified by FISH amplification..  Ductal carcinoma in situ.     The patient reports a medical oncology for therapeutic planning and review for possible neoadjuvant therapies        Pertinent History from December 2024     No new complaints              December 23, 2024 S 24 [id number] status post ultrasound-guided biopsy right breast lesion 10 o'clock position 6 cm from the nipple     Cancer Screening and Prevention  Mammography: 12/18/2024   GI/Colonoscopy: 03/02/2021   GYN: 01/12/2022   Bone Density: Not on file   Covid 19 Vaccination Status:      Oncology Therapeutic Summary:     February 26, 2025 pembrolizumab discontinued after second dose due to diffuse symptomatic maculopapular skin eruption.  Patient continued on chemotherapy alone     From February 3, 2025 cycle 1 week 1   initiation phase 1 keynote 522 approach with pembrolizumab 200 mg 5 pills IV every 3 weeks plus paclitaxel 80 mg/m² IV weekly x 12 and carboplatin AUC 1.5 weekly x 12     12/23/2024 S 24 [id number]  status post ultrasound-guided biopsy right breast     Oncology History   Cancer Staging   Invasive ductal carcinoma of breast, female, right (HCC)  Staging form: Breast, AJCC 8th Edition  - Clinical stage from 1/14/2025: Stage IIB (cT2, cN0, cM0, G2, ER-, IL-, HER2-) - Signed by Jordan Esparza MD on 1/27/2025  Histopathologic type: Infiltrating duct carcinoma, NOS  Stage prefix: Initial diagnosis  Method of lymph node assessment: Clinical  Histologic grading system: 3 grade system        Oncology History   Invasive ductal carcinoma of breast, female, right (HCC)   1/14/2025 -  Cancer Staged     Staging form: Breast,  AJCC 8th Edition  - Clinical stage from 1/14/2025: Stage IIB (cT2, cN0, cM0, G2, ER-, MS-, HER2-) - Signed by Jordan Esparza MD on 1/27/2025  Histopathologic type: Infiltrating duct carcinoma, NOS  Stage prefix: Initial diagnosis  Method of lymph node assessment: Clinical  Histologic grading system: 3 grade system         1/27/2025 Initial Diagnosis     Invasive ductal carcinoma of breast, female, right (HCC)      Malignant neoplasm of upper-outer quadrant of right breast in female, estrogen receptor negative (HCC)   1/27/2025 Initial Diagnosis     Malignant neoplasm of upper-outer quadrant of right breast in female, estrogen receptor negative (HCC)      1/28/2025 -  Chemotherapy     DOXOrubicin (ADRIAMYCIN), 60 mg/m2 = 129 mg, Intravenous, Once, 0 of 4 cycles  alteplase (CATHFLO), 2 mg, Intracatheter, Every 1 Minute as needed, 0 of 17 cycles  pegfilgrastim-apgf ([employer]), 6 mg, Subcutaneous, Once, 0 of 4 cycles  cyclophosphamide (CYTOXAN) IVPB, 600 mg/m2 = 1,290 mg, Intravenous, Once, 0 of 4 cycles  fosaprepitant (EMEND) IVPB, 150 mg, Intravenous, Once, 0 of 4 cycles  CARBOplatin (PARAPLATIN) IVPB (GOG AUC DOSING), 38.52 mg (100 % of original dose 38.52 mg), Intravenous, Once, 0 of 4 cycles  Dose modification:   (original dose 38.52 mg, Cycle 1),   (original dose 38.52 mg, Cycle 1, Reason: Other (Must fill in a comment), Comment: lab estimates),   (original dose 38.52 mg, Cycle 1),   (original dose 38.52 mg, Cycle 1, Reason: Other (Must fill in a comment))  PACLItaxel (TAXOL) chemo IVPB, 80 mg/m2 = 172.2 mg, Intravenous, Once, 0 of 4 cycles  pembrolizumab (KEYTRUDA) IVPB, 200 mg, Intravenous, Once, 0 of 17 cycles         Review of Systems   Constitutional: Negative.    All other systems reviewed and are negative.     Medical History Reviewed by provider this encounter:     .    Medications Ordered Prior to Encounter                                 Current Outpatient Medications on File Prior to Visit    "  Medication     Sig     Dispense     Refill          atorvastatin (LIPITOR) 20 mg tablet     TAKE ONE TABLET BY MOUTH EVERY DAY     90 tablet     1          doxycycline hyclate (VIBRAMYCIN) 100 mg capsule     Take 100 mg by mouth daily as needed                      hydroCHLOROthiazide 12.5 mg tablet     TAKE ONE TABLET BY MOUTH EVERY DAY     30 tablet     5          Multiple Vitamin (MULTIVITAMINS PO)     Take by mouth                      nebivolol (BYSTOLIC) 20 MG tablet     TAKE ONE TABLET BY MOUTH EVERY DAY     90 tablet     1     No current facility-administered medications on file prior to visit.         Social History                                 Tobacco Use          Smoking status:     Never          Smokeless tobacco:     Never     Vaping Use          Vaping status:     Never Used     Substance and Sexual Activity          Alcohol use:     Yes          Drug use:     No          Sexual activity:     Yes                 Partners:     Male                 Birth control/protection:     Post-menopausal             Objective     /76 (BP Location: Left arm, Patient Position: Sitting, Cuff Size: Adult)   Pulse 79   Temp 97.8 °F (36.6 °C) (Temporal)   Resp 18   Ht 5' 6\" (1.676 m)   Wt 108 kg (239 lb)   LMP  (LMP Unknown)   SpO2 95%   BMI 38.58 kg/m²      Pain Screening:  Pain Score: 0-No pain  ECOG ECOG Performance Status: 0 - Fully active, able to carry on all pre-disease performance without restriction 0  Physical Exam  Vitals and nursing note reviewed. Exam conducted with a chaperone present.   Neck:      Thyroid: No thyroid mass, thyromegaly or thyroid tenderness.      Trachea: Trachea normal.   Cardiovascular:      Rate and Rhythm: Normal rate and regular rhythm.      Pulses: Normal pulses.      Heart sounds: Murmur heard.      Systolic murmur is present with a grade of 2/6.      No diastolic murmur is present.      No friction rub. No gallop. No S3 or S4 sounds.   Pulmonary:      Effort: " Pulmonary effort is normal.      Breath sounds: Normal breath sounds. No stridor, decreased air movement or transmitted upper airway sounds. No decreased breath sounds, wheezing, rhonchi or rales.   Chest:      Chest wall: Mass present. No deformity, swelling, tenderness, crepitus or edema. There is no dullness to percussion.   Breasts:     Right: Mass present. No nipple discharge, skin change or tenderness.      Left: Normal.      Comments: 1.55 1.7 cm mass in the right mid to upper outer breasts  Abdominal:      General: Abdomen is protuberant. Bowel sounds are normal.      Palpations: Abdomen is soft. There is no shifting dullness, hepatomegaly, splenomegaly, mass or pulsatile mass.      Tenderness: There is no abdominal tenderness. There is no right CVA tenderness or left CVA tenderness.   Musculoskeletal:      Cervical back: Full passive range of motion without pain and normal range of motion. No edema, rigidity, bony tenderness or crepitus. No pain with movement, spinous process tenderness or muscular tenderness.      Thoracic back: No bony tenderness.      Lumbar back: No bony tenderness.      Right lower leg: No edema.      Left lower leg: No edema.   Lymphadenopathy:      Cervical: No cervical adenopathy.      Right cervical: No superficial, deep or posterior cervical adenopathy.     Left cervical: No superficial, deep or posterior cervical adenopathy.      Upper Body:      Right upper body: No supraclavicular, axillary or pectoral adenopathy.      Left upper body: No supraclavicular, axillary or pectoral adenopathy.   Neurological:      Mental Status: She is alert.            Labs: I have reviewed the following labs:            Lab Results   Component Value Date/Time     WBC 8.89 09/13/2024 11:01 AM     RBC 4.36 09/13/2024 11:01 AM     Hemoglobin 13.8 09/13/2024 11:01 AM     Hematocrit 42.8 09/13/2024 11:01 AM     MCV 98 09/13/2024 11:01 AM     MCH 31.7 09/13/2024 11:01 AM     RDW 12.4 09/13/2024 11:01 AM      Platelets 181 09/13/2024 11:01 AM                Lab Results   Component Value Date/Time     Potassium 4.0 01/09/2025 09:38 AM     Chloride 103 01/09/2025 09:38 AM     CO2 24 01/09/2025 09:38 AM     BUN 20 01/09/2025 09:38 AM     Creatinine 0.68 01/09/2025 09:38 AM     Glucose, Fasting 113 (H) 01/09/2025 09:38 AM     Calcium 8.8 01/09/2025 09:38 AM     AST 15 01/09/2025 09:38 AM     ALT 11 01/09/2025 09:38 AM     Alkaline Phosphatase 101 01/09/2025 09:38 AM     Total Protein 7.1 01/09/2025 09:38 AM     Albumin 4.1 01/09/2025 09:38 AM     Total Bilirubin 0.60 01/09/2025 09:38 AM     eGFR 92 01/09/2025 09:38 AM            Pathology:      December 23, 2024 S 24 [id number] status post ultrasound-guided biopsy right breast lesion 10 o'clock position 6 cm from the nipple  Final Diagnosis   A. Breast, Right, us guided breast bx, 10 ocl, 6 cm fn 3 passes, 12 g:  - Invasive breast carcinoma of no special type (ductal NST/invasive ductal carcinoma).   * Mina grade 2 of 3 (total score: 6 of 9)    -- tubule formation < 10%, score 3    -- nuclear grade 2 of 3, score 2    -- mitoses < 3/mm2, (</= 7 mitoses/10HPF), score 1.   * Confirmed by tumor cell immunophenotype:    -- positive: nuclear stain for GATA3 and membranous stain for E-cadherin and p120.    -- negative:  p63, calponin-B.     * Invasive carcinoma involves 3 of 3 submitted core biopsies, max. dimension = 16 millimeters.  - Ductal carcinoma in situ.   * Estrogen, progesterone & HER2 receptor studies pending, to be described in a separate receptor report.          Synoptic Checklist INVASIVE CARCINOMA OF THE BREAST: Biopsy (INVASIVE CARCINOMA OF THE BREAST: BIOPSY - All Specimens)Protocol posted: 3/22/2023 SPECIMEN Procedure Needle biopsy Specimen Laterality Right .      TUMOR Tumor Site Clock position 10 o'clock   Tumor Site Distance from nipple (Centimeters): 6 cm     Histologic Type Invasive carcinoma of no special type (ductal) Histologic Grade ([place]  Histologic Score) Glandular (Acinar) / Tubular Differentiation Score 3 Nuclear Pleomorphism Score 2 Mitotic Rate [id number] Fátima Reyes [id number]   Overall Grade Grade 2 (scores of 6 or 7)      Largest Invasive Focus in this Limited Biopsy Sample 16 mm      Ductal Carcinoma In Situ (DCIS) Present Architectural Patterns Solid Nuclear Grade Grade II (intermediate) Necrosis Not identified      Lymphatic and / or Vascular Invasion Not identified Microcalcifications Not identified .      Estrogen Receptor (ER) Status Negative (less than 1%)      Progesterone Receptor (PgR) Status Negative (less than 1%)      HER2 by Immunohistochemistry Equivocal (Score 2+)      Addendum  01/02/2025 [employer]  Fish  Non amplified  Negative        Imaging:      April 21, 2025 CT chest without contrast   FINDINGS:     LUNGS: There are multiple new nodular patchy opacities in the left lower lobe, the largest measuring 1.6 x 1.4 cm (3/184). It replaces the previously seen 3 mm left lower lobe pulmonary nodule. A few nodular opacities are also seen in the right lower   lobe measuring up to 3 mm (3/195) and dependent aspect of the right middle lobe measuring 1.1 cm (5/80).     Pulmonary nodules are seen as below.  *  Stable posterior subpleural right lower lobe 4 mm pulmonary nodule (5/42)  *  Stable 3 mm subpleural lingular pulmonary nodule (3/140)  *  A 2 mm right upper lobe pulmonary nodule (5/ 29), previously 1 mm on 3/11/2025.     there is no tracheal or endobronchial lesion.     PLEURA: Unremarkable.     HEART/GREAT VESSELS: Heart is unremarkable for patient's age. No thoracic aortic aneurysm.     MEDIASTINUM AND CONCEPCIÓN: Mildly enlarged right hilar lymph node measuring 1.5 cm (2/52), previously 0.8 cm on 3/11/2025     Small hiatal hernia     CHEST WALL AND LOWER NECK: Left-sided Mediport. Interval decrease in the size of right lateral breast mass measuring 1.3 x 1.1 cm (2/73), previously 2.0 x 1.8 cm on CT 1/20/2025.      VISUALIZED STRUCTURES IN THE UPPER ABDOMEN: Unremarkable.     OSSEOUS STRUCTURES: No acute fracture or destructive osseous lesion.     IMPRESSION:     A few new nodular patchy opacities in the left lower lobe, the largest measuring 1.6 cm, replacing the previously seen 3 mm left lower lobe pulmonary nodule. A few smaller nodular opacities are also seen in the right lower lobe and right middle lobe.   Findings likely represent multifocal infectious/aspiration pneumonitis, limiting assessment of underlying pulmonary nodules. Follow-up CT chest in 2 months is recommended to assess for resolution of consolidation and for better assessment of pulmonary   nodules.     A mildly enlarged right hilar lymph node measuring 1.5 cm, reactive or neoplastic. Attention on follow-up CT chest is recommended to assess for resolution/persistence     Interval decrease in the size of right lateral breast mass since January 2025.     A 2 mm right upper lobe pulmonary nodule, mildly increased in size since 3/11/2025. Additional small stable pulmonary nodules. Attention on follow-up CT chest is recommended to assess for stability.     The study was marked in EPIC for immediate notification.                 January 27, 2025 2D cardiac echo  Summary    Left Ventricle: Left ventricular cavity size is normal. Wall thickness is normal. The left ventricular ejection fraction is 60%. Systolic function is normal. Wall motion is normal. Diastolic function is mildly abnormal, consistent with grade I (abnormal) relaxation.    IVS: There is sigmoid appearance of the septum.    Right Ventricle: Right ventricular cavity size is normal. Systolic function is normal.    Left Atrium: The atrium is mildly dilated (35-41 mL/m2).    Right Atrium: The atrium is normal in size.                       January 20, 2025 CT scan of the chest abdomen and pelvis with contrast  IMPRESSION:     A 2.0 cm right breast mass with a biopsy clip in keeping with the patient's  recently diagnosed breast cancer.     A 3 mm left upper lobe pulmonary nodule. Based on current Fleischner Society 2017 Guidelines on incidental pulmonary nodule, patients with a known malignancy are at increased risk of metastasis and should receive initial three-month follow-up chest CT.     No evidence of metastatic disease in the abdomen and pelvis.     The study was marked in EPIC for immediate notification.        January 17, 2025 nuclear medicine whole-body bone scan  IMPRESSION:     1. No scintigraphic evidence of osseous metastasis.        December 18, 2024 targeted right breast ultrasound with bilateral diagnostic mammogram     FINDINGS:   RIGHT  1) MASS [B]  Mammo diagnostic bilateral w 3d and cad: There is a 25 mm high density, oval mass with indistinct margins seen in the upper outer quadrant of the right breast at 10 o'clock, 6 cm from the nipple. The mass correlates with the palpable finding noted during physical examination.   US breast right limited (diagnostic): There is a 19 mm x 17 mm x 22 mm oval, hypoechoic mass with indistinct margins seen in the upper outer quadrant of the right breast at 10 o'clock, 6 cm from the nipple. The mass correlates with the palpable finding noted during physical examination.  Sonographic exam of the right axilla shows no suspicious adenopathy.  Scattered benign-appearing calcifications are demonstrated in the right breast.  Right breast biopsy clip.     Left  Mammo diagnostic bilateral w 3d and cad  There are no suspicious masses, grouped microcalcifications or areas of unexplained architectural distortion. The skin and nipple areolar complex are unremarkable.   Results were discussed with the patient.  Ultrasound-guided biopsy was recommended.     IMPRESSION:  Solid mass in the upper outer right breast corresponding to the area of clinical concern and highly suspicious for malignancy.     ASSESSMENT/BI-RADS CATEGORY:  Left: 1 - Negative  Right: 5 - Highly  Suggestive of Malignancy  Overall: 5 - Highly Suggestive of Malignancy     RECOMMENDATION:       - Ultrasound-guided breast biopsy for the right breast.       - Routine screening mammogram in 1 year for the left breast.           February 19, 2024 bilateral screening mammography     FINDINGS:   There are no suspicious masses, grouped microcalcifications or areas of architectural distortion. The skin and nipple areolar complex are unremarkable.     IMPRESSION:  No mammographic evidence of malignancy.           ASSESSMENT/BI-RADS CATEGORY:  Left: 1 - Negative  Right: 1 - Negative  Overall: 1 - Negative     RECOMMENDATION:       - Routine screening mammogram in 1 year for both breasts.

## 2025-07-20 DIAGNOSIS — I10 ESSENTIAL HYPERTENSION: ICD-10-CM

## 2025-07-21 ENCOUNTER — OFFICE VISIT (OUTPATIENT)
Dept: HEMATOLOGY ONCOLOGY | Facility: CLINIC | Age: 65
End: 2025-07-21
Payer: COMMERCIAL

## 2025-07-21 ENCOUNTER — DOCUMENTATION (OUTPATIENT)
Dept: HEMATOLOGY ONCOLOGY | Facility: CLINIC | Age: 65
End: 2025-07-21

## 2025-07-21 VITALS
WEIGHT: 222 LBS | OXYGEN SATURATION: 97 % | RESPIRATION RATE: 18 BRPM | SYSTOLIC BLOOD PRESSURE: 96 MMHG | HEIGHT: 66 IN | TEMPERATURE: 97.6 F | BODY MASS INDEX: 35.68 KG/M2 | HEART RATE: 67 BPM | DIASTOLIC BLOOD PRESSURE: 62 MMHG

## 2025-07-21 DIAGNOSIS — Z17.1 MALIGNANT NEOPLASM OF UPPER-OUTER QUADRANT OF RIGHT BREAST IN FEMALE, ESTROGEN RECEPTOR NEGATIVE (HCC): Primary | ICD-10-CM

## 2025-07-21 DIAGNOSIS — C50.411 MALIGNANT NEOPLASM OF UPPER-OUTER QUADRANT OF RIGHT BREAST IN FEMALE, ESTROGEN RECEPTOR NEGATIVE (HCC): Primary | ICD-10-CM

## 2025-07-21 PROCEDURE — 99215 OFFICE O/P EST HI 40 MIN: CPT | Performed by: INTERNAL MEDICINE

## 2025-07-21 RX ORDER — HYDROCHLOROTHIAZIDE 12.5 MG/1
12.5 TABLET ORAL DAILY
Qty: 30 TABLET | Refills: 1 | Status: SHIPPED | OUTPATIENT
Start: 2025-07-21

## 2025-07-21 NOTE — PROGRESS NOTES
Referral to rad onc received from med onc.  Chart reviewed by oncology nurse navigator at this time.      Diagnosis: breast cancer  After review of chart, instructions for scheduling added to referral and sent to be scheduled as advised.

## 2025-07-21 NOTE — TELEPHONE ENCOUNTER
Patient needs an appointment. Please contact the patient to schedule an appointment. Last office visit: 9/13/24

## 2025-07-22 ENCOUNTER — TELEPHONE (OUTPATIENT)
Dept: SURGERY | Facility: CLINIC | Age: 65
End: 2025-07-22

## 2025-07-22 NOTE — TELEPHONE ENCOUNTER
Called and left message for the patient to remind her that she will need medical clearance before her surgery along with testing.   I stated that if she had any questions, to please do not hesitate to call me at the office.

## 2025-07-22 NOTE — ASSESSMENT & PLAN NOTE
65F with HTN, HL, hypothyroidism, history of basal cell carcinoma of the head and neck, who presented with a palpable right breast mass in late 2024, found to have a [id number] grade 2 triple negative right breast invasive ductal carcinoma, keshia reflector in place. Genetics negative. Bone scan negative. She is status post neoadjuvant chemotherapy. Immunotherapy was stopped early in the treatment course related to side effects.     Initial CT scan torso negative other than a 3mm left upper lobe lung nodule, in follow up additional nodular opacities were noted bilaterally, a 1.5cm right hilar node was seen as well. Pulmonary evaluation completed with plan for close follow up. Most up to date CT chest resulted at our visit showing lung base findings consistent with inflammatory/infectious changes and a stable hilar node. Findings may be related to immunotherapy as she is clinically asymptomatic. Will follow up again in 10/2025.    Discussed surgical options, reviewed breast conservation versus mastectomy, discussed the process of a sentinel lymph node biopsy. We decided to proceed with a right breast lumpectomy with keshia  localization, right axillary sentinel lymph node biopsy. Discussed typical perioperative course. Informed consent obtained, discussed risks including bleeding, infection, wound healing difficulties, recurrence, need for further procedures, lymphedema.     Preoperative labs were ordered. Will allow approximately 4-5 weeks of recovery after her final chemotherapy prior to OR.         Orders:    CBC and differential; Future    Comprehensive metabolic panel; Future    Case request operating room: RIGHT BREAST LUMPECTOMY WITH KESHIA  LOCALIZATION, RIGHT AXILLARY SENTINEL LYMPH NODE BIOPSY; Standing    NM sentinal node inj only; Future

## 2025-07-22 NOTE — PROGRESS NOTES
Name: Fátima Reyes      : 1960      MRN: [id number]  Encounter Provider: Jordan Esparza MD  Encounter Date: 2025   Encounter department: [facility]  :  Assessment & Plan  Invasive ductal carcinoma of breast, female, right (HCC)  65F with HTN, HL, hypothyroidism, history of basal cell carcinoma of the head and neck, who presented with a palpable right breast mass in late , found to have a [id number] grade 2 triple negative right breast invasive ductal carcinoma, keshia reflector in place. Genetics negative. Bone scan negative. She is status post neoadjuvant chemotherapy. Immunotherapy was stopped early in the treatment course related to side effects.     Initial CT scan torso negative other than a 3mm left upper lobe lung nodule, in follow up additional nodular opacities were noted bilaterally, a 1.5cm right hilar node was seen as well. Pulmonary evaluation completed with plan for close follow up. Most up to date CT chest resulted at our visit showing lung base findings consistent with inflammatory/infectious changes and a stable hilar node. Findings may be related to immunotherapy as she is clinically asymptomatic. Will follow up again in 10/2025.    Discussed surgical options, reviewed breast conservation versus mastectomy, discussed the process of a sentinel lymph node biopsy. We decided to proceed with a right breast lumpectomy with keshia  localization, right axillary sentinel lymph node biopsy. Discussed typical perioperative course. Informed consent obtained, discussed risks including bleeding, infection, wound healing difficulties, recurrence, need for further procedures, lymphedema.     Preoperative labs were ordered. Will allow approximately 4-5 weeks of recovery after her final chemotherapy prior to OR.         Orders:    CBC and differential; Future    Comprehensive metabolic panel; Future    Case request operating room: RIGHT BREAST LUMPECTOMY WITH  "JUAN DAVID  LOCALIZATION, RIGHT AXILLARY SENTINEL LYMPH NODE BIOPSY; Standing    NM sentinal node inj only; Future          History of Present Illness   Fátima Reyes is a 65 y.o. female here status post neoadjuvant chemotherapy to finalize OR planning. She has stable to improving CT chest findings, likely inflammatory or infectious. US shows a 13 x 4 x 13mm mass, improved from 19 x 17 x 22mm initially.  History of Present Illness    Review of Systems   Constitutional:  Positive for activity change and fatigue.   Skin:         Improving right sided breast cancer after neoadjuvant therapy   Hematological: Negative.    All other systems reviewed and are negative.   as per HPI.  Past Medical History   Past Medical History[1]  Past Surgical History[2]  Family History[3]   reports that she has never smoked. She has never used smokeless tobacco. She reports current alcohol use. She reports that she does not use drugs.  Current Outpatient Medications   Medication Instructions    atorvastatin (LIPITOR) 20 mg, Oral, Daily    doxycycline hyclate (VIBRAMYCIN) 100 mg, Daily PRN    hydroCHLOROthiazide 12.5 mg, Oral, Daily    Multiple Vitamin (MULTIVITAMINS PO) Take by mouth    nebivolol (BYSTOLIC) 20 mg, Oral, Daily    ondansetron (ZOFRAN) 8 mg, Oral, Every 8 hours PRN    triamcinolone (KENALOG) 0.1 % cream    Allergies[4]   Medications Ordered Prior to Encounter[5]   Social History[6]     Objective   /62 (BP Location: Right arm, Patient Position: Sitting, Cuff Size: Large)   Pulse 69   Temp 97.6 °F (36.4 °C) (Temporal)   Resp 16   Ht 5' 6\" (1.676 m)   Wt 103 kg (226 lb)   LMP  (LMP Unknown)   SpO2 96%   BMI 36.48 kg/m²      Physical Exam  Vitals reviewed.   Constitutional:       General: She is not in acute distress.     Appearance: She is not toxic-appearing.   HENT:      Head: Normocephalic.      Nose: No congestion.      Mouth/Throat:      Mouth: Mucous membranes are moist.     Eyes:      Pupils: Pupils are " equal, round, and reactive to light.       Cardiovascular:      Rate and Rhythm: Normal rate.   Pulmonary:      Effort: Pulmonary effort is normal. No respiratory distress.   Abdominal:      Palpations: Abdomen is soft.     Musculoskeletal:         General: Normal range of motion.      Cervical back: Normal range of motion.     Skin:     General: Skin is warm.      Capillary Refill: Capillary refill takes less than 2 seconds.     Neurological:      General: No focal deficit present.      Mental Status: She is alert. Mental status is at baseline.     Psychiatric:         Mood and Affect: Mood normal.        The patient has no palpable cervical, supraclavicular, or axillary lymphadenopathy bilaterally.  The breasts are symmetric in appearance. She has an almost imperceptible residual mass in the right upper outer breast, much improved from prior.  There are otherwise no dominant lumps, masses, skin changes or nipple retraction bilaterally.     Administrative Statements   I have spent a total time of 40 minutes in caring for this patient on the day of the visit/encounter including Diagnostic results, Prognosis, Risks and benefits of tx options, Instructions for management, Patient and family education, Impressions, Counseling / Coordination of care, Reviewing/placing orders in the medical record (including tests, medications, and/or procedures), and Obtaining or reviewing history  .       [1]   Past Medical History:  Diagnosis Date    Hyperlipidemia 9/18/2012    Hypertension     Hypothyroidism 10/23/2018    Lyme disease 07/21/2015    Rosacea     Skin cancer     H/O Basal Cell Cancer   [2]   Past Surgical History:  Procedure Laterality Date    BREAST BIOPSY Right 08/05/2005    benign aprocrine metaplasia    FRACTURE SURGERY  5/1/2019    IR PORT PLACEMENT  01/29/2025    ORBITAL FRACTURE SURGERY Left 05/01/2019    Procedure: OPEN REDUCTION W/ INTERNAL FIXATION (ORIF) ORBITAL;  Surgeon: Quirino Silverio MD;  Location: [facility]  OR;  Service: Plastics    US GUIDED BREAST BIOPSY RIGHT COMPLETE Right 12/23/2024    WISDOM TOOTH EXTRACTION     [3]   Family History  Problem Relation Name Age of Onset    Breast cancer Mother trevor 85    Heart disease Father abilio         Cardiac Disorder    Dementia Father abilio     Hyperlipidemia Father abilio     Pancreatic cancer Maternal Uncle      No Known Problems Brother      No Known Problems Daughter      Stroke Maternal Grandmother babe     Alcohol abuse Maternal Grandfather hui     No Known Problems Paternal Grandmother      No Known Problems Daughter      No Known Problems Maternal Aunt      No Known Problems Paternal Aunt     [4] No Known Allergies  [5]   Current Outpatient Medications on File Prior to Visit   Medication Sig Dispense Refill    atorvastatin (LIPITOR) 20 mg tablet TAKE ONE TABLET BY MOUTH EVERY DAY 90 tablet 1    doxycycline hyclate (VIBRAMYCIN) 100 mg capsule Take 100 mg by mouth daily as needed      Multiple Vitamin (MULTIVITAMINS PO) Take by mouth      nebivolol (BYSTOLIC) 20 MG tablet TAKE ONE TABLET BY MOUTH EVERY DAY 90 tablet 1    ondansetron (ZOFRAN) 8 mg tablet Take 1 tablet (8 mg total) by mouth every 8 (eight) hours as needed for nausea or vomiting (Patient not taking: Reported on 7/21/2025) 20 tablet 0    triamcinolone (KENALOG) 0.1 % cream  (Patient not taking: Reported on 7/21/2025)       No current facility-administered medications on file prior to visit.   [6]   Social History  Tobacco Use    Smoking status: Never    Smokeless tobacco: Never   Vaping Use    Vaping status: Never Used   Substance and Sexual Activity    Alcohol use: Yes    Drug use: No    Sexual activity: Yes     Partners: Male     Birth control/protection: Post-menopausal

## 2025-07-28 ENCOUNTER — APPOINTMENT (OUTPATIENT)
Dept: LAB | Facility: CLINIC | Age: 65
End: 2025-07-28
Payer: COMMERCIAL

## 2025-07-28 ENCOUNTER — PATIENT OUTREACH (OUTPATIENT)
Dept: HEMATOLOGY ONCOLOGY | Facility: CLINIC | Age: 65
End: 2025-07-28

## 2025-07-28 DIAGNOSIS — E86.0 DEHYDRATION: ICD-10-CM

## 2025-07-28 DIAGNOSIS — Z17.1 MALIGNANT NEOPLASM OF UPPER-OUTER QUADRANT OF RIGHT BREAST IN FEMALE, ESTROGEN RECEPTOR NEGATIVE (HCC): ICD-10-CM

## 2025-07-28 DIAGNOSIS — C50.411 MALIGNANT NEOPLASM OF UPPER-OUTER QUADRANT OF RIGHT BREAST IN FEMALE, ESTROGEN RECEPTOR NEGATIVE (HCC): ICD-10-CM

## 2025-07-28 LAB
ALBUMIN SERPL BCG-MCNC: 4 G/DL (ref 3.5–5)
ALP SERPL-CCNC: 88 U/L (ref 34–104)
ALT SERPL W P-5'-P-CCNC: 17 U/L (ref 7–52)
ANION GAP SERPL CALCULATED.3IONS-SCNC: 6 MMOL/L (ref 4–13)
AST SERPL W P-5'-P-CCNC: 23 U/L (ref 13–39)
BASOPHILS # BLD AUTO: 0.04 THOUSANDS/ÂΜL (ref 0–0.1)
BASOPHILS NFR BLD AUTO: 1 % (ref 0–1)
BILIRUB SERPL-MCNC: 0.3 MG/DL (ref 0.2–1)
BUN SERPL-MCNC: 12 MG/DL (ref 5–25)
CALCIUM SERPL-MCNC: 9.6 MG/DL (ref 8.4–10.2)
CHLORIDE SERPL-SCNC: 104 MMOL/L (ref 96–108)
CO2 SERPL-SCNC: 33 MMOL/L (ref 21–32)
CREAT SERPL-MCNC: 0.65 MG/DL (ref 0.6–1.3)
EOSINOPHIL # BLD AUTO: 0.05 THOUSAND/ÂΜL (ref 0–0.61)
EOSINOPHIL NFR BLD AUTO: 1 % (ref 0–6)
ERYTHROCYTE [DISTWIDTH] IN BLOOD BY AUTOMATED COUNT: 15 % (ref 11.6–15.1)
GFR SERPL CREATININE-BSD FRML MDRD: 93 ML/MIN/1.73SQ M
GLUCOSE SERPL-MCNC: 68 MG/DL (ref 65–140)
HCT VFR BLD AUTO: 33.4 % (ref 34.8–46.1)
HGB BLD-MCNC: 10.6 G/DL (ref 11.5–15.4)
IMM GRANULOCYTES # BLD AUTO: 0.06 THOUSAND/UL (ref 0–0.2)
IMM GRANULOCYTES NFR BLD AUTO: 1 % (ref 0–2)
LYMPHOCYTES # BLD AUTO: 0.67 THOUSANDS/ÂΜL (ref 0.6–4.47)
LYMPHOCYTES NFR BLD AUTO: 10 % (ref 14–44)
MCH RBC QN AUTO: 33.4 PG (ref 26.8–34.3)
MCHC RBC AUTO-ENTMCNC: 31.7 G/DL (ref 31.4–37.4)
MCV RBC AUTO: 105 FL (ref 82–98)
MONOCYTES # BLD AUTO: 1.14 THOUSAND/ÂΜL (ref 0.17–1.22)
MONOCYTES NFR BLD AUTO: 17 % (ref 4–12)
NEUTROPHILS # BLD AUTO: 4.78 THOUSANDS/ÂΜL (ref 1.85–7.62)
NEUTS SEG NFR BLD AUTO: 70 % (ref 43–75)
NRBC BLD AUTO-RTO: 0 /100 WBCS
PLATELET # BLD AUTO: 364 THOUSANDS/UL (ref 149–390)
PMV BLD AUTO: 10.2 FL (ref 8.9–12.7)
POTASSIUM SERPL-SCNC: 4.4 MMOL/L (ref 3.5–5.3)
PROT SERPL-MCNC: 6.6 G/DL (ref 6.4–8.4)
RBC # BLD AUTO: 3.17 MILLION/UL (ref 3.81–5.12)
SODIUM SERPL-SCNC: 143 MMOL/L (ref 135–147)
T3FREE SERPL-MCNC: 2.93 PG/ML (ref 2.5–3.9)
T4 FREE SERPL-MCNC: 0.56 NG/DL (ref 0.61–1.12)
TSH SERPL DL<=0.05 MIU/L-ACNC: 17.34 UIU/ML (ref 0.45–4.5)
WBC # BLD AUTO: 6.74 THOUSAND/UL (ref 4.31–10.16)

## 2025-07-28 PROCEDURE — 84481 FREE ASSAY (FT-3): CPT

## 2025-07-28 PROCEDURE — 36415 COLL VENOUS BLD VENIPUNCTURE: CPT

## 2025-07-28 PROCEDURE — 84443 ASSAY THYROID STIM HORMONE: CPT

## 2025-07-28 PROCEDURE — 80053 COMPREHEN METABOLIC PANEL: CPT

## 2025-07-28 PROCEDURE — 84439 ASSAY OF FREE THYROXINE: CPT

## 2025-07-28 PROCEDURE — 85025 COMPLETE CBC W/AUTO DIFF WBC: CPT

## 2025-07-30 ENCOUNTER — HOSPITAL ENCOUNTER (OUTPATIENT)
Dept: INFUSION CENTER | Facility: CLINIC | Age: 65
Discharge: HOME | End: 2025-07-30
Payer: COMMERCIAL

## 2025-07-30 ENCOUNTER — HOSPITAL ENCOUNTER (OUTPATIENT)
Dept: INFUSION CENTER | Facility: CLINIC | Age: 65
Discharge: HOME | End: 2025-07-30
Attending: INTERNAL MEDICINE

## 2025-07-30 DIAGNOSIS — Z95.828 PORT-A-CATH IN PLACE: Primary | ICD-10-CM

## 2025-07-30 PROCEDURE — 96523 IRRIG DRUG DELIVERY DEVICE: CPT

## 2025-08-04 ENCOUNTER — CONSULT (OUTPATIENT)
Dept: FAMILY MEDICINE CLINIC | Facility: CLINIC | Age: 65
End: 2025-08-04
Payer: COMMERCIAL

## 2025-08-04 VITALS
TEMPERATURE: 97.6 F | HEART RATE: 67 BPM | HEIGHT: 66 IN | BODY MASS INDEX: 34.79 KG/M2 | RESPIRATION RATE: 17 BRPM | OXYGEN SATURATION: 97 % | SYSTOLIC BLOOD PRESSURE: 122 MMHG | DIASTOLIC BLOOD PRESSURE: 80 MMHG | WEIGHT: 216.5 LBS

## 2025-08-04 DIAGNOSIS — I10 ESSENTIAL HYPERTENSION: ICD-10-CM

## 2025-08-04 DIAGNOSIS — E03.9 ACQUIRED HYPOTHYROIDISM: ICD-10-CM

## 2025-08-04 DIAGNOSIS — Z17.1 MALIGNANT NEOPLASM OF UPPER-OUTER QUADRANT OF RIGHT BREAST IN FEMALE, ESTROGEN RECEPTOR NEGATIVE (HCC): ICD-10-CM

## 2025-08-04 DIAGNOSIS — Z01.818 PRE-OP EXAM: Primary | ICD-10-CM

## 2025-08-04 DIAGNOSIS — C50.411 MALIGNANT NEOPLASM OF UPPER-OUTER QUADRANT OF RIGHT BREAST IN FEMALE, ESTROGEN RECEPTOR NEGATIVE (HCC): ICD-10-CM

## 2025-08-04 PROCEDURE — 99243 OFF/OP CNSLTJ NEW/EST LOW 30: CPT | Performed by: FAMILY MEDICINE

## 2025-08-05 ENCOUNTER — TELEPHONE (OUTPATIENT)
Dept: FAMILY MEDICINE CLINIC | Facility: CLINIC | Age: 65
End: 2025-08-05

## 2025-08-05 DIAGNOSIS — E03.9 HYPOTHYROIDISM, UNSPECIFIED TYPE: Primary | ICD-10-CM

## 2025-08-05 PROBLEM — Z01.818 PRE-OP EXAM: Status: ACTIVE | Noted: 2025-08-05

## 2025-08-05 RX ORDER — LEVOTHYROXINE SODIUM 50 UG/1
50 TABLET ORAL
Qty: 100 TABLET | Refills: 1 | Status: SHIPPED | OUTPATIENT
Start: 2025-08-05

## 2025-08-07 PROCEDURE — 93000 ELECTROCARDIOGRAM COMPLETE: CPT | Performed by: FAMILY MEDICINE

## 2025-08-08 ENCOUNTER — OFFICE VISIT (OUTPATIENT)
Dept: HEMATOLOGY ONCOLOGY | Facility: CLINIC | Age: 65
End: 2025-08-08
Payer: COMMERCIAL

## 2025-08-08 VITALS
RESPIRATION RATE: 16 BRPM | HEART RATE: 80 BPM | HEIGHT: 66 IN | WEIGHT: 222 LBS | TEMPERATURE: 97.6 F | SYSTOLIC BLOOD PRESSURE: 118 MMHG | BODY MASS INDEX: 35.68 KG/M2 | OXYGEN SATURATION: 97 % | DIASTOLIC BLOOD PRESSURE: 76 MMHG

## 2025-08-08 DIAGNOSIS — Z17.1 MALIGNANT NEOPLASM OF UPPER-OUTER QUADRANT OF RIGHT BREAST IN FEMALE, ESTROGEN RECEPTOR NEGATIVE (HCC): Primary | ICD-10-CM

## 2025-08-08 DIAGNOSIS — C50.411 MALIGNANT NEOPLASM OF UPPER-OUTER QUADRANT OF RIGHT BREAST IN FEMALE, ESTROGEN RECEPTOR NEGATIVE (HCC): Primary | ICD-10-CM

## 2025-08-08 PROCEDURE — 99213 OFFICE O/P EST LOW 20 MIN: CPT | Performed by: INTERNAL MEDICINE

## 2025-08-16 DIAGNOSIS — I10 ESSENTIAL HYPERTENSION: ICD-10-CM

## 2025-08-17 RX ORDER — NEBIVOLOL 20 MG/1
20 TABLET ORAL DAILY
Qty: 90 TABLET | Refills: 1 | Status: SHIPPED | OUTPATIENT
Start: 2025-08-17

## 2025-08-18 ENCOUNTER — APPOINTMENT (OUTPATIENT)
Dept: LAB | Facility: CLINIC | Age: 65
End: 2025-08-18
Payer: COMMERCIAL

## 2025-08-18 ENCOUNTER — TELEPHONE (OUTPATIENT)
Age: 65
End: 2025-08-18

## 2025-08-21 ENCOUNTER — ANESTHESIA EVENT (OUTPATIENT)
Dept: PERIOP | Facility: HOSPITAL | Age: 65
End: 2025-08-21
Payer: COMMERCIAL

## 2025-08-22 ENCOUNTER — ANESTHESIA (OUTPATIENT)
Dept: PERIOP | Facility: HOSPITAL | Age: 65
End: 2025-08-22
Payer: COMMERCIAL

## 2025-08-22 ENCOUNTER — APPOINTMENT (OUTPATIENT)
Dept: RADIOLOGY | Facility: HOSPITAL | Age: 65
End: 2025-08-22
Payer: COMMERCIAL

## 2025-08-22 ENCOUNTER — HOSPITAL ENCOUNTER (OUTPATIENT)
Facility: HOSPITAL | Age: 65
Setting detail: OUTPATIENT SURGERY
Discharge: HOME | End: 2025-08-22
Attending: SURGERY | Admitting: SURGERY
Payer: COMMERCIAL

## 2025-08-22 VITALS
DIASTOLIC BLOOD PRESSURE: 56 MMHG | BODY MASS INDEX: 35.36 KG/M2 | HEART RATE: 67 BPM | OXYGEN SATURATION: 94 % | HEIGHT: 66 IN | TEMPERATURE: 98.3 F | SYSTOLIC BLOOD PRESSURE: 103 MMHG | RESPIRATION RATE: 16 BRPM | WEIGHT: 220 LBS

## 2025-08-22 DIAGNOSIS — C50.911 INVASIVE DUCTAL CARCINOMA OF BREAST, FEMALE, RIGHT (HCC): ICD-10-CM

## 2025-08-22 RX ORDER — TRAMADOL HYDROCHLORIDE 50 MG/1
50 TABLET ORAL EVERY 6 HOURS PRN
Qty: 12 TABLET | Refills: 0 | Status: SHIPPED | OUTPATIENT
Start: 2025-08-22

## 2025-08-22 RX ORDER — MIDAZOLAM HYDROCHLORIDE 2 MG/2ML
INJECTION, SOLUTION INTRAMUSCULAR; INTRAVENOUS AS NEEDED
Status: DISCONTINUED | OUTPATIENT
Start: 2025-08-22 | End: 2025-08-22

## 2025-08-22 RX ORDER — DEXAMETHASONE SODIUM PHOSPHATE 10 MG/ML
INJECTION, SOLUTION INTRAMUSCULAR; INTRAVENOUS AS NEEDED
Status: DISCONTINUED | OUTPATIENT
Start: 2025-08-22 | End: 2025-08-22

## 2025-08-22 RX ORDER — FENTANYL CITRATE 50 UG/ML
INJECTION, SOLUTION INTRAMUSCULAR; INTRAVENOUS AS NEEDED
Status: DISCONTINUED | OUTPATIENT
Start: 2025-08-22 | End: 2025-08-22

## 2025-08-22 RX ORDER — CEFAZOLIN SODIUM 2 G/50ML
2000 SOLUTION INTRAVENOUS ONCE
Status: COMPLETED | OUTPATIENT
Start: 2025-08-22 | End: 2025-08-22

## 2025-08-22 RX ORDER — MAGNESIUM HYDROXIDE 1200 MG/15ML
LIQUID ORAL AS NEEDED
Status: DISCONTINUED | OUTPATIENT
Start: 2025-08-22 | End: 2025-08-22 | Stop reason: HOSPADM

## 2025-08-22 RX ORDER — SODIUM CHLORIDE, SODIUM LACTATE, POTASSIUM CHLORIDE, CALCIUM CHLORIDE 600; 310; 30; 20 MG/100ML; MG/100ML; MG/100ML; MG/100ML
125 INJECTION, SOLUTION INTRAVENOUS CONTINUOUS
Status: ACTIVE | OUTPATIENT
Start: 2025-08-22 | End: 2025-08-22

## 2025-08-22 RX ORDER — TRAMADOL HYDROCHLORIDE 50 MG/1
50 TABLET ORAL EVERY 6 HOURS PRN
Status: DISCONTINUED | OUTPATIENT
Start: 2025-08-22 | End: 2025-08-22 | Stop reason: HOSPADM

## 2025-08-22 RX ORDER — PHENYLEPHRINE HCL IN 0.9% NACL 1 MG/10 ML
SYRINGE (ML) INTRAVENOUS AS NEEDED
Status: DISCONTINUED | OUTPATIENT
Start: 2025-08-22 | End: 2025-08-22

## 2025-08-22 RX ORDER — FENTANYL CITRATE/PF 50 MCG/ML
25 SYRINGE (ML) INJECTION
Status: DISCONTINUED | OUTPATIENT
Start: 2025-08-22 | End: 2025-08-22 | Stop reason: HOSPADM

## 2025-08-22 RX ORDER — BUPIVACAINE HYDROCHLORIDE 5 MG/ML
INJECTION, SOLUTION EPIDURAL; INTRACAUDAL; PERINEURAL AS NEEDED
Status: DISCONTINUED | OUTPATIENT
Start: 2025-08-22 | End: 2025-08-22 | Stop reason: HOSPADM

## 2025-08-22 RX ORDER — HYDROMORPHONE HCL IN WATER/PF 6 MG/30 ML
0.2 PATIENT CONTROLLED ANALGESIA SYRINGE INTRAVENOUS
Status: DISCONTINUED | OUTPATIENT
Start: 2025-08-22 | End: 2025-08-22 | Stop reason: HOSPADM

## 2025-08-22 RX ORDER — GLYCOPYRROLATE 0.2 MG/ML
INJECTION INTRAMUSCULAR; INTRAVENOUS AS NEEDED
Status: DISCONTINUED | OUTPATIENT
Start: 2025-08-22 | End: 2025-08-22

## 2025-08-22 RX ORDER — SODIUM CHLORIDE, SODIUM LACTATE, POTASSIUM CHLORIDE, CALCIUM CHLORIDE 600; 310; 30; 20 MG/100ML; MG/100ML; MG/100ML; MG/100ML
125 INJECTION, SOLUTION INTRAVENOUS CONTINUOUS
Status: DISCONTINUED | OUTPATIENT
Start: 2025-08-22 | End: 2025-08-22

## 2025-08-22 RX ORDER — ONDANSETRON 2 MG/ML
4 INJECTION INTRAMUSCULAR; INTRAVENOUS ONCE AS NEEDED
Status: DISCONTINUED | OUTPATIENT
Start: 2025-08-22 | End: 2025-08-22 | Stop reason: HOSPADM

## 2025-08-22 RX ORDER — LIDOCAINE HYDROCHLORIDE 20 MG/ML
INJECTION, SOLUTION EPIDURAL; INFILTRATION; INTRACAUDAL; PERINEURAL AS NEEDED
Status: DISCONTINUED | OUTPATIENT
Start: 2025-08-22 | End: 2025-08-22

## 2025-08-22 RX ORDER — PROPOFOL 10 MG/ML
INJECTION, EMULSION INTRAVENOUS AS NEEDED
Status: DISCONTINUED | OUTPATIENT
Start: 2025-08-22 | End: 2025-08-22

## 2025-08-22 RX ORDER — ONDANSETRON 2 MG/ML
INJECTION INTRAMUSCULAR; INTRAVENOUS AS NEEDED
Status: DISCONTINUED | OUTPATIENT
Start: 2025-08-22 | End: 2025-08-22

## 2025-08-22 RX ADMIN — GLYCOPYRROLATE 0.2 MG: 0.2 INJECTION, SOLUTION INTRAMUSCULAR; INTRAVENOUS at 08:13

## 2025-08-22 RX ADMIN — FENTANYL CITRATE 25 MCG: 50 INJECTION INTRAMUSCULAR; INTRAVENOUS at 09:48

## 2025-08-22 RX ADMIN — DEXAMETHASONE SODIUM PHOSPHATE 10 MG: 10 INJECTION, SOLUTION INTRAMUSCULAR; INTRAVENOUS at 07:39

## 2025-08-22 RX ADMIN — PROPOFOL 130 MG: 10 INJECTION, EMULSION INTRAVENOUS at 07:39

## 2025-08-22 RX ADMIN — FENTANYL CITRATE 50 MCG: 50 INJECTION INTRAMUSCULAR; INTRAVENOUS at 09:05

## 2025-08-22 RX ADMIN — ONDANSETRON 4 MG: 2 INJECTION INTRAMUSCULAR; INTRAVENOUS at 09:24

## 2025-08-22 RX ADMIN — Medication 100 MCG: at 07:48

## 2025-08-22 RX ADMIN — CEFAZOLIN SODIUM 2000 MG: 2 SOLUTION INTRAVENOUS at 07:36

## 2025-08-22 RX ADMIN — Medication 100 MCG: at 08:08

## 2025-08-22 RX ADMIN — FENTANYL CITRATE 50 MCG: 50 INJECTION INTRAMUSCULAR; INTRAVENOUS at 07:39

## 2025-08-22 RX ADMIN — FENTANYL CITRATE 25 MCG: 50 INJECTION INTRAMUSCULAR; INTRAVENOUS at 08:03

## 2025-08-22 RX ADMIN — PHENYLEPHRINE HYDROCHLORIDE 100 MCG: 10 INJECTION INTRAVENOUS at 08:15

## 2025-08-22 RX ADMIN — FENTANYL CITRATE 25 MCG: 50 INJECTION INTRAMUSCULAR; INTRAVENOUS at 08:55

## 2025-08-22 RX ADMIN — LIDOCAINE HYDROCHLORIDE 100 MG: 20 INJECTION, SOLUTION EPIDURAL; INFILTRATION; INTRACAUDAL; PERINEURAL at 07:39

## 2025-08-22 RX ADMIN — SODIUM CHLORIDE, SODIUM LACTATE, POTASSIUM CHLORIDE, AND CALCIUM CHLORIDE: .6; .31; .03; .02 INJECTION, SOLUTION INTRAVENOUS at 07:34

## 2025-08-22 RX ADMIN — MIDAZOLAM 2 MG: 1 INJECTION INTRAMUSCULAR; INTRAVENOUS at 07:37

## 2025-08-22 RX ADMIN — Medication 100 MCG: at 07:56

## 2025-08-22 RX ADMIN — FENTANYL CITRATE 25 MCG: 50 INJECTION INTRAMUSCULAR; INTRAVENOUS at 09:31

## 2025-08-22 RX ADMIN — PHENYLEPHRINE HYDROCHLORIDE 50 MCG/MIN: 10 INJECTION INTRAVENOUS at 08:14

## 2025-08-22 RX ADMIN — PROPOFOL 100 MCG/KG/MIN: 10 INJECTION, EMULSION INTRAVENOUS at 07:40

## (undated) DEVICE — PROBE GAMMA TRUNODE

## (undated) DEVICE — STERISTRIP

## (undated) DEVICE — Device

## (undated) DEVICE — INTENDED FOR TISSUE SEPARATION, AND OTHER PROCEDURES THAT REQUIRE A SHARP SURGICAL BLADE TO PUNCTURE OR CUT.: Brand: BARD-PARKER ® CARBON RIB-BACK BLADES

## (undated) DEVICE — PACK PBDS MUSCLE FLAP RF

## (undated) DEVICE — SCD SEQUENTIAL COMPRESSION COMFORT SLEEVE MEDIUM KNEE LENGTH: Brand: KENDALL SCD

## (undated) DEVICE — HANDPIECE DISP SAVI SCOUT

## (undated) DEVICE — GLOVE SRG BIOGEL ECLIPSE 7

## (undated) DEVICE — ELECTRODE BLADE MOD E-Z CLEAN  2.75IN 7CM -0012AM

## (undated) DEVICE — PLUMEPEN PRO 10FT

## (undated) DEVICE — MARKER MARGIN 6 COLOR STANDARD

## (undated) DEVICE — SYRINGE BULB 2 OZ

## (undated) DEVICE — NEURO PATTIES 1 X 3

## (undated) DEVICE — DRAPE SHEET THREE QUARTER

## (undated) DEVICE — TIBURON SPLIT SHEET: Brand: CONVERTORS

## (undated) DEVICE — PACK UNIVERSAL NECK